# Patient Record
Sex: FEMALE | Race: WHITE | Employment: OTHER | URBAN - METROPOLITAN AREA
[De-identification: names, ages, dates, MRNs, and addresses within clinical notes are randomized per-mention and may not be internally consistent; named-entity substitution may affect disease eponyms.]

---

## 2019-03-20 ENCOUNTER — APPOINTMENT (OUTPATIENT)
Dept: ULTRASOUND IMAGING | Age: 84
DRG: 749 | End: 2019-03-20
Attending: EMERGENCY MEDICINE
Payer: MEDICARE

## 2019-03-20 ENCOUNTER — HOSPITAL ENCOUNTER (INPATIENT)
Age: 84
LOS: 15 days | Discharge: HOME | DRG: 749 | End: 2019-04-04
Attending: EMERGENCY MEDICINE | Admitting: FAMILY MEDICINE
Payer: MEDICARE

## 2019-03-20 ENCOUNTER — APPOINTMENT (OUTPATIENT)
Dept: CT IMAGING | Age: 84
DRG: 749 | End: 2019-03-20
Attending: EMERGENCY MEDICINE
Payer: MEDICARE

## 2019-03-20 DIAGNOSIS — L02.214 CUTANEOUS ABSCESS OF GROIN: Primary | ICD-10-CM

## 2019-03-20 DIAGNOSIS — Z98.890 S/P LAPAROSCOPY: ICD-10-CM

## 2019-03-20 DIAGNOSIS — K65.1 ABDOMINOPELVIC ABSCESS (HCC): ICD-10-CM

## 2019-03-20 PROBLEM — R10.9 ABDOMINAL PAIN: Status: ACTIVE | Noted: 2019-03-20

## 2019-03-20 LAB
ANION GAP SERPL CALC-SCNC: 6 MMOL/L (ref 5–15)
BASOPHILS # BLD: 0.1 K/UL (ref 0–0.1)
BASOPHILS NFR BLD: 1 % (ref 0–1)
BUN SERPL-MCNC: 17 MG/DL (ref 6–20)
BUN/CREAT SERPL: 18 (ref 12–20)
CALCIUM SERPL-MCNC: 9.5 MG/DL (ref 8.5–10.1)
CHLORIDE SERPL-SCNC: 105 MMOL/L (ref 97–108)
CO2 SERPL-SCNC: 29 MMOL/L (ref 21–32)
COMMENT, HOLDF: NORMAL
CREAT SERPL-MCNC: 0.93 MG/DL (ref 0.55–1.02)
D DIMER PPP FEU-MCNC: 2.74 MG/L FEU (ref 0–0.65)
DIFFERENTIAL METHOD BLD: ABNORMAL
EOSINOPHIL # BLD: 0.1 K/UL (ref 0–0.4)
EOSINOPHIL NFR BLD: 1 % (ref 0–7)
ERYTHROCYTE [DISTWIDTH] IN BLOOD BY AUTOMATED COUNT: 16.3 % (ref 11.5–14.5)
GLUCOSE SERPL-MCNC: 107 MG/DL (ref 65–100)
HCT VFR BLD AUTO: 34.2 % (ref 35–47)
HGB BLD-MCNC: 10.3 G/DL (ref 11.5–16)
IMM GRANULOCYTES # BLD AUTO: 0.1 K/UL (ref 0–0.04)
IMM GRANULOCYTES NFR BLD AUTO: 1 % (ref 0–0.5)
INR PPP: 1.1 (ref 0.9–1.1)
LACTATE BLD-SCNC: 1.04 MMOL/L (ref 0.4–2)
LYMPHOCYTES # BLD: 1.8 K/UL (ref 0.8–3.5)
LYMPHOCYTES NFR BLD: 15 % (ref 12–49)
MCH RBC QN AUTO: 29.9 PG (ref 26–34)
MCHC RBC AUTO-ENTMCNC: 30.1 G/DL (ref 30–36.5)
MCV RBC AUTO: 99.4 FL (ref 80–99)
MONOCYTES # BLD: 1.3 K/UL (ref 0–1)
MONOCYTES NFR BLD: 10 % (ref 5–13)
NEUTS SEG # BLD: 8.9 K/UL (ref 1.8–8)
NEUTS SEG NFR BLD: 72 % (ref 32–75)
NRBC # BLD: 0 K/UL (ref 0–0.01)
NRBC BLD-RTO: 0 PER 100 WBC
PLATELET # BLD AUTO: 407 K/UL (ref 150–400)
PMV BLD AUTO: 8.7 FL (ref 8.9–12.9)
POTASSIUM SERPL-SCNC: 3.5 MMOL/L (ref 3.5–5.1)
PROTHROMBIN TIME: 10.9 SEC (ref 9–11.1)
RBC # BLD AUTO: 3.44 M/UL (ref 3.8–5.2)
SAMPLES BEING HELD,HOLD: NORMAL
SODIUM SERPL-SCNC: 140 MMOL/L (ref 136–145)
TROPONIN I SERPL-MCNC: <0.05 NG/ML
WBC # BLD AUTO: 12.3 K/UL (ref 3.6–11)

## 2019-03-20 PROCEDURE — 36415 COLL VENOUS BLD VENIPUNCTURE: CPT

## 2019-03-20 PROCEDURE — 85379 FIBRIN DEGRADATION QUANT: CPT

## 2019-03-20 PROCEDURE — 71275 CT ANGIOGRAPHY CHEST: CPT

## 2019-03-20 PROCEDURE — 84484 ASSAY OF TROPONIN QUANT: CPT

## 2019-03-20 PROCEDURE — 96365 THER/PROPH/DIAG IV INF INIT: CPT

## 2019-03-20 PROCEDURE — 74177 CT ABD & PELVIS W/CONTRAST: CPT

## 2019-03-20 PROCEDURE — 85610 PROTHROMBIN TIME: CPT

## 2019-03-20 PROCEDURE — 83605 ASSAY OF LACTIC ACID: CPT

## 2019-03-20 PROCEDURE — 65270000032 HC RM SEMIPRIVATE

## 2019-03-20 PROCEDURE — 74011636320 HC RX REV CODE- 636/320: Performed by: RADIOLOGY

## 2019-03-20 PROCEDURE — 74011250636 HC RX REV CODE- 250/636: Performed by: FAMILY MEDICINE

## 2019-03-20 PROCEDURE — 80048 BASIC METABOLIC PNL TOTAL CA: CPT

## 2019-03-20 PROCEDURE — 76857 US EXAM PELVIC LIMITED: CPT

## 2019-03-20 PROCEDURE — 74011000258 HC RX REV CODE- 258: Performed by: EMERGENCY MEDICINE

## 2019-03-20 PROCEDURE — 87205 SMEAR GRAM STAIN: CPT

## 2019-03-20 PROCEDURE — 85025 COMPLETE CBC W/AUTO DIFF WBC: CPT

## 2019-03-20 PROCEDURE — 87040 BLOOD CULTURE FOR BACTERIA: CPT

## 2019-03-20 PROCEDURE — 75810000289 HC I&D ABSCESS SIMP/COMP/MULT

## 2019-03-20 PROCEDURE — 74011250636 HC RX REV CODE- 250/636: Performed by: EMERGENCY MEDICINE

## 2019-03-20 PROCEDURE — 74011000250 HC RX REV CODE- 250: Performed by: EMERGENCY MEDICINE

## 2019-03-20 PROCEDURE — 99285 EMERGENCY DEPT VISIT HI MDM: CPT

## 2019-03-20 PROCEDURE — 74011000258 HC RX REV CODE- 258: Performed by: RADIOLOGY

## 2019-03-20 PROCEDURE — 93005 ELECTROCARDIOGRAM TRACING: CPT

## 2019-03-20 RX ORDER — HEPARIN SODIUM 5000 [USP'U]/ML
5000 INJECTION, SOLUTION INTRAVENOUS; SUBCUTANEOUS EVERY 8 HOURS
Status: DISCONTINUED | OUTPATIENT
Start: 2019-03-20 | End: 2019-03-21

## 2019-03-20 RX ORDER — SODIUM CHLORIDE 0.9 % (FLUSH) 0.9 %
5-40 SYRINGE (ML) INJECTION EVERY 8 HOURS
Status: DISCONTINUED | OUTPATIENT
Start: 2019-03-20 | End: 2019-04-04 | Stop reason: HOSPADM

## 2019-03-20 RX ORDER — DOXYCYCLINE 100 MG/1
100 CAPSULE ORAL 2 TIMES DAILY
COMMUNITY
End: 2019-04-04

## 2019-03-20 RX ORDER — SODIUM CHLORIDE 0.9 % (FLUSH) 0.9 %
10 SYRINGE (ML) INJECTION
Status: COMPLETED | OUTPATIENT
Start: 2019-03-20 | End: 2019-03-20

## 2019-03-20 RX ORDER — BACITRACIN 500 UNIT/G
1 PACKET (EA) TOPICAL
Status: COMPLETED | OUTPATIENT
Start: 2019-03-20 | End: 2019-03-20

## 2019-03-20 RX ORDER — SODIUM CHLORIDE, SODIUM LACTATE, POTASSIUM CHLORIDE, CALCIUM CHLORIDE 600; 310; 30; 20 MG/100ML; MG/100ML; MG/100ML; MG/100ML
100 INJECTION, SOLUTION INTRAVENOUS CONTINUOUS
Status: DISCONTINUED | OUTPATIENT
Start: 2019-03-20 | End: 2019-03-20

## 2019-03-20 RX ORDER — METRONIDAZOLE 500 MG/100ML
500 INJECTION, SOLUTION INTRAVENOUS EVERY 12 HOURS
Status: DISCONTINUED | OUTPATIENT
Start: 2019-03-20 | End: 2019-03-23

## 2019-03-20 RX ORDER — SODIUM CHLORIDE 0.9 % (FLUSH) 0.9 %
5-40 SYRINGE (ML) INJECTION AS NEEDED
Status: DISCONTINUED | OUTPATIENT
Start: 2019-03-20 | End: 2019-04-04 | Stop reason: HOSPADM

## 2019-03-20 RX ORDER — ACETAMINOPHEN 325 MG/1
650 TABLET ORAL
Status: DISCONTINUED | OUTPATIENT
Start: 2019-03-20 | End: 2019-04-04 | Stop reason: HOSPADM

## 2019-03-20 RX ORDER — SODIUM CHLORIDE 9 MG/ML
75 INJECTION, SOLUTION INTRAVENOUS CONTINUOUS
Status: DISCONTINUED | OUTPATIENT
Start: 2019-03-20 | End: 2019-03-24

## 2019-03-20 RX ORDER — LIDOCAINE HYDROCHLORIDE AND EPINEPHRINE 10; 10 MG/ML; UG/ML
1.5 INJECTION, SOLUTION INFILTRATION; PERINEURAL ONCE
Status: COMPLETED | OUTPATIENT
Start: 2019-03-20 | End: 2019-03-20

## 2019-03-20 RX ORDER — VANCOMYCIN 1.75 GRAM/500 ML IN 0.9 % SODIUM CHLORIDE INTRAVENOUS
1750 ONCE
Status: COMPLETED | OUTPATIENT
Start: 2019-03-20 | End: 2019-03-21

## 2019-03-20 RX ADMIN — VANCOMYCIN HYDROCHLORIDE 1750 MG: 10 INJECTION, POWDER, LYOPHILIZED, FOR SOLUTION INTRAVENOUS at 22:49

## 2019-03-20 RX ADMIN — SODIUM CHLORIDE, SODIUM LACTATE, POTASSIUM CHLORIDE, AND CALCIUM CHLORIDE 100 ML/HR: 600; 310; 30; 20 INJECTION, SOLUTION INTRAVENOUS at 13:56

## 2019-03-20 RX ADMIN — BACITRACIN 1 PACKET: 500 OINTMENT TOPICAL at 17:25

## 2019-03-20 RX ADMIN — Medication 10 ML: at 16:15

## 2019-03-20 RX ADMIN — SODIUM CHLORIDE 100 ML: 900 INJECTION, SOLUTION INTRAVENOUS at 16:15

## 2019-03-20 RX ADMIN — HEPARIN SODIUM 5000 UNITS: 5000 INJECTION INTRAVENOUS; SUBCUTANEOUS at 21:34

## 2019-03-20 RX ADMIN — Medication 10 ML: at 21:21

## 2019-03-20 RX ADMIN — IOPAMIDOL 100 ML: 755 INJECTION, SOLUTION INTRAVENOUS at 16:15

## 2019-03-20 RX ADMIN — PIPERACILLIN AND TAZOBACTAM 3.38 G: 3; .375 INJECTION, POWDER, FOR SOLUTION INTRAVENOUS at 19:37

## 2019-03-20 RX ADMIN — SODIUM CHLORIDE 75 ML/HR: 900 INJECTION, SOLUTION INTRAVENOUS at 21:21

## 2019-03-20 RX ADMIN — LIDOCAINE HYDROCHLORIDE,EPINEPHRINE BITARTRATE 15 MG: 10; .01 INJECTION, SOLUTION INFILTRATION; PERINEURAL at 17:24

## 2019-03-20 RX ADMIN — METRONIDAZOLE 500 MG: 500 INJECTION, SOLUTION INTRAVENOUS at 21:32

## 2019-03-20 NOTE — ED PROVIDER NOTES
81 y.o. female with no significant past medical history documented, who presents ambulatory to the ED accompanied by friend, with chief complaint of skin problem. Friend reports that patient became \"sick\" with \"uncontrolable\" diarrhea and increased weakness that has progressively worsened. She states that the patient visited [facility] on 2/14/19, and was transported to [facility] in [place] after her WBC was \"59,000\". Friend reports that patient was diagnosed with \"sepsis, borderline tachycardia, renal failure and pelvic abscess\". 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 Friend also reports that patient visited [facility] on 3/13/19 to have abscess drained, and was prescribed doxycycline 100 mg BID. Pt reports that her home health care nurse came in this morning and recommended the patient come to the ED to have abscess drained. Patient also complains of mild shortness of breath while walking in the parking lot to the ED today. Pt notes that she last ate ~0730 this morning, and her last PO fluid intake was ~1000. Notes that she takes ASA every day. Pt specifically denies fever, nausea, vomiting, diarrhea, chest pain, shortness of breath, cough, hemoptysis, rhinorhea, congestion, sore throat, abdominal pain, dysuria, change in appetite or any pain. There are no other acute medical concerns at this time. Review of medical records indicate that the patient was admitted to [facility] for septic shock on 2/14/19. Records also indicate that patient had a CT on 3/7/19 which indicated resolution of previously demonstrated pelvic abscess and an increase in fluid collection in right inguinal region, abscess vs. Hematoma measuring 6 x 4.5 cm with no adenopathy.  Patient was also seen by Dr. Reddy Sin at [facility] 8 days ago and was diagnosed with this r groin abscess. She was placed on 10 days of doxycyline. Fluid culture from that visit shows no growth. Social hx: Negative for Tobacco use; Negative for EtOH use; Negative for Illicit Drug use PCP: No primary care provider on file. Note written by Isauro Cabrales, as dictated by Oral Kris, DO 1:39 PM 
 
The history is provided by the patient, medical records and a friend. No  was used. History reviewed. No pertinent past medical history. Past Surgical History:  
Procedure Laterality Date [address] UNLISTED Abcess drained History reviewed. No pertinent family history. Social History Socioeconomic History  Marital status:  Spouse name: Not on file  Number of children: Not on file  Years of education: Not on file  Highest education level: Not on file Occupational History  Not on file Social Needs  Financial resource strain: Not on file  Food insecurity:  
  Worry: Not on file Inability: Not on file  Transportation needs:  
  Medical: Not on file Non-medical: Not on file Tobacco Use  Smoking status: Never Smoker  Smokeless tobacco: Never Used Substance and Sexual Activity  Alcohol use: Not Currently  Drug use: Never  Sexual activity: Not on file Lifestyle  Physical activity:  
  Days per week: Not on file Minutes per session: Not on file  Stress: Not on file Relationships  Social connections:  
  Talks on phone: Not on file Gets together: Not on file Attends [employer] service: Not on file Active member of club or organization: Not on file Attends meetings of clubs or organizations: Not on file Relationship status: Not on file  Intimate partner violence:  
  Fear of current or ex partner: Not on file Emotionally abused: Not on file Physically abused: Not on file Forced sexual activity: Not on file Other Topics Concern  Not on file Social History Narrative  Not on file ALLERGIES: Patient has no known allergies. Review of Systems Constitutional: Negative for appetite change and fever. HENT: Negative for congestion, rhinorrhea and sore throat. Respiratory: Positive for shortness of breath. Negative for cough. Cardiovascular: Negative for chest pain. Gastrointestinal: Positive for diarrhea. Negative for abdominal pain, nausea and vomiting. Genitourinary: Negative for dysuria. Musculoskeletal: Negative for arthralgias, back pain, myalgias and neck pain. Skin: Positive for wound (left inguinal). Neurological: Positive for weakness (generalized). All other systems reviewed and are negative. Vitals:  
 03/20/19 1253 03/20/19 1317 BP:  125/64 Pulse: (!) 160 94 Resp:  20 Temp:  97.4 °F (36.3 °C) SpO2: 98% 98% Physical Exam  
Abdominal:  
 
 
  
Constitutional: Pt is awake and alert. Frail and elderly. NAD. HENT:  
Head: Normocephalic and atraumatic. Nose: Nose normal.  
Mouth/Throat: Oropharynx is clear and moist. No oropharyngeal exudate. Eyes: Conjunctivae and extraocular motions are normal. Pupils are equal, round, and reactive to light. Right eye exhibits no discharge. Left eye exhibits no discharge. No scleral icterus. Neck: No tracheal deviation present. Supple neck. Cardiovascular: Borderline tachycardic, regular rhythm, normal heart sounds and intact distal pulses. Exam reveals no gallop and no friction rub. No murmur heard. Pulmonary/Chest: Effort normal and breath sounds normal.  Pt  has no wheezes. Pt  has no rales. Abdominal: Soft. Pt  exhibits no distension and no mass. No tenderness. Pt  has no rebound and no guarding. Abscess noted, see above. Musculoskeletal:  Pt  exhibits no edema and no tenderness.   
Ext: Normal ROM in all four extremities; not tender to palpation; distal pulses are normal, no edema. Neurological:  Pt is alert. nonfocal neuro exam. 
Skin: Skin is warm and dry. Pt  is not diaphoretic. Psychiatric:  Pt  has a normal mood and affect. Behavior is normal.  
Note written by Isauro Cabrales, as dictated by Rodney Ponce DO 1:39 PM 
 
MDM I&D Abcess Complex Date/Time: 3/20/2019 5:42 PM 
Performed by: Rodney Ponce DO Authorized by: Rodney Ponce DO Consent:  
  Consent obtained:  Verbal 
  Consent given by:  Patient Risks discussed:  Bleeding Alternatives discussed:  No treatment Location:  
  Type:  Abscess Size:  4 cm diameter Location:  Trunk Trunk location:  Abdomen Pre-procedure details:  
  Skin preparation:  Chloraprep Procedure type:  
  Complexity:  Complex Procedure details:  
  Needle aspiration: no Incision types:  Stab incision and single straight Incision depth:  Subcutaneous Scalpel blade:  11 Wound management:  Probed and deloculated Drainage:  Purulent and bloody Drainage amount: Moderate Wound treatment:  Wound left open Packing materials:  1/2 in gauze Amount 1/2\": 5-6 inches Post-procedure details:  
  Patient tolerance of procedure: Tolerated well, no immediate complications Comments:  
   2 separate abscesses entered. 2 separate swabs for cx. ED EKG interpretation: 
Rhythm: sinus tachycardia; and regular . Rate (approx.): 132 bpm; Axis: normal; Intervals: normal; ST/T wave: no acute ST or T wave changes noted; no ectopy; Note written by Isauro Cabrales, as dictated by Rodney Ponce DO 1:39 PM 
 
PROGRESS NOTE: 
3:09 PM 
Reviewed ultrasound images. 5:43 PM - reviewed CT images earlier. Skin abscesses drained, packed. abx needed. Pelvic abscess is different - zosyn. Needs surgical consult, perhaps gyn onc. Hospitalist Andrews for Admission 5:47 PM 
 
ED Room Number: ER06/06 Patient Name and age:  Sarah Donahue 81 y.o.  female Working Diagnosis: 1. Cutaneous abscess of groin 2. Abdominopelvic abscess ([address] Readmission: yes Isolation Requirements:  no 
Recommended Level of Care:  med/surg Code Status:   Full CONSULT NOTE: 
6:21 PM Jossie Lu DO communicated with Dr. Lazaro Rodriguez MD, Consult for Hospitalist via [facility] Text. Discussed available diagnostic tests and clinical findings. Dr. Rosales Ng will evaluate the patient for admission to the hospital. 
 
6:21 PM 
Patient is being admitted to the hospital.  The results of their tests and reasons for their admission have been discussed with them and/or available family. They convey agreement and understanding for the need to be admitted and for their admission diagnosis. CONSULT NOTE: 
6:32 PM Jossie Lu DO spoke with Dr. Georgina Willis MD, Consult for Surgery. Discussed available diagnostic tests and clinical findings. Dr. Bogdan Mcmillan will evaluate the patient while in the hospital.  He has ordered IR drain to be done. Plans sinogram in 2 days. D/w pt and her friend who is with her. Labs reviewed Doubt nec fasciitis. Likely has mrsa in R groin.

## 2019-03-20 NOTE — PROGRESS NOTES
Clinical Pharmacy Note: Metronidazole Dosing Please note that the metronidazole dose for Ethel Mom has been changed to 500 mg IV q12h per [facility]-approved protocol. Please contact the pharmacy with any questions.  
 
Saúl Aguilar, OSEASD

## 2019-03-20 NOTE — PROGRESS NOTES
Admission Medication Reconciliation: 
Information obtained from: pt/ Rx Query Significant PMH/Disease States:  
History reviewed. No pertinent past medical history. Chief Complaint for this Admission:  abdominal abscess Allergies:  Patient has no known allergies. Prior to Admission Medications:  
Prior to Admission Medications Prescriptions Last Dose Informant Patient Reported? Taking?  
doxycycline (MONODOX) 100 mg capsule 3/20/2019 at am  Yes Yes Sig: Take 100 mg by mouth two (2) times a day. Facility-Administered Medications: None Comments/Recommendations: Reviewed PTA list w/ pt and Rx Query was used. Pt reported that she is only taking doxycycline since 3/13. Denies missing any doses. And the most recent dose was this morning. Pt denies taking any OTC medications or using any topical products. Reviewed medication allergies with pt, pt reported no know allergies. Nina Bruno PharmD candidate

## 2019-03-20 NOTE — ED TRIAGE NOTES
Triage: Patient arrives from home with c/o abscess that has been present since January of this year. Patient states she had it drained last Tuesday with a home health nurse that was following. Per nurse recommendation patient was to come into an ER to have abscess drained again. Patient denies having any fevers.

## 2019-03-21 ENCOUNTER — APPOINTMENT (OUTPATIENT)
Dept: CT IMAGING | Age: 84
DRG: 749 | End: 2019-03-21
Attending: SURGERY
Payer: MEDICARE

## 2019-03-21 LAB
ALBUMIN SERPL-MCNC: 2.2 G/DL (ref 3.5–5)
ALBUMIN/GLOB SERPL: 0.6 {RATIO} (ref 1.1–2.2)
ALP SERPL-CCNC: 58 U/L (ref 45–117)
ALT SERPL-CCNC: 25 U/L (ref 12–78)
ANION GAP SERPL CALC-SCNC: 7 MMOL/L (ref 5–15)
APTT PPP: 28.2 SEC (ref 22.1–32)
AST SERPL-CCNC: 17 U/L (ref 15–37)
ATRIAL RATE: 132 BPM
BASOPHILS # BLD: 0 K/UL (ref 0–0.1)
BASOPHILS NFR BLD: 0 % (ref 0–1)
BILIRUB SERPL-MCNC: 0.3 MG/DL (ref 0.2–1)
BUN SERPL-MCNC: 13 MG/DL (ref 6–20)
BUN/CREAT SERPL: 18 (ref 12–20)
CALCIUM SERPL-MCNC: 8.5 MG/DL (ref 8.5–10.1)
CALCULATED P AXIS, ECG09: 55 DEGREES
CALCULATED R AXIS, ECG10: 8 DEGREES
CALCULATED T AXIS, ECG11: 65 DEGREES
CHLORIDE SERPL-SCNC: 110 MMOL/L (ref 97–108)
CO2 SERPL-SCNC: 26 MMOL/L (ref 21–32)
CREAT SERPL-MCNC: 0.71 MG/DL (ref 0.55–1.02)
DIAGNOSIS, 93000: NORMAL
DIFFERENTIAL METHOD BLD: ABNORMAL
EOSINOPHIL # BLD: 0.1 K/UL (ref 0–0.4)
EOSINOPHIL NFR BLD: 1 % (ref 0–7)
ERYTHROCYTE [DISTWIDTH] IN BLOOD BY AUTOMATED COUNT: 16.4 % (ref 11.5–14.5)
GLOBULIN SER CALC-MCNC: 4 G/DL (ref 2–4)
GLUCOSE SERPL-MCNC: 116 MG/DL (ref 65–100)
HCT VFR BLD AUTO: 29.3 % (ref 35–47)
HGB BLD-MCNC: 9 G/DL (ref 11.5–16)
IMM GRANULOCYTES # BLD AUTO: 0.1 K/UL (ref 0–0.04)
IMM GRANULOCYTES NFR BLD AUTO: 1 % (ref 0–0.5)
LYMPHOCYTES # BLD: 1.6 K/UL (ref 0.8–3.5)
LYMPHOCYTES NFR BLD: 11 % (ref 12–49)
MCH RBC QN AUTO: 29.8 PG (ref 26–34)
MCHC RBC AUTO-ENTMCNC: 30.7 G/DL (ref 30–36.5)
MCV RBC AUTO: 97 FL (ref 80–99)
MONOCYTES # BLD: 1 K/UL (ref 0–1)
MONOCYTES NFR BLD: 7 % (ref 5–13)
NEUTS SEG # BLD: 11.4 K/UL (ref 1.8–8)
NEUTS SEG NFR BLD: 80 % (ref 32–75)
NRBC # BLD: 0 K/UL (ref 0–0.01)
NRBC BLD-RTO: 0 PER 100 WBC
P-R INTERVAL, ECG05: 160 MS
PLATELET # BLD AUTO: 358 K/UL (ref 150–400)
PMV BLD AUTO: 8.8 FL (ref 8.9–12.9)
POTASSIUM SERPL-SCNC: 3.4 MMOL/L (ref 3.5–5.1)
PROT SERPL-MCNC: 6.2 G/DL (ref 6.4–8.2)
Q-T INTERVAL, ECG07: 324 MS
QRS DURATION, ECG06: 68 MS
QTC CALCULATION (BEZET), ECG08: 480 MS
RBC # BLD AUTO: 3.02 M/UL (ref 3.8–5.2)
SODIUM SERPL-SCNC: 143 MMOL/L (ref 136–145)
THERAPEUTIC RANGE,PTTT: NORMAL SECS (ref 58–77)
VENTRICULAR RATE, ECG03: 132 BPM
WBC # BLD AUTO: 14.3 K/UL (ref 3.6–11)

## 2019-03-21 PROCEDURE — 74011250636 HC RX REV CODE- 250/636: Performed by: FAMILY MEDICINE

## 2019-03-21 PROCEDURE — 85730 THROMBOPLASTIN TIME PARTIAL: CPT

## 2019-03-21 PROCEDURE — 65270000032 HC RM SEMIPRIVATE

## 2019-03-21 PROCEDURE — 36415 COLL VENOUS BLD VENIPUNCTURE: CPT

## 2019-03-21 PROCEDURE — 80053 COMPREHEN METABOLIC PANEL: CPT

## 2019-03-21 PROCEDURE — 74011000258 HC RX REV CODE- 258: Performed by: FAMILY MEDICINE

## 2019-03-21 PROCEDURE — 85025 COMPLETE CBC W/AUTO DIFF WBC: CPT

## 2019-03-21 RX ADMIN — METRONIDAZOLE 500 MG: 500 INJECTION, SOLUTION INTRAVENOUS at 19:21

## 2019-03-21 RX ADMIN — HEPARIN SODIUM 5000 UNITS: 5000 INJECTION INTRAVENOUS; SUBCUTANEOUS at 07:01

## 2019-03-21 RX ADMIN — SODIUM CHLORIDE 75 ML/HR: 900 INJECTION, SOLUTION INTRAVENOUS at 16:40

## 2019-03-21 RX ADMIN — PIPERACILLIN SODIUM,TAZOBACTAM SODIUM 3.38 G: 3; .375 INJECTION, POWDER, FOR SOLUTION INTRAVENOUS at 09:12

## 2019-03-21 RX ADMIN — Medication 10 ML: at 15:26

## 2019-03-21 RX ADMIN — PIPERACILLIN SODIUM,TAZOBACTAM SODIUM 3.38 G: 3; .375 INJECTION, POWDER, FOR SOLUTION INTRAVENOUS at 17:45

## 2019-03-21 RX ADMIN — METRONIDAZOLE 500 MG: 500 INJECTION, SOLUTION INTRAVENOUS at 07:01

## 2019-03-21 RX ADMIN — PIPERACILLIN SODIUM,TAZOBACTAM SODIUM 3.38 G: 3; .375 INJECTION, POWDER, FOR SOLUTION INTRAVENOUS at 02:18

## 2019-03-21 RX ADMIN — Medication 10 ML: at 21:48

## 2019-03-21 RX ADMIN — VANCOMYCIN HYDROCHLORIDE 1000 MG: 1 INJECTION, POWDER, LYOPHILIZED, FOR SOLUTION INTRAVENOUS at 16:40

## 2019-03-21 NOTE — PROGRESS NOTES
Care Management Interventions PCP Verified by CM: No(12/2018  dr torres ) Last Visit to PCP: 12/13/18 Palliative Care Criteria Met (RRAT>21 & CHF Dx)?: No 
Mode of Transport at Discharge: (car son Judie Perez ) Transition of Care Consult (CM Consult): Home Health(patient open to [facility] ) [facility] - INPATIENT: No 
Reason Outside [place]: Patient already serviced by other home care/hospice agency Physical Therapy Consult: No 
Occupational Therapy Consult: No 
Speech Therapy Consult: No 
Current Support Network: Lives Alone(son lives in [place] ph [phone]) Confirm Follow Up Transport: Family(family) Plan discussed with Pt/Family/Caregiver: Yes(Patient who has an AMD and Living Will  uses [facility] in [place]) [employer] Information Provided?: No 
Discharge Location Discharge Placement: Home  met with patient and introduced self to patient who is retired from the [employer]. Patient lives in [place] and her son Mitul Lofton ph [phone] lives in [place]. Patient also has three siblings who lives within 5 miles of her home. Patient states she has been at three hospitals in the last month and is now on\"heavy duty antibiotics\". She started out at [facility] as her son lives in the area. She then went to [facility] and went home with [facility]. She would like to use them post discharge. .  Care management will follow for transitions of care needs.

## 2019-03-21 NOTE — H&P
[facility] HISTORY AND PHYSICAL Name:  Nury Morgan 
MR#:  [id number] :  1935 ACCOUNT #:  [de-identified] ADMIT DATE:  2019 CHIEF COMPLAINT:  Abscess. HISTORY OF PRESENT ILLNESS:  The patient is an 80-year-old female with apparently no past medical history, who presents to the hospital with the above-mentioned symptom. The patient has had a complicated course in the last few days, apparently presented to [facility] and then was transferred to [facility] with 10-day history of increasing right lower quadrant, urinary urgency, rigors and passage of fecal material either in her urine or from her vagina, and this was on 2019. The patient also had some constipation associated with diarrhea, but no bloody stools associated with the same. The patient was found to have a pelvic abscess, and they were concerned that there may be a fistula present. The patient underwent a CT scan which showed a pelvic abscess. A CT-guided drainage of the abscess had produced 400 mL of purulent fluid. They placed a drain which continued to produce 925 mL of purulent fluid. Her drain then fell out the next day. A repeat CT scan was then performed that showed a large pelvic abscess. She underwent CT-guided drainage of the abscess that produced another 370 mL of purulent fluid. Her drain continued to produce purulent fluid throughout the hospital course, and the patient was eventually discharged with a drain in place. The patient was started on IV Zosyn, continued to have IV Zosyn throughout her hospital course and then eventually was switched to ciprofloxacin orally.   The patient was also found to have IUD that has been placed about 50 years back, and she was supposed to follow up for outpatient followup and possible removal.  GYN had consulted on her while she was in the hospital and they felt that the patient does not need the IUD removed at that point of time. The patient reports that she was discharged with home healthcare, and today, the healthcare nurse came and noted that the patient had an abscess on her right groin and that started draining. The patient reports that she also had decreased appetite, not eating or drinking well, and had some nausea associated with diarrhea. The patient reports that she got concerned and decided to come to the hospital.  The patient reports that she also had some shortness of breath while she was walking in the parking lot today, reports that she has finished her antibiotics. 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 The patient reports that she had the abscess drained at [facility] again on 03/13/2019 and was prescribed doxycycline. The patient came to the ER today and was found to have a groin abscess as well as a pelvic abscess. Dr. Luis Mcdowell from Surgery was consulted and requested the patient to be admitted to the hospitalist service. Currently, the patient is resting in bed. Denies any headaches, blurry vision, sore throat, trouble swallowing, trouble with speech, any chest pain, shortness of breath, cough, fever, chills, urinary symptoms, focal or generalized neurological weakness, recent travel, sick contacts, any falls, injuries, hematemesis, melena, hemoptysis, hematuria or any other concerns or problems. PAST MEDICAL HISTORY:  See above. HOME MEDICATIONS:  None except for aspirin 81 mg daily. ALLERGIES:  NO KNOWN DRUG ALLERGIES. SOCIAL HISTORY:  Denies tobacco abuse, alcohol use, IV drug abuse. FAMILY HISTORY:  Discussed, was found to be noncontributory to the present admission. PHYSICAL EXAMINATION: 
VITAL SIGNS:  Temperature 98.9, pulse 92, respiratory rate 19, blood pressure 136/44, pulse oximetry 98% on room air. GENERAL:  Alert, oriented x3, awake, mildly distressed, pleasant female, appears to be of stated age. HEENT:  Pupils equal and reactive to light. Dry mucous membranes. Tympanic membranes are clear. NECK:  Supple. CHEST:  Clear to auscultation bilaterally. HEART:  S1, S2 were heard. ABDOMEN:  Soft, nontender, nondistended. Bowel sounds are physiological.  There is an abscess in the right groin with possible cellulitis, draining and purulent. NEURO/PSYCH:  Pleasant mood and affect. Cranial nerves II through XII grossly intact. Sensory grossly within normal limits. DTRs 2+/4. Strength 5/5. SKIN:  Warm. LABORATORY DATA:  White count 12.3, hemoglobin 10.3, hematocrit 34.2, platelets 805. INR 1.1. Sodium 140, potassium 3.5, chloride 105, bicarbonate 29, anion gap 6, glucose 105, BUN 17, creatinine 0.93, calcium 9.5. Troponin less than 0.05. Blood cultures and wound cultures have been sent. CT of the chest, abdomen and pelvis shows no PE.  2.1 x 5.8 x 3.4 cm posterior pelvic collection, positive for abscess possibly secondary to diverticulitis. Soft tissue collection in the deep soft tissue of the right groin measuring 2.3 x 4.6 x 3.5 cm, suspicious for abscess. Additional incidental findings as above. ASSESSMENT AND PLAN: 
1. Posterior pelvic abscess: The patient will be admitted on a telemetry bed. Surgery has been consulted. They are planning to consult Interventional Radiology in the morning for possible interventional radiology drainage of the abscess. Then, they probably will do a sinogram to look for any fistulas. Recommend starting the patient on IV antibiotics that have been initiated. Keep n.p.o. after midnight. IV fluids, supportive care, pain control, blood cultures, wound cultures and continue to monitor. Further intervention per hospital course. 2.  Right groin abscess: We will provide IV antibiotics.   Await Surgery input.  The patient had a bedside incision and drainage done for the same. Pain control, supportive care, blood cultures, wound cultures and continue to closely monitor. Further intervention per hospital course. Reassess as needed. We will titrate the antibiotic based on the culture results. 3.  Mild dehydration: We will start the patient on gentle IV hydration. Repeat labs in the morning. 4.  Leukocytosis:  Most likely secondary to above. The patient on IV antibiotics. See above. 5.  Anemia:  Appears to be chronic. I will continue home medications and continue to closely monitor. 6.  Shortness of breath:  Most likely secondary to above. The CT of the chest does not show any acute pulmonary embolus. Supportive care, continue to monitor. If symptoms persist, may consider further intervention and diagnostics. The patient will be on telemetry monitoring. Further intervention per hospital course. We will cycle troponin. Reassess as needed. 7.  Gastrointestinal/deep venous thrombosis prophylaxis:  The patient will be on heparin. Sammie Atwood MD MM/CHANTE_DOM/BS_EDIT 
D:  03/20/2019 19:34 
T:  03/20/2019 21:21 
JOB #:  [id number]

## 2019-03-21 NOTE — PROGRESS NOTES
Bedside shift change report given to MAYI Corbin (oncoming nurse) by MAYI Hale (offgoing nurse). Report included the following information SBAR, Kardex, Intake/Output and MAR.

## 2019-03-21 NOTE — PROGRESS NOTES
Pharmacist Note - Vancomycin Dosing Consult provided for this 81 y.o. female for indication of pelvic abscess/intra-abdominal infection. Antibiotic regimen(s): Vanc + Zosyn Recent Labs  
  19 
1331 WBC 12.3*  
CREA 0.93 BUN 17 Frequency of BMP: daily Height: 170.2 cm Weight: 78.1 kg Est CrCl: 50 ml/min; UO: -- ml/kg/hr Temp (24hrs), Av.1 °F (36.7 °C), Min:97.4 °F (36.3 °C), Max:98.9 °F (37.2 °C) Cultures: 
3/20 wound - pending 3/20 blood - pending Goal trough = 10 - 15 mcg/mL Therapy will be initiated with a loading dose of 1750 mg IV x 1 to be followed by a maintenance dose of 1000 mg IV every 18 hours. Pharmacy to follow patient daily and order levels / make dose adjustments as appropriate.

## 2019-03-21 NOTE — PROGRESS NOTES
Patient is due to have a CT Guided Drain placed, vitals taken within normal limits, & PTT sent to the lab.

## 2019-03-21 NOTE — CONSULTS
Surgery Consult    Subjective:      Angi Romero is a 81 y.o. female who is being seen for evaluation of pelvic, right groin abscesses. She has a history of sepsis attributed to pelvic abscess in 2/2019, managed in [place] with IV antibiotics, drain placement. She gradually improved and was discharged to home. Last week she was diagnosed with a right groin abscess (aspirated in [place]) and was started on oral antibiotics. On 3/20 her home health nurse recommended she present to [facility] for evaluation. She denies abdominal/groin pain; she notes swelling in the right groin area and dyspnea on exertion. No fever, chills, change in bowel habits, chest pain, wheezing, pneumaturia, or night sweats. Imaging performed in [facility] ED confirmed right groin abscess which was drained by ED staff. No prior colonoscopy. Patient Active Problem List    Diagnosis Date Noted    Abdominal pain 03/20/2019     History reviewed. No pertinent past medical history. Past Surgical History:   Procedure Laterality Date    ABDOMEN SURGERY PROC UNLISTED      Abcess drained       Social History     Tobacco Use    Smoking status: Never Smoker    Smokeless tobacco: Never Used   Substance Use Topics    Alcohol use: Not Currently      History reviewed. No pertinent family history.    Current Facility-Administered Medications   Medication Dose Route Frequency    sodium chloride (NS) flush 5-40 mL  5-40 mL IntraVENous Q8H    sodium chloride (NS) flush 5-40 mL  5-40 mL IntraVENous PRN    0.9% sodium chloride infusion  75 mL/hr IntraVENous CONTINUOUS    acetaminophen (TYLENOL) tablet 650 mg  650 mg Oral Q4H PRN    piperacillin-tazobactam (ZOSYN) 3.375 g in 0.9% sodium chloride (MBP/ADV) 100 mL  3.375 g IntraVENous Q8H    metroNIDAZOLE (FLAGYL) IVPB premix 500 mg  500 mg IntraVENous Q12H    Vancomycin Pharmacy Dosing   Other Rx Dosing/Monitoring    vancomycin (VANCOCIN) 1,000 mg in 0.9% sodium chloride (MBP/ADV) 250 mL  1,000 mg IntraVENous Q18H      No Known Allergies    Review of Systems:    A complete review of systems was negative except as noted in the HPI. Objective:        Visit Vitals  /62 (BP 1 Location: Right arm, BP Patient Position: At rest)   Pulse 85   Temp 98.9 °F (37.2 °C)   Resp 18   Ht 5' 7\" (1.702 m)   Wt 157 lb 1.6 oz (71.3 kg)   SpO2 94%   BMI 24.61 kg/m²       Physical Exam:  GENERAL: alert, cooperative, no distress, appears stated age, EYE: negative, LYMPH NODES: Cervical, supraclavicular nodes normal. THROAT & NECK: normal, LUNG: clear to auscultation bilaterally, HEART: regular rate and rhythm, S1, S2 normal, no murmur. ABDOMEN: Non-distended, NABS, soft. No pain with palpation, mass, or hernia. EXTREMITIES:  extremities normal, atraumatic, no cyanosis or edema, SKIN: right groin wound packed (faint cellulitis at surrounding skin). NEUROLOGIC: negative    Imaging:  reviewed  Ultrasound, CT and CTA     CT Results  (Last 48 hours)               03/20/19 1706  CTA CHEST W OR W WO CONT Final result    Impression:  IMPRESSION:   1. 2.1 x 5.8 x 3.4 cm posterior pelvic collection suspicious for abscess,   possibly secondary to diverticulitis. .   2. Soft tissue collection in the deep soft tissues of the right groin measuring   2.3 x 4.6 x 3.5 cm suspicious for abscess. 3. Additional incidental findings as above. Narrative:  INDICATION: Dyspnea on exertion with positive d-dimer. Status post abscess   drainage. COMPARISON: Ultrasound performed just prior to this exam.        TECHNIQUE:  2.5 mm axial images were obtained from the bases to the lung apices   after the intravenous administration of 100 cc of Isovue-370. Three-dimensional   postprocessing was performed by the technologist with MIP reconstructions. Portal venous imaging was obtained through the abdomen and pelvis with sagittal   and coronal reformats.  Subsequent portal venous imaging of the abdomen and   pelvis was performed with sagittal and coronal reformats. Oral contrast was not   administered CT dose reduction was achieved through use of a standardized   protocol tailored for this examination and automatic exposure control for dose   modulation. FINDINGS:       THYROID: No nodule. MEDIASTINUM: No mass or lymphadenopathy. GELACIO: No mass or lymphadenopathy. THORACIC AORTA: No dissection or aneurysm. MAIN PULMONARY ARTERY: No acute pulmonary embolus   TRACHEA/BRONCHI: Patent. ESOPHAGUS: No wall thickening or dilatation. HEART: Normal in size without pericardial effusion. Coronary artery   calcifications are noted. PLEURA: No effusion or pneumothorax. LUNGS: Bilateral lower lobe dependent atelectasis with areas of linear   atelectasis or scarring. LIVER: Simple appearing right cyst. No enhancing masses or biliary ductal   dilation. Rafa Nakayama GALLBLADDER: Unremarkable. SPLEEN: No mass. PANCREAS: No mass or ductal dilation. ADRENALS: Unremarkable. KIDNEYS: Small bilateral parapelvic cyst. No enhanced mass, calculus, or   hydronephrosis. STOMACH: Unremarkable. SMALL BOWEL: No dilatation or wall thickening. COLON: No dilatation or wall thickening. Noninflamed appearing pancolonic   diverticula. Colon traverses immediately superior to the pelvic collection. APPENDIX: Unremarkable. PERITONEUM: There is a posterior pelvic collection eccentric to the left   measuring 3.1 x 5.8 x 3.4 cm with rim enhancement. No free fluid or   pneumoperitoneum. RETROPERITONEUM: Atherosclerotic calcification without aneurysm or dissection. REPRODUCTIVE ORGANS: IUD within otherwise unremarkable appearing uterus. Bilateral cystic lesions of the ovaries measuring 1.2 x 1.3 cm on the right and   1.2 x 1.5 cm on the left. URINARY BLADDER: No mass or calculus. BONES: Degenerative spine change. No acute fracture or aggressive lesion.    ADDITIONAL COMMENTS: Collection of the right groin with rim enhancement measures   2.3 x 4.6 x 3.5 cm.           03/20/19 1706  CT ABD PELV W CONT Final result    Impression:  IMPRESSION:   1. 2.1 x 5.8 x 3.4 cm posterior pelvic collection suspicious for abscess,   possibly secondary to diverticulitis. .   2. Soft tissue collection in the deep soft tissues of the right groin measuring   2.3 x 4.6 x 3.5 cm suspicious for abscess. 3. Additional incidental findings as above. Narrative:  INDICATION: Dyspnea on exertion with positive d-dimer. Status post abscess   drainage. COMPARISON: Ultrasound performed just prior to this exam.        TECHNIQUE:  2.5 mm axial images were obtained from the bases to the lung apices   after the intravenous administration of 100 cc of Isovue-370. Three-dimensional   postprocessing was performed by the technologist with MIP reconstructions. Portal venous imaging was obtained through the abdomen and pelvis with sagittal   and coronal reformats. Subsequent portal venous imaging of the abdomen and   pelvis was performed with sagittal and coronal reformats. Oral contrast was not   administered CT dose reduction was achieved through use of a standardized   protocol tailored for this examination and automatic exposure control for dose   modulation. FINDINGS:       THYROID: No nodule. MEDIASTINUM: No mass or lymphadenopathy. GELACIO: No mass or lymphadenopathy. THORACIC AORTA: No dissection or aneurysm. MAIN PULMONARY ARTERY: No acute pulmonary embolus   TRACHEA/BRONCHI: Patent. ESOPHAGUS: No wall thickening or dilatation. HEART: Normal in size without pericardial effusion. Coronary artery   calcifications are noted. PLEURA: No effusion or pneumothorax. LUNGS: Bilateral lower lobe dependent atelectasis with areas of linear   atelectasis or scarring. LIVER: Simple appearing right cyst. No enhancing masses or biliary ductal   dilation. Rafa Nakayama GALLBLADDER: Unremarkable. SPLEEN: No mass. PANCREAS: No mass or ductal dilation.    ADRENALS: Unremarkable. KIDNEYS: Small bilateral parapelvic cyst. No enhanced mass, calculus, or   hydronephrosis. STOMACH: Unremarkable. SMALL BOWEL: No dilatation or wall thickening. COLON: No dilatation or wall thickening. Noninflamed appearing pancolonic   diverticula. Colon traverses immediately superior to the pelvic collection. APPENDIX: Unremarkable. PERITONEUM: There is a posterior pelvic collection eccentric to the left   measuring 3.1 x 5.8 x 3.4 cm with rim enhancement. No free fluid or   pneumoperitoneum. RETROPERITONEUM: Atherosclerotic calcification without aneurysm or dissection. REPRODUCTIVE ORGANS: IUD within otherwise unremarkable appearing uterus. Bilateral cystic lesions of the ovaries measuring 1.2 x 1.3 cm on the right and   1.2 x 1.5 cm on the left. URINARY BLADDER: No mass or calculus. BONES: Degenerative spine change. No acute fracture or aggressive lesion. ADDITIONAL COMMENTS: Collection of the right groin with rim enhancement measures   2.3 x 4.6 x 3.5 cm. Lab/Data Review:  Recent Results (from the past 24 hour(s))   EKG, 12 LEAD, INITIAL    Collection Time: 03/20/19 12:57 PM   Result Value Ref Range    Ventricular Rate 132 BPM    Atrial Rate 132 BPM    P-R Interval 160 ms    QRS Duration 68 ms    Q-T Interval 324 ms    QTC Calculation (Bezet) 480 ms    Calculated P Axis 55 degrees    Calculated R Axis 8 degrees    Calculated T Axis 65 degrees    Diagnosis       Multifocal atrial tachycardia  Septal infarct , age undetermined Nonspecific ST and T wave abnormality   Prolonged QT  No previous ECGs available  Confirmed by Melodie rTammell M.D., Jennifer Marti ([id number]) on 3/21/2019 6:47:30 AM     SAMPLES BEING HELD    Collection Time: 03/20/19  1:31 PM   Result Value Ref Range    SAMPLES BEING HELD 1RED 1BLUE 1PST 1LAV     COMMENT        Add-on orders for these samples will be processed based on acceptable specimen integrity and analyte stability, which may vary by analyte. CBC WITH AUTOMATED DIFF    Collection Time: 03/20/19  1:31 PM   Result Value Ref Range    WBC 12.3 (H) 3.6 - 11.0 K/uL    RBC 3.44 (L) 3.80 - 5.20 M/uL    HGB 10.3 (L) 11.5 - 16.0 g/dL    HCT 34.2 (L) 35.0 - 47.0 %    MCV 99.4 (H) 80.0 - 99.0 FL    MCH 29.9 26.0 - 34.0 PG    MCHC 30.1 30.0 - 36.5 g/dL    RDW 16.3 (H) 11.5 - 14.5 %    PLATELET 530 (H) 577 - 400 K/uL    MPV 8.7 (L) 8.9 - 12.9 FL    NRBC 0.0 0  WBC    ABSOLUTE NRBC 0.00 0.00 - 0.01 K/uL    NEUTROPHILS 72 32 - 75 %    LYMPHOCYTES 15 12 - 49 %    MONOCYTES 10 5 - 13 %    EOSINOPHILS 1 0 - 7 %    BASOPHILS 1 0 - 1 %    IMMATURE GRANULOCYTES 1 (H) 0.0 - 0.5 %    ABS. NEUTROPHILS 8.9 (H) 1.8 - 8.0 K/UL    ABS. LYMPHOCYTES 1.8 0.8 - 3.5 K/UL    ABS. MONOCYTES 1.3 (H) 0.0 - 1.0 K/UL    ABS. EOSINOPHILS 0.1 0.0 - 0.4 K/UL    ABS. BASOPHILS 0.1 0.0 - 0.1 K/UL    ABS. IMM.  GRANS. 0.1 (H) 0.00 - 0.04 K/UL    DF AUTOMATED     METABOLIC PANEL, BASIC    Collection Time: 03/20/19  1:31 PM   Result Value Ref Range    Sodium 140 136 - 145 mmol/L    Potassium 3.5 3.5 - 5.1 mmol/L    Chloride 105 97 - 108 mmol/L    CO2 29 21 - 32 mmol/L    Anion gap 6 5 - 15 mmol/L    Glucose 107 (H) 65 - 100 mg/dL    BUN 17 6 - 20 MG/DL    Creatinine 0.93 0.55 - 1.02 MG/DL    BUN/Creatinine ratio 18 12 - 20      GFR est AA >60 >60 ml/min/1.73m2    GFR est non-AA 58 (L) >60 ml/min/1.73m2    Calcium 9.5 8.5 - 10.1 MG/DL   TROPONIN I    Collection Time: 03/20/19  1:31 PM   Result Value Ref Range    Troponin-I, Qt. <0.05 <0.05 ng/mL   PROTHROMBIN TIME + INR    Collection Time: 03/20/19  1:31 PM   Result Value Ref Range    INR 1.1 0.9 - 1.1      Prothrombin time 10.9 9.0 - 11.1 sec   D DIMER    Collection Time: 03/20/19  1:31 PM   Result Value Ref Range    D-dimer 2.74 (H) 0.00 - 0.65 mg/L FEU   CULTURE, WOUND W GRAM STAIN    Collection Time: 03/20/19  5:58 PM   Result Value Ref Range    Special Requests: NO SPECIAL REQUESTS      GRAM STAIN RARE WBCS SEEN      GRAM STAIN NO ORGANISMS SEEN      Culture result: PENDING    CULTURE, WOUND W GRAM STAIN    Collection Time: 03/20/19  5:58 PM   Result Value Ref Range    Special Requests: NO SPECIAL REQUESTS      GRAM STAIN RARE WBCS SEEN      GRAM STAIN NO ORGANISMS SEEN      Culture result: PENDING    POC LACTIC ACID    Collection Time: 03/20/19  7:20 PM   Result Value Ref Range    Lactic Acid (POC) 1.04 0.40 - 1.71 mmol/L   METABOLIC PANEL, COMPREHENSIVE    Collection Time: 03/21/19  2:20 AM   Result Value Ref Range    Sodium 143 136 - 145 mmol/L    Potassium 3.4 (L) 3.5 - 5.1 mmol/L    Chloride 110 (H) 97 - 108 mmol/L    CO2 26 21 - 32 mmol/L    Anion gap 7 5 - 15 mmol/L    Glucose 116 (H) 65 - 100 mg/dL    BUN 13 6 - 20 MG/DL    Creatinine 0.71 0.55 - 1.02 MG/DL    BUN/Creatinine ratio 18 12 - 20      GFR est AA >60 >60 ml/min/1.73m2    GFR est non-AA >60 >60 ml/min/1.73m2    Calcium 8.5 8.5 - 10.1 MG/DL    Bilirubin, total 0.3 0.2 - 1.0 MG/DL    ALT (SGPT) 25 12 - 78 U/L    AST (SGOT) 17 15 - 37 U/L    Alk. phosphatase 58 45 - 117 U/L    Protein, total 6.2 (L) 6.4 - 8.2 g/dL    Albumin 2.2 (L) 3.5 - 5.0 g/dL    Globulin 4.0 2.0 - 4.0 g/dL    A-G Ratio 0.6 (L) 1.1 - 2.2     CBC WITH AUTOMATED DIFF    Collection Time: 03/21/19  2:20 AM   Result Value Ref Range    WBC 14.3 (H) 3.6 - 11.0 K/uL    RBC 3.02 (L) 3.80 - 5.20 M/uL    HGB 9.0 (L) 11.5 - 16.0 g/dL    HCT 29.3 (L) 35.0 - 47.0 %    MCV 97.0 80.0 - 99.0 FL    MCH 29.8 26.0 - 34.0 PG    MCHC 30.7 30.0 - 36.5 g/dL    RDW 16.4 (H) 11.5 - 14.5 %    PLATELET 404 315 - 292 K/uL    MPV 8.8 (L) 8.9 - 12.9 FL    NRBC 0.0 0  WBC    ABSOLUTE NRBC 0.00 0.00 - 0.01 K/uL    NEUTROPHILS 80 (H) 32 - 75 %    LYMPHOCYTES 11 (L) 12 - 49 %    MONOCYTES 7 5 - 13 %    EOSINOPHILS 1 0 - 7 %    BASOPHILS 0 0 - 1 %    IMMATURE GRANULOCYTES 1 (H) 0.0 - 0.5 %    ABS. NEUTROPHILS 11.4 (H) 1.8 - 8.0 K/UL    ABS. LYMPHOCYTES 1.6 0.8 - 3.5 K/UL    ABS. MONOCYTES 1.0 0.0 - 1.0 K/UL    ABS.  EOSINOPHILS 0.1 0.0 - 0.4 K/UL    ABS. BASOPHILS 0.0 0.0 - 0.1 K/UL    ABS. IMM. GRANS. 0.1 (H) 0.00 - 0.04 K/UL    DF AUTOMATED     PTT    Collection Time: 03/21/19  9:30 AM   Result Value Ref Range    aPTT 28.2 22.1 - 32.0 sec    aPTT, therapeutic range     58.0 - 77.0 SECS     All Micro Results     Procedure Component Value Units Date/Time    CULTURE, BLOOD, PAIRED [[id number]]     Order Status:  Sent Specimen:  Blood     CULTURE, Enrike Baumgarten STAIN [[id number]] Collected:  03/20/19 1758    Order Status:  Completed Specimen:  Wound from Groin Updated:  03/20/19 2037     Special Requests: NO SPECIAL REQUESTS        GRAM STAIN RARE WBCS SEEN         NO ORGANISMS SEEN        Culture result: PENDING    CULTURE, Enrike Baumgarten STAIN [[id number]] Collected:  03/20/19 1758    Order Status:  Completed Specimen:  Wound from Groin Updated:  03/20/19 2035     Special Requests: NO SPECIAL REQUESTS        GRAM STAIN RARE WBCS SEEN         NO ORGANISMS SEEN        Culture result: PENDING    CULTURE, BLOOD, PAIRED [[id number]] Collected:  03/20/19 1902    Order Status:  Completed Specimen:  Blood Updated:  03/20/19 1951    CULTURE, BLOOD, PAIRED [[id number]]     Order Status:  Sent Specimen:  Blood             Assessment:     Right groin abscess (drained), recurrent pelvic abscess. Pelvic collection is presumed diverticular in nature; no prior colonoscopy. Plan:     1. I recommend proceeding with image guided drainage of recurrent abscess. If successfully drained, I will order post-drain sinogram to assess for communication to colon. 2. Wound care. 3. Antibiotics. 4. Will follow.       Signed By: Deepthi Hogan MD     March 21, 2019

## 2019-03-21 NOTE — PROGRESS NOTES
Hospitalist Progress Note Brandi Archer MD 
Answering service: [phone] OR 36 from in house phone Cell: [phone] Date of Service:  3/21/2019 NAME:  Johnathan Mendenhall :  1935 MRN:  [id number] Admission Summary:  
The patient is an 67-year-old female with apparently no past medical history, who presents to the hospital with the above-mentioned symptom. The patient has had a complicated course in the last few days, apparently presented to [facility] and then was transferred to [facility] with 10-day history of increasing right lower quadrant, urinary urgency, rigors and passage of fecal material either in her urine or from her vagina, and this was on 2019. The patient also had some constipation associated with diarrhea, but no bloody stools associated with the same. The patient was found to have a pelvic abscess, and they were concerned that there may be a fistula present. The patient underwent a CT scan which showed a pelvic abscess. A CT-guided drainage of the abscess had produced 400 mL of purulent fluid. They placed a drain which continued to produce 925 mL of purulent fluid. Her drain then fell out the next day. A repeat CT scan was then performed that showed a large pelvic abscess. She underwent CT-guided drainage of the abscess that produced another 370 mL of purulent fluid. Her drain continued to produce purulent fluid throughout the hospital course, and the patient was eventually discharged with a drain in place. The patient was started on IV Zosyn, continued to have IV Zosyn throughout her hospital course and then eventually was switched to ciprofloxacin orally.   The patient was also found to have IUD that has been placed about 50 years back, and she was supposed to follow up for outpatient followup and possible removal. GYN had consulted on her while she was in the hospital and they felt that the patient does not need the IUD removed at that point of time. The patient reports that she was discharged with home healthcare, and today, the healthcare nurse came and noted that the patient had an abscess on her right groin and that started draining. The patient reports that she also had decreased appetite, not eating or drinking well, and had some nausea associated with diarrhea. The patient reports that she got concerned and decided to come to the hospital.  The patient reports that she also had some shortness of breath while she was walking in the parking lot today, reports that she has finished her antibiotics. 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 The patient reports that she had the abscess drained at [facility] again on 03/13/2019 and was prescribed doxycycline. 
  
The patient came to the ER today and was found to have a groin abscess as well as a pelvic abscess. Dr. Yung Zaragoza from Surgery was consulted and requested the patient to be admitted to the hospitalist service. Currently, the patient is resting in bed. Denies any headaches, blurry vision, sore throat, trouble swallowing, trouble with speech, any chest pain, shortness of breath, cough, fever, chills, urinary symptoms, focal or generalized neurological weakness, recent travel, sick contacts, any falls, injuries, hematemesis, melena, hemoptysis, hematuria or any other concerns or problems. Interval history / Subjective: F/u pelvic abscess No pain, nausea or vomiting Assessment & Plan:  
 
Recurrent posterior pelvic abscess 
-CT abd 3/20 2.1 x 5.8 x 3.4 cm posterior pelvic collection suspicious for abscess, possibly secondary to diverticulitis 
-Appreciate Surgery -IR for drainage, but is canceled today as the patient had heparin 
-Continue Vanc/zosyn/flagyl 
-Follow culture Right groin abscess 
-CT abd 3/20 Soft tissue collection in the deep soft tissues of the right groin measuring 2.3 x 4.6 x 3.5 cm suspicious for abscess. -S/p I&D 
-Antibiotics as above 
-Appreciate Surgery Mild dehydration 
-Continue IV fluids for now Anemia 
-Follow work up Hypokalemia 
-replace as needed Shortness of breath 
-CT chest unremarkable -now feeling better NPO 
-Will let her eat NPO from midnight Code status: FULL CODE 
DVT prophylaxis: scd PTA: home alone Functional baseline: Mobile independently Plan: IR drainage tomorrow Care Plan discussed with: Patient/Family Disposition: Home w/Family Hospital Problems  Never Reviewed Codes Class Noted POA Abdominal pain ICD-10-CM: R10.9 ICD-9-CM: 789.00  3/20/2019 Unknown Review of Systems: A comprehensive review of systems was negative except for that written in the HPI. Vital Signs:  
 Last 24hrs VS reviewed since prior progress note. Most recent are: 
Visit Vitals /62 (BP 1 Location: Right arm, BP Patient Position: At rest) Pulse 85 Temp 98.9 °F (37.2 °C) Resp 18 Ht 5' 7\" (1.702 m) Wt 71.3 kg (157 lb 1.6 oz) SpO2 94% BMI 24.61 kg/m² Intake/Output Summary (Last 24 hours) at 3/21/2019 1257 Last data filed at 3/21/2019 9008 Gross per 24 hour Intake  Output 400 ml Net -400 ml Physical Examination:  
 
 
     
Constitutional:  No acute distress, cooperative, pleasant   
ENT:  Oral mucous moist, oropharynx benign. Neck supple, Resp:  CTA bilaterally. No wheezing/rhonchi/rales. No accessory muscle use CV:  Regular rhythm, normal rate, no murmurs, gallops, rubs GI:  Soft, non distended, non tender. normoactive bowel sounds, no hepatosplenomegaly Musculoskeletal:  No edema, warm, 2+ pulses throughout Neurologic:  Moves all extremities. AAOx3, CN II-XII reviewed Skin:  Good turgor, no rashes or ulcers Data Review:  
 Review and/or order of clinical lab test 
 
 
Labs:  
 
Recent Labs  
  03/21/19 0220 03/20/19 
1331 WBC 14.3* 12.3* HGB 9.0* 10.3* HCT 29.3* 34.2*  
 407* Recent Labs  
  03/21/19 0220 03/20/19 
1331  140  
K 3.4* 3.5 * 105 CO2 26 29 BUN 13 17 CREA 0.71 0.93 * 107* CA 8.5 9.5 Recent Labs  
  03/21/19 
0220 SGOT 17 ALT 25 AP 58 TBILI 0.3 TP 6.2* ALB 2.2*  
GLOB 4.0 Recent Labs  
  03/21/19 
0930 03/20/19 
1331 INR  --  1.1 PTP  --  10.9 APTT 28.2  -- No results for input(s): FE, TIBC, PSAT, FERR in the last 72 hours. No results found for: FOL, RBCF No results for input(s): PH, PCO2, PO2 in the last 72 hours. Recent Labs  
  03/20/19 
1331 TROIQ <0.05 No results found for: CHOL, CHOLX, CHLST, CHOLV, HDL, LDL, LDLC, DLDLP, TGLX, TRIGL, TRIGP, CHHD, CHHDX No results found for: [facility] No results found for: COLOR, APPRN, SPGRU, REFSG, [place], PROTU, GLUCU, KETU, BILU, UROU, POLA, LEUKU, GLUKE, EPSU, BACTU, WBCU, RBCU, CASTS, UCRY Medications Reviewed:  
 
Current Facility-Administered Medications Medication Dose Route Frequency  sodium chloride (NS) flush 5-40 mL  5-40 mL IntraVENous Q8H  
 sodium chloride (NS) flush 5-40 mL  5-40 mL IntraVENous PRN  
 0.9% sodium chloride infusion  75 mL/hr IntraVENous CONTINUOUS  
 acetaminophen (TYLENOL) tablet 650 mg  650 mg Oral Q4H PRN  piperacillin-tazobactam (ZOSYN) 3.375 g in 0.9% sodium chloride (MBP/ADV) 100 mL  3.375 g IntraVENous Q8H  
 metroNIDAZOLE (FLAGYL) IVPB premix 500 mg  500 mg IntraVENous Q12H  Vancomycin Pharmacy Dosing   Other Rx Dosing/Monitoring  vancomycin (VANCOCIN) 1,000 mg in 0.9% sodium chloride (MBP/ADV) 250 mL  1,000 mg IntraVENous Q18H  
 
______________________________________________________________________ EXPECTED LENGTH OF STAY: 3d 16h ACTUAL LENGTH OF STAY:          1 Jaime Ponce MD

## 2019-03-21 NOTE — PROGRESS NOTES
NUTRITION COMPLETE ASSESSMENT 
 
RECOMMENDATIONS:  
1. Diet per surgery after drainage - cleared for diet advancement today per discussion with MD 
2. Encourage PO intake with meals and supplements 
 - document % consumed in flowsheet 3. Weekly weights on standing scale Interventions/Plan:  
Food/Nutrient Delivery:  (low fiber diet) Commercial supplement(Ensure BID) Assessment:  
Reason for Assessment: [x]BPA/MST Referral  
 
Diet: NPO Supplements: none Nutritionally Significant Medications: [x] Reviewed & Includes: flagyl, zosyn, vanco, NS @ 75ml/hr Pre-Hospitalization: 
Usual Appetite: Good Diet at Home: regular Vitamins/Supplements: Yes(Boost occassionally) Subjective: I haven't eaten since yesterday. I want to eat. Objective: 
Pt admitted for abd pain. PMHx: pelvic abscess. Recurrent pelvic and groin abscess noted. Recent admit s/p high output drainage of abscesses. Seen by surgery with plans for guided drainage of abscess followed bu sinogram to check for possible communication with colon, though to be diverticular in nature. Pt visited. Upset about not having eaten since yesterday. Spoke with pt and MD who note drainage delayed until tomorrow - MD ok with diet advancement for today. Pt reports eating fairly well throughout hospitalization with just smaller more freqnet meals and snacks. Does note wt loss from UBW of 172#. Has been drinking Boost occassionally - will add Ensure BID for 700kcal, 40 g protein. Will continue to follow for PO intake, wt trends, and plan per surgery. Estimated Nutrition Needs:  
Kcals/day: 2244 Kcals/day(1436-1556kcal) Protein: 86 g(86-100g (1.2-1.4g/kg)) Fluid: 9108 ml(25ml/kg) Based On: [place] Jeor(x 1.2-1.3) Weight Used: Actual wt(71.3kg) Pt expected to meet estimated nutrient needs:  []   Yes     []  No [x] Unable to predict at this time Nutrition Diagnosis:  
1.  Unintended weight loss related to inadequate protein/energy intake as evidenced by 9% wt loss x <3 months; recurrent abscess with multiple hospitalizations Goals:   
 Consumption of at least 50% meals and 2 supplements/day in 5-7 days; wt maintenance Monitoring & Evaluation:  
 - Liquid meal replacement, Total energy intake, Protein intake - Weight/weight change Previous Nutrition Goals Met:   N/A Previous Recommendations:    N/A Education & Discharge Needs: 
 [x] None Identified 
 [] Identified and addressed [x] Participated in care plan, discharge planning, and/or interdisciplinary rounds Cultural, Confucianism and ethnic food preferences identified: None Skin Integrity: [x]Intact  []Other Edema: [x]None []Other Last BM: PTA Food Allergies: [x]None []Other Diet Restrictions: Cultural/Restoration Preference(s): None Anthropometrics:   
Weight Loss Metrics 3/21/2019 Today's Wt 157 lb 1.6 oz  
BMI 24.61 kg/m2 Weight Source: Standing scale (comment) Height: 5' 7\" (170.2 cm), Body mass index is 24.61 kg/m². IBW : 61.2 kg (135 lb), % IBW (Calculated): 116.37 % Usual Body Weight: 78 kg (172 lb),   
 
Labs:   
Lab Results Component Value Date/Time Sodium 143 03/21/2019 02:20 AM  
 Potassium 3.4 (L) 03/21/2019 02:20 AM  
 Chloride 110 (H) 03/21/2019 02:20 AM  
 CO2 26 03/21/2019 02:20 AM  
 Glucose 116 (H) 03/21/2019 02:20 AM  
 BUN 13 03/21/2019 02:20 AM  
 Creatinine 0.71 03/21/2019 02:20 AM  
 Calcium 8.5 03/21/2019 02:20 AM  
 Albumin 2.2 (L) 03/21/2019 02:20 AM  
 
Luan Haas RD Pager #[phone] or [phone]

## 2019-03-21 NOTE — ROUTINE PROCESS
TRANSFER - OUT REPORT: 
 
Verbal report given to [facility] (name) on Ethel Lee  being transferred to (unit) for routine progression of care Report consisted of patients Situation, Background, Assessment and  
Recommendations(SBAR). Information from the following report(s) SBAR, Kardex, ED Summary, Intake/Output, MAR and Recent Results was reviewed with the receiving nurse. Lines:  
Peripheral IV 03/20/19 Right Antecubital (Active) Site Assessment Clean, dry, & intact 3/20/2019  1:30 PM  
Phlebitis Assessment 0 3/20/2019  1:30 PM  
Infiltration Assessment 0 3/20/2019  1:30 PM  
Dressing Status Clean, dry, & intact 3/20/2019  1:30 PM  
  
 
Opportunity for questions and clarification was provided. Patient transported with: 
Transport

## 2019-03-21 NOTE — PROGRESS NOTES
Pt asked to speak with me and would be interested in finding a PCP after discharge. Her PCP retired the end of December and she is open to seeing a [facility] physician, however, she points out lives in [place].

## 2019-03-21 NOTE — PROGRESS NOTES
Advance Care Planning Note Name: Merlin Meehan YOB: 1935 MRN: [id number] Admission Date: 3/20/2019  1:04 PM 
 
Date of discussion: 3/21/2019 Active Diagnoses: 
 
Hospital Problems  Never Reviewed Codes Class Noted POA Abdominal pain ICD-10-CM: R10.9 ICD-9-CM: 789.00  3/20/2019 Unknown These active diagnoses are of sufficient risk that focused discussion on advance care planning is indicated in order to allow the patient to thoughtfully consider personal goals of care, and if situations arise that prevent the ability to personally give input, to ensure appropriate representation of their personal desires for different levels and aggressiveness of care. Discussion:  
 
Persons present and participating in discussion: Merlin MeehanArmin MD 
 
Discussion: Discussed about the recent events leading to admission. The patient understands that she does not have any significant medical problems other than the one that brought her here. We discussed about what FULL CODE, Partial code and DNR means and she wants to be PARTIAL CODE Time Spent:  
 
Total time spent face-to-face in education and discussion: 21 minutes.   
 
Armin Narvaez MD 
3/21/2019 
1:31 PM

## 2019-03-21 NOTE — PROGRESS NOTES
Bedside and Verbal shift change report given to Cady Tavera RN (oncoming nurse) by Trupti Hinojosa RN (offgoing nurse). Report included the following information SBAR, Kardex, ED Summary, Intake/Output, MAR and Recent Results.

## 2019-03-22 ENCOUNTER — APPOINTMENT (OUTPATIENT)
Dept: CT IMAGING | Age: 84
DRG: 749 | End: 2019-03-22
Attending: SURGERY
Payer: MEDICARE

## 2019-03-22 LAB
ANION GAP SERPL CALC-SCNC: 7 MMOL/L (ref 5–15)
BACTERIA SPEC CULT: ABNORMAL
BASOPHILS # BLD: 0.1 K/UL (ref 0–0.1)
BASOPHILS NFR BLD: 0 % (ref 0–1)
BUN SERPL-MCNC: 10 MG/DL (ref 6–20)
BUN/CREAT SERPL: 14 (ref 12–20)
CALCIUM SERPL-MCNC: 8.6 MG/DL (ref 8.5–10.1)
CHLORIDE SERPL-SCNC: 112 MMOL/L (ref 97–108)
CO2 SERPL-SCNC: 23 MMOL/L (ref 21–32)
CREAT SERPL-MCNC: 0.73 MG/DL (ref 0.55–1.02)
DIFFERENTIAL METHOD BLD: ABNORMAL
EOSINOPHIL # BLD: 0.3 K/UL (ref 0–0.4)
EOSINOPHIL NFR BLD: 3 % (ref 0–7)
ERYTHROCYTE [DISTWIDTH] IN BLOOD BY AUTOMATED COUNT: 16.6 % (ref 11.5–14.5)
GLUCOSE SERPL-MCNC: 96 MG/DL (ref 65–100)
GRAM STN SPEC: ABNORMAL
HCT VFR BLD AUTO: 30 % (ref 35–47)
HGB BLD-MCNC: 9.4 G/DL (ref 11.5–16)
IMM GRANULOCYTES # BLD AUTO: 0.1 K/UL (ref 0–0.04)
IMM GRANULOCYTES NFR BLD AUTO: 1 % (ref 0–0.5)
LYMPHOCYTES # BLD: 1.9 K/UL (ref 0.8–3.5)
LYMPHOCYTES NFR BLD: 17 % (ref 12–49)
MCH RBC QN AUTO: 30.9 PG (ref 26–34)
MCHC RBC AUTO-ENTMCNC: 31.3 G/DL (ref 30–36.5)
MCV RBC AUTO: 98.7 FL (ref 80–99)
MONOCYTES # BLD: 1.2 K/UL (ref 0–1)
MONOCYTES NFR BLD: 11 % (ref 5–13)
NEUTS SEG # BLD: 7.7 K/UL (ref 1.8–8)
NEUTS SEG NFR BLD: 68 % (ref 32–75)
NRBC # BLD: 0 K/UL (ref 0–0.01)
NRBC BLD-RTO: 0 PER 100 WBC
PLATELET # BLD AUTO: 348 K/UL (ref 150–400)
PMV BLD AUTO: 8.9 FL (ref 8.9–12.9)
POTASSIUM SERPL-SCNC: 3.3 MMOL/L (ref 3.5–5.1)
RBC # BLD AUTO: 3.04 M/UL (ref 3.8–5.2)
SERVICE CMNT-IMP: ABNORMAL
SERVICE CMNT-IMP: ABNORMAL
SODIUM SERPL-SCNC: 142 MMOL/L (ref 136–145)
WBC # BLD AUTO: 11.2 K/UL (ref 3.6–11)

## 2019-03-22 PROCEDURE — 87075 CULTR BACTERIA EXCEPT BLOOD: CPT

## 2019-03-22 PROCEDURE — 99152 MOD SED SAME PHYS/QHP 5/>YRS: CPT

## 2019-03-22 PROCEDURE — 80048 BASIC METABOLIC PNL TOTAL CA: CPT

## 2019-03-22 PROCEDURE — 74011250637 HC RX REV CODE- 250/637: Performed by: HOSPITALIST

## 2019-03-22 PROCEDURE — 85025 COMPLETE CBC W/AUTO DIFF WBC: CPT

## 2019-03-22 PROCEDURE — 87077 CULTURE AEROBIC IDENTIFY: CPT

## 2019-03-22 PROCEDURE — 74011250636 HC RX REV CODE- 250/636: Performed by: FAMILY MEDICINE

## 2019-03-22 PROCEDURE — 36415 COLL VENOUS BLD VENIPUNCTURE: CPT

## 2019-03-22 PROCEDURE — 94760 N-INVAS EAR/PLS OXIMETRY 1: CPT

## 2019-03-22 PROCEDURE — 74011000258 HC RX REV CODE- 258: Performed by: FAMILY MEDICINE

## 2019-03-22 PROCEDURE — 87102 FUNGUS ISOLATION CULTURE: CPT

## 2019-03-22 PROCEDURE — 65270000032 HC RM SEMIPRIVATE

## 2019-03-22 PROCEDURE — 87205 SMEAR GRAM STAIN: CPT

## 2019-03-22 PROCEDURE — 77030020268 HC MISC GENERAL SUPPLY

## 2019-03-22 PROCEDURE — C1729 CATH, DRAINAGE: HCPCS

## 2019-03-22 PROCEDURE — 0W9J4ZZ DRAINAGE OF PELVIC CAVITY, PERCUTANEOUS ENDOSCOPIC APPROACH: ICD-10-PCS | Performed by: STUDENT IN AN ORGANIZED HEALTH CARE EDUCATION/TRAINING PROGRAM

## 2019-03-22 PROCEDURE — 74011250636 HC RX REV CODE- 250/636: Performed by: STUDENT IN AN ORGANIZED HEALTH CARE EDUCATION/TRAINING PROGRAM

## 2019-03-22 RX ORDER — FLUMAZENIL 0.1 MG/ML
0.5 INJECTION INTRAVENOUS ONCE
Status: DISCONTINUED | OUTPATIENT
Start: 2019-03-22 | End: 2019-03-22 | Stop reason: HOSPADM

## 2019-03-22 RX ORDER — POTASSIUM CHLORIDE 750 MG/1
40 TABLET, FILM COATED, EXTENDED RELEASE ORAL
Status: COMPLETED | OUTPATIENT
Start: 2019-03-22 | End: 2019-03-22

## 2019-03-22 RX ORDER — LIDOCAINE HYDROCHLORIDE 10 MG/ML
20 INJECTION INFILTRATION; PERINEURAL ONCE
Status: COMPLETED | OUTPATIENT
Start: 2019-03-22 | End: 2019-03-22

## 2019-03-22 RX ORDER — FENTANYL CITRATE 50 UG/ML
200 INJECTION, SOLUTION INTRAMUSCULAR; INTRAVENOUS
Status: DISCONTINUED | OUTPATIENT
Start: 2019-03-22 | End: 2019-03-22 | Stop reason: HOSPADM

## 2019-03-22 RX ORDER — NALOXONE HYDROCHLORIDE 0.4 MG/ML
0.4 INJECTION, SOLUTION INTRAMUSCULAR; INTRAVENOUS; SUBCUTANEOUS AS NEEDED
Status: DISCONTINUED | OUTPATIENT
Start: 2019-03-22 | End: 2019-03-22 | Stop reason: HOSPADM

## 2019-03-22 RX ORDER — SODIUM BICARBONATE 42 MG/ML
2 INJECTION, SOLUTION INTRAVENOUS ONCE
Status: DISCONTINUED | OUTPATIENT
Start: 2019-03-22 | End: 2019-03-22 | Stop reason: HOSPADM

## 2019-03-22 RX ORDER — MIDAZOLAM HYDROCHLORIDE 1 MG/ML
5 INJECTION, SOLUTION INTRAMUSCULAR; INTRAVENOUS
Status: DISCONTINUED | OUTPATIENT
Start: 2019-03-22 | End: 2019-03-22 | Stop reason: HOSPADM

## 2019-03-22 RX ORDER — SODIUM CHLORIDE 9 MG/ML
25 INJECTION, SOLUTION INTRAVENOUS CONTINUOUS
Status: DISCONTINUED | OUTPATIENT
Start: 2019-03-22 | End: 2019-03-22 | Stop reason: HOSPADM

## 2019-03-22 RX ADMIN — FENTANYL CITRATE 50 MCG: 50 INJECTION, SOLUTION INTRAMUSCULAR; INTRAVENOUS at 09:53

## 2019-03-22 RX ADMIN — METRONIDAZOLE 500 MG: 500 INJECTION, SOLUTION INTRAVENOUS at 06:56

## 2019-03-22 RX ADMIN — PIPERACILLIN SODIUM,TAZOBACTAM SODIUM 3.38 G: 3; .375 INJECTION, POWDER, FOR SOLUTION INTRAVENOUS at 01:45

## 2019-03-22 RX ADMIN — SODIUM CHLORIDE 75 ML/HR: 900 INJECTION, SOLUTION INTRAVENOUS at 08:47

## 2019-03-22 RX ADMIN — LIDOCAINE HYDROCHLORIDE 6 ML: 10 INJECTION, SOLUTION INFILTRATION; PERINEURAL at 10:02

## 2019-03-22 RX ADMIN — PIPERACILLIN SODIUM,TAZOBACTAM SODIUM 3.38 G: 3; .375 INJECTION, POWDER, FOR SOLUTION INTRAVENOUS at 17:00

## 2019-03-22 RX ADMIN — MIDAZOLAM HYDROCHLORIDE 1 MG: 1 INJECTION, SOLUTION INTRAMUSCULAR; INTRAVENOUS at 09:53

## 2019-03-22 RX ADMIN — METRONIDAZOLE 500 MG: 500 INJECTION, SOLUTION INTRAVENOUS at 18:43

## 2019-03-22 RX ADMIN — VANCOMYCIN HYDROCHLORIDE 1000 MG: 1 INJECTION, POWDER, LYOPHILIZED, FOR SOLUTION INTRAVENOUS at 11:29

## 2019-03-22 RX ADMIN — Medication 10 ML: at 14:00

## 2019-03-22 RX ADMIN — FENTANYL CITRATE 25 MCG: 50 INJECTION, SOLUTION INTRAMUSCULAR; INTRAVENOUS at 10:03

## 2019-03-22 RX ADMIN — FENTANYL CITRATE 25 MCG: 50 INJECTION, SOLUTION INTRAMUSCULAR; INTRAVENOUS at 10:08

## 2019-03-22 RX ADMIN — MIDAZOLAM HYDROCHLORIDE 1 MG: 1 INJECTION, SOLUTION INTRAMUSCULAR; INTRAVENOUS at 09:58

## 2019-03-22 RX ADMIN — Medication 10 ML: at 06:59

## 2019-03-22 RX ADMIN — PIPERACILLIN SODIUM,TAZOBACTAM SODIUM 3.38 G: 3; .375 INJECTION, POWDER, FOR SOLUTION INTRAVENOUS at 09:00

## 2019-03-22 RX ADMIN — POTASSIUM CHLORIDE 40 MEQ: 750 TABLET, EXTENDED RELEASE ORAL at 11:29

## 2019-03-22 NOTE — PROGRESS NOTES
Bedside and Verbal shift change report given to MAYI Wallace (oncoming nurse) by Adonay Gibson RN (offgoing nurse). Report included the following information SBAR, Kardex, Intake/Output, MAR and Recent Results.

## 2019-03-22 NOTE — PROGRESS NOTES
Hospitalist Progress Note Roopa Cristina MD 
Answering service: [phone] from in house phone Cell: [phone] Date of Service:  3/22/2019 NAME:  Silvino Ramires :  1935 MRN:  [id number] Admission Summary:  
The patient is an 58-year-old female with apparently no past medical history, who presents to the hospital with the above-mentioned symptom. The patient has had a complicated course in the last few days, apparently presented to [facility] and then was transferred to [facility] with 10-day history of increasing right lower quadrant, urinary urgency, rigors and passage of fecal material either in her urine or from her vagina, and this was on 2019. The patient also had some constipation associated with diarrhea, but no bloody stools associated with the same. The patient was found to have a pelvic abscess, and they were concerned that there may be a fistula present. The patient underwent a CT scan which showed a pelvic abscess. A CT-guided drainage of the abscess had produced 400 mL of purulent fluid. They placed a drain which continued to produce 925 mL of purulent fluid. Her drain then fell out the next day. A repeat CT scan was then performed that showed a large pelvic abscess. She underwent CT-guided drainage of the abscess that produced another 370 mL of purulent fluid. Her drain continued to produce purulent fluid throughout the hospital course, and the patient was eventually discharged with a drain in place. The patient was started on IV Zosyn, continued to have IV Zosyn throughout her hospital course and then eventually was switched to ciprofloxacin orally.   The patient was also found to have IUD that has been placed about 50 years back, and she was supposed to follow up for outpatient followup and possible removal. GYN had consulted on her while she was in the hospital and they felt that the patient does not need the IUD removed at that point of time. The patient reports that she was discharged with home healthcare, and today, the healthcare nurse came and noted that the patient had an abscess on her right groin and that started draining. The patient reports that she also had decreased appetite, not eating or drinking well, and had some nausea associated with diarrhea. The patient reports that she got concerned and decided to come to the hospital.  The patient reports that she also had some shortness of breath while she was walking in the parking lot today, reports that she has finished her antibiotics. 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 The patient reports that she had the abscess drained at [facility] again on 03/13/2019 and was prescribed doxycycline. 
  
The patient came to the ER today and was found to have a groin abscess as well as a pelvic abscess. Dr. Pita Calhoun from Surgery was consulted and requested the patient to be admitted to the hospitalist service. Currently, the patient is resting in bed. Denies any headaches, blurry vision, sore throat, trouble swallowing, trouble with speech, any chest pain, shortness of breath, cough, fever, chills, urinary symptoms, focal or generalized neurological weakness, recent travel, sick contacts, any falls, injuries, hematemesis, melena, hemoptysis, hematuria or any other concerns or problems. Interval history / Subjective: F/u pelvic abscess No pain, nausea or vomiting Assessment & Plan:  
 
Recurrent posterior pelvic abscess 
-CT abd 3/20 2.1 x 5.8 x 3.4 cm posterior pelvic collection suspicious for abscess, possibly secondary to diverticulitis 
-Appreciate Surgery -IR for CT guided drainage today 3/22, follow cultures 
-Continue Vanc/zosyn/flagyl Right groin abscess 
-CT abd 3/20 Soft tissue collection in the deep soft tissues of the right groin measuring 2.3 x 4.6 x 3.5 cm suspicious for abscess. -S/p I&D 
-Antibiotics as above 
-Appreciate Surgery Mild dehydration -now resolved Anemia 
-Follow work up Hypokalemia 
-replace as needed Shortness of breath 
-CT chest unremarkable -now feeling better NPO 
-regular diet after procedure Code status: PARTIAL CODE 
DVT prophylaxis: scd PTA: home alone Functional baseline: Mobile independently Plan: IR drainage today Care Plan discussed with: Patient/Family Disposition: Home w/Family Hospital Problems  Never Reviewed Codes Class Noted POA Abdominal pain ICD-10-CM: R10.9 ICD-9-CM: 789.00  3/20/2019 Unknown Review of Systems: A comprehensive review of systems was negative except for that written in the HPI. Vital Signs:  
 Last 24hrs VS reviewed since prior progress note. Most recent are: 
Visit Vitals /42 Pulse 74 Temp 97.6 °F (36.4 °C) Resp 16 Ht 5' 7\" (1.702 m) Wt 71.3 kg (157 lb 1.6 oz) SpO2 99% BMI 24.61 kg/m² Intake/Output Summary (Last 24 hours) at 3/22/2019 1051 Last data filed at 3/22/2019 7777 Gross per 24 hour Intake  Output 1400 ml Net -1400 ml Physical Examination:  
 
 
     
Constitutional:  No acute distress, cooperative, pleasant   
ENT:  Oral mucous moist, oropharynx benign. Neck supple, Resp:  CTA bilaterally. No wheezing/rhonchi/rales. No accessory muscle use CV:  Regular rhythm, normal rate, no murmurs, gallops, rubs GI:  Soft, non distended, non tender. normoactive bowel sounds, no hepatosplenomegaly Musculoskeletal:  No edema, warm, 2+ pulses throughout Neurologic:  Moves all extremities. AAOx3, CN II-XII reviewed Skin:  Good turgor, no rashes or ulcers Data Review: Review and/or order of clinical lab test 
 
 
Labs:  
 
Recent Labs  
  03/22/19 
0305 03/21/19 
0220 WBC 11.2* 14.3* HGB 9.4* 9.0*  
HCT 30.0* 29.3*  
 358 Recent Labs  
  03/22/19 
0305 03/21/19 
0220 03/20/19 
1331  143 140  
K 3.3* 3.4* 3.5 * 110* 105 CO2 23 26 29 BUN 10 13 17 CREA 0.73 0.71 0.93 GLU 96 116* 107* CA 8.6 8.5 9.5 Recent Labs  
  03/21/19 
0220 SGOT 17 ALT 25 AP 58 TBILI 0.3 TP 6.2* ALB 2.2*  
GLOB 4.0 Recent Labs  
  03/21/19 
0930 03/20/19 
1331 INR  --  1.1 PTP  --  10.9 APTT 28.2  -- No results for input(s): FE, TIBC, PSAT, FERR in the last 72 hours. No results found for: FOL, RBCF No results for input(s): PH, PCO2, PO2 in the last 72 hours. Recent Labs  
  03/20/19 
1331 TROIQ <0.05 No results found for: CHOL, CHOLX, CHLST, CHOLV, HDL, LDL, LDLC, DLDLP, TGLX, TRIGL, TRIGP, CHHD, CHHDX No results found for: Ivonne Goldberg No results found for: COLOR, APPRN, SPGRU, REFSG, LAWRENCE, PROTU, GLUCU, KETU, BILU, UROU, POLA, LEUKU, GLUKE, EPSU, BACTU, WBCU, RBCU, CASTS, UCRY Medications Reviewed:  
 
Current Facility-Administered Medications Medication Dose Route Frequency  sodium chloride (NS) flush 5-40 mL  5-40 mL IntraVENous Q8H  
 sodium chloride (NS) flush 5-40 mL  5-40 mL IntraVENous PRN  
 0.9% sodium chloride infusion  75 mL/hr IntraVENous CONTINUOUS  
 acetaminophen (TYLENOL) tablet 650 mg  650 mg Oral Q4H PRN  piperacillin-tazobactam (ZOSYN) 3.375 g in 0.9% sodium chloride (MBP/ADV) 100 mL  3.375 g IntraVENous Q8H  
 metroNIDAZOLE (FLAGYL) IVPB premix 500 mg  500 mg IntraVENous Q12H  Vancomycin Pharmacy Dosing   Other Rx Dosing/Monitoring  vancomycin (VANCOCIN) 1,000 mg in 0.9% sodium chloride (MBP/ADV) 250 mL  1,000 mg IntraVENous Q18H  
 
______________________________________________________________________ EXPECTED LENGTH OF STAY: 3d 16h ACTUAL LENGTH OF STAY:          2 
 
            
 Bisi Ahmadi MD

## 2019-03-22 NOTE — ROUTINE PROCESS
TRANSFER - OUT REPORT: 
 
Verbal report given to ramy Wallace RN on Ronald Martell  being transferred to [facility] for routine progression of care Report consisted of patients Situation, Background, Assessment and  
Recommendations(SBAR). Information from the following report(s) SBAR was reviewed with the receiving nurse. Lines:  
Peripheral IV 03/20/19 Anterior; Left Hand (Active) Site Assessment Clean, dry, & intact 3/22/2019  4:44 AM  
Phlebitis Assessment 0 3/22/2019  4:44 AM  
Infiltration Assessment 0 3/22/2019  4:44 AM  
Dressing Status Clean, dry, & intact 3/22/2019  4:44 AM  
Dressing Type Tape;Transparent 3/22/2019  4:44 AM  
Hub Color/Line Status Infusing 3/22/2019  4:44 AM  
Action Taken Open ports on tubing capped 3/22/2019  4:44 AM  
Alcohol Cap Used Yes 3/22/2019  4:44 AM  
   
Peripheral IV 03/21/19 Left Antecubital (Active) Site Assessment Clean, dry, & intact 3/22/2019  4:44 AM  
Phlebitis Assessment 0 3/22/2019  4:44 AM  
Infiltration Assessment 0 3/22/2019  4:44 AM  
Dressing Status Clean, dry, & intact 3/22/2019  4:44 AM  
Dressing Type Tape;Transparent 3/22/2019  4:44 AM  
Hub Color/Line Status Infusing 3/22/2019  4:44 AM  
Action Taken Open ports on tubing capped 3/22/2019  4:44 AM  
Alcohol Cap Used Yes 3/22/2019  4:44 AM  
  
 
Opportunity for questions and clarification was provided. Patient transported with: 
 transport on stretcher back to unit; left pelvic drain intact; connected to accordion drainage bag. Samples taken to lab for aerobic, anaerobic, and fungal cultures.

## 2019-03-22 NOTE — WOUND CARE
Wound Consult:  New Patient Visit. Chart reviewed. Consulted for right groin abscess. Patient is resting on a advance bed with pressure prevention mattress; she was up in chair, to bed for care and then back up to chair. Had discomfort with packing/care. Assessment: 
Right groin - two open areas that appear to connect under skin/subcutaneous tissue; 1 x 3 x 2.6 cm and then beneath this area a 1 x 0.4 x 4 cm opening; the smaller opening erupted with creamy light tan drainage when packing strip removed and I continued to express drainage with palpation. The larger area has creamy serosanguinous drainage in lesser amount and non is expressed. She has maceration of intact skin in adjacent skin; induration and pink surrounding as well. No crepitus or fluctuance felt. No extension noted into groin/inner thigh. Left buttocks - drain in place from IR. No discoloration to sacrum/gluteal cleft or right buttock. Treatment: 
Irrigated right groin wounds with saline, packed with MeSalt packing strips, covered with 4x4s, composite dressing. Protected periwound skin with skin prep. Wound Recommendations: 
Irrigate right groin wounds with saline, packed with MeSalt packing strips leaving at least a 2 cm tail, cover with 4x4s, composite dressing daily and prn. Continue to monitor nutrition, pain, and skin risk scale, and skin assessment. Plan: 
Spoke with Dr. Patience Chambers regarding findings and obtained orders for treatment. We will continue to reassess routinely and as needed. Shana Keith RN,[facility] Office [phone] Pager [phone]

## 2019-03-22 NOTE — PROGRESS NOTES
Provided patient with a list of [employer] PCP's for review. Patient is undecided. At this time.

## 2019-03-22 NOTE — PROGRESS NOTES
Problem: Falls - Risk of 
Goal: *Absence of Falls Description Document Melissa Lemus Fall Risk and appropriate interventions in the flowsheet. Outcome: Progressing Towards Goal 
  
Problem: Patient Education: Go to Patient Education Activity Goal: Patient/Family Education Outcome: Progressing Towards Goal 
  
Problem: General Infection Care Plan (Adult and Pediatric) Goal: Improvement in signs and symptoms of infection Outcome: Progressing Towards Goal 
  
Problem: Pain Goal: *Control of Pain Outcome: Progressing Towards Goal 
  
Problem: Pressure Injury - Risk of 
Goal: *Prevention of pressure injury Description Document Chavez Scale and appropriate interventions in the flowsheet. Outcome: Progressing Towards Goal 
  
Problem: Patient Education: Go to Patient Education Activity Goal: Patient/Family Education Outcome: Progressing Towards Goal 
  
Problem: Nutrition Deficit Goal: *Optimize nutritional status Outcome: Progressing Towards Goal

## 2019-03-23 ENCOUNTER — APPOINTMENT (OUTPATIENT)
Dept: GENERAL RADIOLOGY | Age: 84
DRG: 749 | End: 2019-03-23
Attending: SURGERY
Payer: MEDICARE

## 2019-03-23 LAB
ANION GAP SERPL CALC-SCNC: 6 MMOL/L (ref 5–15)
BASOPHILS # BLD: 0.1 K/UL (ref 0–0.1)
BASOPHILS NFR BLD: 1 % (ref 0–1)
BUN SERPL-MCNC: 13 MG/DL (ref 6–20)
BUN/CREAT SERPL: 19 (ref 12–20)
CALCIUM SERPL-MCNC: 8.4 MG/DL (ref 8.5–10.1)
CHLORIDE SERPL-SCNC: 113 MMOL/L (ref 97–108)
CO2 SERPL-SCNC: 23 MMOL/L (ref 21–32)
CREAT SERPL-MCNC: 0.68 MG/DL (ref 0.55–1.02)
DIFFERENTIAL METHOD BLD: ABNORMAL
EOSINOPHIL # BLD: 0.4 K/UL (ref 0–0.4)
EOSINOPHIL NFR BLD: 3 % (ref 0–7)
ERYTHROCYTE [DISTWIDTH] IN BLOOD BY AUTOMATED COUNT: 16.5 % (ref 11.5–14.5)
FOLATE SERPL-MCNC: 14.1 NG/ML (ref 5–21)
GLUCOSE SERPL-MCNC: 101 MG/DL (ref 65–100)
HCT VFR BLD AUTO: 29 % (ref 35–47)
HGB BLD-MCNC: 8.7 G/DL (ref 11.5–16)
IMM GRANULOCYTES # BLD AUTO: 0.1 K/UL (ref 0–0.04)
IMM GRANULOCYTES NFR BLD AUTO: 1 % (ref 0–0.5)
IRON SATN MFR SERPL: 23 % (ref 20–50)
IRON SERPL-MCNC: 39 UG/DL (ref 35–150)
LYMPHOCYTES # BLD: 1.9 K/UL (ref 0.8–3.5)
LYMPHOCYTES NFR BLD: 17 % (ref 12–49)
MCH RBC QN AUTO: 29.8 PG (ref 26–34)
MCHC RBC AUTO-ENTMCNC: 30 G/DL (ref 30–36.5)
MCV RBC AUTO: 99.3 FL (ref 80–99)
MONOCYTES # BLD: 1.3 K/UL (ref 0–1)
MONOCYTES NFR BLD: 11 % (ref 5–13)
NEUTS SEG # BLD: 7.6 K/UL (ref 1.8–8)
NEUTS SEG NFR BLD: 67 % (ref 32–75)
NRBC # BLD: 0 K/UL (ref 0–0.01)
NRBC BLD-RTO: 0 PER 100 WBC
PLATELET # BLD AUTO: 348 K/UL (ref 150–400)
PMV BLD AUTO: 8.9 FL (ref 8.9–12.9)
POTASSIUM SERPL-SCNC: 3.4 MMOL/L (ref 3.5–5.1)
RBC # BLD AUTO: 2.92 M/UL (ref 3.8–5.2)
SODIUM SERPL-SCNC: 142 MMOL/L (ref 136–145)
TIBC SERPL-MCNC: 171 UG/DL (ref 250–450)
VIT B12 SERPL-MCNC: 252 PG/ML (ref 193–986)
WBC # BLD AUTO: 11.3 K/UL (ref 3.6–11)

## 2019-03-23 PROCEDURE — 74011000258 HC RX REV CODE- 258: Performed by: FAMILY MEDICINE

## 2019-03-23 PROCEDURE — 82746 ASSAY OF FOLIC ACID SERUM: CPT

## 2019-03-23 PROCEDURE — 83540 ASSAY OF IRON: CPT

## 2019-03-23 PROCEDURE — 74011250637 HC RX REV CODE- 250/637: Performed by: HOSPITALIST

## 2019-03-23 PROCEDURE — 65270000032 HC RM SEMIPRIVATE

## 2019-03-23 PROCEDURE — 85025 COMPLETE CBC W/AUTO DIFF WBC: CPT

## 2019-03-23 PROCEDURE — 36415 COLL VENOUS BLD VENIPUNCTURE: CPT

## 2019-03-23 PROCEDURE — 82607 VITAMIN B-12: CPT

## 2019-03-23 PROCEDURE — 76080 X-RAY EXAM OF FISTULA: CPT

## 2019-03-23 PROCEDURE — 74011250636 HC RX REV CODE- 250/636: Performed by: FAMILY MEDICINE

## 2019-03-23 PROCEDURE — 74011636320 HC RX REV CODE- 636/320: Performed by: HOSPITALIST

## 2019-03-23 PROCEDURE — 80048 BASIC METABOLIC PNL TOTAL CA: CPT

## 2019-03-23 RX ORDER — POTASSIUM CHLORIDE 750 MG/1
40 TABLET, FILM COATED, EXTENDED RELEASE ORAL
Status: COMPLETED | OUTPATIENT
Start: 2019-03-23 | End: 2019-03-23

## 2019-03-23 RX ORDER — LANOLIN ALCOHOL/MO/W.PET/CERES
1000 CREAM (GRAM) TOPICAL DAILY
Status: DISCONTINUED | OUTPATIENT
Start: 2019-03-23 | End: 2019-04-04 | Stop reason: HOSPADM

## 2019-03-23 RX ORDER — LANOLIN ALCOHOL/MO/W.PET/CERES
1 CREAM (GRAM) TOPICAL
Status: DISCONTINUED | OUTPATIENT
Start: 2019-03-24 | End: 2019-04-04 | Stop reason: HOSPADM

## 2019-03-23 RX ADMIN — PIPERACILLIN SODIUM,TAZOBACTAM SODIUM 3.38 G: 3; .375 INJECTION, POWDER, FOR SOLUTION INTRAVENOUS at 09:43

## 2019-03-23 RX ADMIN — SODIUM CHLORIDE 75 ML/HR: 900 INJECTION, SOLUTION INTRAVENOUS at 17:11

## 2019-03-23 RX ADMIN — PIPERACILLIN SODIUM,TAZOBACTAM SODIUM 3.38 G: 3; .375 INJECTION, POWDER, FOR SOLUTION INTRAVENOUS at 02:07

## 2019-03-23 RX ADMIN — POTASSIUM CHLORIDE 40 MEQ: 750 TABLET, EXTENDED RELEASE ORAL at 11:54

## 2019-03-23 RX ADMIN — PIPERACILLIN SODIUM,TAZOBACTAM SODIUM 3.38 G: 3; .375 INJECTION, POWDER, FOR SOLUTION INTRAVENOUS at 18:07

## 2019-03-23 RX ADMIN — Medication 1 CAPSULE: at 11:54

## 2019-03-23 RX ADMIN — CYANOCOBALAMIN TAB 500 MCG 1000 MCG: 500 TAB at 11:54

## 2019-03-23 RX ADMIN — METRONIDAZOLE 500 MG: 500 INJECTION, SOLUTION INTRAVENOUS at 07:25

## 2019-03-23 RX ADMIN — IOHEXOL 50 ML: 350 INJECTION, SOLUTION INTRAVENOUS at 11:22

## 2019-03-23 RX ADMIN — VANCOMYCIN HYDROCHLORIDE 1000 MG: 1 INJECTION, POWDER, LYOPHILIZED, FOR SOLUTION INTRAVENOUS at 05:22

## 2019-03-23 NOTE — PROGRESS NOTES
Bedside shift change report given to Yarelis Mobley (oncoming nurse) by [facility] RN (offgoing nurse). Report included the following information SBAR, Kardex, Intake/Output and MAR.

## 2019-03-23 NOTE — PROGRESS NOTES
Hospitalist Progress Note Nica Wagner MD 
Answering service: [phone] from in house phone Cell: [phone] Date of Service:  3/23/2019 NAME:  Sarah Donahue :  1935 MRN:  [id number] Admission Summary:  
The patient is an 60-year-old female with apparently no past medical history, who presents to the hospital with the above-mentioned symptom. The patient has had a complicated course in the last few days, apparently presented to [facility] and then was transferred to [facility] with 10-day history of increasing right lower quadrant, urinary urgency, rigors and passage of fecal material either in her urine or from her vagina, and this was on 2019. The patient also had some constipation associated with diarrhea, but no bloody stools associated with the same. The patient was found to have a pelvic abscess, and they were concerned that there may be a fistula present. The patient underwent a CT scan which showed a pelvic abscess. A CT-guided drainage of the abscess had produced 400 mL of purulent fluid. They placed a drain which continued to produce 925 mL of purulent fluid. Her drain then fell out the next day. A repeat CT scan was then performed that showed a large pelvic abscess. She underwent CT-guided drainage of the abscess that produced another 370 mL of purulent fluid. Her drain continued to produce purulent fluid throughout the hospital course, and the patient was eventually discharged with a drain in place. The patient was started on IV Zosyn, continued to have IV Zosyn throughout her hospital course and then eventually was switched to ciprofloxacin orally.   The patient was also found to have IUD that has been placed about 50 years back, and she was supposed to follow up for outpatient followup and possible removal. GYN had consulted on her while she was in the hospital and they felt that the patient does not need the IUD removed at that point of time. The patient reports that she was discharged with home healthcare, and today, the healthcare nurse came and noted that the patient had an abscess on her right groin and that started draining. The patient reports that she also had decreased appetite, not eating or drinking well, and had some nausea associated with diarrhea. The patient reports that she got concerned and decided to come to the hospital.  The patient reports that she also had some shortness of breath while she was walking in the parking lot today, reports that she has finished her antibiotics. 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 The patient reports that she had the abscess drained at [facility] again on 03/13/2019 and was prescribed doxycycline. 
  
The patient came to the ER today and was found to have a groin abscess as well as a pelvic abscess. Dr. Jennie Oleary from Surgery was consulted and requested the patient to be admitted to the hospitalist service. Currently, the patient is resting in bed. Denies any headaches, blurry vision, sore throat, trouble swallowing, trouble with speech, any chest pain, shortness of breath, cough, fever, chills, urinary symptoms, focal or generalized neurological weakness, recent travel, sick contacts, any falls, injuries, hematemesis, melena, hemoptysis, hematuria or any other concerns or problems. Interval history / Subjective: F/u pelvic abscess No pain, nausea or vomiting Assessment & Plan:  
 
Recurrent posterior pelvic abscess 
-CT abd 3/20 2.1 x 5.8 x 3.4 cm posterior pelvic collection suspicious for abscess, possibly secondary to diverticulitis 
-Appreciate Surgery -s/p CT guided drainage 3/22, follow cultures -Continue Vanc/zosyn/flagyl Right groin abscess 
-CT abd 3/20 Soft tissue collection in the deep soft tissues of the right groin measuring 2.3 x 4.6 x 3.5 cm suspicious for abscess. -S/p I&D 
-Antibiotics as above 
-Culture shows coagulase negative staph 
-Appreciate Surgery/wound care Mild dehydration -now resolved Anemia 
-Vit b12 deficiency, will put the patient on Vit b12 and oral iron 
-outpatient follow up Hypokalemia 
-replace as needed Shortness of breath 
-CT chest unremarkable -now feeling better Regular diet Code status: PARTIAL CODE 
DVT prophylaxis: scd PTA: home alone Functional baseline: Mobile independently Plan: IR drainage today Care Plan discussed with: Patient/Family Disposition: Home w/Family Hospital Problems  Date Reviewed: 3/23/2019 Codes Class Noted POA * (Principal) Abdominal pain ICD-10-CM: R10.9 ICD-9-CM: 789.00  3/20/2019 Yes Review of Systems: A comprehensive review of systems was negative except for that written in the HPI. Vital Signs:  
 Last 24hrs VS reviewed since prior progress note. Most recent are: 
Visit Vitals /63 (BP 1 Location: Right arm, BP Patient Position: At rest) Pulse 86 Temp 97.4 °F (36.3 °C) Resp 16 Ht 5' 7\" (1.702 m) Wt 73.6 kg (162 lb 3.2 oz) SpO2 96% BMI 25.40 kg/m² Intake/Output Summary (Last 24 hours) at 3/23/2019 1037 Last data filed at 3/23/2019 6393 Gross per 24 hour Intake 480 ml Output 1435 ml Net -955 ml Physical Examination:  
 
 
     
Constitutional:  No acute distress, cooperative, pleasant   
ENT:  Oral mucous moist, oropharynx benign. Neck supple, Resp:  CTA bilaterally. No wheezing/rhonchi/rales. No accessory muscle use CV:  Regular rhythm, normal rate, no murmurs, gallops, rubs GI:  drain in place, Soft, non distended, non tender. normoactive bowel sounds, no hepatosplenomegaly Musculoskeletal:  No edema, warm, 2+ pulses throughout Neurologic:  Moves all extremities. AAOx3, CN II-XII reviewed Skin: Right groin wound Data Review:  
 Review and/or order of clinical lab test 
 
 
Labs:  
 
Recent Labs  
  03/23/19 0217 03/22/19 
0305 WBC 11.3* 11.2* HGB 8.7* 9.4* HCT 29.0* 30.0*  
 348 Recent Labs  
  03/23/19 0217 03/22/19 
0305 03/21/19 
0220  142 143  
K 3.4* 3.3* 3.4*  
* 112* 110* CO2 23 23 26 BUN 13 10 13 CREA 0.68 0.73 0.71 * 96 116* CA 8.4* 8.6 8.5 Recent Labs  
  03/21/19 
0220 SGOT 17 ALT 25 AP 58 TBILI 0.3 TP 6.2* ALB 2.2*  
GLOB 4.0 Recent Labs  
  03/21/19 
0930 03/20/19 
1331 INR  --  1.1 PTP  --  10.9 APTT 28.2  --   
  
Recent Labs  
  03/23/19 0217 TIBC 171* PSAT 23 Lab Results Component Value Date/Time Folate 14.1 03/23/2019 02:17 AM  
  
No results for input(s): PH, PCO2, PO2 in the last 72 hours. Recent Labs  
  03/20/19 
1331 TROIQ <0.05 No results found for: CHOL, CHOLX, CHLST, CHOLV, HDL, LDL, LDLC, DLDLP, TGLX, TRIGL, TRIGP, CHHD, CHHDX No results found for: [facility] No results found for: COLOR, APPRN, SPGRU, REFSG, LAWRENCE, PROTU, GLUCU, KETU, BILU, UROU, POLA, LEUKU, GLUKE, EPSU, BACTU, WBCU, RBCU, CASTS, UCRY Medications Reviewed:  
 
Current Facility-Administered Medications Medication Dose Route Frequency  vancomycin trough on 3/23 @ 23:30 - draw PRIOR to dose due @ 00:00   Other ONCE  
 iohexol (OMNIPAQUE) 350 mg iodine/mL contrast injection 100 mL  100 mL Oral RAD ONCE  
 sodium chloride (NS) flush 5-40 mL  5-40 mL IntraVENous Q8H  
 sodium chloride (NS) flush 5-40 mL  5-40 mL IntraVENous PRN  
 0.9% sodium chloride infusion  75 mL/hr IntraVENous CONTINUOUS  
 acetaminophen (TYLENOL) tablet 650 mg  650 mg Oral Q4H PRN  piperacillin-tazobactam (ZOSYN) 3.375 g in 0.9% sodium chloride (MBP/ADV) 100 mL  3.375 g IntraVENous Q8H  
  metroNIDAZOLE (FLAGYL) IVPB premix 500 mg  500 mg IntraVENous Q12H  Vancomycin Pharmacy Dosing   Other Rx Dosing/Monitoring  vancomycin (VANCOCIN) 1,000 mg in 0.9% sodium chloride (MBP/ADV) 250 mL  1,000 mg IntraVENous Q18H  
 
______________________________________________________________________ EXPECTED LENGTH OF STAY: 3d 16h ACTUAL LENGTH OF STAY:          3 Nica Wagner MD

## 2019-03-23 NOTE — PROGRESS NOTES
Image-guided abscess drain performed; will continue IV antibiotics and order sinogram through drain to assess for communication with colon, bladder.

## 2019-03-23 NOTE — ROUTINE PROCESS
Bedside and Verbal shift change report given to [address] [place] [address],[address] (oncoming nurse) by Emanuel Petty RN (offgoing nurse). Report included the following information SBAR, Kardex, Procedure Summary, Intake/Output, MAR, Accordion and Recent Results.

## 2019-03-24 LAB
ANION GAP SERPL CALC-SCNC: 6 MMOL/L (ref 5–15)
BACTERIA SPEC CULT: NORMAL
BUN SERPL-MCNC: 11 MG/DL (ref 6–20)
BUN/CREAT SERPL: 16 (ref 12–20)
CALCIUM SERPL-MCNC: 8.6 MG/DL (ref 8.5–10.1)
CHLORIDE SERPL-SCNC: 113 MMOL/L (ref 97–108)
CO2 SERPL-SCNC: 24 MMOL/L (ref 21–32)
CREAT SERPL-MCNC: 0.67 MG/DL (ref 0.55–1.02)
DATE LAST DOSE: NORMAL
GLUCOSE SERPL-MCNC: 107 MG/DL (ref 65–100)
POTASSIUM SERPL-SCNC: 3.6 MMOL/L (ref 3.5–5.1)
REPORTED DOSE,DOSE: NORMAL UNITS
REPORTED DOSE/TIME,TMG: NORMAL
SERVICE CMNT-IMP: NORMAL
SODIUM SERPL-SCNC: 143 MMOL/L (ref 136–145)
VANCOMYCIN TROUGH SERPL-MCNC: 9 UG/ML (ref 5–10)

## 2019-03-24 PROCEDURE — 74011250637 HC RX REV CODE- 250/637: Performed by: FAMILY MEDICINE

## 2019-03-24 PROCEDURE — 74011250637 HC RX REV CODE- 250/637: Performed by: HOSPITALIST

## 2019-03-24 PROCEDURE — 74011000258 HC RX REV CODE- 258: Performed by: FAMILY MEDICINE

## 2019-03-24 PROCEDURE — 74011250636 HC RX REV CODE- 250/636: Performed by: FAMILY MEDICINE

## 2019-03-24 PROCEDURE — 80048 BASIC METABOLIC PNL TOTAL CA: CPT

## 2019-03-24 PROCEDURE — 36415 COLL VENOUS BLD VENIPUNCTURE: CPT

## 2019-03-24 PROCEDURE — 80202 ASSAY OF VANCOMYCIN: CPT

## 2019-03-24 PROCEDURE — 65270000032 HC RM SEMIPRIVATE

## 2019-03-24 PROCEDURE — 74011250636 HC RX REV CODE- 250/636: Performed by: HOSPITALIST

## 2019-03-24 RX ADMIN — Medication 10 ML: at 22:05

## 2019-03-24 RX ADMIN — ACETAMINOPHEN 650 MG: 325 TABLET ORAL at 16:32

## 2019-03-24 RX ADMIN — VANCOMYCIN HYDROCHLORIDE 1000 MG: 1 INJECTION, POWDER, LYOPHILIZED, FOR SOLUTION INTRAVENOUS at 00:03

## 2019-03-24 RX ADMIN — PIPERACILLIN SODIUM,TAZOBACTAM SODIUM 3.38 G: 3; .375 INJECTION, POWDER, FOR SOLUTION INTRAVENOUS at 18:19

## 2019-03-24 RX ADMIN — VANCOMYCIN HYDROCHLORIDE 750 MG: 750 INJECTION, POWDER, LYOPHILIZED, FOR SOLUTION INTRAVENOUS at 23:04

## 2019-03-24 RX ADMIN — VANCOMYCIN HYDROCHLORIDE 750 MG: 750 INJECTION, POWDER, LYOPHILIZED, FOR SOLUTION INTRAVENOUS at 11:37

## 2019-03-24 RX ADMIN — Medication 1 CAPSULE: at 09:01

## 2019-03-24 RX ADMIN — PIPERACILLIN SODIUM,TAZOBACTAM SODIUM 3.38 G: 3; .375 INJECTION, POWDER, FOR SOLUTION INTRAVENOUS at 09:00

## 2019-03-24 RX ADMIN — FERROUS SULFATE TAB 325 MG (65 MG ELEMENTAL FE) 325 MG: 325 (65 FE) TAB at 09:01

## 2019-03-24 RX ADMIN — CYANOCOBALAMIN TAB 500 MCG 1000 MCG: 500 TAB at 09:01

## 2019-03-24 RX ADMIN — PIPERACILLIN SODIUM,TAZOBACTAM SODIUM 3.38 G: 3; .375 INJECTION, POWDER, FOR SOLUTION INTRAVENOUS at 02:24

## 2019-03-24 NOTE — PROGRESS NOTES
Hospitalist Progress Note Sanna Pruitt MD 
Answering service: [phone] from in house phone Cell: [phone] Date of Service:  3/24/2019 NAME:  Ronadl Martell :  1935 MRN:  [id number] Admission Summary:  
The patient is an 73-year-old female with apparently no past medical history, who presents to the hospital with the above-mentioned symptom. The patient has had a complicated course in the last few days, apparently presented to [facility] and then was transferred to [facility] with 10-day history of increasing right lower quadrant, urinary urgency, rigors and passage of fecal material either in her urine or from her vagina, and this was on 2019. The patient also had some constipation associated with diarrhea, but no bloody stools associated with the same. The patient was found to have a pelvic abscess, and they were concerned that there may be a fistula present. The patient underwent a CT scan which showed a pelvic abscess. A CT-guided drainage of the abscess had produced 400 mL of purulent fluid. They placed a drain which continued to produce 925 mL of purulent fluid. Her drain then fell out the next day. A repeat CT scan was then performed that showed a large pelvic abscess. She underwent CT-guided drainage of the abscess that produced another 370 mL of purulent fluid. Her drain continued to produce purulent fluid throughout the hospital course, and the patient was eventually discharged with a drain in place. The patient was started on IV Zosyn, continued to have IV Zosyn throughout her hospital course and then eventually was switched to ciprofloxacin orally.   The patient was also found to have IUD that has been placed about 50 years back, and she was supposed to follow up for outpatient followup and possible removal. GYN had consulted on her while she was in the hospital and they felt that the patient does not need the IUD removed at that point of time. The patient reports that she was discharged with home healthcare, and today, the healthcare nurse came and noted that the patient had an abscess on her right groin and that started draining. The patient reports that she also had decreased appetite, not eating or drinking well, and had some nausea associated with diarrhea. The patient reports that she got concerned and decided to come to the hospital.  The patient reports that she also had some shortness of breath while she was walking in the parking lot today, reports that she has finished her antibiotics. 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 The patient reports that she had the abscess drained at [facility] again on 03/13/2019 and was prescribed doxycycline. 
  
The patient came to the ER today and was found to have a groin abscess as well as a pelvic abscess. Dr. Randle Castleman from Surgery was consulted and requested the patient to be admitted to the hospitalist service. Currently, the patient is resting in bed. Denies any headaches, blurry vision, sore throat, trouble swallowing, trouble with speech, any chest pain, shortness of breath, cough, fever, chills, urinary symptoms, focal or generalized neurological weakness, recent travel, sick contacts, any falls, injuries, hematemesis, melena, hemoptysis, hematuria or any other concerns or problems. Interval history / Subjective: F/u pelvic abscess No pain, nausea or vomiting Continues to feel stable Assessment & Plan:  
 
Recurrent posterior pelvic abscess 
-CT abd 3/20 2.1 x 5.8 x 3.4 cm posterior pelvic collection suspicious for abscess, possibly secondary to diverticulitis 
-Appreciate Surgery -s/p CT guided drainage 3/22, follow cultures 
-On Vanc/zosyn. May need to stop Vanc now Right groin abscess 
-CT abd 3/20 Soft tissue collection in the deep soft tissues of the right groin measuring 2.3 x 4.6 x 3.5 cm suspicious for abscess. -S/p I&D 
-Antibiotics as above 
-Culture shows coagulase negative staph 
-Appreciate Surgery/wound care Mild dehydration -now resolved Anemia 
-Vit b12 deficiency, will put the patient on Vit b12 and oral iron 
-outpatient follow up Hypokalemia 
-replace as needed Shortness of breath 
-CT chest unremarkable -now feeling better Regular diet Code status: PARTIAL CODE 
DVT prophylaxis: scd PTA: home alone Functional baseline: Mobile independently Plan: Follow Surgery Care Plan discussed with: Patient/Family Disposition: Home w/Family Hospital Problems  Date Reviewed: 3/23/2019 Codes Class Noted POA * (Principal) Abdominal pain ICD-10-CM: R10.9 ICD-9-CM: 789.00  3/20/2019 Yes Review of Systems: A comprehensive review of systems was negative except for that written in the HPI. Vital Signs:  
 Last 24hrs VS reviewed since prior progress note. Most recent are: 
Visit Vitals /73 (BP 1 Location: Right arm, BP Patient Position: Sitting) Pulse 92 Temp 97.7 °F (36.5 °C) Resp 16 Ht 5' 7\" (1.702 m) Wt 73.6 kg (162 lb 3.2 oz) SpO2 94% BMI 25.40 kg/m² Intake/Output Summary (Last 24 hours) at 3/24/2019 0805 Last data filed at 3/24/2019 0230 Gross per 24 hour Intake 5 ml Output 1015 ml Net -1010 ml Physical Examination:  
 
 
     
Constitutional:  No acute distress, cooperative, pleasant   
ENT:  Oral mucous moist, oropharynx benign. Neck supple, Resp:  CTA bilaterally. No wheezing/rhonchi/rales. No accessory muscle use CV:  Regular rhythm, normal rate, no murmurs, gallops, rubs GI:  drain in place, Soft, non distended, non tender.  normoactive bowel sounds, no hepatosplenomegaly Musculoskeletal:  No edema, warm, 2+ pulses throughout Neurologic:  Moves all extremities. AAOx3, CN II-XII reviewed Skin: Right groin wound Data Review:  
 Review and/or order of clinical lab test 
 
 
Labs:  
 
Recent Labs  
  03/23/19 0217 03/22/19 
0305 WBC 11.3* 11.2* HGB 8.7* 9.4* HCT 29.0* 30.0*  
 348 Recent Labs  
  03/24/19 
0002 03/23/19 0217 03/22/19 
0305  142 142  
K 3.6 3.4* 3.3*  
* 113* 112* CO2 24 23 23 BUN 11 13 10 CREA 0.67 0.68 0.73 * 101* 96  
CA 8.6 8.4* 8.6 No results for input(s): SGOT, GPT, ALT, AP, TBIL, TBILI, TP, ALB, GLOB, GGT, AML, LPSE in the last 72 hours. No lab exists for component: AMYP, HLPSE Recent Labs  
  03/21/19 
0930 APTT 28.2 Recent Labs  
  03/23/19 0217 TIBC 171* PSAT 23 Lab Results Component Value Date/Time Folate 14.1 03/23/2019 02:17 AM  
  
No results for input(s): PH, PCO2, PO2 in the last 72 hours. No results for input(s): CPK, CKNDX, TROIQ in the last 72 hours. No lab exists for component: CPKMB No results found for: CHOL, CHOLX, CHLST, CHOLV, HDL, LDL, LDLC, DLDLP, TGLX, TRIGL, TRIGP, CHHD, CHHDX No results found for: Ad[facility] Feeling No results found for: COLOR, APPRN, SPGRU, REFSG, LAWRENCE, PROTU, GLUCU, KETU, BILU, UROU, POLA, LEUKU, GLUKE, EPSU, BACTU, WBCU, RBCU, CASTS, UCRY Medications Reviewed:  
 
Current Facility-Administered Medications Medication Dose Route Frequency  vancomycin (VANCOCIN) 750 mg in 0.9% sodium chloride (MBP/ADV) 250 mL  750 mg IntraVENous Q12H  
 lactobac ac& pc-s.therm-b.anim (JOSE Q/RISAQUAD)  1 Cap Oral DAILY  cyanocobalamin (VITAMIN B12) tablet 1,000 mcg  1,000 mcg Oral DAILY  ferrous sulfate tablet 325 mg  1 Tab Oral DAILY WITH BREAKFAST  sodium chloride (NS) flush 5-40 mL  5-40 mL IntraVENous Q8H  
 sodium chloride (NS) flush 5-40 mL  5-40 mL IntraVENous PRN  
  0.9% sodium chloride infusion  75 mL/hr IntraVENous CONTINUOUS  
 acetaminophen (TYLENOL) tablet 650 mg  650 mg Oral Q4H PRN  piperacillin-tazobactam (ZOSYN) 3.375 g in 0.9% sodium chloride (MBP/ADV) 100 mL  3.375 g IntraVENous Q8H  Vancomycin Pharmacy Dosing   Other Rx Dosing/Monitoring  
 
______________________________________________________________________ EXPECTED LENGTH OF STAY: 3d 16h ACTUAL LENGTH OF STAY:          4 Radha Trent MD

## 2019-03-24 NOTE — PROGRESS NOTES
Bedside shift change report given to Kimber  (oncoming nurse) by Shady Bloom (offgoing nurse). Report included the following information SBAR, Kardex, Intake/Output and MAR.

## 2019-03-24 NOTE — PROGRESS NOTES
Vancomycin trough = 9.0 @ 18.5 H (ext ~ 9.3) Goal 10-15 mcg/ml Change to Vancomycin 750 MG Q 12 H starting @ noon today.

## 2019-03-24 NOTE — PROGRESS NOTES
Bedside shift change report given to Adrian Mendoza (oncoming nurse) by Kimber RN (offgoing nurse). Report included the following information SBAR, Kardex, Intake/Output and MAR.

## 2019-03-25 LAB
ANION GAP SERPL CALC-SCNC: 6 MMOL/L (ref 5–15)
APPEARANCE UR: CLEAR
BACTERIA SPEC CULT: ABNORMAL
BACTERIA SPEC CULT: NORMAL
BACTERIA URNS QL MICRO: NEGATIVE /HPF
BASOPHILS # BLD: 0.1 K/UL (ref 0–0.1)
BASOPHILS NFR BLD: 1 % (ref 0–1)
BILIRUB UR QL: NEGATIVE
BUN SERPL-MCNC: 6 MG/DL (ref 6–20)
BUN/CREAT SERPL: 10 (ref 12–20)
CALCIUM SERPL-MCNC: 8.5 MG/DL (ref 8.5–10.1)
CHLORIDE SERPL-SCNC: 112 MMOL/L (ref 97–108)
CO2 SERPL-SCNC: 25 MMOL/L (ref 21–32)
COLOR UR: ABNORMAL
CREAT SERPL-MCNC: 0.61 MG/DL (ref 0.55–1.02)
DIFFERENTIAL METHOD BLD: ABNORMAL
EOSINOPHIL # BLD: 0.6 K/UL (ref 0–0.4)
EOSINOPHIL NFR BLD: 6 % (ref 0–7)
EPITH CASTS URNS QL MICRO: ABNORMAL /LPF
ERYTHROCYTE [DISTWIDTH] IN BLOOD BY AUTOMATED COUNT: 16.6 % (ref 11.5–14.5)
GLUCOSE SERPL-MCNC: 98 MG/DL (ref 65–100)
GLUCOSE UR STRIP.AUTO-MCNC: NEGATIVE MG/DL
GRAM STN SPEC: ABNORMAL
GRAM STN SPEC: ABNORMAL
HCT VFR BLD AUTO: 31.2 % (ref 35–47)
HGB BLD-MCNC: 9.4 G/DL (ref 11.5–16)
HGB UR QL STRIP: ABNORMAL
IMM GRANULOCYTES # BLD AUTO: 0.1 K/UL (ref 0–0.04)
IMM GRANULOCYTES NFR BLD AUTO: 1 % (ref 0–0.5)
KETONES UR QL STRIP.AUTO: NEGATIVE MG/DL
LEUKOCYTE ESTERASE UR QL STRIP.AUTO: ABNORMAL
LYMPHOCYTES # BLD: 1.7 K/UL (ref 0.8–3.5)
LYMPHOCYTES NFR BLD: 17 % (ref 12–49)
MCH RBC QN AUTO: 29.6 PG (ref 26–34)
MCHC RBC AUTO-ENTMCNC: 30.1 G/DL (ref 30–36.5)
MCV RBC AUTO: 98.1 FL (ref 80–99)
MONOCYTES # BLD: 1.2 K/UL (ref 0–1)
MONOCYTES NFR BLD: 12 % (ref 5–13)
NEUTS SEG # BLD: 6.5 K/UL (ref 1.8–8)
NEUTS SEG NFR BLD: 63 % (ref 32–75)
NITRITE UR QL STRIP.AUTO: NEGATIVE
NRBC # BLD: 0 K/UL (ref 0–0.01)
NRBC BLD-RTO: 0 PER 100 WBC
PH UR STRIP: 5.5 [PH] (ref 5–8)
PLATELET # BLD AUTO: 337 K/UL (ref 150–400)
PMV BLD AUTO: 8.6 FL (ref 8.9–12.9)
POTASSIUM SERPL-SCNC: 3.4 MMOL/L (ref 3.5–5.1)
PROT UR STRIP-MCNC: NEGATIVE MG/DL
RBC # BLD AUTO: 3.18 M/UL (ref 3.8–5.2)
RBC #/AREA URNS HPF: ABNORMAL /HPF (ref 0–5)
SERVICE CMNT-IMP: ABNORMAL
SERVICE CMNT-IMP: ABNORMAL
SERVICE CMNT-IMP: NORMAL
SODIUM SERPL-SCNC: 143 MMOL/L (ref 136–145)
SP GR UR REFRACTOMETRY: 1.02 (ref 1–1.03)
UA: UC IF INDICATED,UAUC: ABNORMAL
UROBILINOGEN UR QL STRIP.AUTO: 0.2 EU/DL (ref 0.2–1)
WBC # BLD AUTO: 10.1 K/UL (ref 3.6–11)
WBC URNS QL MICRO: ABNORMAL /HPF (ref 0–4)

## 2019-03-25 PROCEDURE — 81001 URINALYSIS AUTO W/SCOPE: CPT

## 2019-03-25 PROCEDURE — 74011250637 HC RX REV CODE- 250/637: Performed by: HOSPITALIST

## 2019-03-25 PROCEDURE — 36415 COLL VENOUS BLD VENIPUNCTURE: CPT

## 2019-03-25 PROCEDURE — 74011000250 HC RX REV CODE- 250: Performed by: NURSE PRACTITIONER

## 2019-03-25 PROCEDURE — 85025 COMPLETE CBC W/AUTO DIFF WBC: CPT

## 2019-03-25 PROCEDURE — 65270000032 HC RM SEMIPRIVATE

## 2019-03-25 PROCEDURE — 74011000258 HC RX REV CODE- 258: Performed by: FAMILY MEDICINE

## 2019-03-25 PROCEDURE — 74011250636 HC RX REV CODE- 250/636: Performed by: FAMILY MEDICINE

## 2019-03-25 PROCEDURE — 87086 URINE CULTURE/COLONY COUNT: CPT

## 2019-03-25 PROCEDURE — 80048 BASIC METABOLIC PNL TOTAL CA: CPT

## 2019-03-25 RX ORDER — POTASSIUM CHLORIDE 750 MG/1
40 TABLET, FILM COATED, EXTENDED RELEASE ORAL
Status: COMPLETED | OUTPATIENT
Start: 2019-03-25 | End: 2019-03-25

## 2019-03-25 RX ADMIN — POLYETHYLENE GLYCOL-3350 AND ELECTROLYTES 4000 ML: 236; 6.74; 5.86; 2.97; 22.74 POWDER, FOR SOLUTION ORAL at 17:25

## 2019-03-25 RX ADMIN — Medication 10 ML: at 21:35

## 2019-03-25 RX ADMIN — Medication 1 CAPSULE: at 09:45

## 2019-03-25 RX ADMIN — PIPERACILLIN SODIUM,TAZOBACTAM SODIUM 3.38 G: 3; .375 INJECTION, POWDER, FOR SOLUTION INTRAVENOUS at 09:45

## 2019-03-25 RX ADMIN — FERROUS SULFATE TAB 325 MG (65 MG ELEMENTAL FE) 325 MG: 325 (65 FE) TAB at 07:10

## 2019-03-25 RX ADMIN — Medication 10 ML: at 05:40

## 2019-03-25 RX ADMIN — POTASSIUM CHLORIDE 40 MEQ: 750 TABLET, EXTENDED RELEASE ORAL at 12:36

## 2019-03-25 RX ADMIN — Medication 10 ML: at 14:00

## 2019-03-25 RX ADMIN — PIPERACILLIN SODIUM,TAZOBACTAM SODIUM 3.38 G: 3; .375 INJECTION, POWDER, FOR SOLUTION INTRAVENOUS at 17:26

## 2019-03-25 RX ADMIN — CYANOCOBALAMIN TAB 500 MCG 1000 MCG: 500 TAB at 09:45

## 2019-03-25 RX ADMIN — PIPERACILLIN SODIUM,TAZOBACTAM SODIUM 3.38 G: 3; .375 INJECTION, POWDER, FOR SOLUTION INTRAVENOUS at 01:30

## 2019-03-25 NOTE — PROGRESS NOTES
Hospitalist Progress Note Rad Huber MD 
Answering service: [phone] from in house phone Cell: [phone] Date of Service:  3/25/2019 NAME:  Gigi Garcia :  1935 MRN:  [id number] Admission Summary:  
The patient is an 79-year-old female with apparently no past medical history, who presents to the hospital with the above-mentioned symptom. The patient has had a complicated course in the last few days, apparently presented to [facility] and then was transferred to [facility] with 10-day history of increasing right lower quadrant, urinary urgency, rigors and passage of fecal material either in her urine or from her vagina, and this was on 2019. The patient also had some constipation associated with diarrhea, but no bloody stools associated with the same. The patient was found to have a pelvic abscess, and they were concerned that there may be a fistula present. The patient underwent a CT scan which showed a pelvic abscess. A CT-guided drainage of the abscess had produced 400 mL of purulent fluid. They placed a drain which continued to produce 925 mL of purulent fluid. Her drain then fell out the next day. A repeat CT scan was then performed that showed a large pelvic abscess. She underwent CT-guided drainage of the abscess that produced another 370 mL of purulent fluid. Her drain continued to produce purulent fluid throughout the hospital course, and the patient was eventually discharged with a drain in place. The patient was started on IV Zosyn, continued to have IV Zosyn throughout her hospital course and then eventually was switched to ciprofloxacin orally.   The patient was also found to have IUD that has been placed about 50 years back, and she was supposed to follow up for outpatient followup and possible removal. GYN had consulted on her while she was in the hospital and they felt that the patient does not need the IUD removed at that point of time. The patient reports that she was discharged with home healthcare, and today, the healthcare nurse came and noted that the patient had an abscess on her right groin and that started draining. The patient reports that she also had decreased appetite, not eating or drinking well, and had some nausea associated with diarrhea. The patient reports that she got concerned and decided to come to the hospital.  The patient reports that she also had some shortness of breath while she was walking in the parking lot today, reports that she has finished her antibiotics. 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 The patient reports that she had the abscess drained at [facility] again on 03/13/2019 and was prescribed doxycycline. 
  
The patient came to the ER today and was found to have a groin abscess as well as a pelvic abscess. Dr. Tova Belle from Surgery was consulted and requested the patient to be admitted to the hospitalist service. Currently, the patient is resting in bed. Denies any headaches, blurry vision, sore throat, trouble swallowing, trouble with speech, any chest pain, shortness of breath, cough, fever, chills, urinary symptoms, focal or generalized neurological weakness, recent travel, sick contacts, any falls, injuries, hematemesis, melena, hemoptysis, hematuria or any other concerns or problems. Interval history / Subjective: F/u pelvic abscess No pain, nausea or vomiting No new issues Assessment & Plan:  
 
Recurrent posterior pelvic abscess 
-CT abd 3/20 2.1 x 5.8 x 3.4 cm posterior pelvic collection suspicious for abscess, possibly secondary to diverticulitis 
-Appreciate Surgery -s/p CT guided drainage 3/22, culture shows S mitis, alpha strep, rare candida 
-On Vanc/zosyn. 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 
-Plan for Colonoscopy 3/26 and then lap small bowel resection 3/27 
-Appreciate GI/Surgery Right groin abscess 
-CT abd 3/20 Soft tissue collection in the deep soft tissues of the right groin measuring 2.3 x 4.6 x 3.5 cm suspicious for abscess. -S/p I&D 
-Antibiotics as above 
-Culture shows coagulase negative staph 
-Appreciate Surgery/wound care Mild dehydration -now resolved Anemia 
-Vit b12 deficiency, will put the patient on Vit b12 and oral iron 
-outpatient follow up Hypokalemia 
-replace as needed Shortness of breath 
-CT chest unremarkable -now feeling better Regular diet Code status: PARTIAL CODE 
DVT prophylaxis: scd PTA: home alone Functional baseline: Mobile independently Plan: Follow Surgery Care Plan discussed with: Patient/Family Disposition: Home w/Family Hospital Problems  Date Reviewed: 3/23/2019 Codes Class Noted POA * (Principal) Abdominal pain ICD-10-CM: R10.9 ICD-9-CM: 789.00  3/20/2019 Yes Review of Systems: A comprehensive review of systems was negative except for that written in the HPI. Vital Signs:  
 Last 24hrs VS reviewed since prior progress note. Most recent are: 
Visit Vitals /65 (BP 1 Location: Right arm, BP Patient Position: At rest) Pulse 82 Temp 98 °F (36.7 °C) Resp 16 Ht 5' 7\" (1.702 m) Wt 73.6 kg (162 lb 3.2 oz) SpO2 95% BMI 25.40 kg/m² Intake/Output Summary (Last 24 hours) at 3/25/2019 1055 Last data filed at 3/25/2019 8538 Gross per 24 hour Intake  Output 1200 ml Net -1200 ml Physical Examination:  
 
 
     
Constitutional:  No acute distress, cooperative, pleasant   
ENT:  Oral mucous moist, oropharynx benign.  Neck supple,   
 Resp: CTA bilaterally. No wheezing/rhonchi/rales. No accessory muscle use CV:  Regular rhythm, normal rate, no murmurs, gallops, rubs GI:  drain in place, Soft, non distended, non tender. normoactive bowel sounds, no hepatosplenomegaly Musculoskeletal:  No edema, warm, 2+ pulses throughout Neurologic:  Moves all extremities. AAOx3, CN II-XII reviewed Skin: Right groin wound Data Review:  
 Review and/or order of clinical lab test 
 
 
Labs:  
 
Recent Labs  
  03/25/19 0541 03/23/19 0217 WBC 10.1 11.3* HGB 9.4* 8.7* HCT 31.2* 29.0*  
 348 Recent Labs  
  03/25/19 0541 03/24/19 
0002 03/23/19 0217  143 142  
K 3.4* 3.6 3.4*  
* 113* 113* CO2 25 24 23 BUN 6 11 13 CREA 0.61 0.67 0.68 GLU 98 107* 101* CA 8.5 8.6 8.4* No results for input(s): SGOT, GPT, ALT, AP, TBIL, TBILI, TP, ALB, GLOB, GGT, AML, LPSE in the last 72 hours. No lab exists for component: AMYP, HLPSE No results for input(s): INR, PTP, APTT in the last 72 hours. No lab exists for component: INREXT, INREXT Recent Labs  
  03/23/19 0217 TIBC 171* PSAT 23 Lab Results Component Value Date/Time Folate 14.1 03/23/2019 02:17 AM  
  
No results for input(s): PH, PCO2, PO2 in the last 72 hours. No results for input(s): CPK, CKNDX, TROIQ in the last 72 hours. No lab exists for component: CPKMB No results found for: CHOL, CHOLX, CHLST, CHOLV, HDL, LDL, LDLC, DLDLP, TGLX, TRIGL, TRIGP, CHHD, CHHDX No results found for: Sabina Pride No results found for: COLOR, APPRN, SPGRU, REFSG, LAWRENCE, PROTU, GLUCU, KETU, BILU, UROU, POLA, LEUKU, GLUKE, EPSU, BACTU, WBCU, RBCU, CASTS, UCRY Medications Reviewed:  
 
Current Facility-Administered Medications Medication Dose Route Frequency  vancomycin (VANCOCIN) 750 mg in 0.9% sodium chloride (MBP/ADV) 250 mL  750 mg IntraVENous Q12H  
 lactobac ac& pc-s.therm-b.anim (JOSE Q/RISAQUAD)  1 Cap Oral DAILY  cyanocobalamin (VITAMIN B12) tablet 1,000 mcg  1,000 mcg Oral DAILY  ferrous sulfate tablet 325 mg  1 Tab Oral DAILY WITH BREAKFAST  sodium chloride (NS) flush 5-40 mL  5-40 mL IntraVENous Q8H  
 sodium chloride (NS) flush 5-40 mL  5-40 mL IntraVENous PRN  
 acetaminophen (TYLENOL) tablet 650 mg  650 mg Oral Q4H PRN  piperacillin-tazobactam (ZOSYN) 3.375 g in 0.9% sodium chloride (MBP/ADV) 100 mL  3.375 g IntraVENous Q8H  Vancomycin Pharmacy Dosing   Other Rx Dosing/Monitoring  
 
______________________________________________________________________ EXPECTED LENGTH OF STAY: 3d 16h ACTUAL LENGTH OF STAY:          5 Hubert Head MD

## 2019-03-25 NOTE — PROGRESS NOTES
Charted 3/25/19: 
 
Spiritual Care Partner Volunteer visited patient in Rm 505 on 3/24/19. Documented by: Chaplain Rowan MDiv, MACE 
[phone] PRAY ([id number])

## 2019-03-25 NOTE — CONSULTS
[facility].  [address] [address] [address], [address]  [phone]                     GI CONSULTATION NOTE  Madison Pelaez, AGACNP-BC    NAME:  Raven Wei   :   1935   MRN:   [id number]       Referring Provider: Dr. Charline Madison Date: 3/25/2019     Chief Complaint: Ileal fistual    History of Present Illness:  81year old female with history of pelvic and groin abscess seen in consultation for ileal fistula. She was admitted in 2019 to [facility] for right quadrant pain, found to have a pelvic abscess that was drained x 2. She was eventually discharged home on antibiotics. At some some point developed right groin abscess and had it drained at [facility] 2 weeks ago and prescribed doxycycline. She returned to [facility] referred by her home health nurse on 3/20 for evaluation. She denies fevers or chills. No pain. No bloody stools. No weight loss. CT Abd/pelvis w/:  IMPRESSION:  1. 2.1 x 5.8 x 3.4 cm posterior pelvic collection suspicious for abscess,  possibly secondary to diverticulitis. .  2. Soft tissue collection in the deep soft tissues of the right groin measuring  2.3 x 4.6 x 3.5 cm suspicious for abscess. 3. Additional incidental findings as above. Sinogram fistulogram:  IMPRESSION: Fistula to the distal small bowel. She has not had any previous colonoscopy. History reviewed. No pertinent past medical history. PSH:  Past Surgical History:   Procedure Laterality Date    ABDOMEN SURGERY PROC UNLISTED      Abcess drained        Allergies:  No Known Allergies    Home Medications:  Prior to Admission Medications   Prescriptions Last Dose Informant Patient Reported? Taking?   doxycycline (MONODOX) 100 mg capsule 3/20/2019 at am  Yes Yes   Sig: Take 100 mg by mouth two (2) times a day.       Facility-Administered Medications: None       Hospital Medications:  Current Facility-Administered Medications   Medication Dose Route Frequency    vancomycin (VANCOCIN) 750 mg in 0.9% sodium chloride (MBP/ADV) 250 mL  750 mg IntraVENous Q12H    lactobac ac& pc-s.therm-b.anim (JOSE Q/RISAQUAD)  1 Cap Oral DAILY    cyanocobalamin (VITAMIN B12) tablet 1,000 mcg  1,000 mcg Oral DAILY    ferrous sulfate tablet 325 mg  1 Tab Oral DAILY WITH BREAKFAST    sodium chloride (NS) flush 5-40 mL  5-40 mL IntraVENous Q8H    sodium chloride (NS) flush 5-40 mL  5-40 mL IntraVENous PRN    acetaminophen (TYLENOL) tablet 650 mg  650 mg Oral Q4H PRN    piperacillin-tazobactam (ZOSYN) 3.375 g in 0.9% sodium chloride (MBP/ADV) 100 mL  3.375 g IntraVENous Q8H    Vancomycin Pharmacy Dosing   Other Rx Dosing/Monitoring       Social History:  Social History     Tobacco Use    Smoking status: Never Smoker    Smokeless tobacco: Never Used   Substance Use Topics    Alcohol use: Not Currently       Family History:  History reviewed. No pertinent family history. Review of Systems:    Constitutional: negative fever, negative chills, negative weight loss  Eyes:   negative visual changes  ENT:   negative sore throat, tongue or lip swelling  Respiratory:  negative cough, negative dyspnea  Cards:  negative for chest pain, palpitations, lower extremity edema  GI:   See HPI  :  negative for frequency, dysuria  Integument:  negative for rash and pruritus  Heme:  negative for easy bruising and gum/nose bleeding  Musculoskel: negative for myalgias, back pain and muscle weakness  Neuro: negative for headaches, dizziness, vertigo  Psych:  negative for feelings of anxiety, depression      Objective:     Patient Vitals for the past 8 hrs:   BP Temp Pulse Resp SpO2   03/25/19 0900 123/65 98 °F (36.7 °C) 82 16 95 %     03/25 0701 - 03/25 1900  In: -   Out: 800 [Urine:800]  03/23 1901 - 03/25 0700  In: -   Out: 9266 [Urine:1400; Drains:15]      PHYSICAL EXAM:  General: Well developed, Well nourished.  Elderly female alert, cooperative, no acute distress.    HEENT: Normocephalic, Atraumatic. PERRLA, EOMI. Anicteric sclerae. Lungs:  CTA Bilaterally. No Wheezing/Rhonchi/Rales. Heart:  Regular rate and rhythm, No murmur, No Rubs, No Gallops  Abdomen: Soft, non-distended, non-tender.  +Bowel sounds, no hepatomegaly. Right groin wound  Extremities: No cyanosis,clubing or edema  Neurologic:  Alert and oriented X 3. No acute neurological distress. Psych:   Good insight. Not anxious nor agitated. Data Review     Recent Labs     03/25/19  0541 03/23/19  0217   WBC 10.1 11.3*   HGB 9.4* 8.7*   HCT 31.2* 29.0*    348     Recent Labs     03/25/19  0541 03/24/19  0002    143   K 3.4* 3.6   * 113*   CO2 25 24   BUN 6 11   CREA 0.61 0.67   GLU 98 107*   CA 8.5 8.6     No results for input(s): SGOT, GPT, AP, TBIL, TP, ALB, GLOB, GGT, AML, LPSE in the last 72 hours. No lab exists for component: AMYP, HLPSE  No results for input(s): INR, PTP, APTT in the last 72 hours. 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        Patient Active Problem List   Diagnosis Code    Abdominal pain R10.9                  Plan:   -Clear liquid now, start prep for colonoscopy  -Colonoscopy tomorrow   -Continue antibiotics  -Discussed with Dr. Radu Hale  -Will follow along with you  -Thank you kindly for allowing us to participate in the care of this patient

## 2019-03-25 NOTE — PROGRESS NOTES
Problem: Falls - Risk of 
Goal: *Absence of Falls Description Document Loni Gann Fall Risk and appropriate interventions in the flowsheet. 3/25/2019 1808 by Madelaine Villarreal, RN Outcome: Progressing Towards Goal 
3/25/2019 1807 by Madelaine Villarreal, RN Outcome: Progressing Towards Goal 
3/25/2019 1806 by Madelaine Villarreal, RN Outcome: Progressing Towards Goal 
  
Problem: Patient Education: Go to Patient Education Activity Goal: Patient/Family Education 3/25/2019 1808 by Madelaine Villarreal, RN Outcome: Progressing Towards Goal 
3/25/2019 1807 by Madelaine Villarreal, RN Outcome: Progressing Towards Goal 
3/25/2019 1806 by Madelaine Villarreal, RN Outcome: Progressing Towards Goal 
  
Problem: General Infection Care Plan (Adult and Pediatric) Goal: Improvement in signs and symptoms of infection 3/25/2019 1808 by Madelaine Villarreal, RN Outcome: Progressing Towards Goal 
3/25/2019 1807 by Madelaine Villarreal, RN Outcome: Progressing Towards Goal 
3/25/2019 1806 by Madelaine Villarreal, RN Outcome: Progressing Towards Goal 
  
Problem: Pain Goal: *Control of Pain 3/25/2019 1808 by Madelaine Villarreal, RN Outcome: Progressing Towards Goal 
3/25/2019 1807 by Madelaine Villarreal, RN Outcome: Progressing Towards Goal 
3/25/2019 1806 by Madelaine Villarreal, RN Outcome: Progressing Towards Goal 
  
Problem: Pressure Injury - Risk of 
Goal: *Prevention of pressure injury Description Document Chavez Scale and appropriate interventions in the flowsheet. 3/25/2019 1808 by Madelaine Villarreal, RN Outcome: Progressing Towards Goal 
3/25/2019 1807 by Madelaine Villarreal, RN Outcome: Progressing Towards Goal 
3/25/2019 1806 by Madelaine Villarreal, RN Outcome: Progressing Towards Goal 
  
Problem: Patient Education: Go to Patient Education Activity Goal: Patient/Family Education 3/25/2019 1808 by Madelaine Villarreal, RN Outcome: Progressing Towards Goal 
3/25/2019 1807 by Madelaine Villarreal, RN Outcome: Progressing Towards Goal 
 3/25/2019 1806 by Archer Meigs, RN Outcome: Progressing Towards Goal 
  
Problem: Nutrition Deficit Goal: *Optimize nutritional status 3/25/2019 1808 by Archer Meigs, RN Outcome: Progressing Towards Goal 
3/25/2019 1807 by Archer Meigs, RN Outcome: Progressing Towards Goal 
3/25/2019 1806 by Archer Meigs, RN Outcome: Progressing Towards Goal

## 2019-03-25 NOTE — PROGRESS NOTES
3/25/19; 10:30 -  
CM participated in IDRs on patient. Patient has a hx of abscesses. Patient's pigtail drain came out Saturday, and current recommendation is to monitor patient's development without the drain. Patient is noted as having been open to [facility]. CM submitted MYRA referral to [facility] via [employer]. CRM: Henrry Sharma, MPH, [facility]; Z: [phone]

## 2019-03-25 NOTE — PROGRESS NOTES
Progress Note Patient: Sarah Donahue MRN: [id number]  SSN: [id number] YOB: 1935  Age: 81 y.o. Sex: female Admit Date: 3/20/2019 Subjective:  
 
Patient is tolerating diet; no fever/chills or abdominal pain. Drain fell out over weekend. Objective:  
 
Visit Vitals /65 (BP 1 Location: Right arm, BP Patient Position: At rest) Pulse 82 Temp 98 °F (36.7 °C) Resp 16 Ht 5' 7\" (1.702 m) Wt 162 lb 3.2 oz (73.6 kg) SpO2 95% BMI 25.40 kg/m² Temp (24hrs), Av.2 °F (36.8 °C), Min:98 °F (36.7 °C), Max:98.5 °F (36.9 °C) Physical Exam: ABDOMEN: Non-distended, soft. Non-tender. Right groin wound packed. Data Review: VSD, I/O's Lab Review:  
Recent Results (from the past 12 hour(s)) CBC WITH AUTOMATED DIFF Collection Time: 19  5:41 AM  
Result Value Ref Range WBC 10.1 3.6 - 11.0 K/uL  
 RBC 3.18 (L) 3.80 - 5.20 M/uL HGB 9.4 (L) 11.5 - 16.0 g/dL HCT 31.2 (L) 35.0 - 47.0 % MCV 98.1 80.0 - 99.0 FL  
 MCH 29.6 26.0 - 34.0 PG  
 MCHC 30.1 30.0 - 36.5 g/dL  
 RDW 16.6 (H) 11.5 - 14.5 % PLATELET 903 860 - 103 K/uL MPV 8.6 (L) 8.9 - 12.9 FL  
 NRBC 0.0 0  WBC ABSOLUTE NRBC 0.00 0.00 - 0.01 K/uL NEUTROPHILS 63 32 - 75 % LYMPHOCYTES 17 12 - 49 % MONOCYTES 12 5 - 13 % EOSINOPHILS 6 0 - 7 % BASOPHILS 1 0 - 1 % IMMATURE GRANULOCYTES 1 (H) 0.0 - 0.5 % ABS. NEUTROPHILS 6.5 1.8 - 8.0 K/UL  
 ABS. LYMPHOCYTES 1.7 0.8 - 3.5 K/UL  
 ABS. MONOCYTES 1.2 (H) 0.0 - 1.0 K/UL  
 ABS. EOSINOPHILS 0.6 (H) 0.0 - 0.4 K/UL  
 ABS. BASOPHILS 0.1 0.0 - 0.1 K/UL  
 ABS. IMM. GRANS. 0.1 (H) 0.00 - 0.04 K/UL  
 DF AUTOMATED METABOLIC PANEL, BASIC Collection Time: 19  5:41 AM  
Result Value Ref Range Sodium 143 136 - 145 mmol/L Potassium 3.4 (L) 3.5 - 5.1 mmol/L Chloride 112 (H) 97 - 108 mmol/L  
 CO2 25 21 - 32 mmol/L Anion gap 6 5 - 15 mmol/L Glucose 98 65 - 100 mg/dL  BUN 6 6 - 20 MG/DL  
 Creatinine 0.61 0.55 - 1.02 MG/DL  
 BUN/Creatinine ratio 10 (L) 12 - 20 GFR est AA >60 >60 ml/min/1.73m2 GFR est non-AA >60 >60 ml/min/1.73m2 Calcium 8.5 8.5 - 10.1 MG/DL Assessment:  
 
Hospital Problems  Date Reviewed: 3/23/2019 Codes Class Noted POA * (Principal) Abdominal pain ICD-10-CM: R10.9 ICD-9-CM: 789.00  3/20/2019 Yes Sinogram 3/23 reveals abscess communicates with terminal ileum; no prior colonoscopy. Discussed resection as abscess will otherwise recur. Also recommended pre-op colonoscopy as she has no prior colonoscopy. She agrees with this plan. Plan/Recommendations/Medical Decision Makin. Appreciate Gastroenterology recommendations, plan for colonoscopy Tuesday. 2. To OR Wednesday for laparoscopic small bowel resection barring additional findings on colonoscopy. 3. Continue antibiotics. Signed By: Kaelyn Apple MD   
 2019

## 2019-03-25 NOTE — WOUND CARE
Wound Consult: Follow Up Visit. Chart reviewed. Consulted for right groin abscess / I&D site. Patient is up in chair, assisted her back to bed and then back to chair; she had some fecal incontinence and this was treated with care. Patient's son at bedside; he and his mom are discussing that she is to have colonoscopy tomorrow but she does not know of any diet changes for today that her son is indicating should occur. Looked up diet orders at bedside for patient and reported to her that she was as of 11:30 this a.m on clear liquid diet - she is looking at menu to call dietary - advised her to tell them no red/orange colored jellos. Assessment: 
Right groin - larger area 2 x 0.5 x 1.8 cm and smaller area beneath 0.5 x 0.2 x 1.8 cm. Moist red tissue at opening of larger area, no purulence from this wound; mostly serosanguinous and the smaller area continues to have some drainage of serous to creamy serous drainage without odor; expressed until no longer could express. Surrounding redness / induration is improving. Buttock drain fell out per patient. Perineal area pink, cleansed from fecal incontinence. Treatment: 
Irrigated with saline; packed with strips of MeSalt packing into each wound with over 2 cm tail. Dry gauze and composite dressing applied. Wound Recommendations: 
Continue MeSalt packing / dry gauze dressing to right groin. Continue to monitor nutrition, pain, and skin risk scale, and skin assessment. Plan: We will continue to reassess routinely and as needed. Lisa Ferrell RN,[facility] [phone] Pager [phone]

## 2019-03-25 NOTE — PROGRESS NOTES
Bedside shift change report given to Clarisse (oncoming nurse) by Kwasi Tanner (offgoing nurse). Report included the following information SBAR, Kardex, Intake/Output and MAR.

## 2019-03-25 NOTE — PROGRESS NOTES
Bedside and Verbal shift change report given to Niels N Sami Hutchinson (oncoming nurse) by Liam Irene (offgoing nurse). Report included the following information SBAR, Kardex, Intake/Output, MAR, Accordion and Recent Results.

## 2019-03-26 ENCOUNTER — ANESTHESIA (OUTPATIENT)
Dept: ENDOSCOPY | Age: 84
DRG: 749 | End: 2019-03-26
Payer: MEDICARE

## 2019-03-26 ENCOUNTER — ANESTHESIA EVENT (OUTPATIENT)
Dept: ENDOSCOPY | Age: 84
DRG: 749 | End: 2019-03-26
Payer: MEDICARE

## 2019-03-26 LAB
ANION GAP SERPL CALC-SCNC: 4 MMOL/L (ref 5–15)
BASOPHILS # BLD: 0.1 K/UL (ref 0–0.1)
BASOPHILS NFR BLD: 1 % (ref 0–1)
BUN SERPL-MCNC: 5 MG/DL (ref 6–20)
BUN/CREAT SERPL: 8 (ref 12–20)
CALCIUM SERPL-MCNC: 8.9 MG/DL (ref 8.5–10.1)
CHLORIDE SERPL-SCNC: 112 MMOL/L (ref 97–108)
CO2 SERPL-SCNC: 26 MMOL/L (ref 21–32)
CREAT SERPL-MCNC: 0.59 MG/DL (ref 0.55–1.02)
DIFFERENTIAL METHOD BLD: ABNORMAL
EOSINOPHIL # BLD: 0.6 K/UL (ref 0–0.4)
EOSINOPHIL NFR BLD: 6 % (ref 0–7)
ERYTHROCYTE [DISTWIDTH] IN BLOOD BY AUTOMATED COUNT: 16.6 % (ref 11.5–14.5)
GLUCOSE SERPL-MCNC: 93 MG/DL (ref 65–100)
HCT VFR BLD AUTO: 31.2 % (ref 35–47)
HGB BLD-MCNC: 9.4 G/DL (ref 11.5–16)
IMM GRANULOCYTES # BLD AUTO: 0.1 K/UL (ref 0–0.04)
IMM GRANULOCYTES NFR BLD AUTO: 1 % (ref 0–0.5)
LYMPHOCYTES # BLD: 2.1 K/UL (ref 0.8–3.5)
LYMPHOCYTES NFR BLD: 20 % (ref 12–49)
MCH RBC QN AUTO: 29.2 PG (ref 26–34)
MCHC RBC AUTO-ENTMCNC: 30.1 G/DL (ref 30–36.5)
MCV RBC AUTO: 96.9 FL (ref 80–99)
MONOCYTES # BLD: 1.2 K/UL (ref 0–1)
MONOCYTES NFR BLD: 12 % (ref 5–13)
NEUTS SEG # BLD: 6.3 K/UL (ref 1.8–8)
NEUTS SEG NFR BLD: 60 % (ref 32–75)
NRBC # BLD: 0 K/UL (ref 0–0.01)
NRBC BLD-RTO: 0 PER 100 WBC
PLATELET # BLD AUTO: 344 K/UL (ref 150–400)
PMV BLD AUTO: 8.7 FL (ref 8.9–12.9)
POTASSIUM SERPL-SCNC: 3.3 MMOL/L (ref 3.5–5.1)
RBC # BLD AUTO: 3.22 M/UL (ref 3.8–5.2)
SODIUM SERPL-SCNC: 142 MMOL/L (ref 136–145)
WBC # BLD AUTO: 10.4 K/UL (ref 3.6–11)

## 2019-03-26 PROCEDURE — 76060000032 HC ANESTHESIA 0.5 TO 1 HR: Performed by: SPECIALIST

## 2019-03-26 PROCEDURE — 74011250637 HC RX REV CODE- 250/637: Performed by: SPECIALIST

## 2019-03-26 PROCEDURE — 85025 COMPLETE CBC W/AUTO DIFF WBC: CPT

## 2019-03-26 PROCEDURE — 77030029384 HC SNR POLYP CAPTVR II BSC -B: Performed by: SPECIALIST

## 2019-03-26 PROCEDURE — 0DBK8ZZ EXCISION OF ASCENDING COLON, VIA NATURAL OR ARTIFICIAL OPENING ENDOSCOPIC: ICD-10-PCS | Performed by: SPECIALIST

## 2019-03-26 PROCEDURE — 74011250636 HC RX REV CODE- 250/636: Performed by: SPECIALIST

## 2019-03-26 PROCEDURE — 77030027957 HC TBNG IRR ENDOGTR BUSS -B: Performed by: SPECIALIST

## 2019-03-26 PROCEDURE — 80048 BASIC METABOLIC PNL TOTAL CA: CPT

## 2019-03-26 PROCEDURE — 74011250637 HC RX REV CODE- 250/637: Performed by: FAMILY MEDICINE

## 2019-03-26 PROCEDURE — 74011250636 HC RX REV CODE- 250/636

## 2019-03-26 PROCEDURE — 65270000032 HC RM SEMIPRIVATE

## 2019-03-26 PROCEDURE — 36415 COLL VENOUS BLD VENIPUNCTURE: CPT

## 2019-03-26 PROCEDURE — 74011000258 HC RX REV CODE- 258: Performed by: FAMILY MEDICINE

## 2019-03-26 PROCEDURE — 76040000007: Performed by: SPECIALIST

## 2019-03-26 PROCEDURE — 88305 TISSUE EXAM BY PATHOLOGIST: CPT

## 2019-03-26 PROCEDURE — 74011250636 HC RX REV CODE- 250/636: Performed by: FAMILY MEDICINE

## 2019-03-26 RX ORDER — SODIUM CHLORIDE 0.9 % (FLUSH) 0.9 %
5-40 SYRINGE (ML) INJECTION EVERY 8 HOURS
Status: DISCONTINUED | OUTPATIENT
Start: 2019-03-26 | End: 2019-04-04 | Stop reason: HOSPADM

## 2019-03-26 RX ORDER — MIDAZOLAM HYDROCHLORIDE 1 MG/ML
.25-1 INJECTION, SOLUTION INTRAMUSCULAR; INTRAVENOUS
Status: DISCONTINUED | OUTPATIENT
Start: 2019-03-26 | End: 2019-03-26 | Stop reason: HOSPADM

## 2019-03-26 RX ORDER — FLUMAZENIL 0.1 MG/ML
0.2 INJECTION INTRAVENOUS
Status: DISCONTINUED | OUTPATIENT
Start: 2019-03-26 | End: 2019-03-26 | Stop reason: HOSPADM

## 2019-03-26 RX ORDER — ATROPINE SULFATE 0.1 MG/ML
0.5 INJECTION INTRAVENOUS
Status: DISCONTINUED | OUTPATIENT
Start: 2019-03-26 | End: 2019-03-26 | Stop reason: HOSPADM

## 2019-03-26 RX ORDER — LIDOCAINE HYDROCHLORIDE 20 MG/ML
INJECTION, SOLUTION EPIDURAL; INFILTRATION; INTRACAUDAL; PERINEURAL AS NEEDED
Status: DISCONTINUED | OUTPATIENT
Start: 2019-03-26 | End: 2019-03-26 | Stop reason: HOSPADM

## 2019-03-26 RX ORDER — FENTANYL CITRATE 50 UG/ML
200 INJECTION, SOLUTION INTRAMUSCULAR; INTRAVENOUS
Status: DISCONTINUED | OUTPATIENT
Start: 2019-03-26 | End: 2019-03-26 | Stop reason: HOSPADM

## 2019-03-26 RX ORDER — SODIUM CHLORIDE 9 MG/ML
50 INJECTION, SOLUTION INTRAVENOUS CONTINUOUS
Status: DISPENSED | OUTPATIENT
Start: 2019-03-26 | End: 2019-03-26

## 2019-03-26 RX ORDER — EPINEPHRINE 0.1 MG/ML
1 INJECTION INTRACARDIAC; INTRAVENOUS
Status: DISCONTINUED | OUTPATIENT
Start: 2019-03-26 | End: 2019-03-26 | Stop reason: HOSPADM

## 2019-03-26 RX ORDER — LORAZEPAM 2 MG/ML
2 INJECTION INTRAMUSCULAR AS NEEDED
Status: DISCONTINUED | OUTPATIENT
Start: 2019-03-26 | End: 2019-03-26 | Stop reason: HOSPADM

## 2019-03-26 RX ORDER — DEXTROMETHORPHAN/PSEUDOEPHED 2.5-7.5/.8
1.2 DROPS ORAL
Status: DISCONTINUED | OUTPATIENT
Start: 2019-03-26 | End: 2019-03-26 | Stop reason: HOSPADM

## 2019-03-26 RX ORDER — GUAIFENESIN 100 MG/5ML
81 LIQUID (ML) ORAL DAILY
COMMUNITY

## 2019-03-26 RX ORDER — SODIUM CHLORIDE 0.9 % (FLUSH) 0.9 %
5-40 SYRINGE (ML) INJECTION AS NEEDED
Status: DISCONTINUED | OUTPATIENT
Start: 2019-03-26 | End: 2019-04-04 | Stop reason: HOSPADM

## 2019-03-26 RX ORDER — NALOXONE HYDROCHLORIDE 0.4 MG/ML
0.4 INJECTION, SOLUTION INTRAMUSCULAR; INTRAVENOUS; SUBCUTANEOUS
Status: DISCONTINUED | OUTPATIENT
Start: 2019-03-26 | End: 2019-03-26 | Stop reason: HOSPADM

## 2019-03-26 RX ORDER — PROPOFOL 10 MG/ML
INJECTION, EMULSION INTRAVENOUS AS NEEDED
Status: DISCONTINUED | OUTPATIENT
Start: 2019-03-26 | End: 2019-03-26 | Stop reason: HOSPADM

## 2019-03-26 RX ADMIN — Medication 10 ML: at 22:32

## 2019-03-26 RX ADMIN — ACETAMINOPHEN 650 MG: 325 TABLET ORAL at 10:27

## 2019-03-26 RX ADMIN — PIPERACILLIN SODIUM,TAZOBACTAM SODIUM 3.38 G: 3; .375 INJECTION, POWDER, FOR SOLUTION INTRAVENOUS at 10:27

## 2019-03-26 RX ADMIN — PROPOFOL 50 MG: 10 INJECTION, EMULSION INTRAVENOUS at 16:23

## 2019-03-26 RX ADMIN — LIDOCAINE HYDROCHLORIDE 40 MG: 20 INJECTION, SOLUTION EPIDURAL; INFILTRATION; INTRACAUDAL; PERINEURAL at 16:20

## 2019-03-26 RX ADMIN — PIPERACILLIN SODIUM,TAZOBACTAM SODIUM 3.38 G: 3; .375 INJECTION, POWDER, FOR SOLUTION INTRAVENOUS at 02:40

## 2019-03-26 RX ADMIN — PIPERACILLIN SODIUM,TAZOBACTAM SODIUM 3.38 G: 3; .375 INJECTION, POWDER, FOR SOLUTION INTRAVENOUS at 17:39

## 2019-03-26 RX ADMIN — Medication 10 ML: at 05:23

## 2019-03-26 RX ADMIN — Medication 10 ML: at 17:40

## 2019-03-26 RX ADMIN — PROPOFOL 50 MG: 10 INJECTION, EMULSION INTRAVENOUS at 16:27

## 2019-03-26 RX ADMIN — SODIUM CHLORIDE 50 ML/HR: 900 INJECTION, SOLUTION INTRAVENOUS at 17:40

## 2019-03-26 RX ADMIN — PROPOFOL 50 MG: 10 INJECTION, EMULSION INTRAVENOUS at 16:20

## 2019-03-26 RX ADMIN — ACETAMINOPHEN 650 MG: 325 TABLET ORAL at 17:56

## 2019-03-26 NOTE — PROGRESS NOTES
Progress Note Patient: Hood Florez MRN: [id number]  SSN: [id number] YOB: 1935  Age: 81 y.o. Sex: female Admit Date: 3/20/2019 Subjective:  
 
Patient is tolerating bowel prep; no fever/chills or abdominal pain. Objective:  
 
Visit Vitals /77 (BP 1 Location: Right arm, BP Patient Position: At rest) Pulse 95 Temp 98.3 °F (36.8 °C) Resp 16 Ht 5' 7\" (1.702 m) Wt 162 lb 3.2 oz (73.6 kg) SpO2 95% BMI 25.40 kg/m² Temp (24hrs), Av.3 °F (36.8 °C), Min:98 °F (36.7 °C), Max:98.5 °F (36.9 °C) Physical Exam: ABDOMEN: Non-distended, soft. Non-tender. Right groin wound packed. Data Review: VSD, I/O's Lab Review:  
Recent Results (from the past 12 hour(s)) CBC WITH AUTOMATED DIFF Collection Time: 19  3:00 AM  
Result Value Ref Range WBC 10.4 3.6 - 11.0 K/uL  
 RBC 3.22 (L) 3.80 - 5.20 M/uL HGB 9.4 (L) 11.5 - 16.0 g/dL HCT 31.2 (L) 35.0 - 47.0 % MCV 96.9 80.0 - 99.0 FL  
 MCH 29.2 26.0 - 34.0 PG  
 MCHC 30.1 30.0 - 36.5 g/dL  
 RDW 16.6 (H) 11.5 - 14.5 % PLATELET 471 989 - 502 K/uL MPV 8.7 (L) 8.9 - 12.9 FL  
 NRBC 0.0 0  WBC ABSOLUTE NRBC 0.00 0.00 - 0.01 K/uL NEUTROPHILS 60 32 - 75 % LYMPHOCYTES 20 12 - 49 % MONOCYTES 12 5 - 13 % EOSINOPHILS 6 0 - 7 % BASOPHILS 1 0 - 1 % IMMATURE GRANULOCYTES 1 (H) 0.0 - 0.5 % ABS. NEUTROPHILS 6.3 1.8 - 8.0 K/UL  
 ABS. LYMPHOCYTES 2.1 0.8 - 3.5 K/UL  
 ABS. MONOCYTES 1.2 (H) 0.0 - 1.0 K/UL  
 ABS. EOSINOPHILS 0.6 (H) 0.0 - 0.4 K/UL  
 ABS. BASOPHILS 0.1 0.0 - 0.1 K/UL  
 ABS. IMM. GRANS. 0.1 (H) 0.00 - 0.04 K/UL  
 DF AUTOMATED METABOLIC PANEL, BASIC Collection Time: 19  3:00 AM  
Result Value Ref Range Sodium 142 136 - 145 mmol/L Potassium 3.3 (L) 3.5 - 5.1 mmol/L Chloride 112 (H) 97 - 108 mmol/L  
 CO2 26 21 - 32 mmol/L Anion gap 4 (L) 5 - 15 mmol/L Glucose 93 65 - 100 mg/dL  BUN 5 (L) 6 - 20 MG/DL  
 Creatinine 0.59 0.55 - 1.02 MG/DL  
 BUN/Creatinine ratio 8 (L) 12 - 20 GFR est AA >60 >60 ml/min/1.73m2 GFR est non-AA >60 >60 ml/min/1.73m2 Calcium 8.9 8.5 - 10.1 MG/DL Assessment:  
 
Hospital Problems  Date Reviewed: 3/23/2019 Codes Class Noted POA * (Principal) Abdominal pain ICD-10-CM: R10.9 ICD-9-CM: 789.00  3/20/2019 Yes Sinogram 3/23 reveals abscess communicates with terminal ileum; no prior colonoscopy. Discussed resection as abscess will otherwise recur. Will proceed with colonoscopy to rule out concomitant colonic pathology, might also confirm diverticulitis as antecedent event leading to fistula/abscess. She is on OR schedule for Wednesday PM for laparoscopic small bowel resection. Plan/Recommendations/Medical Decision Makin. Colonoscopy later today. 2. To OR Wednesday for laparoscopic small bowel resection barring additional findings on colonoscopy. 3. Continue antibiotics. Signed By: Dimitrios Jerez MD   
 2019

## 2019-03-26 NOTE — PROGRESS NOTES
Bedside shift change report given to Brii (oncoming nurse) by Aziza Urbano  (offgoing nurse). Report included the following information SBAR, Kardex, Procedure Summary, MAR, Recent Results, Med Rec Status and Quality Measures.

## 2019-03-26 NOTE — ANESTHESIA PREPROCEDURE EVALUATION
Relevant Problems No relevant active problems Anesthetic History No history of anesthetic complications Review of Systems / Medical History Patient summary reviewed, nursing notes reviewed and pertinent labs reviewed Pulmonary Within defined limits Neuro/Psych Within defined limits Cardiovascular Within defined limits GI/Hepatic/Renal 
Within defined limits Endo/Other Within defined limits Other Findings Physical Exam 
 
Airway Mallampati: II 
TM Distance: > 6 cm Neck ROM: normal range of motion Mouth opening: Normal 
 
 Cardiovascular Regular rate and rhythm,  S1 and S2 normal,  no murmur, click, rub, or gallop Dental 
No notable dental hx Pulmonary Breath sounds clear to auscultation Abdominal 
GI exam deferred Other Findings Anesthetic Plan ASA: 2 Anesthesia type: MAC Induction: Intravenous Anesthetic plan and risks discussed with: Patient

## 2019-03-26 NOTE — PROGRESS NOTES
Hospitalist Progress Note Bright Lopez MD 
Answering service: [phone] from in house phone Cell: [phone] Date of Service:  3/26/2019 NAME:  Irma Severance :  1935 MRN:  [id number] Admission Summary:  
The patient is an 61-year-old female with apparently no past medical history, who presents to the hospital with the above-mentioned symptom. The patient has had a complicated course in the last few days, apparently presented to [facility] and then was transferred to [facility] with 10-day history of increasing right lower quadrant, urinary urgency, rigors and passage of fecal material either in her urine or from her vagina, and this was on 2019. The patient also had some constipation associated with diarrhea, but no bloody stools associated with the same. The patient was found to have a pelvic abscess, and they were concerned that there may be a fistula present. The patient underwent a CT scan which showed a pelvic abscess. A CT-guided drainage of the abscess had produced 400 mL of purulent fluid. They placed a drain which continued to produce 925 mL of purulent fluid. Her drain then fell out the next day. A repeat CT scan was then performed that showed a large pelvic abscess. She underwent CT-guided drainage of the abscess that produced another 370 mL of purulent fluid. Her drain continued to produce purulent fluid throughout the hospital course, and the patient was eventually discharged with a drain in place. The patient was started on IV Zosyn, continued to have IV Zosyn throughout her hospital course and then eventually was switched to ciprofloxacin orally.   The patient was also found to have IUD that has been placed about 50 years back, and she was supposed to follow up for outpatient followup and possible removal. GYN had consulted on her while she was in the hospital and they felt that the patient does not need the IUD removed at that point of time. The patient reports that she was discharged with home healthcare, and today, the healthcare nurse came and noted that the patient had an abscess on her right groin and that started draining. The patient reports that she also had decreased appetite, not eating or drinking well, and had some nausea associated with diarrhea. The patient reports that she got concerned and decided to come to the hospital.  The patient reports that she also had some shortness of breath while she was walking in the parking lot today, reports that she has finished her antibiotics. 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 The patient reports that she had the abscess drained at [facility] again on 03/13/2019 and was prescribed doxycycline. 
  
The patient came to the ER today and was found to have a groin abscess as well as a pelvic abscess. Dr. Bogdan Mcmillan from Surgery was consulted and requested the patient to be admitted to the hospitalist service. Currently, the patient is resting in bed. Denies any headaches, blurry vision, sore throat, trouble swallowing, trouble with speech, any chest pain, shortness of breath, cough, fever, chills, urinary symptoms, focal or generalized neurological weakness, recent travel, sick contacts, any falls, injuries, hematemesis, melena, hemoptysis, hematuria or any other concerns or problems. Interval history / Subjective: F/u pelvic abscess No pain, nausea or vomiting No new issues Waiting for colonoscopy today Assessment & Plan:  
 
Recurrent posterior pelvic abscess 
-CT abd 3/20 2.1 x 5.8 x 3.4 cm posterior pelvic collection suspicious for abscess, possibly secondary to diverticulitis 
-Appreciate Surgery -s/p CT guided drainage 3/22, culture shows S mitis, alpha strep, rare candida 
-On Vanc/zosyn. 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 
-Plan for Colonoscopy 3/26 and then lap small bowel resection 3/27 
-Appreciate GI/Surgery Right groin abscess 
-CT abd 3/20 Soft tissue collection in the deep soft tissues of the right groin measuring 2.3 x 4.6 x 3.5 cm suspicious for abscess. -S/p I&D 
-Antibiotics as above 
-Culture shows coagulase negative staph 
-Appreciate Surgery/wound care Mild dehydration -now resolved Anemia 
-Vit b12 deficiency, will put the patient on Vit b12 and oral iron 
-outpatient follow up Hypokalemia 
-replace as needed Shortness of breath 
-CT chest unremarkable -now feeling better Regular diet Code status: PARTIAL CODE 
DVT prophylaxis: scd PTA: home alone Functional baseline: Mobile independently Plan: Follow Surgery/GI Care Plan discussed with: Patient/Family Disposition: Home w/Family Hospital Problems  Date Reviewed: 3/23/2019 Codes Class Noted POA * (Principal) Abdominal pain ICD-10-CM: R10.9 ICD-9-CM: 789.00  3/20/2019 Yes Review of Systems: A comprehensive review of systems was negative except for that written in the HPI. Vital Signs:  
 Last 24hrs VS reviewed since prior progress note. Most recent are: 
Visit Vitals /77 (BP 1 Location: Right arm, BP Patient Position: At rest) Pulse 95 Temp 98.3 °F (36.8 °C) Resp 16 Ht 5' 7\" (1.702 m) Wt 73.6 kg (162 lb 3.2 oz) SpO2 95% BMI 25.40 kg/m² Intake/Output Summary (Last 24 hours) at 3/26/2019 1038 Last data filed at 3/25/2019 8701 Gross per 24 hour Intake 300 ml Output 450 ml Net -150 ml Physical Examination:  
 
 
     
Constitutional:  No acute distress, cooperative, pleasant   
ENT:  Oral mucous moist, oropharynx benign.  Neck supple,   
 Resp: CTA bilaterally. No wheezing/rhonchi/rales. No accessory muscle use CV:  Regular rhythm, normal rate, no murmurs, gallops, rubs GI:  drain in place, Soft, non distended, non tender. normoactive bowel sounds, no hepatosplenomegaly Musculoskeletal:  No edema, warm, 2+ pulses throughout Neurologic:  Moves all extremities. AAOx3, CN II-XII reviewed Skin: Right groin wound Data Review:  
 Review and/or order of clinical lab test 
 
 
Labs:  
 
Recent Labs  
  03/26/19 0300 03/25/19 0541 WBC 10.4 10.1 HGB 9.4* 9.4* HCT 31.2* 31.2*  
 337 Recent Labs  
  03/26/19 0300 03/25/19 0541 03/24/19 
0002  143 143  
K 3.3* 3.4* 3.6 * 112* 113* CO2 26 25 24 BUN 5* 6 11 CREA 0.59 0.61 0.67 GLU 93 98 107* CA 8.9 8.5 8.6 No results for input(s): SGOT, GPT, ALT, AP, TBIL, TBILI, TP, ALB, GLOB, GGT, AML, LPSE in the last 72 hours. No lab exists for component: AMYP, HLPSE No results for input(s): INR, PTP, APTT in the last 72 hours. No lab exists for component: INREXT, INREXT No results for input(s): FE, TIBC, PSAT, FERR in the last 72 hours. Lab Results Component Value Date/Time Folate 14.1 03/23/2019 02:17 AM  
  
No results for input(s): PH, PCO2, PO2 in the last 72 hours. No results for input(s): CPK, CKNDX, TROIQ in the last 72 hours. No lab exists for component: CPKMB No results found for: CHOL, CHOLX, CHLST, CHOLV, HDL, LDL, LDLC, DLDLP, TGLX, TRIGL, TRIGP, CHHD, CHHDX No results found for: Slava Pedroza Lab Results Component Value Date/Time  Color YELLOW/STRAW 03/25/2019 05:30 PM  
 Appearance CLEAR 03/25/2019 05:30 PM  
 Specific gravity 1.019 03/25/2019 05:30 PM  
 pH (UA) 5.5 03/25/2019 05:30 PM  
 Protein NEGATIVE  03/25/2019 05:30 PM  
 Glucose NEGATIVE  03/25/2019 05:30 PM  
 Ketone NEGATIVE  03/25/2019 05:30 PM  
 Bilirubin NEGATIVE  03/25/2019 05:30 PM  
 Urobilinogen 0.2 03/25/2019 05:30 PM  
 Nitrites NEGATIVE  03/25/2019 05:30 PM  
 Leukocyte Esterase SMALL (A) 03/25/2019 05:30 PM  
 Epithelial cells MODERATE (A) 03/25/2019 05:30 PM  
 Bacteria NEGATIVE  03/25/2019 05:30 PM  
 WBC 5-10 03/25/2019 05:30 PM  
 RBC 0-5 03/25/2019 05:30 PM  
 
 
 
Medications Reviewed:  
 
Current Facility-Administered Medications Medication Dose Route Frequency  lactobac ac& pc-s.therm-b.anim (JOSE Q/RISAQUAD)  1 Cap Oral DAILY  cyanocobalamin (VITAMIN B12) tablet 1,000 mcg  1,000 mcg Oral DAILY  ferrous sulfate tablet 325 mg  1 Tab Oral DAILY WITH BREAKFAST  sodium chloride (NS) flush 5-40 mL  5-40 mL IntraVENous Q8H  
 sodium chloride (NS) flush 5-40 mL  5-40 mL IntraVENous PRN  
 acetaminophen (TYLENOL) tablet 650 mg  650 mg Oral Q4H PRN  piperacillin-tazobactam (ZOSYN) 3.375 g in 0.9% sodium chloride (MBP/ADV) 100 mL  3.375 g IntraVENous Q8H  
 
______________________________________________________________________ EXPECTED LENGTH OF STAY: 4d 21h ACTUAL LENGTH OF STAY:          6 Inocente Dietz MD

## 2019-03-26 NOTE — ANESTHESIA POSTPROCEDURE EVALUATION
Procedure(s): 
COLONOSCOPY 
ENDOSCOPIC POLYPECTOMY. MAC Anesthesia Post Evaluation Patient location during evaluation: PACU Patient participation: complete - patient participated Level of consciousness: awake Pain management: adequate Airway patency: patent Anesthetic complications: no 
Cardiovascular status: hemodynamically stable Respiratory status: acceptable Hydration status: acceptable Comments: I have seen and evaluated the patient. The patient is ready for PACU discharge. [address], DO Vitals Value Taken Time BP Temp Pulse Resp 0 3/26/2019  5:04 PM  
SpO2 Vitals shown include unvalidated device data.

## 2019-03-26 NOTE — PROGRESS NOTES
Problem: Falls - Risk of 
Goal: *Absence of Falls Description Document Vonnie Trujillo Fall Risk and appropriate interventions in the flowsheet. Outcome: Progressing Towards Goal 
  
Problem: Patient Education: Go to Patient Education Activity Goal: Patient/Family Education Outcome: Progressing Towards Goal 
  
Problem: General Infection Care Plan (Adult and Pediatric) Goal: Improvement in signs and symptoms of infection Outcome: Progressing Towards Goal 
  
Problem: Pain Goal: *Control of Pain Outcome: Progressing Towards Goal 
  
Problem: Pressure Injury - Risk of 
Goal: *Prevention of pressure injury Description Document Chavez Scale and appropriate interventions in the flowsheet. Outcome: Progressing Towards Goal 
  
Problem: Patient Education: Go to Patient Education Activity Goal: Patient/Family Education Outcome: Progressing Towards Goal 
  
Problem: Nutrition Deficit Goal: *Optimize nutritional status Outcome: Progressing Towards Goal

## 2019-03-26 NOTE — PROGRESS NOTES
3/26/19; 10:30 -  
CM participated in IDRs on patient. Patient is to have a colonoscopy today, 3/26, and possibly a small bowel resection tomorrow. Patient continues on zosyn and wound care is following.  was notified that patient is currently open to [facility] and will require MYRA orders. CRM: Juliana Raza, MPH, [address] [place] [address]; Z: [phone]

## 2019-03-26 NOTE — ROUTINE PROCESS
Bedside and Verbal shift change report given to Elaine Gardiner (oncoming nurse) by Destiny Amin (offgoing nurse). Report included the following information SBAR, Kardex, Procedure Summary, Intake/Output, MAR, Accordion and Recent Results.

## 2019-03-27 ENCOUNTER — ANESTHESIA EVENT (OUTPATIENT)
Dept: SURGERY | Age: 84
DRG: 749 | End: 2019-03-27
Payer: MEDICARE

## 2019-03-27 ENCOUNTER — ANESTHESIA (OUTPATIENT)
Dept: SURGERY | Age: 84
DRG: 749 | End: 2019-03-27
Payer: MEDICARE

## 2019-03-27 LAB
ANION GAP SERPL CALC-SCNC: 6 MMOL/L (ref 5–15)
BACTERIA SPEC CULT: ABNORMAL
BUN SERPL-MCNC: 4 MG/DL (ref 6–20)
BUN/CREAT SERPL: 7 (ref 12–20)
CALCIUM SERPL-MCNC: 8.6 MG/DL (ref 8.5–10.1)
CC UR VC: ABNORMAL
CHLORIDE SERPL-SCNC: 111 MMOL/L (ref 97–108)
CO2 SERPL-SCNC: 25 MMOL/L (ref 21–32)
CREAT SERPL-MCNC: 0.59 MG/DL (ref 0.55–1.02)
GLUCOSE SERPL-MCNC: 77 MG/DL (ref 65–100)
POTASSIUM SERPL-SCNC: 3 MMOL/L (ref 3.5–5.1)
SERVICE CMNT-IMP: ABNORMAL
SODIUM SERPL-SCNC: 142 MMOL/L (ref 136–145)

## 2019-03-27 PROCEDURE — 74011250636 HC RX REV CODE- 250/636: Performed by: FAMILY MEDICINE

## 2019-03-27 PROCEDURE — 74011000258 HC RX REV CODE- 258: Performed by: FAMILY MEDICINE

## 2019-03-27 PROCEDURE — 86900 BLOOD TYPING SEROLOGIC ABO: CPT

## 2019-03-27 PROCEDURE — 86923 COMPATIBILITY TEST ELECTRIC: CPT

## 2019-03-27 PROCEDURE — 74011000258 HC RX REV CODE- 258

## 2019-03-27 PROCEDURE — 74011250636 HC RX REV CODE- 250/636

## 2019-03-27 PROCEDURE — 77030035051: Performed by: SURGERY

## 2019-03-27 PROCEDURE — 77030008756 HC TU IRR SUC STRY -B: Performed by: SURGERY

## 2019-03-27 PROCEDURE — 77030016151 HC PROTCTR LNS DFOG COVD -B: Performed by: SURGERY

## 2019-03-27 PROCEDURE — 77030013567 HC DRN WND RESERV BARD -A: Performed by: SURGERY

## 2019-03-27 PROCEDURE — 77030034850: Performed by: SURGERY

## 2019-03-27 PROCEDURE — 77030002933 HC SUT MCRYL J&J -A: Performed by: SURGERY

## 2019-03-27 PROCEDURE — 77030018836 HC SOL IRR NACL ICUM -A: Performed by: SURGERY

## 2019-03-27 PROCEDURE — P9045 ALBUMIN (HUMAN), 5%, 250 ML: HCPCS

## 2019-03-27 PROCEDURE — 77030020747 HC TU INSUF ENDOSC TELE -A: Performed by: SURGERY

## 2019-03-27 PROCEDURE — 77030037032 HC INSRT SCIS CLICKLLINE DISP STOR -B: Performed by: SURGERY

## 2019-03-27 PROCEDURE — 80048 BASIC METABOLIC PNL TOTAL CA: CPT

## 2019-03-27 PROCEDURE — 76210000006 HC OR PH I REC 0.5 TO 1 HR: Performed by: SURGERY

## 2019-03-27 PROCEDURE — 77030012407 HC DRN WND BARD -B: Performed by: SURGERY

## 2019-03-27 PROCEDURE — 76010000171 HC OR TIME 2 TO 2.5 HR INTENSV-TIER 1: Performed by: SURGERY

## 2019-03-27 PROCEDURE — 77030008684 HC TU ET CUF COVD -B: Performed by: ANESTHESIOLOGY

## 2019-03-27 PROCEDURE — 36415 COLL VENOUS BLD VENIPUNCTURE: CPT

## 2019-03-27 PROCEDURE — 74011250636 HC RX REV CODE- 250/636: Performed by: SURGERY

## 2019-03-27 PROCEDURE — 77030002916 HC SUT ETHLN J&J -A: Performed by: SURGERY

## 2019-03-27 PROCEDURE — 74011250637 HC RX REV CODE- 250/637: Performed by: SURGERY

## 2019-03-27 PROCEDURE — 77030026438 HC STYL ET INTUB CARD -A: Performed by: ANESTHESIOLOGY

## 2019-03-27 PROCEDURE — 0W9J40Z DRAINAGE OF PELVIC CAVITY WITH DRAINAGE DEVICE, PERCUTANEOUS ENDOSCOPIC APPROACH: ICD-10-PCS | Performed by: SURGERY

## 2019-03-27 PROCEDURE — 77030035048 HC TRCR ENDOSC OPTCL COVD -B: Performed by: SURGERY

## 2019-03-27 PROCEDURE — 77030020782 HC GWN BAIR PAWS FLX 3M -B

## 2019-03-27 PROCEDURE — 77030032490 HC SLV COMPR SCD KNE COVD -B: Performed by: SURGERY

## 2019-03-27 PROCEDURE — 77030035045 HC TRCR ENDOSC VRSPRT BLDLSS COVD -B: Performed by: SURGERY

## 2019-03-27 PROCEDURE — 0DN84ZZ RELEASE SMALL INTESTINE, PERCUTANEOUS ENDOSCOPIC APPROACH: ICD-10-PCS | Performed by: SURGERY

## 2019-03-27 PROCEDURE — 76060000035 HC ANESTHESIA 2 TO 2.5 HR: Performed by: SURGERY

## 2019-03-27 PROCEDURE — 74011000258 HC RX REV CODE- 258: Performed by: SURGERY

## 2019-03-27 PROCEDURE — 77030018352 HC SHR COAG HARM2 J&J -E: Performed by: SURGERY

## 2019-03-27 PROCEDURE — 76010000131 HC OR TIME 2 TO 2.5 HR: Performed by: SURGERY

## 2019-03-27 PROCEDURE — 74011000250 HC RX REV CODE- 250: Performed by: SURGERY

## 2019-03-27 PROCEDURE — 77030013079 HC BLNKT BAIR HGGR 3M -A: Performed by: ANESTHESIOLOGY

## 2019-03-27 PROCEDURE — 65270000032 HC RM SEMIPRIVATE

## 2019-03-27 PROCEDURE — 77030019702 HC WRP THER MENM -C: Performed by: SURGERY

## 2019-03-27 PROCEDURE — 74011000250 HC RX REV CODE- 250

## 2019-03-27 PROCEDURE — 77030011640 HC PAD GRND REM COVD -A: Performed by: SURGERY

## 2019-03-27 RX ORDER — ACETAMINOPHEN 500 MG
1000 TABLET ORAL ONCE
Status: COMPLETED | OUTPATIENT
Start: 2019-03-27 | End: 2019-03-27

## 2019-03-27 RX ORDER — SODIUM CHLORIDE 9 MG/ML
1000 INJECTION, SOLUTION INTRAVENOUS CONTINUOUS
Status: DISCONTINUED | OUTPATIENT
Start: 2019-03-27 | End: 2019-03-27 | Stop reason: HOSPADM

## 2019-03-27 RX ORDER — SODIUM CHLORIDE 0.9 % (FLUSH) 0.9 %
5-40 SYRINGE (ML) INJECTION AS NEEDED
Status: CANCELLED | OUTPATIENT
Start: 2019-03-27

## 2019-03-27 RX ORDER — LIDOCAINE HYDROCHLORIDE 10 MG/ML
0.1 INJECTION, SOLUTION EPIDURAL; INFILTRATION; INTRACAUDAL; PERINEURAL AS NEEDED
Status: DISCONTINUED | OUTPATIENT
Start: 2019-03-27 | End: 2019-03-27 | Stop reason: HOSPADM

## 2019-03-27 RX ORDER — SUCCINYLCHOLINE CHLORIDE 20 MG/ML
INJECTION INTRAMUSCULAR; INTRAVENOUS AS NEEDED
Status: DISCONTINUED | OUTPATIENT
Start: 2019-03-27 | End: 2019-03-27 | Stop reason: HOSPADM

## 2019-03-27 RX ORDER — MORPHINE SULFATE 2 MG/ML
2 INJECTION, SOLUTION INTRAMUSCULAR; INTRAVENOUS
Status: DISCONTINUED | OUTPATIENT
Start: 2019-03-27 | End: 2019-04-04 | Stop reason: HOSPADM

## 2019-03-27 RX ORDER — GABAPENTIN 300 MG/1
600 CAPSULE ORAL ONCE
Status: COMPLETED | OUTPATIENT
Start: 2019-03-27 | End: 2019-03-27

## 2019-03-27 RX ORDER — FENTANYL CITRATE 50 UG/ML
25 INJECTION, SOLUTION INTRAMUSCULAR; INTRAVENOUS
Status: CANCELLED | OUTPATIENT
Start: 2019-03-27

## 2019-03-27 RX ORDER — ONDANSETRON 2 MG/ML
4 INJECTION INTRAMUSCULAR; INTRAVENOUS AS NEEDED
Status: CANCELLED | OUTPATIENT
Start: 2019-03-27

## 2019-03-27 RX ORDER — OXYCODONE AND ACETAMINOPHEN 5; 325 MG/1; MG/1
1 TABLET ORAL AS NEEDED
Status: CANCELLED | OUTPATIENT
Start: 2019-03-27

## 2019-03-27 RX ORDER — ALVIMOPAN 12 MG/1
12 CAPSULE ORAL ONCE
Status: COMPLETED | OUTPATIENT
Start: 2019-03-27 | End: 2019-03-27

## 2019-03-27 RX ORDER — SODIUM CHLORIDE 0.9 % (FLUSH) 0.9 %
5-40 SYRINGE (ML) INJECTION EVERY 8 HOURS
Status: DISCONTINUED | OUTPATIENT
Start: 2019-03-27 | End: 2019-04-04 | Stop reason: HOSPADM

## 2019-03-27 RX ORDER — ROCURONIUM BROMIDE 10 MG/ML
INJECTION, SOLUTION INTRAVENOUS AS NEEDED
Status: DISCONTINUED | OUTPATIENT
Start: 2019-03-27 | End: 2019-03-27 | Stop reason: HOSPADM

## 2019-03-27 RX ORDER — OXYCODONE HYDROCHLORIDE 5 MG/1
5 TABLET ORAL
Status: DISCONTINUED | OUTPATIENT
Start: 2019-03-27 | End: 2019-04-04 | Stop reason: HOSPADM

## 2019-03-27 RX ORDER — PHENYLEPHRINE HCL IN 0.9% NACL 0.4MG/10ML
SYRINGE (ML) INTRAVENOUS AS NEEDED
Status: DISCONTINUED | OUTPATIENT
Start: 2019-03-27 | End: 2019-03-27 | Stop reason: HOSPADM

## 2019-03-27 RX ORDER — SODIUM CHLORIDE, SODIUM LACTATE, POTASSIUM CHLORIDE, CALCIUM CHLORIDE 600; 310; 30; 20 MG/100ML; MG/100ML; MG/100ML; MG/100ML
75 INJECTION, SOLUTION INTRAVENOUS CONTINUOUS
Status: DISCONTINUED | OUTPATIENT
Start: 2019-03-27 | End: 2019-03-27 | Stop reason: HOSPADM

## 2019-03-27 RX ORDER — SODIUM CHLORIDE 0.9 % (FLUSH) 0.9 %
5-40 SYRINGE (ML) INJECTION AS NEEDED
Status: DISCONTINUED | OUTPATIENT
Start: 2019-03-27 | End: 2019-03-27 | Stop reason: HOSPADM

## 2019-03-27 RX ORDER — PROPOFOL 10 MG/ML
INJECTION, EMULSION INTRAVENOUS AS NEEDED
Status: DISCONTINUED | OUTPATIENT
Start: 2019-03-27 | End: 2019-03-27 | Stop reason: HOSPADM

## 2019-03-27 RX ORDER — SODIUM CHLORIDE 9 MG/ML
INJECTION, SOLUTION INTRAVENOUS
Status: DISCONTINUED | OUTPATIENT
Start: 2019-03-27 | End: 2019-03-27 | Stop reason: HOSPADM

## 2019-03-27 RX ORDER — FENTANYL CITRATE 50 UG/ML
50 INJECTION, SOLUTION INTRAMUSCULAR; INTRAVENOUS AS NEEDED
Status: DISCONTINUED | OUTPATIENT
Start: 2019-03-27 | End: 2019-03-27 | Stop reason: HOSPADM

## 2019-03-27 RX ORDER — SODIUM CHLORIDE 0.9 % (FLUSH) 0.9 %
5-40 SYRINGE (ML) INJECTION EVERY 8 HOURS
Status: CANCELLED | OUTPATIENT
Start: 2019-03-27

## 2019-03-27 RX ORDER — MIDAZOLAM HYDROCHLORIDE 1 MG/ML
1 INJECTION, SOLUTION INTRAMUSCULAR; INTRAVENOUS AS NEEDED
Status: DISCONTINUED | OUTPATIENT
Start: 2019-03-27 | End: 2019-03-27 | Stop reason: HOSPADM

## 2019-03-27 RX ORDER — FENTANYL CITRATE 50 UG/ML
25 INJECTION, SOLUTION INTRAMUSCULAR; INTRAVENOUS
Status: DISCONTINUED | OUTPATIENT
Start: 2019-03-27 | End: 2019-03-27 | Stop reason: HOSPADM

## 2019-03-27 RX ORDER — DIPHENHYDRAMINE HYDROCHLORIDE 50 MG/ML
12.5 INJECTION, SOLUTION INTRAMUSCULAR; INTRAVENOUS AS NEEDED
Status: CANCELLED | OUTPATIENT
Start: 2019-03-27 | End: 2019-03-27

## 2019-03-27 RX ORDER — CELECOXIB 200 MG/1
200 CAPSULE ORAL ONCE
Status: COMPLETED | OUTPATIENT
Start: 2019-03-27 | End: 2019-03-27

## 2019-03-27 RX ORDER — GLYCOPYRROLATE 0.2 MG/ML
INJECTION INTRAMUSCULAR; INTRAVENOUS AS NEEDED
Status: DISCONTINUED | OUTPATIENT
Start: 2019-03-27 | End: 2019-03-27 | Stop reason: HOSPADM

## 2019-03-27 RX ORDER — FENTANYL CITRATE 50 UG/ML
INJECTION, SOLUTION INTRAMUSCULAR; INTRAVENOUS AS NEEDED
Status: DISCONTINUED | OUTPATIENT
Start: 2019-03-27 | End: 2019-03-27 | Stop reason: HOSPADM

## 2019-03-27 RX ORDER — MIDAZOLAM HYDROCHLORIDE 1 MG/ML
0.5 INJECTION, SOLUTION INTRAMUSCULAR; INTRAVENOUS
Status: CANCELLED | OUTPATIENT
Start: 2019-03-27

## 2019-03-27 RX ORDER — BUPIVACAINE HYDROCHLORIDE 5 MG/ML
30 INJECTION, SOLUTION EPIDURAL; INTRACAUDAL ONCE
Status: COMPLETED | OUTPATIENT
Start: 2019-03-27 | End: 2019-03-27

## 2019-03-27 RX ORDER — NEOSTIGMINE METHYLSULFATE 1 MG/ML
INJECTION INTRAVENOUS AS NEEDED
Status: DISCONTINUED | OUTPATIENT
Start: 2019-03-27 | End: 2019-03-27 | Stop reason: HOSPADM

## 2019-03-27 RX ORDER — SODIUM CHLORIDE 0.9 % (FLUSH) 0.9 %
5-40 SYRINGE (ML) INJECTION EVERY 8 HOURS
Status: DISCONTINUED | OUTPATIENT
Start: 2019-03-27 | End: 2019-03-27 | Stop reason: HOSPADM

## 2019-03-27 RX ORDER — POTASSIUM CHLORIDE 7.45 MG/ML
10 INJECTION INTRAVENOUS
Status: DISPENSED | OUTPATIENT
Start: 2019-03-27 | End: 2019-03-27

## 2019-03-27 RX ORDER — ONDANSETRON 2 MG/ML
INJECTION INTRAMUSCULAR; INTRAVENOUS AS NEEDED
Status: DISCONTINUED | OUTPATIENT
Start: 2019-03-27 | End: 2019-03-27 | Stop reason: HOSPADM

## 2019-03-27 RX ORDER — SODIUM CHLORIDE 0.9 % (FLUSH) 0.9 %
5-40 SYRINGE (ML) INJECTION AS NEEDED
Status: DISCONTINUED | OUTPATIENT
Start: 2019-03-27 | End: 2019-04-04 | Stop reason: HOSPADM

## 2019-03-27 RX ORDER — LIDOCAINE HYDROCHLORIDE 20 MG/ML
INJECTION, SOLUTION EPIDURAL; INFILTRATION; INTRACAUDAL; PERINEURAL AS NEEDED
Status: DISCONTINUED | OUTPATIENT
Start: 2019-03-27 | End: 2019-03-27 | Stop reason: HOSPADM

## 2019-03-27 RX ORDER — MORPHINE SULFATE 10 MG/ML
2 INJECTION, SOLUTION INTRAMUSCULAR; INTRAVENOUS
Status: CANCELLED | OUTPATIENT
Start: 2019-03-27

## 2019-03-27 RX ORDER — SODIUM CHLORIDE 9 MG/ML
50 INJECTION, SOLUTION INTRAVENOUS CONTINUOUS
Status: CANCELLED | OUTPATIENT
Start: 2019-03-27

## 2019-03-27 RX ORDER — KETAMINE HYDROCHLORIDE 10 MG/ML
INJECTION, SOLUTION INTRAMUSCULAR; INTRAVENOUS AS NEEDED
Status: DISCONTINUED | OUTPATIENT
Start: 2019-03-27 | End: 2019-03-27 | Stop reason: HOSPADM

## 2019-03-27 RX ORDER — SODIUM CHLORIDE 9 MG/ML
50 INJECTION, SOLUTION INTRAVENOUS CONTINUOUS
Status: DISCONTINUED | OUTPATIENT
Start: 2019-03-27 | End: 2019-03-27 | Stop reason: HOSPADM

## 2019-03-27 RX ORDER — SODIUM CHLORIDE, SODIUM LACTATE, POTASSIUM CHLORIDE, CALCIUM CHLORIDE 600; 310; 30; 20 MG/100ML; MG/100ML; MG/100ML; MG/100ML
75 INJECTION, SOLUTION INTRAVENOUS CONTINUOUS
Status: CANCELLED | OUTPATIENT
Start: 2019-03-27

## 2019-03-27 RX ORDER — SODIUM CHLORIDE, SODIUM LACTATE, POTASSIUM CHLORIDE, CALCIUM CHLORIDE 600; 310; 30; 20 MG/100ML; MG/100ML; MG/100ML; MG/100ML
INJECTION, SOLUTION INTRAVENOUS
Status: DISCONTINUED | OUTPATIENT
Start: 2019-03-27 | End: 2019-03-27 | Stop reason: HOSPADM

## 2019-03-27 RX ORDER — DEXAMETHASONE SODIUM PHOSPHATE 4 MG/ML
INJECTION, SOLUTION INTRA-ARTICULAR; INTRALESIONAL; INTRAMUSCULAR; INTRAVENOUS; SOFT TISSUE AS NEEDED
Status: DISCONTINUED | OUTPATIENT
Start: 2019-03-27 | End: 2019-03-27 | Stop reason: HOSPADM

## 2019-03-27 RX ORDER — SODIUM CHLORIDE 9 MG/ML
50 INJECTION, SOLUTION INTRAVENOUS CONTINUOUS
Status: DISPENSED | OUTPATIENT
Start: 2019-03-27 | End: 2019-03-27

## 2019-03-27 RX ORDER — ONDANSETRON 2 MG/ML
4 INJECTION INTRAMUSCULAR; INTRAVENOUS AS NEEDED
Status: DISCONTINUED | OUTPATIENT
Start: 2019-03-27 | End: 2019-03-27 | Stop reason: HOSPADM

## 2019-03-27 RX ORDER — SODIUM CHLORIDE 9 MG/ML
250 INJECTION, SOLUTION INTRAVENOUS AS NEEDED
Status: DISCONTINUED | OUTPATIENT
Start: 2019-03-27 | End: 2019-03-27

## 2019-03-27 RX ORDER — HYDROMORPHONE HYDROCHLORIDE 1 MG/ML
0.2 INJECTION, SOLUTION INTRAMUSCULAR; INTRAVENOUS; SUBCUTANEOUS
Status: CANCELLED | OUTPATIENT
Start: 2019-03-27

## 2019-03-27 RX ORDER — ALBUMIN HUMAN 50 G/1000ML
SOLUTION INTRAVENOUS AS NEEDED
Status: DISCONTINUED | OUTPATIENT
Start: 2019-03-27 | End: 2019-03-27 | Stop reason: HOSPADM

## 2019-03-27 RX ORDER — MAGNESIUM SULFATE HEPTAHYDRATE 40 MG/ML
INJECTION, SOLUTION INTRAVENOUS AS NEEDED
Status: DISCONTINUED | OUTPATIENT
Start: 2019-03-27 | End: 2019-03-27 | Stop reason: HOSPADM

## 2019-03-27 RX ADMIN — GLYCOPYRROLATE 0.2 MG: 0.2 INJECTION INTRAMUSCULAR; INTRAVENOUS at 13:58

## 2019-03-27 RX ADMIN — FENTANYL CITRATE 50 MCG: 50 INJECTION, SOLUTION INTRAMUSCULAR; INTRAVENOUS at 13:13

## 2019-03-27 RX ADMIN — NEOSTIGMINE METHYLSULFATE 1 MG: 1 INJECTION INTRAVENOUS at 15:03

## 2019-03-27 RX ADMIN — DEXAMETHASONE SODIUM PHOSPHATE 4 MG: 4 INJECTION, SOLUTION INTRA-ARTICULAR; INTRALESIONAL; INTRAMUSCULAR; INTRAVENOUS; SOFT TISSUE at 14:11

## 2019-03-27 RX ADMIN — SODIUM CHLORIDE: 9 INJECTION, SOLUTION INTRAVENOUS at 13:24

## 2019-03-27 RX ADMIN — Medication 80 MCG: at 13:46

## 2019-03-27 RX ADMIN — SODIUM CHLORIDE 50 ML/HR: 900 INJECTION, SOLUTION INTRAVENOUS at 17:30

## 2019-03-27 RX ADMIN — CELECOXIB 200 MG: 200 CAPSULE ORAL at 12:51

## 2019-03-27 RX ADMIN — FENTANYL CITRATE 50 MCG: 50 INJECTION, SOLUTION INTRAMUSCULAR; INTRAVENOUS at 13:19

## 2019-03-27 RX ADMIN — ACETAMINOPHEN 1000 MG: 500 TABLET ORAL at 12:50

## 2019-03-27 RX ADMIN — GLYCOPYRROLATE 0.2 MG: 0.2 INJECTION INTRAMUSCULAR; INTRAVENOUS at 15:00

## 2019-03-27 RX ADMIN — GLYCOPYRROLATE 0.1 MG: 0.2 INJECTION INTRAMUSCULAR; INTRAVENOUS at 15:03

## 2019-03-27 RX ADMIN — NEOSTIGMINE METHYLSULFATE 1 MG: 1 INJECTION INTRAVENOUS at 15:00

## 2019-03-27 RX ADMIN — ALVIMOPAN 12 MG: 12 CAPSULE ORAL at 12:50

## 2019-03-27 RX ADMIN — MAGNESIUM SULFATE HEPTAHYDRATE 2 G: 40 INJECTION, SOLUTION INTRAVENOUS at 13:26

## 2019-03-27 RX ADMIN — LIDOCAINE HYDROCHLORIDE 60 MG: 20 INJECTION, SOLUTION EPIDURAL; INFILTRATION; INTRACAUDAL; PERINEURAL at 13:21

## 2019-03-27 RX ADMIN — SODIUM CHLORIDE, SODIUM LACTATE, POTASSIUM CHLORIDE, CALCIUM CHLORIDE: 600; 310; 30; 20 INJECTION, SOLUTION INTRAVENOUS at 13:12

## 2019-03-27 RX ADMIN — PIPERACILLIN AND TAZOBACTAM 3.38 G: 3; .375 INJECTION, POWDER, FOR SOLUTION INTRAVENOUS at 18:00

## 2019-03-27 RX ADMIN — PIPERACILLIN SODIUM,TAZOBACTAM SODIUM 3.38 G: 3; .375 INJECTION, POWDER, FOR SOLUTION INTRAVENOUS at 02:00

## 2019-03-27 RX ADMIN — NEOSTIGMINE METHYLSULFATE 1 MG: 1 INJECTION INTRAVENOUS at 15:02

## 2019-03-27 RX ADMIN — GABAPENTIN 600 MG: 300 CAPSULE ORAL at 12:50

## 2019-03-27 RX ADMIN — CYANOCOBALAMIN TAB 500 MCG 1000 MCG: 500 TAB at 17:48

## 2019-03-27 RX ADMIN — KETAMINE HYDROCHLORIDE 30 MG: 10 INJECTION, SOLUTION INTRAMUSCULAR; INTRAVENOUS at 13:22

## 2019-03-27 RX ADMIN — Medication 1 CAPSULE: at 17:48

## 2019-03-27 RX ADMIN — ALBUMIN HUMAN 250 ML: 50 SOLUTION INTRAVENOUS at 14:15

## 2019-03-27 RX ADMIN — ROCURONIUM BROMIDE 5 MG: 10 INJECTION, SOLUTION INTRAVENOUS at 13:22

## 2019-03-27 RX ADMIN — PROPOFOL 120 MG: 10 INJECTION, EMULSION INTRAVENOUS at 13:22

## 2019-03-27 RX ADMIN — SUCCINYLCHOLINE CHLORIDE 120 MG: 20 INJECTION INTRAMUSCULAR; INTRAVENOUS at 13:22

## 2019-03-27 RX ADMIN — ROCURONIUM BROMIDE 45 MG: 10 INJECTION, SOLUTION INTRAVENOUS at 13:51

## 2019-03-27 RX ADMIN — SODIUM CHLORIDE, SODIUM LACTATE, POTASSIUM CHLORIDE, AND CALCIUM CHLORIDE 75 ML/HR: 600; 310; 30; 20 INJECTION, SOLUTION INTRAVENOUS at 13:10

## 2019-03-27 RX ADMIN — NEOSTIGMINE METHYLSULFATE 1 MG: 1 INJECTION INTRAVENOUS at 15:01

## 2019-03-27 RX ADMIN — GLYCOPYRROLATE 0.2 MG: 0.2 INJECTION INTRAMUSCULAR; INTRAVENOUS at 15:02

## 2019-03-27 RX ADMIN — ONDANSETRON 4 MG: 2 INJECTION INTRAMUSCULAR; INTRAVENOUS at 15:04

## 2019-03-27 NOTE — BRIEF OP NOTE
BRIEF OPERATIVE NOTE Date of Procedure: 3/27/2019 Preoperative Diagnosis: SMALL BOWEL FISTULA Postoperative Diagnosis: SMALL BOWEL FISTULA Procedure(s): DIAGNOSTIC LAPAROSCOPY WITH LYSIS OF ADHESIONS AND DRAINAGE OF PELVIC ABSCESS Surgeon(s) and Role: Glenda Anne MD - Primary Surgical Assistant: Miranda Prater PA-C Surgical Staff: 
Circ-1: Andree Paul Circ-Relief: Luke Zacarias RN Physician Assistant: YUKI Aragon Scrub Tech-1: Judi Moulton Event Time In Time Out Incision Start 03/27/2019 1355 Incision Close 03/27/2019 1501 Anesthesia: General  
Estimated Blood Loss: 100 cc Specimens: * No specimens in log * Findings: dense pelvic adhesions, lysed; pelvic abscess x 2, drained; no fistula identified; sigmoid diverticulosis Complications: none Implants: * No implants in log *

## 2019-03-27 NOTE — ROUTINE PROCESS
Bedside and Verbal shift change report given to [address] [place] [address],[address] (oncoming nurse) by Chester Alfaro RN (offgoing nurse). Report included the following information SBAR, Kardex, Procedure Summary, Intake/Output, MAR, Accordion and Recent Results.

## 2019-03-27 NOTE — PROGRESS NOTES
TRANSFER - OUT REPORT: 
 
Verbal report given to Shwetha SEE(name) on Ronald Martell  being transferred to [facility](unit) for routine post - op Report consisted of patients Situation, Background, Assessment and  
Recommendations(SBAR). Time Pre op antibiotic given:1350 Anesthesia Stop time: [phone] Davalos Present on Transfer to floor:y Order for Davalos on Chart:y 
Discharge Prescriptions with Chart:n Information from the following report(s) SBAR, Kardex, OR Summary, Procedure Summary, Intake/Output and MAR was reviewed with the receiving nurse. Opportunity for questions and clarification was provided. Is the patient on 02? YES 
     L/Min 2 Is the patient on a monitor? NO Is the nurse transporting with the patient? NO Surgical Waiting Area notified of patient's transfer from PACU? YES The following personal items collected during your admission accompanied patient upon transfer:  
Dental Appliance: Dental Appliances: None Vision: Visual Aid: Glasses, With patient Hearing Aid:   
Jewelry: Jewelry: None Clothing: Clothing: At bedside Other Valuables: Other Valuables: Cell Phone Valuables sent to safe:

## 2019-03-27 NOTE — ROUTINE PROCESS
Patient: Nirav Ramon MRN: [id number]  SSN: [id number] YOB: 1935  Age: 81 y.o. Sex: female Patient is status post Procedure(s): DIAGNOSTIC LAPAROSCOPY WITH LYSIS OF ADHESIONS AND DRAINAGE OF PELVIC ABSCESS. Surgeon(s) and Role: Cathy Rojas MD - Primary Local/Dose/Irrigation: 0.5% Marcaine 30ml Peripheral IV 03/20/19 Anterior; Left Hand (Active) Site Assessment Clean, dry, & intact 3/27/2019  4:30 AM  
Phlebitis Assessment 0 3/27/2019  4:30 AM  
Infiltration Assessment 0 3/27/2019  4:30 AM  
Dressing Status Clean, dry, & intact 3/27/2019  4:30 AM  
Dressing Type Transparent 3/27/2019  4:30 AM  
Hub Color/Line Status Infusing 3/27/2019  4:30 AM  
Action Taken Open ports on tubing capped 3/26/2019  8:35 PM  
Alcohol Cap Used Yes 3/27/2019  4:30 AM  
   
Peripheral IV 03/27/19 Posterior;Right Arm (Active) Peripheral IV 03/27/19 Right Hand (Active) Airway - Endotracheal Tube 03/27/19 Oral (Active) Dressing/Packing:   Exofin to trocar sites Other: Davalos in place

## 2019-03-27 NOTE — WOUND CARE
Stoma Marking Note: 
 
Per Dr. Luis Mcdowell: 
                                 Colonoscopy results reviewed and discussed with Dr. Patrick Pierson. We both agree likely source of abscess, fistula is previous complicated diverticulitis. Plan to proceed with laparoscopic small bowel resection. Discussed with patient high likelihood to need sigmoidectomy with current abscess, fistula, previous colovesical fistula, and she is likely to require conversion to open procedure. Risks reviewed to include bleeding, infection, visceral injury, leak, stoma, and open procedure. She understands and desires to proceed. All questions answered. Cardiology to see patient this AM. Remain NPO. Will have stoma team mali patient. Type of ostomy scheduled: probable sigmoid colostomy, per Dr. Luis Mcdowell. Introduced self and services to patient. Stoma site marked with surgical marker in LUQ: Dr. Luis Mcdowell requested high on left side, marked within rectus muscle. Stoma site chosen is in the patients visual field and within the rectus muscle while avoiding the following: umbilicus, wrinkles, dips, skin folds, rib cage, iliac crest and belt/pants/underwear line. Site was assessed in the sitting and standing positions. Abdominal topography: with 2 creases: 1 at waist line and 1  above. Patient understands that the final location will be determined by the surgeon and the site is only a guideline. Plan to follow after surgery for teaching. Will likely need home health care for further teaching after discharge. Barb Slaughter RN, BSN Wound Care Team 
office: [phone] Pager: [phone]

## 2019-03-27 NOTE — PROGRESS NOTES
Bedside shift change report given to MAYI Jason (oncoming nurse) by Troy Gregorio (offgoing nurse). Report included the following information SBAR, Kardex, Intake/Output, MAR and Recent Results.

## 2019-03-27 NOTE — PROGRESS NOTES
Bedside and Verbal shift change report given to steve (oncoming nurse) by Debi Black (offgoing nurse). Report included the following information SBAR, Kardex, Procedure Summary, Intake/Output, MAR and Recent Results.

## 2019-03-27 NOTE — ANESTHESIA POSTPROCEDURE EVALUATION
Procedure(s): DIAGNOSTIC LAPAROSCOPY WITH LYSIS OF ADHESIONS AND DRAINAGE OF PELVIC ABSCESS. general 
 
Anesthesia Post Evaluation Patient location during evaluation: PACU Patient participation: complete - patient participated Level of consciousness: awake Pain management: adequate Airway patency: patent Anesthetic complications: no 
Cardiovascular status: acceptable Respiratory status: acceptable Hydration status: acceptable Comments: Seen, recovering well Post anesthesia nausea and vomiting:  none Vitals Value Taken Time /55 3/27/2019  3:45 PM  
Temp 36.5 °C (97.7 °F) 3/27/2019  3:20 PM  
Pulse 86 3/27/2019  3:50 PM  
Resp 9 3/27/2019  3:50 PM  
SpO2 90 % 3/27/2019  3:50 PM  
Vitals shown include unvalidated device data.

## 2019-03-27 NOTE — PROGRESS NOTES
Colonoscopy results reviewed and discussed with Dr. Raul Diaz. We both agree likely source of abscess, fistula is previous complicated diverticulitis. Plan to proceed with laparoscopic small bowel resection. Discussed with patient high likelihood to need sigmoidectomy with current abscess, fistula, previous colovesical fistula, and she is likely to require conversion to open procedure. Risks reviewed to include bleeding, infection, visceral injury, leak, stoma, and open procedure. She understands and desires to proceed. All questions answered. Cardiology to see patient this AM. Remain NPO. Will have stoma team mali patient.

## 2019-03-27 NOTE — PROGRESS NOTES
3/27/19; 10:30 -  
CM participated in IDRs on patient. Patient has abscess on buttock and left groin. Patient has had her colonoscopy, and patient will have a laparoscopic bowel resection today, 3/27. CM resubmitted [facility] MYRA referral to [place] via [employer]. CRM: Shabana Luo, MPH, [address] [place] [address]; Z: [phone]

## 2019-03-27 NOTE — PROGRESS NOTES
Problem: Falls - Risk of 
Goal: *Absence of Falls Description Document Soledad Reagan Fall Risk and appropriate interventions in the flowsheet. Outcome: Progressing Towards Goal 
  
Problem: Patient Education: Go to Patient Education Activity Goal: Patient/Family Education Outcome: Progressing Towards Goal 
  
Problem: General Infection Care Plan (Adult and Pediatric) Goal: Improvement in signs and symptoms of infection Outcome: Progressing Towards Goal 
  
Problem: Pain Goal: *Control of Pain Outcome: Progressing Towards Goal 
  
Problem: Pressure Injury - Risk of 
Goal: *Prevention of pressure injury Description Document Chavez Scale and appropriate interventions in the flowsheet. Outcome: Progressing Towards Goal 
  
Problem: Patient Education: Go to Patient Education Activity Goal: Patient/Family Education Outcome: Progressing Towards Goal 
  
Problem: Nutrition Deficit Goal: *Optimize nutritional status Outcome: Progressing Towards Goal

## 2019-03-27 NOTE — CONSULTS
Date of  Admission: 3/20/2019  1:04 PM     Sigrid Borges is a 81 y.o. female admitted for Abdominal pain [R10.9]  Subjective: patient admitted with recurrent pelvic abscess, needs surgical exploration and repair. Asked to see for preop evaluation abnormal ekg. Pt has been remarkably healthy until around valentines day when developed lower abdominal swelling. Ended up in [facility] where had drainage and discharged on abx. Symptoms recurred and advised by home health to come to [facility]. Initial ekg showed mat with septal q waves, no old tracing. She is very active for age. Lives in upstairs apartment, does all adl's and some gardening without symptoms suggestive of angina or chf.            denies chest pain, dyspnea, palpitations, syncope, orthopnea, paroxysmal nocturnal dyspnea, exertional chest pressure/discomfort, claudication, lower extremity edema. Cardiac risk factors: post-menopausal.    Assessment/Plan: her ekg abnormalities are minimal and not concerning considering clinical presentation, lack of symptoms and risk factors and implications for planned surgery. Vitals have been stable since shortly after admission. At this point surgery can proceed without special precautions or further cardiac testing. Patient Active Problem List    Diagnosis Date Noted    Abdominal pain 03/20/2019      Chilango, Nkechiverónica Or Chandu General Surgeon  History reviewed. No pertinent past medical history. Past Surgical History:   Procedure Laterality Date    ABDOMEN SURGERY PROC UNLISTED      Abcess drained      No Known Allergies   History reviewed. No pertinent family history.    Current Facility-Administered Medications   Medication Dose Route Frequency    potassium chloride 10 mEq in 100 ml IVPB  10 mEq IntraVENous Q1H    sodium chloride (NS) flush 5-40 mL  5-40 mL IntraVENous Q8H    sodium chloride (NS) flush 5-40 mL  5-40 mL IntraVENous PRN    lactobac ac& pc-s.therm-b.anim (JOSE Q/RISAQUAD) 1 Cap Oral DAILY    cyanocobalamin (VITAMIN B12) tablet 1,000 mcg  1,000 mcg Oral DAILY    ferrous sulfate tablet 325 mg  1 Tab Oral DAILY WITH BREAKFAST    sodium chloride (NS) flush 5-40 mL  5-40 mL IntraVENous Q8H    sodium chloride (NS) flush 5-40 mL  5-40 mL IntraVENous PRN    acetaminophen (TYLENOL) tablet 650 mg  650 mg Oral Q4H PRN    piperacillin-tazobactam (ZOSYN) 3.375 g in 0.9% sodium chloride (MBP/ADV) 100 mL  3.375 g IntraVENous Q8H         Review of Symptoms:  A comprehensive review of systems was negative except for that written in the HPI. Physical Exam    Visit Vitals  /71 (BP 1 Location: Right arm, BP Patient Position: At rest)   Pulse 83   Temp 98 °F (36.7 °C)   Resp 16   Ht 5' 7\" (1.702 m)   Wt 153 lb 14.4 oz (69.8 kg)   SpO2 93%   BMI 24.10 kg/m²     Neck: supple, symmetrical, trachea midline, no adenopathy, no carotid bruit and no JVD  Heart: regular rate and rhythm, S1, S2 normal, no murmur, click, rub or gallop  Lungs: clear to auscultation bilaterally  Abdomen: soft, non-tender.  Bowel sounds normal. No masses,  no organomegaly, dressing rlq  Extremities: extremities normal, atraumatic, no cyanosis or edema  Pulses: 2+ and symmetric  Neurologic: Grossly normal    Cardiographics      ECG: see above  Echocardiogram: Not done    Recent radiology, intake/output and wt reviewed    Labs:   Recent Results (from the past 24 hour(s))   METABOLIC PANEL, BASIC    Collection Time: 03/27/19  3:03 AM   Result Value Ref Range    Sodium 142 136 - 145 mmol/L    Potassium 3.0 (L) 3.5 - 5.1 mmol/L    Chloride 111 (H) 97 - 108 mmol/L    CO2 25 21 - 32 mmol/L    Anion gap 6 5 - 15 mmol/L    Glucose 77 65 - 100 mg/dL    BUN 4 (L) 6 - 20 MG/DL    Creatinine 0.59 0.55 - 1.02 MG/DL    BUN/Creatinine ratio 7 (L) 12 - 20      GFR est AA >60 >60 ml/min/1.73m2    GFR est non-AA >60 >60 ml/min/1.73m2    Calcium 8.6 8.5 - 10.1 MG/DL

## 2019-03-27 NOTE — PROGRESS NOTES
Hospitalist Progress Note Eddi Gardiner MD 
Answering service: [phone] from in house phone Cell: [phone] Date of Service:  3/27/2019 NAME:  Hood Florez :  1935 MRN:  [id number] Admission Summary:  
The patient is an 81-year-old female with apparently no past medical history, who presents to the hospital with the above-mentioned symptom. The patient has had a complicated course in the last few days, apparently presented to [facility] and then was transferred to [facility] with 10-day history of increasing right lower quadrant, urinary urgency, rigors and passage of fecal material either in her urine or from her vagina, and this was on 2019. The patient also had some constipation associated with diarrhea, but no bloody stools associated with the same. The patient was found to have a pelvic abscess, and they were concerned that there may be a fistula present. The patient underwent a CT scan which showed a pelvic abscess. A CT-guided drainage of the abscess had produced 400 mL of purulent fluid. They placed a drain which continued to produce 925 mL of purulent fluid. Her drain then fell out the next day. A repeat CT scan was then performed that showed a large pelvic abscess. She underwent CT-guided drainage of the abscess that produced another 370 mL of purulent fluid. Her drain continued to produce purulent fluid throughout the hospital course, and the patient was eventually discharged with a drain in place. The patient was started on IV Zosyn, continued to have IV Zosyn throughout her hospital course and then eventually was switched to ciprofloxacin orally.   The patient was also found to have IUD that has been placed about 50 years back, and she was supposed to follow up for outpatient followup and possible removal. GYN had consulted on her while she was in the hospital and they felt that the patient does not need the IUD removed at that point of time. The patient reports that she was discharged with home healthcare, and today, the healthcare nurse came and noted that the patient had an abscess on her right groin and that started draining. The patient reports that she also had decreased appetite, not eating or drinking well, and had some nausea associated with diarrhea. The patient reports that she got concerned and decided to come to the hospital.  The patient reports that she also had some shortness of breath while she was walking in the parking lot today, reports that she has finished her antibiotics. 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 The patient reports that she had the abscess drained at [facility] again on 03/13/2019 and was prescribed doxycycline. 
  
The patient came to the ER today and was found to have a groin abscess as well as a pelvic abscess. Dr. Karina So from Surgery was consulted and requested the patient to be admitted to the hospitalist service. Currently, the patient is resting in bed. Denies any headaches, blurry vision, sore throat, trouble swallowing, trouble with speech, any chest pain, shortness of breath, cough, fever, chills, urinary symptoms, focal or generalized neurological weakness, recent travel, sick contacts, any falls, injuries, hematemesis, melena, hemoptysis, hematuria or any other concerns or problems. Interval history / Subjective: F/u pelvic abscess No pain, nausea or vomiting No new issues Waiting for colonoscopy today Assessment & Plan:  
 
Recurrent posterior pelvic abscess 
-CT abd 3/20 2.1 x 5.8 x 3.4 cm posterior pelvic collection suspicious for abscess, possibly secondary to diverticulitis 
-Appreciate Surgery -s/p CT guided drainage 3/22, culture shows S mitis, alpha strep, rare candida 
-On Vanc/zosyn. 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 
-s/p Colonoscopy 3/26. Please see the results. Outpatient follow up with GI 
-Awaiting lap small bowel resection today 
-Appreciate GI/Surgery Right groin abscess 
-CT abd 3/20 Soft tissue collection in the deep soft tissues of the right groin measuring 2.3 x 4.6 x 3.5 cm suspicious for abscess. -S/p I&D 
-Antibiotics as above 
-Culture shows coagulase negative staph 
-Appreciate Surgery/wound care Mild dehydration -now resolved Anemia 
-Vit b12 deficiency, will put the patient on Vit b12 and oral iron 
-outpatient follow up Hypokalemia 
-replace as needed Shortness of breath 
-CT chest unremarkable -now feeling better Regular diet Code status: PARTIAL CODE 
DVT prophylaxis: scd PTA: home alone Functional baseline: Mobile independently Plan: Follow Surgery/GI Care Plan discussed with: Patient/Family Disposition: Home w/Family Hospital Problems  Date Reviewed: 3/27/2019 Codes Class Noted POA * (Principal) Abdominal pain ICD-10-CM: R10.9 ICD-9-CM: 789.00  3/20/2019 Yes Review of Systems: A comprehensive review of systems was negative except for that written in the HPI. Vital Signs:  
 Last 24hrs VS reviewed since prior progress note. Most recent are: 
Visit Vitals /71 (BP 1 Location: Right arm, BP Patient Position: At rest) Pulse 83 Temp 98 °F (36.7 °C) Resp 16 Ht 5' 7\" (1.702 m) Wt 69.8 kg (153 lb 14.4 oz) SpO2 93% BMI 24.10 kg/m² No intake or output data in the 24 hours ending 03/27/19 1200 Physical Examination:  
 
 
     
Constitutional:  No acute distress, cooperative, pleasant   
ENT:  Oral mucous moist, oropharynx benign. Neck supple, Resp:  CTA bilaterally. No wheezing/rhonchi/rales. No accessory muscle use CV:  Regular rhythm, normal rate, no murmurs, gallops, rubs GI:  drain in place, Soft, non distended, non tender. normoactive bowel sounds, no hepatosplenomegaly Musculoskeletal:  No edema, warm, 2+ pulses throughout Neurologic:  Moves all extremities. AAOx3, CN II-XII reviewed Skin: Right groin wound Data Review:  
 Review and/or order of clinical lab test 
 
 
Labs:  
 
Recent Labs  
  03/26/19 0300 03/25/19 0541 WBC 10.4 10.1 HGB 9.4* 9.4* HCT 31.2* 31.2*  
 337 Recent Labs  
  03/27/19 
0303 03/26/19 
0300 03/25/19 
0541  142 143  
K 3.0* 3.3* 3.4*  
* 112* 112* CO2 25 26 25 BUN 4* 5* 6  
CREA 0.59 0.59 0.61 GLU 77 93 98 CA 8.6 8.9 8.5 No results for input(s): SGOT, GPT, ALT, AP, TBIL, TBILI, TP, ALB, GLOB, GGT, AML, LPSE in the last 72 hours. No lab exists for component: AMYP, HLPSE No results for input(s): INR, PTP, APTT in the last 72 hours. No lab exists for component: INREXT, INREXT No results for input(s): FE, TIBC, PSAT, FERR in the last 72 hours. Lab Results Component Value Date/Time Folate 14.1 03/23/2019 02:17 AM  
  
No results for input(s): PH, PCO2, PO2 in the last 72 hours. No results for input(s): CPK, CKNDX, TROIQ in the last 72 hours. No lab exists for component: CPKMB No results found for: CHOL, CHOLX, CHLST, CHOLV, HDL, LDL, LDLC, DLDLP, TGLX, TRIGL, TRIGP, CHHD, CHHDX No results found for: Consuelo Sage Lab Results Component Value Date/Time  Color YELLOW/STRAW 03/25/2019 05:30 PM  
 Appearance CLEAR 03/25/2019 05:30 PM  
 Specific gravity 1.019 03/25/2019 05:30 PM  
 pH (UA) 5.5 03/25/2019 05:30 PM  
 Protein NEGATIVE  03/25/2019 05:30 PM  
 Glucose NEGATIVE  03/25/2019 05:30 PM  
 Ketone NEGATIVE  03/25/2019 05:30 PM  
 Bilirubin NEGATIVE  03/25/2019 05:30 PM  
 Urobilinogen 0.2 03/25/2019 05:30 PM  
 Nitrites NEGATIVE  03/25/2019 05:30 PM  
 Leukocyte Esterase SMALL (A) 03/25/2019 05:30 PM  
 Epithelial cells MODERATE (A) 03/25/2019 05:30 PM  
 Bacteria NEGATIVE  03/25/2019 05:30 PM  
 WBC 5-10 03/25/2019 05:30 PM  
 RBC 0-5 03/25/2019 05:30 PM  
 
 
 
Medications Reviewed:  
 
Current Facility-Administered Medications Medication Dose Route Frequency  potassium chloride 10 mEq in 100 ml IVPB  10 mEq IntraVENous Q1H  
 0.9% sodium chloride infusion 250 mL  250 mL IntraVENous PRN  
 sodium chloride (NS) flush 5-40 mL  5-40 mL IntraVENous Q8H  
 sodium chloride (NS) flush 5-40 mL  5-40 mL IntraVENous PRN  
 lactobac ac& pc-s.therm-b.anim (JOSE Q/RISAQUAD)  1 Cap Oral DAILY  cyanocobalamin (VITAMIN B12) tablet 1,000 mcg  1,000 mcg Oral DAILY  ferrous sulfate tablet 325 mg  1 Tab Oral DAILY WITH BREAKFAST  sodium chloride (NS) flush 5-40 mL  5-40 mL IntraVENous Q8H  
 sodium chloride (NS) flush 5-40 mL  5-40 mL IntraVENous PRN  
 acetaminophen (TYLENOL) tablet 650 mg  650 mg Oral Q4H PRN  piperacillin-tazobactam (ZOSYN) 3.375 g in 0.9% sodium chloride (MBP/ADV) 100 mL  3.375 g IntraVENous Q8H  
 
______________________________________________________________________ EXPECTED LENGTH OF STAY: 4d 21h ACTUAL LENGTH OF STAY:          7 Paramjit Rosario MD

## 2019-03-27 NOTE — PROGRESS NOTES
[facility]. 
[address] [place], [address] 
[phone] GI PROGRESS Chantal Enoch, AGACNP-BC 
 
 
NAME:   Bryson Martinez :    1935 MRN:    [id number] Assessment/Plan 1. 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 Plans are for laparoscopic exploration small bowel resection and probable sigmoid resection. 
-await pathology Patient Active Problem List  
Diagnosis Code  Abdominal pain R10.9 Subjective:  
 
Pleasant. No complaints. Denies abd pain. Objective: VITALS:  
Last 24hrs VS reviewed since prior hospitalist progress note. Most recent are: 
Visit Vitals /71 (BP 1 Location: Right arm, BP Patient Position: At rest) Pulse 83 Temp 98 °F (36.7 °C) Resp 16 Ht 5' 7\" (1.702 m) Wt 69.8 kg (153 lb 14.4 oz) SpO2 93% BMI 24.10 kg/m² No intake or output data in the 24 hours ending 19 1000 PHYSICAL EXAM: 
General:          Well developed, Well nourished. Elderly female alert, cooperative, no acute distress.   
HEENT:           Normocephalic, Atraumatic. PERRLA, EOMI. Anicteric sclerae. Lungs:             CTA Bilaterally. No Wheezing/Rhonchi/Rales. Heart:              Regular rate and rhythm, No murmur, No Rubs, No Gallops Abdomen:        Soft, non-distended, non-tender.  +Bowel sounds, no hepatomegaly. Right groin wound Extremities:     No cyanosis,clubing or edema Neurologic:      Alert and oriented X 3. No acute neurological distress. Psych:             Good insight. Not anxious nor agitated. 
  
 
 
  
Lab Data Recent Results (from the past 12 hour(s)) METABOLIC PANEL, BASIC Collection Time: 19  3:03 AM  
Result Value Ref Range Sodium 142 136 - 145 mmol/L Potassium 3.0 (L) 3.5 - 5.1 mmol/L Chloride 111 (H) 97 - 108 mmol/L  
 CO2 25 21 - 32 mmol/L Anion gap 6 5 - 15 mmol/L Glucose 77 65 - 100 mg/dL BUN 4 (L) 6 - 20 MG/DL Creatinine 0.59 0.55 - 1.02 MG/DL  
 BUN/Creatinine ratio 7 (L) 12 - 20 GFR est AA >60 >60 ml/min/1.73m2 GFR est non-AA >60 >60 ml/min/1.73m2 Calcium 8.6 8.5 - 10.1 MG/DL Medications: Reviewed PMH/SH reviewed - no change compared to H&P Mid-Level Provider: Alexandru Fuentes NP Date/Time:  3/27/2019

## 2019-03-27 NOTE — PROGRESS NOTES
TRANSFER - IN REPORT: 
 
Verbal report received from EMILRN(name) on Lorrene Oas  being received from 5E(unit) for ordered procedure Report consisted of patients Situation, Background, Assessment and  
Recommendations(SBAR). Information from the following report(s) SBAR, ED Summary, MAR, Recent Results and Pre Procedure Checklist was reviewed with the receiving nurse. Opportunity for questions and clarification was provided. Assessment completed upon patients arrival to unit and care assumed.

## 2019-03-27 NOTE — PROGRESS NOTES
Bedside shift change report given to [facility], RN (oncoming nurse) by Loli Parra nurse). Report included the following information SBAR, Kardex, Intake/Output, MAR and Recent Results.

## 2019-03-27 NOTE — PROCEDURES
Colonoscopy Procedure Note    Indications: abnormal CT with pelvic abscess (recurrent), history of diverticulosis, recent history of pneumaturia  Referring Physician: Mariana Rivera General Surgeon   Anesthesia/Sedation:MAC  Endoscopist:  Dr. Angus Humphrey  Assistant:  Endoscopy Technician-1: Romelia Kowalski  Endoscopy RN-1: Radha Arana  Surgical Assistant: None  Implants: None    Preoperative diagnosis: Ileal Fistula    Postoperative diagnosis: 1. Mild universal Diverticulosis-moderate to severe sigmoid diverticulosis  2.ascending colon polyp      Procedure in Detail:  Informed consent was obtained for the procedure, including sedation. Risks of perforation, hemorrhage, adverse drug reaction, and aspiration were discussed. The patient was placed in the left lateral decubitus position. Based on the pre-procedure assessment, including review of the patient's medical history, medications, allergies, and review of systems, she had been deemed to be an appropriate candidate for moderate sedation; she was therefore sedated with the medications listed above. The patient was monitored continuously with ECG tracing, pulse oximetry, blood pressure monitoring, and direct observations. A rectal examination was performed. The [id number] was inserted into the rectum and advanced under direct vision to the terminal ileum. The quality of the colonic preparation was excellent. A careful inspection was made as the colonoscope was withdrawn, including a retroflexed view of the rectum; findings and interventions are described below.       Findings:   Rectum: normal  Sigmoid: moderate to severe diverticulosis without diverticulitis  Descending Colon: mild scattered diverticulosis  Transverse Colon: mild scattered diverticulosis  Ascending Colon:mild scattered diverticulosis, 3 mm polyp removed with cold snare  Cecum: normal  Terminal Ileum: normal ×5 cm proximally sharp angulation beyond this unable to traverse    Specimens:   See above    EBL: None    Complications: None; patient tolerated the procedure well. Recommendations:     - Await pathology. - discussed case with Dr. Nancee Canavan. Plans are for laparoscopic exploration small bowel resection and probable sigmoid resection. Most likely first developed diverticular abscess arising from the sigmoid then involving the bladder and small bowel. no evidence of any neoplasia other than small and is a distal polyp of the ascending colon    Signed By: Cyrus Dias MD                        March 26, 2019

## 2019-03-27 NOTE — ANESTHESIA PREPROCEDURE EVALUATION
Relevant Problems No relevant active problems Anesthetic History No history of anesthetic complications Review of Systems / Medical History Patient summary reviewed, nursing notes reviewed and pertinent labs reviewed Pulmonary Within defined limits Neuro/Psych Within defined limits Cardiovascular CHF Exercise tolerance: <4 METS 
  
GI/Hepatic/Renal 
  
 
 
 
 
 
Comments: GI abscess 
diverticulitis  Endo/Other Within defined limits Other Findings Physical Exam 
 
Airway Mallampati: III 
TM Distance: 4 - 6 cm Neck ROM: decreased range of motion Mouth opening: Normal 
 
 Cardiovascular Regular rate and rhythm,  S1 and S2 normal,  no murmur, click, rub, or gallop Rhythm: regular Rate: normal 
 
 
 
 Dental 
 
Dentition: Poor dentition Pulmonary Breath sounds clear to auscultation Abdominal 
GI exam deferred Other Findings Anesthetic Plan ASA: 3 Anesthesia type: general 
 
 
 
 
Induction: Intravenous Anesthetic plan and risks discussed with: Patient ERAS

## 2019-03-28 PROBLEM — K65.1 ABSCESS OF ABDOMINAL CAVITY (HCC): Status: ACTIVE | Noted: 2019-03-28

## 2019-03-28 LAB
ABO + RH BLD: NORMAL
ANION GAP SERPL CALC-SCNC: 6 MMOL/L (ref 5–15)
BASOPHILS # BLD: 0 K/UL (ref 0–0.1)
BASOPHILS NFR BLD: 0 % (ref 0–1)
BLD PROD TYP BPU: NORMAL
BLD PROD TYP BPU: NORMAL
BLOOD GROUP ANTIBODIES SERPL: NORMAL
BPU ID: NORMAL
BPU ID: NORMAL
BUN SERPL-MCNC: 7 MG/DL (ref 6–20)
BUN/CREAT SERPL: 7 (ref 12–20)
CALCIUM SERPL-MCNC: 8.4 MG/DL (ref 8.5–10.1)
CHLORIDE SERPL-SCNC: 109 MMOL/L (ref 97–108)
CO2 SERPL-SCNC: 27 MMOL/L (ref 21–32)
CREAT SERPL-MCNC: 0.98 MG/DL (ref 0.55–1.02)
CROSSMATCH RESULT,%XM: NORMAL
CROSSMATCH RESULT,%XM: NORMAL
DIFFERENTIAL METHOD BLD: ABNORMAL
EOSINOPHIL # BLD: 0 K/UL (ref 0–0.4)
EOSINOPHIL NFR BLD: 0 % (ref 0–7)
ERYTHROCYTE [DISTWIDTH] IN BLOOD BY AUTOMATED COUNT: 16.7 % (ref 11.5–14.5)
GLUCOSE SERPL-MCNC: 117 MG/DL (ref 65–100)
HCT VFR BLD AUTO: 30.5 % (ref 35–47)
HGB BLD-MCNC: 9.3 G/DL (ref 11.5–16)
IMM GRANULOCYTES # BLD AUTO: 0.1 K/UL (ref 0–0.04)
IMM GRANULOCYTES NFR BLD AUTO: 1 % (ref 0–0.5)
LYMPHOCYTES # BLD: 1.4 K/UL (ref 0.8–3.5)
LYMPHOCYTES NFR BLD: 12 % (ref 12–49)
MCH RBC QN AUTO: 30 PG (ref 26–34)
MCHC RBC AUTO-ENTMCNC: 30.5 G/DL (ref 30–36.5)
MCV RBC AUTO: 98.4 FL (ref 80–99)
MONOCYTES # BLD: 1.3 K/UL (ref 0–1)
MONOCYTES NFR BLD: 10 % (ref 5–13)
NEUTS SEG # BLD: 9.2 K/UL (ref 1.8–8)
NEUTS SEG NFR BLD: 77 % (ref 32–75)
NRBC # BLD: 0 K/UL (ref 0–0.01)
NRBC BLD-RTO: 0 PER 100 WBC
PLATELET # BLD AUTO: 348 K/UL (ref 150–400)
PMV BLD AUTO: 8.7 FL (ref 8.9–12.9)
POTASSIUM SERPL-SCNC: 3.4 MMOL/L (ref 3.5–5.1)
RBC # BLD AUTO: 3.1 M/UL (ref 3.8–5.2)
SODIUM SERPL-SCNC: 142 MMOL/L (ref 136–145)
SPECIMEN EXP DATE BLD: NORMAL
STATUS OF UNIT,%ST: NORMAL
STATUS OF UNIT,%ST: NORMAL
UNIT DIVISION, %UDIV: 0
UNIT DIVISION, %UDIV: 0
WBC # BLD AUTO: 12 K/UL (ref 3.6–11)

## 2019-03-28 PROCEDURE — 65270000032 HC RM SEMIPRIVATE

## 2019-03-28 PROCEDURE — 94760 N-INVAS EAR/PLS OXIMETRY 1: CPT

## 2019-03-28 PROCEDURE — 85025 COMPLETE CBC W/AUTO DIFF WBC: CPT

## 2019-03-28 PROCEDURE — 74011250637 HC RX REV CODE- 250/637: Performed by: SURGERY

## 2019-03-28 PROCEDURE — 36415 COLL VENOUS BLD VENIPUNCTURE: CPT

## 2019-03-28 PROCEDURE — 80048 BASIC METABOLIC PNL TOTAL CA: CPT

## 2019-03-28 PROCEDURE — 74011250636 HC RX REV CODE- 250/636: Performed by: SURGERY

## 2019-03-28 PROCEDURE — 74011000258 HC RX REV CODE- 258: Performed by: SURGERY

## 2019-03-28 PROCEDURE — 74011250637 HC RX REV CODE- 250/637: Performed by: INTERNAL MEDICINE

## 2019-03-28 RX ORDER — POTASSIUM CHLORIDE 1.5 G/1.77G
20 POWDER, FOR SOLUTION ORAL
Status: COMPLETED | OUTPATIENT
Start: 2019-03-28 | End: 2019-03-28

## 2019-03-28 RX ADMIN — POTASSIUM CHLORIDE 20 MEQ: 1.5 POWDER, FOR SOLUTION ORAL at 15:41

## 2019-03-28 RX ADMIN — PIPERACILLIN SODIUM,TAZOBACTAM SODIUM 3.38 G: 3; .375 INJECTION, POWDER, FOR SOLUTION INTRAVENOUS at 20:16

## 2019-03-28 RX ADMIN — Medication 1 CAPSULE: at 09:32

## 2019-03-28 RX ADMIN — OXYCODONE HYDROCHLORIDE 5 MG: 5 TABLET ORAL at 12:20

## 2019-03-28 RX ADMIN — Medication 10 ML: at 14:20

## 2019-03-28 RX ADMIN — OXYCODONE HYDROCHLORIDE 5 MG: 5 TABLET ORAL at 17:17

## 2019-03-28 RX ADMIN — Medication 10 ML: at 07:03

## 2019-03-28 RX ADMIN — FERROUS SULFATE TAB 325 MG (65 MG ELEMENTAL FE) 325 MG: 325 (65 FE) TAB at 07:01

## 2019-03-28 RX ADMIN — PIPERACILLIN SODIUM,TAZOBACTAM SODIUM 3.38 G: 3; .375 INJECTION, POWDER, FOR SOLUTION INTRAVENOUS at 12:15

## 2019-03-28 RX ADMIN — CYANOCOBALAMIN TAB 500 MCG 1000 MCG: 500 TAB at 09:32

## 2019-03-28 NOTE — WOUND CARE
Wound Consult: Follow Up Visit. Chart reviewed. Consulted for abscess in right groin and in to follow up; patient marked for colostomy but did not have to have diversion, drain placed in RLQ for fluid collection/abscess. Spoke with patients nurse,  Adriana Becerra regarding performing wound care. Patient is resting on a advance bed but back up to chair after seeing. Assessment: 
Right groin - two linear openings - larger one 2 x 0.3 x 2 cm, then distal to this a small slit 0.5 x 0.1 x 1.4 cm, this small area continues to express drainage seropurulent without odor, induration continues in this area surrounding these openings; no redness or warmth. DEEPAK drain above this area with serosanguinous drainage. Treatment: 
Cleansed areas with saline, packed with tyler of MeSalt, folded 4x4 and Tegaderm placed to secure. Wound Recommendations: 
Continue current local wound care. Continue to monitor nutrition, pain, and skin risk scale, and skin assessment. Plan: We will continue to reassess routinely and as needed. Elizabeth Godfrey RN,[facility] Office [phone] Pager [phone]

## 2019-03-28 NOTE — PROGRESS NOTES
3/28/19; 10:30 -  
CM participated in IDRs on patient. Patient is POD#1 from abscess drainage. Patient remains on ABX and with a  DEEPAK drain. Patient's plan includes: cameron coming out today, increased ambulation, and a CT tomorrow, 3/29. CM spoke with Matt Shipley: [phone]) who confirmed that they are accepting patient for [facility]. Information is on patient's AVS. CRM: Dillon Sheets, MPH, [address] [place] [address]; Z: [phone]

## 2019-03-28 NOTE — PROGRESS NOTES
Hospitalist Progress Note Micha Zamudio MD 
Answering service: [phone] OR [phone] from in house phone Cell: [phone] Date of Service:  3/28/2019 NAME:  Izabel Chicas :  1935 MRN:  [id number] Admission Summary:  
69-year-old female with  a complicated medical course, apparently presented to [facility] and then was transferred to [facility] with 10-day history of increasing right lower quadrant pain, urinary urgency, rigors and passage of fecal material either in her urine or from her vagina, on 2019. The patient also had some constipation associated with diarrhea, but no bloody stools associated with the same. The patient was found to have a pelvic abscess, and they were concerned that there may be a fistula present. The patient underwent a CT scan which showed a pelvic abscess. A CT-guided drainage of the abscess had produced 400 mL of purulent fluid. They placed a drain which continued to produce 925 mL of purulent fluid. Her drain then fell out the next day. A repeat CT scan was then performed that showed a large pelvic abscess. She underwent CT-guided drainage of the abscess that produced another 370 mL of purulent fluid. Her drain continued to produce purulent fluid throughout the hospital course, and the patient was eventually discharged with a drain in place. The patient was started on IV Zosyn, continued to have IV Zosyn throughout her hospital course and then eventually was switched to ciprofloxacin orally. The patient was also found to have IUD that has been placed about 50 years back, and she was supposed to follow up for outpatient followup and possible removal.  GYN had consulted on her while she was in the hospital and they felt that the patient does not need the IUD removed at that point of time.   The patient reports that she was discharged with home healthcare, and today, the healthcare nurse came and noted that the patient had an abscess on her right groin and that started draining. The patient reports that she also had decreased appetite, not eating or drinking well, and had some nausea associated with diarrhea. The patient reports that she got concerned and decided to come to the hospital.  The patient reports that she also had some shortness of breath while she was walking in the parking lot today, reports that she has finished her antibiotics. 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 The patient reports that she had the abscess drained at [facility] again on 03/13/2019 and was prescribed doxycycline. The patient presented to the ER  and was found to have a groin abscess as well as a pelvic abscess. Dr. Greg Sprague from Surgery was consulted and requested the patient to be admitted to the hospitalist service. Interval history / Subjective:  
  CC: Pelvic Abscess. Patient in no distress on am rounds. C/O of abdomen feeling a bit \"funny\" after meals. Denied any any nausea or vomiting. Assessment & Plan:  
 
Recurrent posterior pelvic abscess 
-CT abd 3/20 2.1 x 5.8 x 3.4 cm posterior pelvic collection suspicious for abscess, possibly secondary to diverticulitis -s/p CT guided drainage 3/22, culture shows S mitis, alpha strep, rare candida 
-On Vanc/zosyn. 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 
-s/p Colonoscopy 3/26/19. Please see the results. Outpatient follow up with GI 
-GI and surgical F/Us noted and appreciated. Right groin abscess 
-CT abd 3/20 Soft tissue collection in the deep soft tissues of the right groin measuring 2.3 x 4.6 x 3.5 cm suspicious for abscess. -S/p I&D 
-Culture shows coagulase negative staph -Continue Zosyn. Mild dehydration 
-resolved Anemia 
-Vit b12 deficiency, will put the patient on Vit b12 and oral iron 
-outpatient follow up Hypokalemia 
-repletion with F/U Shortness of breath 
-resolved. Encourage incentive spirometry Regular diet Prophylaxis: DVT. Directives: Partial Code with a guarded prognosis. D/W patient. Plan: Anticipate D/C to home with probable HomeHealth arrangements. D/W patient. Hospital Problems  Date Reviewed: 3/28/2019 Codes Class Noted POA Abscess of abdominal cavity ([facility] [address] ICD-10-CM: K65.1 ICD-9-CM: 472.96  3/28/2019 Yes * (Principal) Abdominal pain ICD-10-CM: R10.9 ICD-9-CM: 789.00  3/20/2019 Yes Review of Systems: A comprehensive review of systems was negative except for that written in the HPI. Vital Signs:  
 Last 24hrs VS reviewed since prior progress note. Most recent are: 
Visit Vitals /57 (BP 1 Location: Left arm, BP Patient Position: At rest) Pulse 93 Temp 98.4 °F (36.9 °C) Resp 18 Ht 5' 7\" (1.702 m) Wt 72.1 kg (158 lb 14.4 oz) SpO2 93% BMI 24.89 kg/m² Intake/Output Summary (Last 24 hours) at 3/28/2019 1426 Last data filed at 3/28/2019 1220 Gross per 24 hour Intake 1240 ml Output 1755 ml Net -515 ml Physical Examination:  
 
 
     
Constitutional:  No acute distress, cooperative, pleasant   
ENT:  Oral mucous moist, oropharynx benign. Neck supple, Resp:  CTA bilaterally. No wheezing/rhonchi/rales. No accessory muscle use CV:  Regular rhythm, normal rate, no murmurs, gallops, rubs GI:  drain in place, Soft, non distended, non tender. normoactive bowel sounds, no hepatosplenomegaly Musculoskeletal:  No edema, warm, 2+ pulses throughout Neurologic: Awake, alert and oriented. Skin: Right groin wound Data Review:  
 Review and/or order of clinical lab test 
 
 
Labs:  
 
Recent Labs  
  03/28/19 
0247 03/26/19 
0300 WBC 12.0* 10.4 HGB 9.3* 9.4* HCT 30.5* 31.2*  
 344 Recent Labs  
  03/28/19 
0247 03/27/19 
0303 03/26/19 
0300  142 142  
K 3.4* 3.0* 3.3*  
* 111* 112* CO2 27 25 26 BUN 7 4* 5*  
CREA 0.98 0.59 0.59 * 77 93 CA 8.4* 8.6 8.9 No results for input(s): SGOT, GPT, ALT, AP, TBIL, TBILI, TP, ALB, GLOB, GGT, AML, LPSE in the last 72 hours. No lab exists for component: AMYP, HLPSE No results for input(s): INR, PTP, APTT in the last 72 hours. No lab exists for component: INREXT, INREXT No results for input(s): FE, TIBC, PSAT, FERR in the last 72 hours. Lab Results Component Value Date/Time Folate 14.1 03/23/2019 02:17 AM  
  
No results for input(s): PH, PCO2, PO2 in the last 72 hours. No results for input(s): CPK, CKNDX, TROIQ in the last 72 hours. No lab exists for component: CPKMB No results found for: CHOL, CHOLX, CHLST, CHOLV, HDL, LDL, LDLC, DLDLP, TGLX, TRIGL, TRIGP, CHHD, CHHDX No results found for: Aileen Pulido Lab Results Component Value Date/Time Color YELLOW/STRAW 03/25/2019 05:30 PM  
 Appearance CLEAR 03/25/2019 05:30 PM  
 Specific gravity 1.019 03/25/2019 05:30 PM  
 pH (UA) 5.5 03/25/2019 05:30 PM  
 Protein NEGATIVE  03/25/2019 05:30 PM  
 Glucose NEGATIVE  03/25/2019 05:30 PM  
 Ketone NEGATIVE  03/25/2019 05:30 PM  
 Bilirubin NEGATIVE  03/25/2019 05:30 PM  
 Urobilinogen 0.2 03/25/2019 05:30 PM  
 Nitrites NEGATIVE  03/25/2019 05:30 PM  
 Leukocyte Esterase SMALL (A) 03/25/2019 05:30 PM  
 Epithelial cells MODERATE (A) 03/25/2019 05:30 PM  
 Bacteria NEGATIVE  03/25/2019 05:30 PM  
 WBC 5-10 03/25/2019 05:30 PM  
 RBC 0-5 03/25/2019 05:30 PM  
 
 
 
Medications Reviewed:  
 
Current Facility-Administered Medications Medication Dose Route Frequency  piperacillin-tazobactam (ZOSYN) 3.375 g in 0.9% sodium chloride (MBP/ADV) 100 mL  3.375 g IntraVENous Q8H  
 sodium chloride (NS) flush 5-40 mL  5-40 mL IntraVENous Q8H  
  sodium chloride (NS) flush 5-40 mL  5-40 mL IntraVENous PRN  
 oxyCODONE IR (ROXICODONE) tablet 5 mg  5 mg Oral Q4H PRN  
 morphine injection 2 mg  2 mg IntraVENous Q4H PRN  
 sodium chloride (NS) flush 5-40 mL  5-40 mL IntraVENous Q8H  
 sodium chloride (NS) flush 5-40 mL  5-40 mL IntraVENous PRN  
 lactobac ac& pc-s.therm-b.anim (JOSE Q/RISAQUAD)  1 Cap Oral DAILY  cyanocobalamin (VITAMIN B12) tablet 1,000 mcg  1,000 mcg Oral DAILY  ferrous sulfate tablet 325 mg  1 Tab Oral DAILY WITH BREAKFAST  sodium chloride (NS) flush 5-40 mL  5-40 mL IntraVENous Q8H  
 sodium chloride (NS) flush 5-40 mL  5-40 mL IntraVENous PRN  
 acetaminophen (TYLENOL) tablet 650 mg  650 mg Oral Q4H PRN  
 
______________________________________________________________________ EXPECTED LENGTH OF STAY: 4d 21h ACTUAL LENGTH OF STAY:          8 Elan Schroeder MD

## 2019-03-28 NOTE — PROGRESS NOTES
Bedside shift change report given to Kimber (oncoming nurse) by Michelle Munoz (offgoing nurse). Report included the following information SBAR, Intake/Output, MAR and Accordion.

## 2019-03-28 NOTE — PROGRESS NOTES
Progress Note Patient: Trina Melgar MRN: [id number]  SSN: [id number] YOB: 1935  Age: 81 y.o. Sex: female Admit Date: 3/20/2019 
 
1 Day Post-Op Procedure:  Procedure(s): DIAGNOSTIC LAPAROSCOPY WITH LYSIS OF ADHESIONS AND DRAINAGE OF PELVIC ABSCESS Subjective:  
 
Patient has good pain control; out of bed in chair last night; tolerating diet. Objective:  
 
Visit Vitals /63 (BP 1 Location: Left arm, BP Patient Position: At rest) Pulse 85 Temp 98.3 °F (36.8 °C) Resp 18 Ht 5' 7\" (1.702 m) Wt 158 lb 14.4 oz (72.1 kg) SpO2 93% BMI 24.89 kg/m² Temp (24hrs), Av.8 °F (36.6 °C), Min:97 °F (36.1 °C), Max:98.3 °F (36.8 °C) Physical Exam: ABDOMEN: Non-distended, soft. Wounds intact; sero-sanguinous drain fluid; appropriate incisional pain with palpation. Data Review: VS, I/O's, Labs Lab Review:  
Recent Results (from the past 12 hour(s)) CBC WITH AUTOMATED DIFF Collection Time: 19  2:47 AM  
Result Value Ref Range WBC 12.0 (H) 3.6 - 11.0 K/uL  
 RBC 3.10 (L) 3.80 - 5.20 M/uL HGB 9.3 (L) 11.5 - 16.0 g/dL HCT 30.5 (L) 35.0 - 47.0 % MCV 98.4 80.0 - 99.0 FL  
 MCH 30.0 26.0 - 34.0 PG  
 MCHC 30.5 30.0 - 36.5 g/dL  
 RDW 16.7 (H) 11.5 - 14.5 % PLATELET 560 010 - 474 K/uL MPV 8.7 (L) 8.9 - 12.9 FL  
 NRBC 0.0 0  WBC ABSOLUTE NRBC 0.00 0.00 - 0.01 K/uL NEUTROPHILS 77 (H) 32 - 75 % LYMPHOCYTES 12 12 - 49 % MONOCYTES 10 5 - 13 % EOSINOPHILS 0 0 - 7 % BASOPHILS 0 0 - 1 % IMMATURE GRANULOCYTES 1 (H) 0.0 - 0.5 % ABS. NEUTROPHILS 9.2 (H) 1.8 - 8.0 K/UL  
 ABS. LYMPHOCYTES 1.4 0.8 - 3.5 K/UL  
 ABS. MONOCYTES 1.3 (H) 0.0 - 1.0 K/UL  
 ABS. EOSINOPHILS 0.0 0.0 - 0.4 K/UL  
 ABS. BASOPHILS 0.0 0.0 - 0.1 K/UL  
 ABS. IMM. GRANS. 0.1 (H) 0.00 - 0.04 K/UL  
 DF AUTOMATED METABOLIC PANEL, BASIC Collection Time: 19  2:47 AM  
Result Value Ref Range  Sodium 142 136 - 145 mmol/L  
 Potassium 3.4 (L) 3.5 - 5.1 mmol/L Chloride 109 (H) 97 - 108 mmol/L  
 CO2 27 21 - 32 mmol/L Anion gap 6 5 - 15 mmol/L Glucose 117 (H) 65 - 100 mg/dL BUN 7 6 - 20 MG/DL Creatinine 0.98 0.55 - 1.02 MG/DL  
 BUN/Creatinine ratio 7 (L) 12 - 20 GFR est AA >60 >60 ml/min/1.73m2 GFR est non-AA 54 (L) >60 ml/min/1.73m2 Calcium 8.4 (L) 8.5 - 10.1 MG/DL Assessment:  
 
Hospital Problems  Date Reviewed: 3/28/2019 Codes Class Noted POA [facility] ICD-10-CM: K65.1 ICD-9-CM: 450.77  3/28/2019 Yes * (Principal) Abdominal pain ICD-10-CM: R10.9 ICD-9-CM: 789.00  3/20/2019 Yes Plan/Recommendations/Medical Decision Makin. Remove Davalos. 2. Continue antibiotics, drain. 3. Out of bed, ambulation. 4. Interval CT scan Friday. Signed By: Nolvia Corley MD   
 2019

## 2019-03-28 NOTE — PROGRESS NOTES
Bedside verbal shift change report given to oncoming nurse Yasmin Mendoza R.N. Opportunity for questions and clarifications provided.

## 2019-03-28 NOTE — PROGRESS NOTES
NUTRITION COMPLETE ASSESSMENT 
 
RECOMMENDATIONS:  
1. Encourage PO intake with meals and supplements 
 - document % consumed in flowsheet 2. Weekly weights on standing scale Interventions/Plan:  
Food/Nutrient Delivery:  (low fiber diet) (continue Ensure) Assessment:  
Reason for Assessment: [x]reassessment Diet: (GI lite) Supplements: Ensure BID Nutritionally Significant Medications: [x] Reviewed & Includes: vit B12 (1000mcg), iron, jonas-Q, zosyn, vanco 
 
Subjective: I am drinking the Ensure, I do Boost at home. I really wanted a Frosty the other day. Objective: 
Pt admitted for abd pain. PMHx: pelvic abscess. Recurrent pelvic and groin abscess noted. S/p diagnostic lap with SAHRA and drainage of abscess. No resection needed. DEEPAK drains remain in place. Pt visited. Eating lunch during visit with no issues. Ate about 75% breakfast today per RN. Will continue Ensure BID for 700kcal, 40 g protein since pt drinking some of each. Asking about \"regular\" diet, will liberalize some to just low fiber with further liberalization per surgery (low fat, low lactose not indicated at this time). Pt getting tired of foods so encouraged family to bring foods as able. No questions/concerns at this time. Will continue to follow for PO intake, wt trends. Estimated Nutrition Needs:  
Kcals/day: 1364 Kcals/day(1436-1556kcal) Protein: 86 g(86-100g (1.2-1.4g/kg)) Fluid: 7033 ml(25ml/kg) Based On: [place] [address] Jeor(x 1.2-1.3) Weight Used: Actual wt(71.3kg) Pt expected to meet estimated nutrient needs:  []   Yes     []  No [x] Unable to predict at this time Nutrition Diagnosis:  
1. Unintended weight loss related to inadequate protein/energy intake as evidenced by 9% wt loss x <3 months; recurrent abscess with multiple hospitalizations Goals:   
 Continued consumption of at least 75% meals and 1 supplement/day in 5-7 days Monitoring & Evaluation:  
 - Liquid meal replacement, Protein intake - Weight/weight change Previous Nutrition Goals Met:   Yes Previous Recommendations:    Yes 
 
Education & Discharge Needs: 
 [x] None Identified 
 [] Identified and addressed [x] Participated in care plan, discharge planning, and/or interdisciplinary rounds Cultural, Jewish and ethnic food preferences identified: None Skin Integrity: [x]Intact  []Other Edema: [x]None []Other Last BM: PTA Food Allergies: [x]None []Other Diet Restrictions: Cultural/Jewish Preference(s): None Anthropometrics:   
Weight Loss Metrics 3/28/2019 Today's Wt 158 lb 14.4 oz BMI 24.89 kg/m2 Weight Source: Standing scale (comment) Height: 5' 7\" (170.2 cm), Body mass index is 24.89 kg/m². IBW : 61.2 kg (135 lb), % IBW (Calculated): 116.37 % Usual Body Weight: 78 kg (172 lb),   
 
Labs:   
Lab Results Component Value Date/Time Sodium 142 03/28/2019 02:47 AM  
 Potassium 3.4 (L) 03/28/2019 02:47 AM  
 Chloride 109 (H) 03/28/2019 02:47 AM  
 CO2 27 03/28/2019 02:47 AM  
 Glucose 117 (H) 03/28/2019 02:47 AM  
 BUN 7 03/28/2019 02:47 AM  
 Creatinine 0.98 03/28/2019 02:47 AM  
 Calcium 8.4 (L) 03/28/2019 02:47 AM  
 Albumin 2.2 (L) 03/21/2019 02:20 AM  
 
Rosalva Prakash RD Pager #[phone] tl [phone]

## 2019-03-28 NOTE — PROGRESS NOTES
Bedside shift change report given to [facility] (oncoming nurse) by Kimber RN (offgoing nurse). Report included the following information SBAR, Kardex, Intake/Output and MAR.

## 2019-03-29 ENCOUNTER — APPOINTMENT (OUTPATIENT)
Dept: CT IMAGING | Age: 84
DRG: 749 | End: 2019-03-29
Attending: SURGERY
Payer: MEDICARE

## 2019-03-29 LAB
ANION GAP SERPL CALC-SCNC: 6 MMOL/L (ref 5–15)
BASOPHILS # BLD: 0 K/UL (ref 0–0.1)
BASOPHILS NFR BLD: 0 % (ref 0–1)
BLASTS NFR BLD MANUAL: 0 %
BUN SERPL-MCNC: 5 MG/DL (ref 6–20)
BUN/CREAT SERPL: 7 (ref 12–20)
CALCIUM SERPL-MCNC: 8.2 MG/DL (ref 8.5–10.1)
CHLORIDE SERPL-SCNC: 111 MMOL/L (ref 97–108)
CO2 SERPL-SCNC: 24 MMOL/L (ref 21–32)
CREAT SERPL-MCNC: 0.69 MG/DL (ref 0.55–1.02)
DIFFERENTIAL METHOD BLD: ABNORMAL
EOSINOPHIL # BLD: 0 K/UL (ref 0–0.4)
EOSINOPHIL NFR BLD: 0 % (ref 0–7)
ERYTHROCYTE [DISTWIDTH] IN BLOOD BY AUTOMATED COUNT: 16.7 % (ref 11.5–14.5)
GLUCOSE SERPL-MCNC: 109 MG/DL (ref 65–100)
HCT VFR BLD AUTO: 30.2 % (ref 35–47)
HGB BLD-MCNC: 9.5 G/DL (ref 11.5–16)
IMM GRANULOCYTES # BLD AUTO: 0 K/UL
IMM GRANULOCYTES NFR BLD AUTO: 0 %
LYMPHOCYTES # BLD: 2.3 K/UL (ref 0.8–3.5)
LYMPHOCYTES NFR BLD: 13 % (ref 12–49)
MCH RBC QN AUTO: 31.4 PG (ref 26–34)
MCHC RBC AUTO-ENTMCNC: 31.5 G/DL (ref 30–36.5)
MCV RBC AUTO: 99.7 FL (ref 80–99)
METAMYELOCYTES NFR BLD MANUAL: 0 %
MONOCYTES # BLD: 1.6 K/UL (ref 0–1)
MONOCYTES NFR BLD: 9 % (ref 5–13)
MYELOCYTES NFR BLD MANUAL: 0 %
NEUTS BAND NFR BLD MANUAL: 0 % (ref 0–6)
NEUTS SEG # BLD: 13.5 K/UL (ref 1.8–8)
NEUTS SEG NFR BLD: 78 % (ref 32–75)
NRBC # BLD: 0 K/UL (ref 0–0.01)
NRBC BLD-RTO: 0 PER 100 WBC
OTHER CELLS NFR BLD MANUAL: 0 %
PLATELET # BLD AUTO: 309 K/UL (ref 150–400)
PMV BLD AUTO: 8.8 FL (ref 8.9–12.9)
POTASSIUM SERPL-SCNC: 3.2 MMOL/L (ref 3.5–5.1)
PROMYELOCYTES NFR BLD MANUAL: 0 %
RBC # BLD AUTO: 3.03 M/UL (ref 3.8–5.2)
RBC MORPH BLD: ABNORMAL
SODIUM SERPL-SCNC: 141 MMOL/L (ref 136–145)
WBC # BLD AUTO: 17.4 K/UL (ref 3.6–11)

## 2019-03-29 PROCEDURE — 74011250637 HC RX REV CODE- 250/637: Performed by: SURGERY

## 2019-03-29 PROCEDURE — 74011636320 HC RX REV CODE- 636/320: Performed by: RADIOLOGY

## 2019-03-29 PROCEDURE — 74011000258 HC RX REV CODE- 258: Performed by: RADIOLOGY

## 2019-03-29 PROCEDURE — 80048 BASIC METABOLIC PNL TOTAL CA: CPT

## 2019-03-29 PROCEDURE — 74177 CT ABD & PELVIS W/CONTRAST: CPT

## 2019-03-29 PROCEDURE — 85027 COMPLETE CBC AUTOMATED: CPT

## 2019-03-29 PROCEDURE — 36415 COLL VENOUS BLD VENIPUNCTURE: CPT

## 2019-03-29 PROCEDURE — 74011250636 HC RX REV CODE- 250/636: Performed by: SURGERY

## 2019-03-29 PROCEDURE — 65270000032 HC RM SEMIPRIVATE

## 2019-03-29 PROCEDURE — 74011000258 HC RX REV CODE- 258: Performed by: SURGERY

## 2019-03-29 RX ORDER — AMOXICILLIN AND CLAVULANATE POTASSIUM 875; 125 MG/1; MG/1
1 TABLET, FILM COATED ORAL 2 TIMES DAILY
Qty: 14 TAB | Refills: 0 | Status: SHIPPED | OUTPATIENT
Start: 2019-03-29 | End: 2019-04-05

## 2019-03-29 RX ORDER — POTASSIUM CHLORIDE 14.9 MG/ML
10 INJECTION INTRAVENOUS ONCE
Status: ACTIVE | OUTPATIENT
Start: 2019-03-29 | End: 2019-03-30

## 2019-03-29 RX ORDER — OXYCODONE HYDROCHLORIDE 5 MG/1
5 TABLET ORAL
Qty: 20 TAB | Refills: 0 | Status: SHIPPED | OUTPATIENT
Start: 2019-03-29 | End: 2019-04-01

## 2019-03-29 RX ORDER — SODIUM CHLORIDE 0.9 % (FLUSH) 0.9 %
10 SYRINGE (ML) INJECTION
Status: COMPLETED | OUTPATIENT
Start: 2019-03-29 | End: 2019-03-29

## 2019-03-29 RX ADMIN — IOHEXOL 50 ML: 240 INJECTION, SOLUTION INTRATHECAL; INTRAVASCULAR; INTRAVENOUS; ORAL at 11:17

## 2019-03-29 RX ADMIN — PIPERACILLIN SODIUM,TAZOBACTAM SODIUM 3.38 G: 3; .375 INJECTION, POWDER, FOR SOLUTION INTRAVENOUS at 20:53

## 2019-03-29 RX ADMIN — IOPAMIDOL 100 ML: 755 INJECTION, SOLUTION INTRAVENOUS at 11:17

## 2019-03-29 RX ADMIN — Medication 10 ML: at 20:54

## 2019-03-29 RX ADMIN — PIPERACILLIN SODIUM,TAZOBACTAM SODIUM 3.38 G: 3; .375 INJECTION, POWDER, FOR SOLUTION INTRAVENOUS at 13:44

## 2019-03-29 RX ADMIN — OXYCODONE HYDROCHLORIDE 5 MG: 5 TABLET ORAL at 13:55

## 2019-03-29 RX ADMIN — SODIUM CHLORIDE 100 ML: 900 INJECTION, SOLUTION INTRAVENOUS at 11:17

## 2019-03-29 RX ADMIN — Medication 10 ML: at 11:17

## 2019-03-29 RX ADMIN — PIPERACILLIN SODIUM,TAZOBACTAM SODIUM 3.38 G: 3; .375 INJECTION, POWDER, FOR SOLUTION INTRAVENOUS at 04:01

## 2019-03-29 NOTE — PROGRESS NOTES
3/29/19; 10:30 -  
ILANA participated in IDRs on patient. Patient had significant DEEPAK drain output overnight. Patient is complaining of pain at drain site. Patient's plan for today includes ABD CT with contrast.  Ollie will be doing MYRA for [facility] upon discharge, and information is on patient's AVS. CRM: Yanely Huizar, MPH, [address] [place] [address]; Z: [phone]

## 2019-03-29 NOTE — PROGRESS NOTES
Hospitalist Progress Note Lilian Clay MD 
Answering service: [phone] or [phone] from in house phone Cell: [phone] Date of Service:  3/29/2019 NAME:  Kendrick Neal :  1935 MRN:  [id number] Admission Summary:  
81-year-old female with  a complicated medical course, apparently presented to [facility] and then was transferred to [facility] with 10-day history of increasing right lower quadrant pain, urinary urgency, rigors and passage of fecal material either in her urine or from her vagina, on 2019. The patient also had some constipation associated with diarrhea, but no bloody stools associated with the same. The patient was found to have a pelvic abscess, and they were concerned that there may be a fistula present. The patient underwent a CT scan which showed a pelvic abscess. A CT-guided drainage of the abscess had produced 400 mL of purulent fluid. They placed a drain which continued to produce 925 mL of purulent fluid. Her drain then fell out the next day. A repeat CT scan was then performed that showed a large pelvic abscess. She underwent CT-guided drainage of the abscess that produced another 370 mL of purulent fluid. Her drain continued to produce purulent fluid throughout the hospital course, and the patient was eventually discharged with a drain in place. The patient was started on IV Zosyn, continued to have IV Zosyn throughout her hospital course and then eventually was switched to ciprofloxacin orally. The patient was also found to have IUD that has been placed about 50 years back, and she was supposed to follow up for outpatient followup and possible removal.  GYN had consulted on her while she was in the hospital and they felt that the patient does not need the IUD removed at that point of time.   The patient reports that she was discharged with home healthcare, and today, the healthcare nurse came and noted that the patient had an abscess on her right groin and that started draining. The patient reports that she also had decreased appetite, not eating or drinking well, and had some nausea associated with diarrhea. The patient reports that she got concerned and decided to come to the hospital.  The patient reports that she also had some shortness of breath while she was walking in the parking lot today, reports that she has finished her antibiotics. 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 The patient reports that she had the abscess drained at [facility] again on 03/13/2019 and was prescribed doxycycline. The patient presented to the ER  and was found to have a groin abscess as well as a pelvic abscess. Dr. Jevon Connors from Surgery was consulted and requested the patient to be admitted to the hospitalist service. Interval history / Subjective:  
  CC: Pelvic Abscess. Patient in no distress on am rounds. C/O of abdomen feeling a bit \"funny\" after meals. Denied any any nausea or vomiting. Assessment & Plan:  
 
Recurrent posterior pelvic abscess 
-CT abd 3/20 2.1 x 5.8 x 3.4 cm posterior pelvic collection suspicious for abscess, possibly secondary to diverticulitis -s/p CT guided drainage 3/22, culture shows S mitis, alpha strep, rare candida 
-On Vanc/zosyn. 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 
-s/p Colonoscopy 3/26/19. Please see the results. Outpatient follow up with GI 
-GI and surgical F/Us noted and appreciated. Right groin abscess 
-CT abd 3/20 Soft tissue collection in the deep soft tissues of the right groin measuring 2.3 x 4.6 x 3.5 cm suspicious for abscess. -S/p I&D 
-Culture shows coagulase negative staph. -Sub-optimal growth of Candida albicans from urine. 
-Continue Zosyn. Will hold off on Antifungals for now. Mild dehydration 
-resolved Anemia 
-Vit b12 deficiency. Supplementation. 
-Outpatient follow up Hypokalemia 
-repletion with F/U Shortness of breath 
-resolved. Encourage incentive spirometry Regular diet Prophylaxis: DVT. Directives: Partial Code with a guarded prognosis. D/W patient. Plan: Prolonged hospital stay due to the decompensation in the medical problems. Anticipate D/C to home with probable HomeHealth arrangements. D/W patient. Hospital Problems  Date Reviewed: 3/28/2019 Codes Class Noted POA Abscess of abdominal cavity ([facility] [address] [facility] ICD-10-CM: K65.1 ICD-9-CM: 147.55  3/28/2019 Yes * (Principal) Abdominal pain ICD-10-CM: R10.9 ICD-9-CM: 789.00  3/20/2019 Yes Review of Systems: A comprehensive review of systems was negative except for that written in the HPI. Vital Signs:  
 Last 24hrs VS reviewed since prior progress note. Most recent are: 
Visit Vitals /53 (BP 1 Location: Left arm, BP Patient Position: At rest) Pulse 93 Temp 98.5 °F (36.9 °C) Resp 16 Ht 5' 7\" (1.702 m) Wt 77.1 kg (170 lb) SpO2 92% BMI 26.63 kg/m² Intake/Output Summary (Last 24 hours) at 3/29/2019 1200 Last data filed at 3/28/2019 2006 Gross per 24 hour Intake  Output 600 ml Net -600 ml Physical Examination:  
 
 
     
Constitutional:  No acute distress, cooperative, pleasant   
ENT:  Oral mucous moist, oropharynx benign. Neck supple, Resp:  CTA bilaterally. No wheezing/rhonchi/rales. No accessory muscle use CV:  Regular rhythm, normal rate, no murmurs, gallops, rubs GI:  drain in place, Soft, non distended, non tender. normoactive bowel sounds, no hepatosplenomegaly Musculoskeletal:  No edema, warm, 2+ pulses throughout Neurologic: Awake, alert and oriented. Skin: Right groin wound Data Review: Review and/or order of clinical lab test 
 
 
Labs:  
 
Recent Labs  
  03/29/19 
0420 03/28/19 
0247 WBC 17.4* 12.0* HGB 9.5* 9.3* HCT 30.2* 30.5*  348 Recent Labs  
  03/29/19 
0420 03/28/19 
0247 03/27/19 
0303  142 142  
K 3.2* 3.4* 3.0*  
* 109* 111* CO2 24 27 25 BUN 5* 7 4* CREA 0.69 0.98 0.59 * 117* 77  
CA 8.2* 8.4* 8.6 No results for input(s): SGOT, GPT, ALT, AP, TBIL, TBILI, TP, ALB, GLOB, GGT, AML, LPSE in the last 72 hours. No lab exists for component: AMYP, HLPSE No results for input(s): INR, PTP, APTT in the last 72 hours. No lab exists for component: INREXT, INREXT No results for input(s): FE, TIBC, PSAT, FERR in the last 72 hours. Lab Results Component Value Date/Time Folate 14.1 03/23/2019 02:17 AM  
  
No results for input(s): PH, PCO2, PO2 in the last 72 hours. No results for input(s): CPK, CKNDX, TROIQ in the last 72 hours. No lab exists for component: CPKMB No results found for: CHOL, CHOLX, CHLST, CHOLV, HDL, LDL, LDLC, DLDLP, TGLX, TRIGL, TRIGP, CHHD, CHHDX No results found for: Sabina Pride Lab Results Component Value Date/Time Color YELLOW/STRAW 03/25/2019 05:30 PM  
 Appearance CLEAR 03/25/2019 05:30 PM  
 Specific gravity 1.019 03/25/2019 05:30 PM  
 pH (UA) 5.5 03/25/2019 05:30 PM  
 Protein NEGATIVE  03/25/2019 05:30 PM  
 Glucose NEGATIVE  03/25/2019 05:30 PM  
 Ketone NEGATIVE  03/25/2019 05:30 PM  
 Bilirubin NEGATIVE  03/25/2019 05:30 PM  
 Urobilinogen 0.2 03/25/2019 05:30 PM  
 Nitrites NEGATIVE  03/25/2019 05:30 PM  
 Leukocyte Esterase SMALL (A) 03/25/2019 05:30 PM  
 Epithelial cells MODERATE (A) 03/25/2019 05:30 PM  
 Bacteria NEGATIVE  03/25/2019 05:30 PM  
 WBC 5-10 03/25/2019 05:30 PM  
 RBC 0-5 03/25/2019 05:30 PM  
 
 
 
Medications Reviewed:  
 
Current Facility-Administered Medications Medication Dose Route Frequency  piperacillin-tazobactam (ZOSYN) 3.375 g in 0.9% sodium chloride (MBP/ADV) 100 mL  3.375 g IntraVENous Q8H  
 sodium chloride (NS) flush 5-40 mL  5-40 mL IntraVENous Q8H  
 sodium chloride (NS) flush 5-40 mL  5-40 mL IntraVENous PRN  
 oxyCODONE IR (ROXICODONE) tablet 5 mg  5 mg Oral Q4H PRN  
 morphine injection 2 mg  2 mg IntraVENous Q4H PRN  
 sodium chloride (NS) flush 5-40 mL  5-40 mL IntraVENous Q8H  
 sodium chloride (NS) flush 5-40 mL  5-40 mL IntraVENous PRN  
 lactobac ac& pc-s.therm-b.anim (JOSE Q/RISAQUAD)  1 Cap Oral DAILY  cyanocobalamin (VITAMIN B12) tablet 1,000 mcg  1,000 mcg Oral DAILY  ferrous sulfate tablet 325 mg  1 Tab Oral DAILY WITH BREAKFAST  sodium chloride (NS) flush 5-40 mL  5-40 mL IntraVENous Q8H  
 sodium chloride (NS) flush 5-40 mL  5-40 mL IntraVENous PRN  
 acetaminophen (TYLENOL) tablet 650 mg  650 mg Oral Q4H PRN  
 
______________________________________________________________________ EXPECTED LENGTH OF STAY: 4d 21h ACTUAL LENGTH OF STAY:          9 Melisa Valentino MD

## 2019-03-29 NOTE — PROGRESS NOTES
Patient c/o post op abdominal pain, anorexic VSS stable Abdomen- soft mildly tender (+)BS. REP-[phone]. Stable post op Will sign off. Please call for new problems.  Mehreen Peterson MD

## 2019-03-29 NOTE — PROGRESS NOTES
Surgery Progress Note Admit Date: 3/20/2019 Subjective:  
 
 Pt complains of pain and poor appetite Pts pain location, mid to lower abdomen and level of pain (1-10, 10 severe), 5/10 this morning. No SOB. No CP. Pt is ambulating. Patient 's current diet is Regular, yesterday she took some ginger ale and ice cream but did not eat much, sipped a supplement \" I just have not been very interested in food lately\"  . Katerine Robles Pt reports  no nausea and no vomiting. Pt reports no fever or chills Bowel Movements: Flatus and last BM 3 days ago with bowel prep Objective:  
 
Patient Vitals for the past 8 hrs: 
 Weight  
03/29/19 0646 170 lb (77.1 kg) No intake/output data recorded. 03/27 1901 - 03/29 0700 In: -  
Out: 0818 [Urine:1550; Drains:140]ip Physical Exam: 
 
General: alert, cooperative, no distress, sitting up in the chair Cardiac: normal S1 and S2 Lungs: clear anterior Abdomen: soft, protuberant, nondistended, hypoactive bowel sounds, tenderness mild - in the lower abdomen, without guarding, without rebound Wounds:DEEPAK is ss output, clean dressings in place to right groin and DEEPAK site Neuro: alert, oriented x 3, no defects noted in general exam. 
Extremities: no edema CBC:  
Lab Results Component Value Date/Time WBC 17.4 (H) 03/29/2019 04:20 AM  
 RBC 3.03 (L) 03/29/2019 04:20 AM  
 HGB 9.5 (L) 03/29/2019 04:20 AM  
 HCT 30.2 (L) 03/29/2019 04:20 AM  
 PLATELET 191 95/51/3253 04:20 AM  
 
BMP:  
Lab Results Component Value Date/Time Glucose 109 (H) 03/29/2019 04:20 AM  
 Sodium 141 03/29/2019 04:20 AM  
 Potassium 3.2 (L) 03/29/2019 04:20 AM  
 Chloride 111 (H) 03/29/2019 04:20 AM  
 CO2 24 03/29/2019 04:20 AM  
 BUN 5 (L) 03/29/2019 04:20 AM  
 Creatinine 0.69 03/29/2019 04:20 AM  
 Calcium 8.2 (L) 03/29/2019 04:20 AM  
 
CMP: 
Lab Results Component Value Date/Time  Glucose 109 (H) 03/29/2019 04:20 AM  
 Sodium 141 03/29/2019 04:20 AM  
 Potassium 3.2 (L) 03/29/2019 04:20 AM  
 Chloride 111 (H) 03/29/2019 04:20 AM  
 CO2 24 03/29/2019 04:20 AM  
 BUN 5 (L) 03/29/2019 04:20 AM  
 Creatinine 0.69 03/29/2019 04:20 AM  
 Calcium 8.2 (L) 03/29/2019 04:20 AM  
 Anion gap 6 03/29/2019 04:20 AM  
 BUN/Creatinine ratio 7 (L) 03/29/2019 04:20 AM  
 Alk. phosphatase 58 03/21/2019 02:20 AM  
 Protein, total 6.2 (L) 03/21/2019 02:20 AM  
 Albumin 2.2 (L) 03/21/2019 02:20 AM  
 Globulin 4.0 03/21/2019 02:20 AM  
 A-G Ratio 0.6 (L) 03/21/2019 02:20 AM  
 
 
Radiology review: CT pending this AM  
 
   
Assessment:  
Pt is POD #2 s/p Procedure(s): DIAGNOSTIC LAPAROSCOPY WITH LYSIS OF ADHESIONS AND DRAINAGE OF PELVIC ABSCESS Plan:  
Diet: encourage po as tolerated, Activity: walk in [place] Pain management GI and DVT prophylaxis Drains DEEPAK in place with ss output Labs: labs reviewed, her WBC has bumped up to 17 today, she is afebrile and abdomen is soft and appropriately tender Antibiotics: Zosyn--cultures from last week show candida in urine and drainage, consider adding diflucan For CT today--r/o undrained collection Further plan per Dr. Serenity Cazares PA-C

## 2019-03-29 NOTE — OP NOTES
[address]   OPERATIVE REPORT    Name:  Starr Rossi  MR#:  [id number]  :  1935  ACCOUNT #:  [de-identified]  DATE OF SERVICE:  2019    PREOPERATIVE DIAGNOSIS:  Recurrent pelvic abscess. POSTOPERATIVE DIAGNOSES:  1. Recurrent pelvic abscess. 2.  Dense intra-abdominal adhesions. PROCEDURE PERFORMED:  Laparoscopic drainage of pelvic abscess x 2 with lysis of adhesions. SURGEON:  Deepthi Hogan MD    ASSISTANT:  YUKI Hamilton    ANESTHESIA:  General endotracheal.    COMPLICATIONS:  None. SPECIMENS REMOVED:  None. IMPLANTS:  None. ESTIMATED BLOOD LOSS:  100 mL. DRAINS:  19 mm Mega drain. COUNTS:  Sponge count correct. Needle count correct. INDICATIONS:  The patient is an 60-year-old white female with a history of sepsis with a pelvic abscess, managed through image-guided drainage in [place] in 2019. Following drain removal, the abscess was noted to recur, followed by repeat drainage. She was recently admitted to the Medicine Service with a right groin abscess which was drained by emergency room personnel. CT scan revealed a recurrent pelvic abscess. Image-guided drainage was performed, and sinogram through this drain revealed communication with the distal small bowel. Colonoscopy revealed severe diverticulosis in the sigmoid colon. She appears to have a diverticulitis-related abscess with communication to the small bowel. She was taken to the operating room today for diagnostic laparoscopy, possible small bowel resection, and possible sigmoidectomy. FINDINGS:  1. Dense pelvic adhesions, lysed. 2.  Pelvic abscess x 2, drained. 3.  No fistula to small bowel identified. 4.  Sigmoid diverticulosis. PROCEDURE:  The patient was identified as the correct patient in the preoperative holding area and informed consent was confirmed.   After answering the patient's remaining questions, she was taken to the operating room and placed on the operating room table in the supine position. Sequential compression devices were placed on both lower extremities. Following the uneventful initiation of general anesthesia, she was carefully secured to the operating room table with safety strap in place. A Davalos catheter was in place. All potential pressure points were padded with egg crate. The right arm was tucked to her side. She was then shifted into the low lithotomy position. Her abdomen was then prepped and draped in usual sterile fashion. Final time-out was performed, and it was confirmed she had received intravenous antibiotics. A 5 mm trocar was inserted through a small right subcostal skin incision using an Optiview technique. After confirming intraperitoneal location of the trocar tip, insufflation with carbon dioxide gas was initiated. Once adequate working sites had been developed, the 5 mm, 30-degree laparoscope was inserted. No signs of trocar injury were present. Loops of bowel were identified, adherent to the pelvic sidewall, and the intraabdominal wall. Three additional 5 mm trocars were inserted through small incisions using visual guidance with the laparoscope. Lysis of adhesions was initiated using careful sharp dissection. The patient was then placed in the steep Trendelenburg position, and multiple interloop small bowel adhesions were freed, with mobile loops of bowel mobilized into the upper abdomen as they were freed. Additional loops of bowel were freed from the right pelvic sidewall, with identification of the appendix and the terminal ileum. The terminal ileum was run in retrograde fashion, freeing adhesions along its antimesenteric surface from the pelvic sidewall and the right adnexa. This allowed drainage of an abscess. The terminal ileum was densely adherent within the pelvis, just adjacent to the rectosigmoid junction and the blood supply of the sigmoid colon, which was adherent to the left sidewall.   Once the terminal ileum had been freed, no further small bowel was present within the pelvis. The small bowel was then run in retrograde fashion, inspecting the bowel as it was run proximally. No signs of fistula or injury were present. An additional pelvic abscess adjacent to the rectosigmoid junction was drained using careful blunt dissection. The pelvis was then irrigated with sterile saline. The sigmoid colon was noted to have severe diverticulosis, with no signs of communication with other structures. The colon was relatively soft on palpation, and given these findings, the decision was made to abstain from performing small bowel resection or sigmoidectomy. A 19 mm Mega drain was inserted into the abdominal space. This was allowed to lie in the pelvis at the area of abscess drainage and secured to the skin with a 2-0 nylon suture. After confirming adequate hemostasis, the small bowel was returned to the lower abdomen, and pneumoperitoneum was released. All wounds were infiltrated with 0.5% Marcaine without epinephrine. All skin edges were reapproximated with a combination of subcuticular 4-0 Monocryl suture and Dermabond. The patient tolerated the procedure well. She was extubated in the operating room and transported to the recovery area in stable condition. The attending surgeon, Dr. Nancee Canavan, was scrubbed and present for the entire procedure.       Catalino Monsalve MD      [id number]  D:  03/28/2019 10:07  T:  03/28/2019 10:15  JOB #:  [id number]

## 2019-03-30 LAB
ANION GAP SERPL CALC-SCNC: 7 MMOL/L (ref 5–15)
BASOPHILS # BLD: 0 K/UL (ref 0–0.1)
BASOPHILS NFR BLD: 0 % (ref 0–1)
BLASTS NFR BLD MANUAL: 0 %
BUN SERPL-MCNC: 5 MG/DL (ref 6–20)
BUN/CREAT SERPL: 9 (ref 12–20)
CALCIUM SERPL-MCNC: 8.5 MG/DL (ref 8.5–10.1)
CHLORIDE SERPL-SCNC: 106 MMOL/L (ref 97–108)
CO2 SERPL-SCNC: 26 MMOL/L (ref 21–32)
CREAT SERPL-MCNC: 0.58 MG/DL (ref 0.55–1.02)
DIFFERENTIAL METHOD BLD: ABNORMAL
EOSINOPHIL # BLD: 0.4 K/UL (ref 0–0.4)
EOSINOPHIL NFR BLD: 3 % (ref 0–7)
ERYTHROCYTE [DISTWIDTH] IN BLOOD BY AUTOMATED COUNT: 16.4 % (ref 11.5–14.5)
GLUCOSE SERPL-MCNC: 94 MG/DL (ref 65–100)
HCT VFR BLD AUTO: 29.9 % (ref 35–47)
HGB BLD-MCNC: 9.3 G/DL (ref 11.5–16)
IMM GRANULOCYTES # BLD AUTO: 0 K/UL
IMM GRANULOCYTES NFR BLD AUTO: 0 %
LYMPHOCYTES # BLD: 1.7 K/UL (ref 0.8–3.5)
LYMPHOCYTES NFR BLD: 12 % (ref 12–49)
MCH RBC QN AUTO: 30.3 PG (ref 26–34)
MCHC RBC AUTO-ENTMCNC: 31.1 G/DL (ref 30–36.5)
MCV RBC AUTO: 97.4 FL (ref 80–99)
METAMYELOCYTES NFR BLD MANUAL: 0 %
MONOCYTES # BLD: 0.8 K/UL (ref 0–1)
MONOCYTES NFR BLD: 6 % (ref 5–13)
MYELOCYTES NFR BLD MANUAL: 0 %
NEUTS BAND NFR BLD MANUAL: 1 % (ref 0–6)
NEUTS SEG # BLD: 11.1 K/UL (ref 1.8–8)
NEUTS SEG NFR BLD: 78 % (ref 32–75)
NRBC # BLD: 0 K/UL (ref 0–0.01)
NRBC BLD-RTO: 0 PER 100 WBC
OTHER CELLS NFR BLD MANUAL: 0 %
PLATELET # BLD AUTO: 317 K/UL (ref 150–400)
PLATELET COMMENTS,PCOM: ABNORMAL
PMV BLD AUTO: 9.2 FL (ref 8.9–12.9)
POTASSIUM SERPL-SCNC: 3.1 MMOL/L (ref 3.5–5.1)
PROMYELOCYTES NFR BLD MANUAL: 0 %
RBC # BLD AUTO: 3.07 M/UL (ref 3.8–5.2)
RBC MORPH BLD: ABNORMAL
SODIUM SERPL-SCNC: 139 MMOL/L (ref 136–145)
WBC # BLD AUTO: 14 K/UL (ref 3.6–11)

## 2019-03-30 PROCEDURE — 74011250636 HC RX REV CODE- 250/636: Performed by: SURGERY

## 2019-03-30 PROCEDURE — 80048 BASIC METABOLIC PNL TOTAL CA: CPT

## 2019-03-30 PROCEDURE — 74011250637 HC RX REV CODE- 250/637: Performed by: SURGERY

## 2019-03-30 PROCEDURE — 65270000032 HC RM SEMIPRIVATE

## 2019-03-30 PROCEDURE — 85027 COMPLETE CBC AUTOMATED: CPT

## 2019-03-30 PROCEDURE — 36415 COLL VENOUS BLD VENIPUNCTURE: CPT

## 2019-03-30 PROCEDURE — 74011250637 HC RX REV CODE- 250/637: Performed by: INTERNAL MEDICINE

## 2019-03-30 PROCEDURE — 74011000258 HC RX REV CODE- 258: Performed by: SURGERY

## 2019-03-30 RX ORDER — POTASSIUM CHLORIDE 750 MG/1
40 TABLET, FILM COATED, EXTENDED RELEASE ORAL
Status: COMPLETED | OUTPATIENT
Start: 2019-03-30 | End: 2019-03-30

## 2019-03-30 RX ADMIN — Medication 10 ML: at 06:12

## 2019-03-30 RX ADMIN — POTASSIUM CHLORIDE 40 MEQ: 750 TABLET, EXTENDED RELEASE ORAL at 11:34

## 2019-03-30 RX ADMIN — Medication 10 ML: at 22:04

## 2019-03-30 RX ADMIN — Medication 10 ML: at 22:03

## 2019-03-30 RX ADMIN — PIPERACILLIN SODIUM,TAZOBACTAM SODIUM 3.38 G: 3; .375 INJECTION, POWDER, FOR SOLUTION INTRAVENOUS at 11:35

## 2019-03-30 RX ADMIN — Medication 1 CAPSULE: at 08:58

## 2019-03-30 RX ADMIN — OXYCODONE HYDROCHLORIDE 5 MG: 5 TABLET ORAL at 04:09

## 2019-03-30 RX ADMIN — OXYCODONE HYDROCHLORIDE 5 MG: 5 TABLET ORAL at 12:44

## 2019-03-30 RX ADMIN — CYANOCOBALAMIN TAB 500 MCG 1000 MCG: 500 TAB at 08:59

## 2019-03-30 RX ADMIN — PIPERACILLIN SODIUM,TAZOBACTAM SODIUM 3.38 G: 3; .375 INJECTION, POWDER, FOR SOLUTION INTRAVENOUS at 03:59

## 2019-03-30 RX ADMIN — PIPERACILLIN SODIUM,TAZOBACTAM SODIUM 3.38 G: 3; .375 INJECTION, POWDER, FOR SOLUTION INTRAVENOUS at 20:25

## 2019-03-30 RX ADMIN — FERROUS SULFATE TAB 325 MG (65 MG ELEMENTAL FE) 325 MG: 325 (65 FE) TAB at 08:57

## 2019-03-30 NOTE — PROGRESS NOTES
Bedside and Verbal shift change report given to [facility] [place] (oncoming nurse) by DALE Barragan RN (offgoing nurse). Report given with SBAR, Kardex, Intake/Output, MAR and Recent Results.

## 2019-03-30 NOTE — PROGRESS NOTES
Progress Note Patient: Merlin Meehan MRN: [id number]  SSN: [id number] YOB: 1935  Age: 81 y.o. Sex: female Admit Date: 3/20/2019 
 
3 Days Post-Op Procedure:  Procedure(s): DIAGNOSTIC LAPAROSCOPY WITH LYSIS OF ADHESIONS AND DRAINAGE OF PELVIC ABSCESS Subjective:  
 
Patient feeling better though still needing some pain medication. Eating some but not very much Objective:  
 
Visit Vitals /65 (BP 1 Location: Right arm, BP Patient Position: At rest;Head of bed elevated (Comment degrees)) Pulse 87 Temp 97.5 °F (36.4 °C) Resp 16 Ht 5' 7\" (1.702 m) Wt 170 lb (77.1 kg) SpO2 96% BMI 26.63 kg/m² Temp (24hrs), Av.9 °F (36.6 °C), Min:97.1 °F (36.2 °C), Max:98.9 °F (37.2 °C) Physical Exam:   
Gen- Alert in NAD Lungs- CTA H-RRR Abd- soft with mild tenderness in RLQ at drain insertion site. Data Review: images and reports reviewed Lab Review: All lab results for the last 24 hours reviewed. Recent Results (from the past 24 hour(s)) METABOLIC PANEL, BASIC Collection Time: 19  4:07 AM  
Result Value Ref Range Sodium 139 136 - 145 mmol/L Potassium 3.1 (L) 3.5 - 5.1 mmol/L Chloride 106 97 - 108 mmol/L  
 CO2 26 21 - 32 mmol/L Anion gap 7 5 - 15 mmol/L Glucose 94 65 - 100 mg/dL BUN 5 (L) 6 - 20 MG/DL Creatinine 0.58 0.55 - 1.02 MG/DL  
 BUN/Creatinine ratio 9 (L) 12 - 20 GFR est AA >60 >60 ml/min/1.73m2 GFR est non-AA >60 >60 ml/min/1.73m2 Calcium 8.5 8.5 - 10.1 MG/DL  
CBC WITH MANUAL DIFF Collection Time: 19  6:28 AM  
Result Value Ref Range WBC 14.0 (H) 3.6 - 11.0 K/uL  
 RBC 3.07 (L) 3.80 - 5.20 M/uL HGB 9.3 (L) 11.5 - 16.0 g/dL HCT 29.9 (L) 35.0 - 47.0 % MCV 97.4 80.0 - 99.0 FL  
 MCH 30.3 26.0 - 34.0 PG  
 MCHC 31.1 30.0 - 36.5 g/dL  
 RDW 16.4 (H) 11.5 - 14.5 % PLATELET 982 196 - 331 K/uL MPV 9.2 8.9 - 12.9 FL  
 NRBC 0.0 0  WBC ABSOLUTE NRBC 0.00 0.00 - 0.01 K/uL NEUTROPHILS 78 (H) 32 - 75 % BAND NEUTROPHILS 1 0 - 6 % LYMPHOCYTES 12 12 - 49 % MONOCYTES 6 5 - 13 % EOSINOPHILS 3 0 - 7 % BASOPHILS 0 0 - 1 % METAMYELOCYTES 0 0 % MYELOCYTES 0 0 % PROMYELOCYTES 0 0 % BLASTS 0 0 % OTHER CELL 0 0 IMMATURE GRANULOCYTES 0 %  
 ABS. NEUTROPHILS 11.1 (H) 1.8 - 8.0 K/UL  
 ABS. LYMPHOCYTES 1.7 0.8 - 3.5 K/UL  
 ABS. MONOCYTES 0.8 0.0 - 1.0 K/UL  
 ABS. EOSINOPHILS 0.4 0.0 - 0.4 K/UL  
 ABS. BASOPHILS 0.0 0.0 - 0.1 K/UL  
 ABS. IMM. GRANS. 0.0 K/UL  
 DF MANUAL PLATELET COMMENTS Large Platelets RBC COMMENTS ANISOCYTOSIS 
1+ Assessment:  
 
Hospital Problems  Date Reviewed: 3/28/2019 Codes Class Noted POA [facility] ICD-10-CM: K65.1 ICD-9-CM: 537.64  3/28/2019 Yes * (Principal) Abdominal pain ICD-10-CM: R10.9 ICD-9-CM: 789.00  3/20/2019 Yes Plan/Recommendations/Medical Decision Making:  
Seems to be improving. Continue IV abx Diet as tolerated Repeat CBC in am . Signed By: Betty Arshad MD   
 March 30, 2019

## 2019-03-30 NOTE — PROGRESS NOTES
Hospitalist Progress Note Mathew Salazar MD 
Answering service: [phone] OR [phone] from in house phone Cell: [phone] Date of Service:  3/30/2019 NAME:  Leticia Grier :  1935 MRN:  [id number] Admission Summary:  
52-year-old female with  a complicated medical course, apparently presented to [facility] and then was transferred to [facility] with 10-day history of increasing right lower quadrant pain, urinary urgency, rigors and passage of fecal material either in her urine or from her vagina, on 2019. The patient also had some constipation associated with diarrhea, but no bloody stools associated with the same. The patient was found to have a pelvic abscess, and they were concerned that there may be a fistula present. The patient underwent a CT scan which showed a pelvic abscess. A CT-guided drainage of the abscess had produced 400 mL of purulent fluid. They placed a drain which continued to produce 925 mL of purulent fluid. Her drain then fell out the next day. A repeat CT scan was then performed that showed a large pelvic abscess. She underwent CT-guided drainage of the abscess that produced another 370 mL of purulent fluid. Her drain continued to produce purulent fluid throughout the hospital course, and the patient was eventually discharged with a drain in place. The patient was started on IV Zosyn, continued to have IV Zosyn throughout her hospital course and then eventually was switched to ciprofloxacin orally. The patient was also found to have IUD that has been placed about 50 years back, and she was supposed to follow up for outpatient followup and possible removal.  GYN had consulted on her while she was in the hospital and they felt that the patient does not need the IUD removed at that point of time.   The patient reports that she was discharged with home healthcare, and today, the healthcare nurse came and noted that the patient had an abscess on her right groin and that started draining. The patient reports that she also had decreased appetite, not eating or drinking well, and had some nausea associated with diarrhea. The patient reports that she got concerned and decided to come to the hospital.  The patient reports that she also had some shortness of breath while she was walking in the parking lot today, reports that she has finished her antibiotics. 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 The patient reports that she had the abscess drained at [facility] again on 03/13/2019 and was prescribed doxycycline. The patient presented to the ER  and was found to have a groin abscess as well as a pelvic abscess. Dr. Dileep Cullen from Surgery was consulted and requested the patient to be admitted to the hospitalist service. Interval history / Subjective:  
  CC: Pelvic Abscess. Patient sitting up comfortably on am rounds. Denied any fevers or chills. Assessment & Plan:  
 
Recurrent posterior pelvic abscess 
-CT abd 3/20 2.1 x 5.8 x 3.4 cm posterior pelvic collection suspicious for abscess, possibly secondary to diverticulitis -s/p CT guided drainage 3/22, culture shows S mitis, alpha strep, rare candida 
-On Vanc/zosyn. 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 
-s/p Colonoscopy 3/26/19. Outpatient  GI follow up. -GI and surgical F/Us noted and appreciated. Right groin abscess 
-CT abd 3/20 Soft tissue collection in the deep soft tissues of the right groin measuring 2.3 x 4.6 x 3.5 cm suspicious for abscess. -S/p I&D 
-F/U CT ABD/Pelvis 3/29/19 with almost complete resolution of right groin fluid and no new collections. -Culture shows coagulase negative staph. 
-Sub-optimal growth of Candida albicans from urine. 
-Continue Zosyn. Will hold off on Antifungals for now. Mild dehydration 
-resolved Anemia 
-Vit b12 deficiency. Supplementation. 
-Outpatient follow up Hypokalemia 
-repletion with F/U Shortness of breath 
-resolved. Encourage incentive spirometry Regular diet Prophylaxis: DVT. Directives: Partial Code with a guarded prognosis. D/W patient. Plan: Prolonged hospital stay due to the decompensation in the medical problems. Anticipate D/C to home with probable HomeHealth arrangements when stable. D/W patient. Hospital Problems  Date Reviewed: 3/28/2019 Codes Class Noted POA Abscess of abdominal cavity ([facility] ICD-10-CM: K65.1 ICD-9-CM: 792.45  3/28/2019 Yes * (Principal) Abdominal pain ICD-10-CM: R10.9 ICD-9-CM: 789.00  3/20/2019 Yes Review of Systems: A comprehensive review of systems was negative except for that written in the HPI. Vital Signs:  
 Last 24hrs VS reviewed since prior progress note. Most recent are: 
Visit Vitals /65 (BP 1 Location: Left arm, BP Patient Position: Sitting) Pulse (!) 107 Temp 97.1 °F (36.2 °C) Resp 16 Ht 5' 7\" (1.702 m) Wt 77.1 kg (170 lb) SpO2 93% BMI 26.63 kg/m² Intake/Output Summary (Last 24 hours) at 3/30/2019 1112 Last data filed at 3/30/2019 2006 Gross per 24 hour Intake 600 ml Output 20 ml Net 580 ml Physical Examination:  
 
 
     
Constitutional:  No acute distress, cooperative, pleasant   
ENT:  Oral mucous moist, oropharynx benign. Neck supple, Resp:  CTA bilaterally. No wheezing/rhonchi/rales. No accessory muscle use CV:  Regular rhythm, normal rate, no murmurs, gallops, rubs GI:  drain in place with output, Soft, non distended, non tender. normoactive bowel sounds, no hepatosplenomegaly Musculoskeletal:  No edema, warm, 2+ pulses throughout Neurologic: Awake, alert and oriented. Skin: Right groin wound Data Review:  
 Review and/or order of clinical lab test 
 
 
Labs:  
 
Recent Labs  
  03/30/19 
0628 03/29/19 
0420 WBC 14.0* 17.4* HGB 9.3* 9.5* HCT 29.9* 30.2*  309 Recent Labs  
  03/30/19 
0407 03/29/19 
0420 03/28/19 
0247  141 142  
K 3.1* 3.2* 3.4*  
 111* 109* CO2 26 24 27 BUN 5* 5* 7 CREA 0.58 0.69 0.98  
GLU 94 109* 117* CA 8.5 8.2* 8.4* No results for input(s): SGOT, GPT, ALT, AP, TBIL, TBILI, TP, ALB, GLOB, GGT, AML, LPSE in the last 72 hours. No lab exists for component: AMYP, HLPSE No results for input(s): INR, PTP, APTT in the last 72 hours. No lab exists for component: INREXT, INREXT No results for input(s): FE, TIBC, PSAT, FERR in the last 72 hours. Lab Results Component Value Date/Time Folate 14.1 03/23/2019 02:17 AM  
  
No results for input(s): PH, PCO2, PO2 in the last 72 hours. No results for input(s): CPK, CKNDX, TROIQ in the last 72 hours. No lab exists for component: CPKMB No results found for: CHOL, CHOLX, CHLST, CHOLV, HDL, LDL, LDLC, DLDLP, TGLX, TRIGL, TRIGP, CHHD, CHHDX No results found for: Jamarcus Uribe Lab Results Component Value Date/Time Color YELLOW/STRAW 03/25/2019 05:30 PM  
 Appearance CLEAR 03/25/2019 05:30 PM  
 Specific gravity 1.019 03/25/2019 05:30 PM  
 pH (UA) 5.5 03/25/2019 05:30 PM  
 Protein NEGATIVE  03/25/2019 05:30 PM  
 Glucose NEGATIVE  03/25/2019 05:30 PM  
 Ketone NEGATIVE  03/25/2019 05:30 PM  
 Bilirubin NEGATIVE  03/25/2019 05:30 PM  
 Urobilinogen 0.2 03/25/2019 05:30 PM  
 Nitrites NEGATIVE  03/25/2019 05:30 PM  
 Leukocyte Esterase SMALL (A) 03/25/2019 05:30 PM  
 Epithelial cells MODERATE (A) 03/25/2019 05:30 PM  
 Bacteria NEGATIVE  03/25/2019 05:30 PM  
 WBC 5-10 03/25/2019 05:30 PM  
 RBC 0-5 03/25/2019 05:30 PM  
 
 
 
Medications Reviewed:  
 
Current Facility-Administered Medications Medication Dose Route Frequency  potassium chloride SR (KLOR-CON 10) tablet 40 mEq  40 mEq Oral NOW  piperacillin-tazobactam (ZOSYN) 3.375 g in 0.9% sodium chloride (MBP/ADV) 100 mL  3.375 g IntraVENous Q8H  
 sodium chloride (NS) flush 5-40 mL  5-40 mL IntraVENous Q8H  
 sodium chloride (NS) flush 5-40 mL  5-40 mL IntraVENous PRN  
 oxyCODONE IR (ROXICODONE) tablet 5 mg  5 mg Oral Q4H PRN  
 morphine injection 2 mg  2 mg IntraVENous Q4H PRN  
 sodium chloride (NS) flush 5-40 mL  5-40 mL IntraVENous Q8H  
 sodium chloride (NS) flush 5-40 mL  5-40 mL IntraVENous PRN  
 lactobac ac& pc-s.therm-b.anim (JOSE Q/RISAQUAD)  1 Cap Oral DAILY  cyanocobalamin (VITAMIN B12) tablet 1,000 mcg  1,000 mcg Oral DAILY  ferrous sulfate tablet 325 mg  1 Tab Oral DAILY WITH BREAKFAST  sodium chloride (NS) flush 5-40 mL  5-40 mL IntraVENous Q8H  
 sodium chloride (NS) flush 5-40 mL  5-40 mL IntraVENous PRN  
 acetaminophen (TYLENOL) tablet 650 mg  650 mg Oral Q4H PRN  
 
______________________________________________________________________ EXPECTED LENGTH OF STAY: 4d 21h ACTUAL LENGTH OF STAY:          10 Zuhair Holland MD

## 2019-03-31 LAB
ANION GAP SERPL CALC-SCNC: 4 MMOL/L (ref 5–15)
BASOPHILS # BLD: 0.1 K/UL (ref 0–0.1)
BASOPHILS NFR BLD: 1 % (ref 0–1)
BUN SERPL-MCNC: 9 MG/DL (ref 6–20)
BUN/CREAT SERPL: 13 (ref 12–20)
CALCIUM SERPL-MCNC: 8.7 MG/DL (ref 8.5–10.1)
CHLORIDE SERPL-SCNC: 107 MMOL/L (ref 97–108)
CO2 SERPL-SCNC: 29 MMOL/L (ref 21–32)
CREAT SERPL-MCNC: 0.67 MG/DL (ref 0.55–1.02)
DIFFERENTIAL METHOD BLD: ABNORMAL
EOSINOPHIL # BLD: 0.4 K/UL (ref 0–0.4)
EOSINOPHIL NFR BLD: 3 % (ref 0–7)
ERYTHROCYTE [DISTWIDTH] IN BLOOD BY AUTOMATED COUNT: 16.4 % (ref 11.5–14.5)
GLUCOSE SERPL-MCNC: 103 MG/DL (ref 65–100)
HCT VFR BLD AUTO: 31.2 % (ref 35–47)
HGB BLD-MCNC: 9.6 G/DL (ref 11.5–16)
IMM GRANULOCYTES # BLD AUTO: 0.1 K/UL (ref 0–0.04)
IMM GRANULOCYTES NFR BLD AUTO: 1 % (ref 0–0.5)
LYMPHOCYTES # BLD: 1.8 K/UL (ref 0.8–3.5)
LYMPHOCYTES NFR BLD: 13 % (ref 12–49)
MCH RBC QN AUTO: 30 PG (ref 26–34)
MCHC RBC AUTO-ENTMCNC: 30.8 G/DL (ref 30–36.5)
MCV RBC AUTO: 97.5 FL (ref 80–99)
MONOCYTES # BLD: 1.4 K/UL (ref 0–1)
MONOCYTES NFR BLD: 11 % (ref 5–13)
NEUTS SEG # BLD: 9.7 K/UL (ref 1.8–8)
NEUTS SEG NFR BLD: 71 % (ref 32–75)
NRBC # BLD: 0 K/UL (ref 0–0.01)
NRBC BLD-RTO: 0 PER 100 WBC
PLATELET # BLD AUTO: 354 K/UL (ref 150–400)
PMV BLD AUTO: 9 FL (ref 8.9–12.9)
POTASSIUM SERPL-SCNC: 3 MMOL/L (ref 3.5–5.1)
RBC # BLD AUTO: 3.2 M/UL (ref 3.8–5.2)
SODIUM SERPL-SCNC: 140 MMOL/L (ref 136–145)
WBC # BLD AUTO: 13.5 K/UL (ref 3.6–11)

## 2019-03-31 PROCEDURE — 74011250636 HC RX REV CODE- 250/636: Performed by: SURGERY

## 2019-03-31 PROCEDURE — 65270000032 HC RM SEMIPRIVATE

## 2019-03-31 PROCEDURE — 36415 COLL VENOUS BLD VENIPUNCTURE: CPT

## 2019-03-31 PROCEDURE — 74011250637 HC RX REV CODE- 250/637: Performed by: SURGERY

## 2019-03-31 PROCEDURE — 80048 BASIC METABOLIC PNL TOTAL CA: CPT

## 2019-03-31 PROCEDURE — 74011000258 HC RX REV CODE- 258: Performed by: SURGERY

## 2019-03-31 PROCEDURE — 85025 COMPLETE CBC W/AUTO DIFF WBC: CPT

## 2019-03-31 RX ADMIN — PIPERACILLIN SODIUM,TAZOBACTAM SODIUM 3.38 G: 3; .375 INJECTION, POWDER, FOR SOLUTION INTRAVENOUS at 19:56

## 2019-03-31 RX ADMIN — Medication 10 ML: at 21:06

## 2019-03-31 RX ADMIN — FERROUS SULFATE TAB 325 MG (65 MG ELEMENTAL FE) 325 MG: 325 (65 FE) TAB at 07:05

## 2019-03-31 RX ADMIN — PIPERACILLIN SODIUM,TAZOBACTAM SODIUM 3.38 G: 3; .375 INJECTION, POWDER, FOR SOLUTION INTRAVENOUS at 13:37

## 2019-03-31 RX ADMIN — Medication 10 ML: at 07:05

## 2019-03-31 RX ADMIN — PIPERACILLIN SODIUM,TAZOBACTAM SODIUM 3.38 G: 3; .375 INJECTION, POWDER, FOR SOLUTION INTRAVENOUS at 03:28

## 2019-03-31 RX ADMIN — ACETAMINOPHEN 650 MG: 325 TABLET ORAL at 03:41

## 2019-03-31 RX ADMIN — CYANOCOBALAMIN TAB 500 MCG 1000 MCG: 500 TAB at 10:10

## 2019-03-31 RX ADMIN — Medication 1 CAPSULE: at 10:10

## 2019-03-31 NOTE — PROGRESS NOTES
I have read and agree with the documentation of KARO Jacobs RN. Bedside shift change report given to José Miguel Esteves RN (oncoming nurse) by Ria Luis RN (offgoing nurse). Report included the following information SBAR.

## 2019-03-31 NOTE — PROGRESS NOTES
Hospitalist Progress Note Yaron Barrientos MD 
Answering service: [phone] OR [phone] from in house phone Cell: [phone] Date of Service:  3/31/2019 NAME:  Hood Florez :  1935 MRN:  [id number] Admission Summary:  
81-year-old female with  a complicated medical course, apparently presented to [facility] and then was transferred to [facility] with 10-day history of increasing right lower quadrant pain, urinary urgency, rigors and passage of fecal material either in her urine or from her vagina, on 2019. The patient also had some constipation associated with diarrhea, but no bloody stools associated with the same. The patient was found to have a pelvic abscess, and they were concerned that there may be a fistula present. The patient underwent a CT scan which showed a pelvic abscess. A CT-guided drainage of the abscess had produced 400 mL of purulent fluid. They placed a drain which continued to produce 925 mL of purulent fluid. Her drain then fell out the next day. A repeat CT scan was then performed that showed a large pelvic abscess. She underwent CT-guided drainage of the abscess that produced another 370 mL of purulent fluid. Her drain continued to produce purulent fluid throughout the hospital course, and the patient was eventually discharged with a drain in place. The patient was started on IV Zosyn, continued to have IV Zosyn throughout her hospital course and then eventually was switched to ciprofloxacin orally. The patient was also found to have IUD that has been placed about 50 years back, and she was supposed to follow up for outpatient followup and possible removal.  GYN had consulted on her while she was in the hospital and they felt that the patient does not need the IUD removed at that point of time.   The patient reports that she was discharged with home healthcare, and today, the healthcare nurse came and noted that the patient had an abscess on her right groin and that started draining. The patient reports that she also had decreased appetite, not eating or drinking well, and had some nausea associated with diarrhea. The patient reports that she got concerned and decided to come to the hospital.  The patient reports that she also had some shortness of breath while she was walking in the parking lot today, reports that she has finished her antibiotics. 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 The patient reports that she had the abscess drained at [facility] again on 03/13/2019 and was prescribed doxycycline. The patient presented to the ER  and was found to have a groin abscess as well as a pelvic abscess. Dr. Libra Corley from Surgery was consulted and requested the patient to be admitted to the hospitalist service. Interval history / Subjective:  
  CC: Pelvic Abscess. Patient sitting up comfortably on am rounds. Denied any fevers or chills. Assessment & Plan:  
 
Recurrent posterior pelvic abscess 
-CT abd 3/20 2.1 x 5.8 x 3.4 cm posterior pelvic collection suspicious for abscess, possibly secondary to diverticulitis -s/p CT guided drainage 3/22, culture shows S mitis, alpha strep, rare candida 
-On Vanc/zosyn. 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 
-s/p Colonoscopy 3/26/19. Outpatient  GI follow up. -GI and surgical F/Us noted and appreciated. Right groin abscess 
-CT abd 3/20 Soft tissue collection in the deep soft tissues of the right groin measuring 2.3 x 4.6 x 3.5 cm suspicious for abscess. -S/p I&D 
-F/U CT ABD/Pelvis 3/29/19 with almost complete resolution of right groin fluid and no new collections. -Culture shows coagulase negative staph. -Resolving leukocytosis. -Sub-optimal growth of Candida albicans from urine. Avoid Davalos catheters. 
-Continue Zosyn. Will hold off on Antifungals for now. Mild dehydration 
-resolved Anemia 
-Vit b12 deficiency. Supplementation. 
-Outpatient follow up Hypokalemia 
-repletion with F/U Shortness of breath 
-resolved. Encourage incentive spirometry Regular diet Prophylaxis: DVT. Directives: Partial Code with a guarded prognosis. D/W patient. Plan: Prolonged hospital stay due to the decompensation in the medical problems. Anticipate D/C to home with probable HomeHealth arrangements in the next few days. D/W patient. Hospital Problems  Date Reviewed: 3/28/2019 Codes Class Noted POA Abscess of abdominal cavity ([facility] [address] ICD-10-CM: K65.1 ICD-9-CM: 483.23  3/28/2019 Yes * (Principal) Abdominal pain ICD-10-CM: R10.9 ICD-9-CM: 789.00  3/20/2019 Yes Review of Systems: A comprehensive review of systems was negative except for that written in the HPI. Vital Signs:  
 Last 24hrs VS reviewed since prior progress note. Most recent are: 
Visit Vitals /62 Pulse 91 Temp 98.1 °F (36.7 °C) Resp 18 Ht 5' 7\" (1.702 m) Wt 77.1 kg (170 lb) SpO2 93% BMI 26.63 kg/m² Intake/Output Summary (Last 24 hours) at 3/31/2019 0750 Last data filed at 3/31/2019 4545 Gross per 24 hour Intake  Output 425 ml Net -425 ml Physical Examination:  
 
 
     
Constitutional:  No acute distress, cooperative, pleasant   
ENT:  Oral mucous moist, oropharynx benign. Neck supple, Resp:  CTA bilaterally. No wheezing/rhonchi/rales. No accessory muscle use CV:  Regular rhythm, normal rate, no murmurs, gallops, rubs GI:  drain in place with output, Soft, non distended, non tender. normoactive bowel sounds, no hepatosplenomegaly Musculoskeletal:  No edema, warm, 2+ pulses throughout Neurologic: Awake, alert and oriented. Skin: Right groin wound Data Review:  
 Review and/or order of clinical lab test 
 
 
Labs:  
 
Recent Labs  
  03/31/19 
0327 03/30/19 
1520 WBC 13.5* 14.0* HGB 9.6* 9.3* HCT 31.2* 29.9*  
 317 Recent Labs  
  03/31/19 
0327 03/30/19 
0407 03/29/19 
5188  139 141  
K 3.0* 3.1* 3.2*  
 106 111* CO2 29 26 24 BUN 9 5* 5*  
CREA 0.67 0.58 0.69 * 94 109* CA 8.7 8.5 8.2* No results for input(s): SGOT, GPT, ALT, AP, TBIL, TBILI, TP, ALB, GLOB, GGT, AML, LPSE in the last 72 hours. No lab exists for component: AMYP, HLPSE No results for input(s): INR, PTP, APTT in the last 72 hours. No lab exists for component: INREXT, INREXT No results for input(s): FE, TIBC, PSAT, FERR in the last 72 hours. Lab Results Component Value Date/Time Folate 14.1 03/23/2019 02:17 AM  
  
No results for input(s): PH, PCO2, PO2 in the last 72 hours. No results for input(s): CPK, CKNDX, TROIQ in the last 72 hours. No lab exists for component: CPKMB No results found for: CHOL, CHOLX, CHLST, CHOLV, HDL, LDL, LDLC, DLDLP, TGLX, TRIGL, TRIGP, CHHD, CHHDX No results found for: Chinmay Kaminski Lab Results Component Value Date/Time Color YELLOW/STRAW 03/25/2019 05:30 PM  
 Appearance CLEAR 03/25/2019 05:30 PM  
 Specific gravity 1.019 03/25/2019 05:30 PM  
 pH (UA) 5.5 03/25/2019 05:30 PM  
 Protein NEGATIVE  03/25/2019 05:30 PM  
 Glucose NEGATIVE  03/25/2019 05:30 PM  
 Ketone NEGATIVE  03/25/2019 05:30 PM  
 Bilirubin NEGATIVE  03/25/2019 05:30 PM  
 Urobilinogen 0.2 03/25/2019 05:30 PM  
 Nitrites NEGATIVE  03/25/2019 05:30 PM  
 Leukocyte Esterase SMALL (A) 03/25/2019 05:30 PM  
 Epithelial cells MODERATE (A) 03/25/2019 05:30 PM  
 Bacteria NEGATIVE  03/25/2019 05:30 PM  
 WBC 5-10 03/25/2019 05:30 PM  
 RBC 0-5 03/25/2019 05:30 PM  
 
 
 
Medications Reviewed:  
 
Current Facility-Administered Medications Medication Dose Route Frequency  piperacillin-tazobactam (ZOSYN) 3.375 g in 0.9% sodium chloride (MBP/ADV) 100 mL  3.375 g IntraVENous Q8H  
 sodium chloride (NS) flush 5-40 mL  5-40 mL IntraVENous Q8H  
 sodium chloride (NS) flush 5-40 mL  5-40 mL IntraVENous PRN  
 oxyCODONE IR (ROXICODONE) tablet 5 mg  5 mg Oral Q4H PRN  
 morphine injection 2 mg  2 mg IntraVENous Q4H PRN  
 sodium chloride (NS) flush 5-40 mL  5-40 mL IntraVENous Q8H  
 sodium chloride (NS) flush 5-40 mL  5-40 mL IntraVENous PRN  
 lactobac ac& pc-s.therm-b.anim (JOSE Q/RISAQUAD)  1 Cap Oral DAILY  cyanocobalamin (VITAMIN B12) tablet 1,000 mcg  1,000 mcg Oral DAILY  ferrous sulfate tablet 325 mg  1 Tab Oral DAILY WITH BREAKFAST  sodium chloride (NS) flush 5-40 mL  5-40 mL IntraVENous Q8H  
 sodium chloride (NS) flush 5-40 mL  5-40 mL IntraVENous PRN  
 acetaminophen (TYLENOL) tablet 650 mg  650 mg Oral Q4H PRN  
 
______________________________________________________________________ EXPECTED LENGTH OF STAY: 4d 21h ACTUAL LENGTH OF STAY:          11 Mathew Salazar MD

## 2019-03-31 NOTE — PROGRESS NOTES
Progress Note Patient: Raven Wei MRN: [id number]  SSN: [id number] YOB: 1935  Age: 81 y.o. Sex: female Admit Date: 3/20/2019 
 
4 Days Post-Op Procedure:  Procedure(s): DIAGNOSTIC LAPAROSCOPY WITH LYSIS OF ADHESIONS AND DRAINAGE OF PELVIC ABSCESS Subjective:  
 
Patient still not feeling great. She is able to eat about 1/2 of her food. Can only tolerate 1 ensure. Objective:  
 
Visit Vitals /72 (BP 1 Location: Left arm, BP Patient Position: At rest;Sitting) Pulse 88 Temp 98.6 °F (37 °C) Resp 18 Ht 5' 7\" (1.702 m) Wt 170 lb (77.1 kg) SpO2 94% BMI 26.63 kg/m² Temp (24hrs), Av.2 °F (36.8 °C), Min:97.8 °F (36.6 °C), Max:98.6 °F (37 °C) Physical Exam:   
gen- alert in NAD Lungs- CTA H-RRR Abd-S/ mild tenderness/ 
Juaquin serosanginous Right groin- granulating. Data Review: images and reports reviewed Lab Review: All lab results for the last 24 hours reviewed. Recent Results (from the past 24 hour(s)) CBC WITH AUTOMATED DIFF Collection Time: 19  3:27 AM  
Result Value Ref Range WBC 13.5 (H) 3.6 - 11.0 K/uL  
 RBC 3.20 (L) 3.80 - 5.20 M/uL HGB 9.6 (L) 11.5 - 16.0 g/dL HCT 31.2 (L) 35.0 - 47.0 % MCV 97.5 80.0 - 99.0 FL  
 MCH 30.0 26.0 - 34.0 PG  
 MCHC 30.8 30.0 - 36.5 g/dL  
 RDW 16.4 (H) 11.5 - 14.5 % PLATELET 611 627 - 880 K/uL MPV 9.0 8.9 - 12.9 FL  
 NRBC 0.0 0  WBC ABSOLUTE NRBC 0.00 0.00 - 0.01 K/uL NEUTROPHILS 71 32 - 75 % LYMPHOCYTES 13 12 - 49 % MONOCYTES 11 5 - 13 % EOSINOPHILS 3 0 - 7 % BASOPHILS 1 0 - 1 % IMMATURE GRANULOCYTES 1 (H) 0.0 - 0.5 % ABS. NEUTROPHILS 9.7 (H) 1.8 - 8.0 K/UL  
 ABS. LYMPHOCYTES 1.8 0.8 - 3.5 K/UL  
 ABS. MONOCYTES 1.4 (H) 0.0 - 1.0 K/UL  
 ABS. EOSINOPHILS 0.4 0.0 - 0.4 K/UL  
 ABS. BASOPHILS 0.1 0.0 - 0.1 K/UL  
 ABS. IMM. GRANS. 0.1 (H) 0.00 - 0.04 K/UL  
 DF AUTOMATED METABOLIC PANEL, BASIC  Collection Time: 19  3:27 AM  
 Result Value Ref Range Sodium 140 136 - 145 mmol/L Potassium 3.0 (L) 3.5 - 5.1 mmol/L Chloride 107 97 - 108 mmol/L  
 CO2 29 21 - 32 mmol/L Anion gap 4 (L) 5 - 15 mmol/L Glucose 103 (H) 65 - 100 mg/dL BUN 9 6 - 20 MG/DL Creatinine 0.67 0.55 - 1.02 MG/DL  
 BUN/Creatinine ratio 13 12 - 20 GFR est AA >60 >60 ml/min/1.73m2 GFR est non-AA >60 >60 ml/min/1.73m2 Calcium 8.7 8.5 - 10.1 MG/DL Assessment:  
 
Hospital Problems  Date Reviewed: 3/28/2019 Codes Class Noted POA [facility] ICD-10-CM: K65.1 ICD-9-CM: 654.21  3/28/2019 Yes * (Principal) Abdominal pain ICD-10-CM: R10.9 ICD-9-CM: 789.00  3/20/2019 Yes Plan/Recommendations/Medical Decision Making:  
Continue IV abx Encourage PO intake Continue groin dressing changes. Follow WBC Signed By: Javy Zhu MD   
 March 31, 2019

## 2019-04-01 LAB
ANION GAP SERPL CALC-SCNC: 8 MMOL/L (ref 5–15)
BASOPHILS # BLD: 0.1 K/UL (ref 0–0.1)
BASOPHILS NFR BLD: 1 % (ref 0–1)
BUN SERPL-MCNC: 8 MG/DL (ref 6–20)
BUN/CREAT SERPL: 12 (ref 12–20)
CALCIUM SERPL-MCNC: 8.7 MG/DL (ref 8.5–10.1)
CHLORIDE SERPL-SCNC: 106 MMOL/L (ref 97–108)
CO2 SERPL-SCNC: 25 MMOL/L (ref 21–32)
CREAT SERPL-MCNC: 0.66 MG/DL (ref 0.55–1.02)
DIFFERENTIAL METHOD BLD: ABNORMAL
EOSINOPHIL # BLD: 0.3 K/UL (ref 0–0.4)
EOSINOPHIL NFR BLD: 2 % (ref 0–7)
ERYTHROCYTE [DISTWIDTH] IN BLOOD BY AUTOMATED COUNT: 16.2 % (ref 11.5–14.5)
GLUCOSE SERPL-MCNC: 97 MG/DL (ref 65–100)
HCT VFR BLD AUTO: 29.6 % (ref 35–47)
HGB BLD-MCNC: 9.4 G/DL (ref 11.5–16)
IMM GRANULOCYTES # BLD AUTO: 0.1 K/UL (ref 0–0.04)
IMM GRANULOCYTES NFR BLD AUTO: 1 % (ref 0–0.5)
LYMPHOCYTES # BLD: 1.8 K/UL (ref 0.8–3.5)
LYMPHOCYTES NFR BLD: 16 % (ref 12–49)
MCH RBC QN AUTO: 30.5 PG (ref 26–34)
MCHC RBC AUTO-ENTMCNC: 31.8 G/DL (ref 30–36.5)
MCV RBC AUTO: 96.1 FL (ref 80–99)
MONOCYTES # BLD: 1.4 K/UL (ref 0–1)
MONOCYTES NFR BLD: 13 % (ref 5–13)
NEUTS SEG # BLD: 7.7 K/UL (ref 1.8–8)
NEUTS SEG NFR BLD: 67 % (ref 32–75)
NRBC # BLD: 0 K/UL (ref 0–0.01)
NRBC BLD-RTO: 0 PER 100 WBC
PLATELET # BLD AUTO: 376 K/UL (ref 150–400)
PMV BLD AUTO: 8.8 FL (ref 8.9–12.9)
POTASSIUM SERPL-SCNC: 3.1 MMOL/L (ref 3.5–5.1)
RBC # BLD AUTO: 3.08 M/UL (ref 3.8–5.2)
SODIUM SERPL-SCNC: 139 MMOL/L (ref 136–145)
WBC # BLD AUTO: 11.4 K/UL (ref 3.6–11)

## 2019-04-01 PROCEDURE — 74011250637 HC RX REV CODE- 250/637: Performed by: INTERNAL MEDICINE

## 2019-04-01 PROCEDURE — 85025 COMPLETE CBC W/AUTO DIFF WBC: CPT

## 2019-04-01 PROCEDURE — 94760 N-INVAS EAR/PLS OXIMETRY 1: CPT

## 2019-04-01 PROCEDURE — 74011250636 HC RX REV CODE- 250/636: Performed by: SURGERY

## 2019-04-01 PROCEDURE — 74011250637 HC RX REV CODE- 250/637: Performed by: SURGERY

## 2019-04-01 PROCEDURE — 80048 BASIC METABOLIC PNL TOTAL CA: CPT

## 2019-04-01 PROCEDURE — 74011250636 HC RX REV CODE- 250/636: Performed by: NURSE PRACTITIONER

## 2019-04-01 PROCEDURE — 65270000032 HC RM SEMIPRIVATE

## 2019-04-01 PROCEDURE — 74011000258 HC RX REV CODE- 258: Performed by: SURGERY

## 2019-04-01 PROCEDURE — 36415 COLL VENOUS BLD VENIPUNCTURE: CPT

## 2019-04-01 RX ORDER — DIPHENOXYLATE HYDROCHLORIDE AND ATROPINE SULFATE 2.5; .025 MG/1; MG/1
1 TABLET ORAL
Status: DISCONTINUED | OUTPATIENT
Start: 2019-04-01 | End: 2019-04-04 | Stop reason: HOSPADM

## 2019-04-01 RX ORDER — ENOXAPARIN SODIUM 100 MG/ML
40 INJECTION SUBCUTANEOUS EVERY 24 HOURS
Status: DISCONTINUED | OUTPATIENT
Start: 2019-04-01 | End: 2019-04-04 | Stop reason: HOSPADM

## 2019-04-01 RX ADMIN — ACETAMINOPHEN 650 MG: 325 TABLET ORAL at 17:31

## 2019-04-01 RX ADMIN — PIPERACILLIN SODIUM,TAZOBACTAM SODIUM 3.38 G: 3; .375 INJECTION, POWDER, FOR SOLUTION INTRAVENOUS at 19:15

## 2019-04-01 RX ADMIN — Medication 10 ML: at 07:10

## 2019-04-01 RX ADMIN — ACETAMINOPHEN 650 MG: 325 TABLET ORAL at 02:20

## 2019-04-01 RX ADMIN — Medication 1 CAPSULE: at 09:41

## 2019-04-01 RX ADMIN — CYANOCOBALAMIN TAB 500 MCG 1000 MCG: 500 TAB at 09:41

## 2019-04-01 RX ADMIN — PIPERACILLIN SODIUM,TAZOBACTAM SODIUM 3.38 G: 3; .375 INJECTION, POWDER, FOR SOLUTION INTRAVENOUS at 03:01

## 2019-04-01 RX ADMIN — DIPHENOXYLATE HYDROCHLORIDE AND ATROPINE SULFATE 1 TABLET: 2.5; .025 TABLET ORAL at 14:48

## 2019-04-01 RX ADMIN — ENOXAPARIN SODIUM 40 MG: 100 INJECTION SUBCUTANEOUS at 14:48

## 2019-04-01 RX ADMIN — PIPERACILLIN SODIUM,TAZOBACTAM SODIUM 3.38 G: 3; .375 INJECTION, POWDER, FOR SOLUTION INTRAVENOUS at 12:16

## 2019-04-01 RX ADMIN — FERROUS SULFATE TAB 325 MG (65 MG ELEMENTAL FE) 325 MG: 325 (65 FE) TAB at 07:09

## 2019-04-01 NOTE — PROGRESS NOTES
Daily Progress Note [id number] [facility] at [facility] Admit Date: 3/20/2019 Post-Operative Day: 5 from Procedure(s): DIAGNOSTIC LAPAROSCOPY WITH LYSIS OF ADHESIONS AND DRAINAGE OF PELVIC ABSCESS Subjective:  
 
Last 24 hrs: Feeling better today. Pain significantly improved. Has little energy and minimal appetite. WBC 11.4, T max 99, on zosyn. + flatus and had a \"blow out\" this morning. Ambulating hallway with assistance. Objective:  
 
Blood pressure 128/54, pulse 92, temperature 99 °F (37.2 °C), resp. rate 16, height 5' 7\" (1.702 m), weight 77.1 kg (170 lb), SpO2 100 %. Temp (24hrs), Av.8 °F (37.1 °C), Min:98.6 °F (37 °C), Max:99 °F (37.2 °C) 
 
 
_____________________ Physical Exam:    
Alert and Oriented, lying in bed, no acute distress. Cardiovascular: RRR, no peripheral edema Lungs:CTAB Abdomen: + BS, soft, NT.  DEEPAK with ss fluid, 10 mL out yesterday. Assessment:  
Principal Problem: 
  Abdominal pain (3/20/2019) Active Problems: Abscess of abdominal cavity ([address] (3/28/2019) S/p laparoscopic drainage of pelvic abscess Plan:  
 
Continue zosyn OOB 
GI lite as tolerated Labs in am 
Hopefully home in the next 2-3 days Conchis Corrigan [facility] [id number] [facility] Surgery at [facility] [address] [address] [place], [place] 
[phone] Data Review: 
 
Recent Labs 19 
4273 19 
0327 19 
1595 WBC 11.4* 13.5* 14.0* HGB 9.4* 9.6* 9.3* HCT 29.6* 31.2* 29.9*  
 354 317 Recent Labs 19 
1545 19 
0327 19 
0407  140 139  
K 3.1* 3.0* 3.1*  
 107 106 CO2 25 29 26 GLU 97 103* 94 BUN 8 9 5* CREA 0.66 0.67 0.58  
CA 8.7 8.7 8.5 No results for input(s): AML, LPSE in the last 72 hours. ______________________ Medications: 
 
Current Facility-Administered Medications Medication Dose Route Frequency  diphenoxylate-atropine (LOMOTIL) tablet 1 Tab  1 Tab Oral QID PRN  piperacillin-tazobactam (ZOSYN) 3.375 g in 0.9% sodium chloride (MBP/ADV) 100 mL  3.375 g IntraVENous Q8H  
 sodium chloride (NS) flush 5-40 mL  5-40 mL IntraVENous Q8H  
 sodium chloride (NS) flush 5-40 mL  5-40 mL IntraVENous PRN  
 oxyCODONE IR (ROXICODONE) tablet 5 mg  5 mg Oral Q4H PRN  
 morphine injection 2 mg  2 mg IntraVENous Q4H PRN  
 sodium chloride (NS) flush 5-40 mL  5-40 mL IntraVENous Q8H  
 sodium chloride (NS) flush 5-40 mL  5-40 mL IntraVENous PRN  
 lactobac ac& pc-s.therm-b.anim (JOSE Q/RISAQUAD)  1 Cap Oral DAILY  cyanocobalamin (VITAMIN B12) tablet 1,000 mcg  1,000 mcg Oral DAILY  ferrous sulfate tablet 325 mg  1 Tab Oral DAILY WITH BREAKFAST  sodium chloride (NS) flush 5-40 mL  5-40 mL IntraVENous Q8H  
 sodium chloride (NS) flush 5-40 mL  5-40 mL IntraVENous PRN  
 acetaminophen (TYLENOL) tablet 650 mg  650 mg Oral Q4H PRN I have independently examined the patient and have reviewed the chart. I agree with the above plan. Doing well, needs to eat a little more, WBC decreasing, DEEPAK ss, maybe home in the next 1-2 days.  
 
Ivonne Santiago MD

## 2019-04-01 NOTE — PROGRESS NOTES
Bedside and Verbal shift change report given to Yesy Elizondo (oncoming nurse) by Rl Ocampo RN (offgoing nurse). Report included the following information SBAR, Kardex, ED Summary, Intake/Output, MAR and Recent Results.

## 2019-04-01 NOTE — PROGRESS NOTES
Hospitalist Progress Note Marixa Mccall MD 
Answering service: [phone] OR [phone] from in house phone Cell: [phone] Date of Service:  2019 NAME:  Raven Wei :  1935 MRN:  [id number] Admission Summary:  
67-year-old female with  a complicated medical course, apparently presented to [facility] and then was transferred to [facility] with 10-day history of increasing right lower quadrant pain, urinary urgency, rigors and passage of fecal material either in her urine or from her vagina, on 2019. The patient also had some constipation associated with diarrhea, but no bloody stools associated with the same. The patient was found to have a pelvic abscess, and they were concerned that there may be a fistula present. The patient underwent a CT scan which showed a pelvic abscess. A CT-guided drainage of the abscess had produced 400 mL of purulent fluid. They placed a drain which continued to produce 925 mL of purulent fluid. Her drain then fell out the next day. A repeat CT scan was then performed that showed a large pelvic abscess. She underwent CT-guided drainage of the abscess that produced another 370 mL of purulent fluid. Her drain continued to produce purulent fluid throughout the hospital course, and the patient was eventually discharged with a drain in place. The patient was started on IV Zosyn, continued to have IV Zosyn throughout her hospital course and then eventually was switched to ciprofloxacin orally. The patient was also found to have IUD that has been placed about 50 years back, and she was supposed to follow up for outpatient followup and possible removal.  GYN had consulted on her while she was in the hospital and they felt that the patient does not need the IUD removed at that point of time.   The patient reports that she was discharged with home healthcare, and today, the healthcare nurse came and noted that the patient had an abscess on her right groin and that started draining. The patient reports that she also had decreased appetite, not eating or drinking well, and had some nausea associated with diarrhea. The patient reports that she got concerned and decided to come to the hospital.  The patient reports that she also had some shortness of breath while she was walking in the parking lot today, reports that she has finished her antibiotics. 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 The patient reports that she had the abscess drained at [facility] again on 03/13/2019 and was prescribed doxycycline. The patient presented to the ER  and was found to have a groin abscess as well as a pelvic abscess. Dr. Bogdan Mcmillan from Surgery was consulted and requested the patient to be admitted to the hospitalist service. Interval history / Subjective:  
  CC: Pelvic Abscess. Patient sitting up comfortably on am rounds. Denied any fevers or chills. Assessment & Plan:  
 
Recurrent posterior pelvic abscess 
-CT abd 3/20 2.1 x 5.8 x 3.4 cm posterior pelvic collection suspicious for abscess, possibly secondary to diverticulitis -s/p CT guided drainage 3/22, culture shows S mitis, alpha strep, rare candida 
-On Vanc/zosyn. 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 
-s/p Colonoscopy 3/26/19. Outpatient  GI follow up. -GI and surgical F/Us noted and appreciated. -Continue zosyn 
-GI lite as tolerated Labs in am 
-Hopefully home in the next 2-3 days 
  
 
Right groin abscess 
-CT abd 3/20 Soft tissue collection in the deep soft tissues of the right groin measuring 2.3 x 4.6 x 3.5 cm suspicious for abscess. -S/p I&D -F/U CT ABD/Pelvis 3/29/19 with almost complete resolution of right groin fluid and no new collections. 
-Culture shows coagulase negative staph. -Resolving leukocytosis. -Sub-optimal growth of Candida albicans from urine. Avoid Davalos catheters. 
-Continue Zosyn. Will hold off on Antifungals for now. Mild dehydration 
-resolved Anemia 
-Vit b12 deficiency. Supplementation. 
-Outpatient follow up Hypokalemia 
-repletion with F/U Shortness of breath 
-resolved. Encourage incentive spirometry Regular diet Prophylaxis: DVT. Directives: Partial Code with a guarded prognosis. D/W patient. Plan: Prolonged hospital stay due to the decompensation in the medical problems. Anticipate D/C to home with probable HomeHealth arrangements in the next few days. D/W patient. Hospital Problems  Date Reviewed: 3/28/2019 Codes Class Noted POA Abscess of abdominal cavity ([facility] [address] ICD-10-CM: K65.1 ICD-9-CM: 212.60  3/28/2019 Yes * (Principal) Abdominal pain ICD-10-CM: R10.9 ICD-9-CM: 789.00  3/20/2019 Yes Review of Systems: A comprehensive review of systems was negative except for that written in the HPI. Vital Signs:  
 Last 24hrs VS reviewed since prior progress note. Most recent are: 
Visit Vitals /74 (BP 1 Location: Left arm, BP Patient Position: At rest) Pulse 83 Temp 97.7 °F (36.5 °C) Resp 17 Ht 5' 7\" (1.702 m) Wt 77.1 kg (170 lb) SpO2 95% BMI 26.63 kg/m² Intake/Output Summary (Last 24 hours) at 4/1/2019 1919 Last data filed at 4/1/2019 5500 Gross per 24 hour Intake 900 ml Output 510 ml Net 390 ml Physical Examination:  
 
 
     
Constitutional:  No acute distress, cooperative, pleasant   
ENT:  Oral mucous moist, oropharynx benign. Neck supple, Resp:  CTA bilaterally. No wheezing/rhonchi/rales. No accessory muscle use CV:  Regular rhythm, normal rate, no murmurs, gallops, rubs GI: drain in place with output, Soft, non distended, non tender. normoactive bowel sounds, no hepatosplenomegaly Musculoskeletal:  No edema, warm, 2+ pulses throughout Neurologic: Awake, alert and oriented. Skin: Right groin wound Data Review:  
 Review and/or order of clinical lab test 
 
 
Labs:  
 
Recent Labs 04/01/19 
6931 03/31/19 
0327 WBC 11.4* 13.5* HGB 9.4* 9.6* HCT 29.6* 31.2*  
 354 Recent Labs 04/01/19 
0196 03/31/19 
0327 03/30/19 
0407  140 139  
K 3.1* 3.0* 3.1*  
 107 106 CO2 25 29 26 BUN 8 9 5* CREA 0.66 0.67 0.58 GLU 97 103* 94  
CA 8.7 8.7 8.5 No results for input(s): SGOT, GPT, ALT, AP, TBIL, TBILI, TP, ALB, GLOB, GGT, AML, LPSE in the last 72 hours. No lab exists for component: AMYP, HLPSE No results for input(s): INR, PTP, APTT in the last 72 hours. No lab exists for component: INREXT, INREXT No results for input(s): FE, TIBC, PSAT, FERR in the last 72 hours. Lab Results Component Value Date/Time Folate 14.1 03/23/2019 02:17 AM  
  
No results for input(s): PH, PCO2, PO2 in the last 72 hours. No results for input(s): CPK, CKNDX, TROIQ in the last 72 hours. No lab exists for component: CPKMB No results found for: CHOL, CHOLX, CHLST, CHOLV, HDL, LDL, LDLC, DLDLP, TGLX, TRIGL, TRIGP, CHHD, CHHDX No results found for: Lyly Jacobs Lab Results Component Value Date/Time  Color YELLOW/STRAW 03/25/2019 05:30 PM  
 Appearance CLEAR 03/25/2019 05:30 PM  
 Specific gravity 1.019 03/25/2019 05:30 PM  
 pH (UA) 5.5 03/25/2019 05:30 PM  
 Protein NEGATIVE  03/25/2019 05:30 PM  
 Glucose NEGATIVE  03/25/2019 05:30 PM  
 Ketone NEGATIVE  03/25/2019 05:30 PM  
 Bilirubin NEGATIVE  03/25/2019 05:30 PM  
 Urobilinogen 0.2 03/25/2019 05:30 PM  
 Nitrites NEGATIVE  03/25/2019 05:30 PM  
 Leukocyte Esterase SMALL (A) 03/25/2019 05:30 PM  
 Epithelial cells MODERATE (A) 03/25/2019 05:30 PM  
 Bacteria NEGATIVE  03/25/2019 05:30 PM  
 WBC 5-10 03/25/2019 05:30 PM  
 RBC 0-5 03/25/2019 05:30 PM  
 
 
 
Medications Reviewed:  
 
Current Facility-Administered Medications Medication Dose Route Frequency  diphenoxylate-atropine (LOMOTIL) tablet 1 Tab  1 Tab Oral QID PRN  
 enoxaparin (LOVENOX) injection 40 mg  40 mg SubCUTAneous Q24H  piperacillin-tazobactam (ZOSYN) 3.375 g in 0.9% sodium chloride (MBP/ADV) 100 mL  3.375 g IntraVENous Q8H  
 sodium chloride (NS) flush 5-40 mL  5-40 mL IntraVENous Q8H  
 sodium chloride (NS) flush 5-40 mL  5-40 mL IntraVENous PRN  
 oxyCODONE IR (ROXICODONE) tablet 5 mg  5 mg Oral Q4H PRN  
 morphine injection 2 mg  2 mg IntraVENous Q4H PRN  
 sodium chloride (NS) flush 5-40 mL  5-40 mL IntraVENous Q8H  
 sodium chloride (NS) flush 5-40 mL  5-40 mL IntraVENous PRN  
 lactobac ac& pc-s.therm-b.anim (JOSE Q/RISAQUAD)  1 Cap Oral DAILY  cyanocobalamin (VITAMIN B12) tablet 1,000 mcg  1,000 mcg Oral DAILY  ferrous sulfate tablet 325 mg  1 Tab Oral DAILY WITH BREAKFAST  sodium chloride (NS) flush 5-40 mL  5-40 mL IntraVENous Q8H  
 sodium chloride (NS) flush 5-40 mL  5-40 mL IntraVENous PRN  
 acetaminophen (TYLENOL) tablet 650 mg  650 mg Oral Q4H PRN  
 
______________________________________________________________________ EXPECTED LENGTH OF STAY: 4d 21h ACTUAL LENGTH OF STAY:          12 Sebastien Darby MD

## 2019-04-01 NOTE — PROGRESS NOTES
4/1/19; 10:30 -  
CM participated in IDRs on patient. Patient is being followed by GI and surgery. Patient is POD #5 for a diagnostic laparoscopy with lysis of adhesions and drainage of pelvic abscess. Patient is ambulating in the halls with assistance and continues on IV ABX. Plan includes: diet advancement to GI Lite and watching WBC with labs in the morning. Patient is anticipated to discharge in 2-3 days. Ollie is on board for [facility] upon discharge. CRM: Chi Martin, MPH, [address]; Z: [phone]

## 2019-04-02 LAB
BASOPHILS # BLD: 0.1 K/UL (ref 0–0.1)
BASOPHILS NFR BLD: 1 % (ref 0–1)
DIFFERENTIAL METHOD BLD: ABNORMAL
EOSINOPHIL # BLD: 0.5 K/UL (ref 0–0.4)
EOSINOPHIL NFR BLD: 6 % (ref 0–7)
ERYTHROCYTE [DISTWIDTH] IN BLOOD BY AUTOMATED COUNT: 16.2 % (ref 11.5–14.5)
HCT VFR BLD AUTO: 28.2 % (ref 35–47)
HGB BLD-MCNC: 8.8 G/DL (ref 11.5–16)
IMM GRANULOCYTES # BLD AUTO: 0.1 K/UL (ref 0–0.04)
IMM GRANULOCYTES NFR BLD AUTO: 1 % (ref 0–0.5)
LYMPHOCYTES # BLD: 1.8 K/UL (ref 0.8–3.5)
LYMPHOCYTES NFR BLD: 24 % (ref 12–49)
MCH RBC QN AUTO: 30 PG (ref 26–34)
MCHC RBC AUTO-ENTMCNC: 31.2 G/DL (ref 30–36.5)
MCV RBC AUTO: 96.2 FL (ref 80–99)
MONOCYTES # BLD: 1.1 K/UL (ref 0–1)
MONOCYTES NFR BLD: 14 % (ref 5–13)
NEUTS SEG # BLD: 3.9 K/UL (ref 1.8–8)
NEUTS SEG NFR BLD: 54 % (ref 32–75)
NRBC # BLD: 0 K/UL (ref 0–0.01)
NRBC BLD-RTO: 0 PER 100 WBC
PLATELET # BLD AUTO: 406 K/UL (ref 150–400)
RBC # BLD AUTO: 2.93 M/UL (ref 3.8–5.2)
RBC MORPH BLD: ABNORMAL
WBC # BLD AUTO: 7.5 K/UL (ref 3.6–11)

## 2019-04-02 PROCEDURE — 74011000258 HC RX REV CODE- 258: Performed by: SURGERY

## 2019-04-02 PROCEDURE — 74011250636 HC RX REV CODE- 250/636: Performed by: NURSE PRACTITIONER

## 2019-04-02 PROCEDURE — 36415 COLL VENOUS BLD VENIPUNCTURE: CPT

## 2019-04-02 PROCEDURE — 74011250637 HC RX REV CODE- 250/637: Performed by: SURGERY

## 2019-04-02 PROCEDURE — 85025 COMPLETE CBC W/AUTO DIFF WBC: CPT

## 2019-04-02 PROCEDURE — 65270000032 HC RM SEMIPRIVATE

## 2019-04-02 PROCEDURE — 74011250636 HC RX REV CODE- 250/636: Performed by: SURGERY

## 2019-04-02 RX ADMIN — ACETAMINOPHEN 650 MG: 325 TABLET ORAL at 18:36

## 2019-04-02 RX ADMIN — CYANOCOBALAMIN TAB 500 MCG 1000 MCG: 500 TAB at 09:10

## 2019-04-02 RX ADMIN — Medication 10 ML: at 06:00

## 2019-04-02 RX ADMIN — PIPERACILLIN SODIUM,TAZOBACTAM SODIUM 3.38 G: 3; .375 INJECTION, POWDER, FOR SOLUTION INTRAVENOUS at 19:45

## 2019-04-02 RX ADMIN — FERROUS SULFATE TAB 325 MG (65 MG ELEMENTAL FE) 325 MG: 325 (65 FE) TAB at 09:01

## 2019-04-02 RX ADMIN — ENOXAPARIN SODIUM 40 MG: 100 INJECTION SUBCUTANEOUS at 13:50

## 2019-04-02 RX ADMIN — Medication 1 CAPSULE: at 09:10

## 2019-04-02 RX ADMIN — PIPERACILLIN SODIUM,TAZOBACTAM SODIUM 3.38 G: 3; .375 INJECTION, POWDER, FOR SOLUTION INTRAVENOUS at 03:42

## 2019-04-02 RX ADMIN — PIPERACILLIN SODIUM,TAZOBACTAM SODIUM 3.38 G: 3; .375 INJECTION, POWDER, FOR SOLUTION INTRAVENOUS at 12:31

## 2019-04-02 NOTE — PROGRESS NOTES
Bedside shift change report given to Gilson Collier (oncoming nurse) by Humble Lopez (offgoing nurse). Report included the following information SBAR.

## 2019-04-02 NOTE — PROGRESS NOTES
Bedside shift change report given to Elie Alaniz (oncoming nurse) by America Vieira (offgoing nurse). Report included the following information SBAR, Kardex, Intake/Output and MAR.

## 2019-04-02 NOTE — PROGRESS NOTES
Bedside shift change report given to [address] [place] [address] [address] (oncoming nurse) by Marielena Mcconnell (offgoing nurse). Report included the following information SBAR, Kardex and MAR.

## 2019-04-02 NOTE — PROGRESS NOTES
[facility] POD #5 Subjective Doing well, no eating much but eating about 1/2 her plate, no fever Objective Patient Vitals for the past 24 hrs: 
 Temp Pulse Resp BP SpO2  
04/02/19 1154 98.2 °F (36.8 °C) 89 14 113/72 93 % 04/02/19 0811 97.5 °F (36.4 °C) 88 14 143/79 94 % 04/02/19 0055 97.9 °F (36.6 °C) 78 14 121/65 94 % Date 04/01/19 0700 - 04/02/19 6427 04/02/19 0700 - 04/03/19 5778 Shift 6159-0392 7078-0645 24 Hour Total 5465-1985 5965-7865 24 Hour Total  
INTAKE  
P.O. 400  400 240  240  
  P. O. 400  400 240  240  
I. V.(mL/kg/hr)  300(0.3) 300(0.2) Volume (piperacillin-tazobactam (ZOSYN) 3.375 g in 0.9% sodium chloride (MBP/ADV) 100 mL)  300 300 Shift Total(mL/kg) 400(5.2) 300(3.9) 700(9.1) 240(3.1)  240(3.1) OUTPUT Urine(mL/kg/hr) 300(0.3) 675(0.7) 975(0.5) Urine Voided 300 675 975 Drains 10  10 Output (ml) (Javy-Jimenes Drain 03/27/19 Right; Lower Abdomen) 10  10 Stool Stool Occurrence(s) 1 x 2 x 3 x Shift Total(mL/kg) 310(4) 675(8.8) [id number]) NET 90 375 -285 240  240 Weight (kg) 77.1 77.1 77.1 77.1 77.1 77.1 PE 
Pulm - CTAB 
CV - RRR Abd - soft, ND, BS present, incisions c/d/i, DEEPAK ss Labs No results found for this or any previous visit (from the past 12 hour(s)). Assessment Trinidad Almanzar is a 81 y. o.yr old female s/p laparoscopic drainage of abscess Plan Doing well WBC now normal, DEEPAK is ss and not enteric, no signs of fistula Repeat a CT scan tomorrow to make sure the collection is drained Continue diet as tolerated I may have the drain stay in place on DC home depending what the CT shows.  
 
Sally Palacios MD

## 2019-04-02 NOTE — PROGRESS NOTES
Hospitalist Progress Note Lindsey Ames MD 
Answering service: [phone] OR [phone] from in house phone Cell: [phone] Date of Service:  2019 NAME:  Deandra Young :  1935 MRN:  [id number] Admission Summary:  
78-year-old female with  a complicated medical course, apparently presented to [facility] and then was transferred to [facility] with 10-day history of increasing right lower quadrant pain, urinary urgency, rigors and passage of fecal material either in her urine or from her vagina, on 2019. The patient also had some constipation associated with diarrhea, but no bloody stools associated with the same. The patient was found to have a pelvic abscess, and they were concerned that there may be a fistula present. The patient underwent a CT scan which showed a pelvic abscess. A CT-guided drainage of the abscess had produced 400 mL of purulent fluid. They placed a drain which continued to produce 925 mL of purulent fluid. Her drain then fell out the next day. A repeat CT scan was then performed that showed a large pelvic abscess. She underwent CT-guided drainage of the abscess that produced another 370 mL of purulent fluid. Her drain continued to produce purulent fluid throughout the hospital course, and the patient was eventually discharged with a drain in place. The patient was started on IV Zosyn, continued to have IV Zosyn throughout her hospital course and then eventually was switched to ciprofloxacin orally. The patient was also found to have IUD that has been placed about 50 years back, and she was supposed to follow up for outpatient followup and possible removal.  GYN had consulted on her while she was in the hospital and they felt that the patient does not need the IUD removed at that point of time.   The patient reports that she was discharged with home healthcare, and today, the healthcare nurse came and noted that the patient had an abscess on her right groin and that started draining. The patient reports that she also had decreased appetite, not eating or drinking well, and had some nausea associated with diarrhea. The patient reports that she got concerned and decided to come to the hospital.  The patient reports that she also had some shortness of breath while she was walking in the parking lot today, reports that she has finished her antibiotics. 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 The patient reports that she had the abscess drained at [facility] again on 03/13/2019 and was prescribed doxycycline. The patient presented to the ER  and was found to have a groin abscess as well as a pelvic abscess. Dr. Reinier Harris from Surgery was consulted and requested the patient to be admitted to the hospitalist service. Interval history / Subjective:  
  CC: Pelvic Abscess. Patient sitting up comfortably on am rounds. Denied any fevers or chills. Assessment & Plan:  
 
Recurrent posterior pelvic abscess 
-CT abd 3/20 2.1 x 5.8 x 3.4 cm posterior pelvic collection suspicious for abscess, possibly secondary to diverticulitis -s/p CT guided drainage 3/22, culture shows S mitis, alpha strep, rare candida 
-On Vanc/zosyn. 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 
-s/p Colonoscopy 3/26/19. Outpatient  GI follow up. -GI and surgical F/Us noted and appreciated. -Continue zosyn 
-GI lite as tolerated Labs in am 
-Hopefully home in the next 2-3 days 
-follow up CT  Tomorrow 4/3 if it is unremarkable the patient can be discharged 
  
 
Right groin abscess 
-CT abd 3/20 Soft tissue collection in the deep soft tissues of the right groin measuring 2.3 x 4.6 x 3.5 cm suspicious for abscess. -S/p I&D 
-F/U CT ABD/Pelvis 3/29/19 with almost complete resolution of right groin fluid and no new collections. 
-Culture shows coagulase negative staph. -Resolving leukocytosis. -Sub-optimal growth of Candida albicans from urine. Avoid Davalos catheters. 
-Continue Zosyn. Will hold off on Antifungals for now Mild dehydration 
-resolved Anemia 
-Vit b12 deficiency. Supplementation. 
-Outpatient follow up Hypokalemia 
-repletion with F/U Shortness of breath 
-resolved. Encourage incentive spirometry Regular diet Prophylaxis: DVT. Directives: Partial Code with a guarded prognosis. D/W patient. Plan: Prolonged hospital stay due to the decompensation in the medical problems. Anticipate D/C to home with probable HomeHealth arrangements in the next few days. D/W patient. Hospital Problems  Date Reviewed: 3/28/2019 Codes Class Noted POA [facility] ICD-10-CM: K65.1 ICD-9-CM: 030.88  3/28/2019 Yes * (Principal) Abdominal pain ICD-10-CM: R10.9 ICD-9-CM: 789.00  3/20/2019 Yes Review of Systems: A comprehensive review of systems was negative except for that written in the HPI. Vital Signs:  
 Last 24hrs VS reviewed since prior progress note. Most recent are: 
Visit Vitals /60 (BP 1 Location: Left arm, BP Patient Position: Sitting) Pulse 89 Temp 98.1 °F (36.7 °C) Resp 16 Ht 5' 7\" (1.702 m) Wt 72.1 kg (159 lb) SpO2 94% BMI 24.90 kg/m² Intake/Output Summary (Last 24 hours) at 4/2/2019 1951 Last data filed at 4/2/2019 7003 Gross per 24 hour Intake 540 ml Output 475 ml Net 65 ml Physical Examination:  
 
 
     
Constitutional:  No acute distress, cooperative, pleasant   
ENT:  Oral mucous moist, oropharynx benign. Neck supple, Resp:  CTA bilaterally. No wheezing/rhonchi/rales. No accessory muscle use CV:  Regular rhythm, normal rate, no murmurs, gallops, rubs GI:  drain in place with output, Soft, non distended, non tender. normoactive bowel sounds, no hepatosplenomegaly Musculoskeletal:  No edema, warm, 2+ pulses throughout Neurologic: Awake, alert and oriented. Skin: Right groin wound Data Review:  
 Review and/or order of clinical lab test 
 
 
Labs:  
 
Recent Labs 04/02/19 
0990 04/01/19 
7041 WBC 7.5 11.4* HGB 8.8* 9.4* HCT 28.2* 29.6* * 376 Recent Labs 04/01/19 
2493 03/31/19 
0327  140  
K 3.1* 3.0*  
 107 CO2 25 29 BUN 8 9 CREA 0.66 0.67 GLU 97 103* CA 8.7 8.7 No results for input(s): SGOT, GPT, ALT, AP, TBIL, TBILI, TP, ALB, GLOB, GGT, AML, LPSE in the last 72 hours. No lab exists for component: AMYP, HLPSE No results for input(s): INR, PTP, APTT in the last 72 hours. No lab exists for component: INREXT, INREXT No results for input(s): FE, TIBC, PSAT, FERR in the last 72 hours. Lab Results Component Value Date/Time Folate 14.1 03/23/2019 02:17 AM  
  
No results for input(s): PH, PCO2, PO2 in the last 72 hours. No results for input(s): CPK, CKNDX, TROIQ in the last 72 hours. No lab exists for component: CPKMB No results found for: CHOL, CHOLX, CHLST, CHOLV, HDL, LDL, LDLC, DLDLP, TGLX, TRIGL, TRIGP, CHHD, CHHDX No results found for: Ruth Congo Lab Results Component Value Date/Time  Color YELLOW/STRAW 03/25/2019 05:30 PM  
 Appearance CLEAR 03/25/2019 05:30 PM  
 Specific gravity 1.019 03/25/2019 05:30 PM  
 pH (UA) 5.5 03/25/2019 05:30 PM  
 Protein NEGATIVE  03/25/2019 05:30 PM  
 Glucose NEGATIVE  03/25/2019 05:30 PM  
 Ketone NEGATIVE  03/25/2019 05:30 PM  
 Bilirubin NEGATIVE  03/25/2019 05:30 PM  
 Urobilinogen 0.2 03/25/2019 05:30 PM  
 Nitrites NEGATIVE  03/25/2019 05:30 PM  
 Leukocyte Esterase SMALL (A) 03/25/2019 05:30 PM  
 Epithelial cells MODERATE (A) 03/25/2019 05:30 PM  
 Bacteria NEGATIVE  03/25/2019 05:30 PM  
 WBC 5-10 03/25/2019 05:30 PM  
 RBC 0-5 03/25/2019 05:30 PM  
 
 
 
Medications Reviewed:  
 
Current Facility-Administered Medications Medication Dose Route Frequency  diphenoxylate-atropine (LOMOTIL) tablet 1 Tab  1 Tab Oral QID PRN  
 enoxaparin (LOVENOX) injection 40 mg  40 mg SubCUTAneous Q24H  piperacillin-tazobactam (ZOSYN) 3.375 g in 0.9% sodium chloride (MBP/ADV) 100 mL  3.375 g IntraVENous Q8H  
 sodium chloride (NS) flush 5-40 mL  5-40 mL IntraVENous Q8H  
 sodium chloride (NS) flush 5-40 mL  5-40 mL IntraVENous PRN  
 oxyCODONE IR (ROXICODONE) tablet 5 mg  5 mg Oral Q4H PRN  
 morphine injection 2 mg  2 mg IntraVENous Q4H PRN  
 sodium chloride (NS) flush 5-40 mL  5-40 mL IntraVENous Q8H  
 sodium chloride (NS) flush 5-40 mL  5-40 mL IntraVENous PRN  
 lactobac ac& pc-s.therm-b.anim (JOSE Q/RISAQUAD)  1 Cap Oral DAILY  cyanocobalamin (VITAMIN B12) tablet 1,000 mcg  1,000 mcg Oral DAILY  ferrous sulfate tablet 325 mg  1 Tab Oral DAILY WITH BREAKFAST  sodium chloride (NS) flush 5-40 mL  5-40 mL IntraVENous Q8H  
 sodium chloride (NS) flush 5-40 mL  5-40 mL IntraVENous PRN  
 acetaminophen (TYLENOL) tablet 650 mg  650 mg Oral Q4H PRN  
 
______________________________________________________________________ EXPECTED LENGTH OF STAY: 5d 16h ACTUAL LENGTH OF STAY:          13 Lasha Hernandes MD

## 2019-04-02 NOTE — PROGRESS NOTES
4/2/19; 10:30 -  
CM participated in IDRs on patient. Patient's WBC looks good today. Patient is anticipated to discharge in 1-2 days with [facility]. CM to submit update to [employer] via [employer]. CRM: Talisha Hunter, MPH, [facility]; Z: [phone] 
 
13:50 -  
CM submitted referral update to [facility] and noted the referral has not been accepted in [employer]. CM contacted [facility] Govern: [phone]) to confirm ability to accept. Geovanna Wilcox confirmed plan to accept the patient. CRM: Talisha Hunter, MPH, [address] [place] [address]; Z: [phone]

## 2019-04-03 ENCOUNTER — APPOINTMENT (OUTPATIENT)
Dept: CT IMAGING | Age: 84
DRG: 749 | End: 2019-04-03
Attending: SURGERY
Payer: MEDICARE

## 2019-04-03 LAB
ANION GAP SERPL CALC-SCNC: 6 MMOL/L (ref 5–15)
BASOPHILS # BLD: 0.1 K/UL (ref 0–0.1)
BASOPHILS NFR BLD: 1 % (ref 0–1)
BUN SERPL-MCNC: 7 MG/DL (ref 6–20)
BUN/CREAT SERPL: 10 (ref 12–20)
CALCIUM SERPL-MCNC: 9.2 MG/DL (ref 8.5–10.1)
CHLORIDE SERPL-SCNC: 102 MMOL/L (ref 97–108)
CO2 SERPL-SCNC: 30 MMOL/L (ref 21–32)
COMMENT, HOLDF: NORMAL
CREAT SERPL-MCNC: 0.71 MG/DL (ref 0.55–1.02)
DIFFERENTIAL METHOD BLD: ABNORMAL
EOSINOPHIL # BLD: 0.5 K/UL (ref 0–0.4)
EOSINOPHIL NFR BLD: 7 % (ref 0–7)
ERYTHROCYTE [DISTWIDTH] IN BLOOD BY AUTOMATED COUNT: 16.1 % (ref 11.5–14.5)
GLUCOSE SERPL-MCNC: 101 MG/DL (ref 65–100)
HCT VFR BLD AUTO: 31.8 % (ref 35–47)
HGB BLD-MCNC: 10 G/DL (ref 11.5–16)
IMM GRANULOCYTES # BLD AUTO: 0.1 K/UL (ref 0–0.04)
IMM GRANULOCYTES NFR BLD AUTO: 1 % (ref 0–0.5)
LYMPHOCYTES # BLD: 2.4 K/UL (ref 0.8–3.5)
LYMPHOCYTES NFR BLD: 30 % (ref 12–49)
MCH RBC QN AUTO: 30.6 PG (ref 26–34)
MCHC RBC AUTO-ENTMCNC: 31.4 G/DL (ref 30–36.5)
MCV RBC AUTO: 97.2 FL (ref 80–99)
MONOCYTES # BLD: 1 K/UL (ref 0–1)
MONOCYTES NFR BLD: 13 % (ref 5–13)
NEUTS SEG # BLD: 3.9 K/UL (ref 1.8–8)
NEUTS SEG NFR BLD: 48 % (ref 32–75)
NRBC # BLD: 0 K/UL (ref 0–0.01)
NRBC BLD-RTO: 0 PER 100 WBC
PLATELET # BLD AUTO: 412 K/UL (ref 150–400)
PMV BLD AUTO: 8.8 FL (ref 8.9–12.9)
POTASSIUM SERPL-SCNC: 3.1 MMOL/L (ref 3.5–5.1)
RBC # BLD AUTO: 3.27 M/UL (ref 3.8–5.2)
SAMPLES BEING HELD,HOLD: NORMAL
SODIUM SERPL-SCNC: 138 MMOL/L (ref 136–145)
WBC # BLD AUTO: 7.9 K/UL (ref 3.6–11)

## 2019-04-03 PROCEDURE — 85025 COMPLETE CBC W/AUTO DIFF WBC: CPT

## 2019-04-03 PROCEDURE — 74011250636 HC RX REV CODE- 250/636: Performed by: NURSE PRACTITIONER

## 2019-04-03 PROCEDURE — 80048 BASIC METABOLIC PNL TOTAL CA: CPT

## 2019-04-03 PROCEDURE — 36415 COLL VENOUS BLD VENIPUNCTURE: CPT

## 2019-04-03 PROCEDURE — 65270000032 HC RM SEMIPRIVATE

## 2019-04-03 PROCEDURE — 74011000258 HC RX REV CODE- 258: Performed by: SURGERY

## 2019-04-03 PROCEDURE — 74011250637 HC RX REV CODE- 250/637: Performed by: SURGERY

## 2019-04-03 PROCEDURE — 74011636320 HC RX REV CODE- 636/320: Performed by: RADIOLOGY

## 2019-04-03 PROCEDURE — 74177 CT ABD & PELVIS W/CONTRAST: CPT

## 2019-04-03 PROCEDURE — 74011250636 HC RX REV CODE- 250/636: Performed by: SURGERY

## 2019-04-03 PROCEDURE — 74011000258 HC RX REV CODE- 258: Performed by: RADIOLOGY

## 2019-04-03 PROCEDURE — 74011250637 HC RX REV CODE- 250/637: Performed by: INTERNAL MEDICINE

## 2019-04-03 RX ORDER — SODIUM CHLORIDE 0.9 % (FLUSH) 0.9 %
10 SYRINGE (ML) INJECTION
Status: COMPLETED | OUTPATIENT
Start: 2019-04-03 | End: 2019-04-03

## 2019-04-03 RX ORDER — DIPHENHYDRAMINE HCL 25 MG
25 CAPSULE ORAL
Status: DISCONTINUED | OUTPATIENT
Start: 2019-04-03 | End: 2019-04-04 | Stop reason: HOSPADM

## 2019-04-03 RX ADMIN — FERROUS SULFATE TAB 325 MG (65 MG ELEMENTAL FE) 325 MG: 325 (65 FE) TAB at 07:23

## 2019-04-03 RX ADMIN — PIPERACILLIN SODIUM,TAZOBACTAM SODIUM 3.38 G: 3; .375 INJECTION, POWDER, FOR SOLUTION INTRAVENOUS at 19:56

## 2019-04-03 RX ADMIN — CYANOCOBALAMIN TAB 500 MCG 1000 MCG: 500 TAB at 09:05

## 2019-04-03 RX ADMIN — DIPHENOXYLATE HYDROCHLORIDE AND ATROPINE SULFATE 1 TABLET: 2.5; .025 TABLET ORAL at 16:19

## 2019-04-03 RX ADMIN — Medication 10 ML: at 11:31

## 2019-04-03 RX ADMIN — PIPERACILLIN SODIUM,TAZOBACTAM SODIUM 3.38 G: 3; .375 INJECTION, POWDER, FOR SOLUTION INTRAVENOUS at 04:40

## 2019-04-03 RX ADMIN — ENOXAPARIN SODIUM 40 MG: 100 INJECTION SUBCUTANEOUS at 16:00

## 2019-04-03 RX ADMIN — Medication 10 ML: at 16:00

## 2019-04-03 RX ADMIN — PIPERACILLIN SODIUM,TAZOBACTAM SODIUM 3.38 G: 3; .375 INJECTION, POWDER, FOR SOLUTION INTRAVENOUS at 12:21

## 2019-04-03 RX ADMIN — IOPAMIDOL 100 ML: 755 INJECTION, SOLUTION INTRAVENOUS at 11:31

## 2019-04-03 RX ADMIN — Medication 1 CAPSULE: at 09:05

## 2019-04-03 RX ADMIN — IOHEXOL 50 ML: 240 INJECTION, SOLUTION INTRATHECAL; INTRAVASCULAR; INTRAVENOUS; ORAL at 11:31

## 2019-04-03 RX ADMIN — SODIUM CHLORIDE 100 ML: 900 INJECTION, SOLUTION INTRAVENOUS at 11:31

## 2019-04-03 NOTE — PROGRESS NOTES
Bedside shift change report given to Vero (oncoming nurse) by Rick Langford (offgoing nurse). Report included the following information SBAR, Kardex, Intake/Output and MAR.

## 2019-04-03 NOTE — PROGRESS NOTES
Bedside shift change report given to [facility], RN (oncoming nurse) by Rosanna Almeida RN (offgoing nurse). Report included the following information SBAR, Kardex, Procedure Summary, Intake/Output and Recent Results.

## 2019-04-03 NOTE — PROGRESS NOTES
Bedside and Verbal shift change report given to [address] (oncoming nurse) by Kerry Camacho (offgoing nurse). Report included the following information SBAR.

## 2019-04-03 NOTE — PROGRESS NOTES
Spiritual Care Assessment/Progress Note [address] 
 
 
NAME: Silvino Ramires      MRN: [id number] AGE: 81 y.o. SEX: female Shinto Affiliation: [facility]  
Language: [place] 4/3/2019     Total Time (in minutes): 16 Spiritual Assessment begun in [facility] [phone] through conversation with: 
  
    [x]Patient        [] Family    [] Friend(s) Reason for Consult: Initial/Spiritual assessment, patient floor Spiritual beliefs: (Please include comment if needed) [x] Identifies with a fidel tradition: Oriental orthodox    
   [] Supported by a fidel community:        
   [] Claims no spiritual orientation:       
   [] Seeking spiritual identity:            
   [] Adheres to an individual form of spirituality:       
   [] Not able to assess:                   
 
    
Identified resources for coping:  
   [] Prayer                           
   [] Music                  [] Guided Imagery [x] Family/friends                 [] Pet visits [] Devotional reading                         [] Unknown 
   [] Other:                                         
 
 
Interventions offered during this visit: (See comments for more details) Patient Interventions: Affirmation of emotions/emotional suffering, Affirmation of fidel, Coping skills reviewed/reinforced, Iconic (affirming the presence of God/Higher Power) Plan of Care: 
 
 [] Support spiritual and/or cultural needs  
 [] Support AMD and/or advance care planning process    
 [] Support grieving process 
 [] Coordinate Rites and/or Rituals  
 [] Coordination with community clergy [] No spiritual needs identified at this time 
 [] Detailed Plan of Care below (See Comments)  [] Make referral to Music Therapy 
[] Make referral to Pet Therapy    
[] Make referral to Addiction services 
[] Make referral to [facility] 
[] Make referral to Spiritual Care Partner 
[] No future visits requested       
[x] Follow up visits as needed Comments:  visit for initial spiritual assessment. Patient sitting up in chair at bedside reading. Good eye contact, smiling, polite. Says she is feeling better. Provided spiritual presence and listening as she spoke about her present thoughts, feelings, and concerns. Spoke about her recent health and the events leading to this hospitalization. Lives alone and has a son who lives close by and provides care. Has good friends who also provide care and support. Described active fidel and trust in God. Says she hopes to be discharged home with home health in the next day or so. Appeared comforted and encouraged as a result of this visit and expressed gratitude for this visit. Visited by Rev. Patricia Moore, [address] [employer] [employer]  paging service: [phone])

## 2019-04-03 NOTE — PROGRESS NOTES
4/3/19; 10:30 -  
CM participated in IDRs on patient. Patient has been advanced to a regular diet and is tolerating it well. Patient's PO intake needs to increase though. Plan includes an interval CT today, 4/3. CRM: Henrry Sharma MPH, [facility]; Z: [phone] 
 
11:45 -  
CM sent update to [facility] via [employer] and rounded on patient with Zeny Haley. Patient's HH orders include wound care and drain management. [facility] Friday, [facility]/5. CRM: Henrry Sharma MPH, [facility]; Z: [phone] 
 
14:15 -  
CM rounded on patient with attending. CT results are not back yet. CRM: Henrry Sharma MPH, [facility]; Z: [phone] 
 
14:45 -  
CM received call from [address] [place] [address] [address] [place]: [phone]), and CM provided clinical update, including discharge disposition plan. CRM: Henrry Sharma, MPH,  [facility]; Z: [phone]

## 2019-04-04 VITALS
BODY MASS INDEX: 24.58 KG/M2 | SYSTOLIC BLOOD PRESSURE: 123 MMHG | HEIGHT: 67 IN | OXYGEN SATURATION: 96 % | HEART RATE: 83 BPM | WEIGHT: 156.6 LBS | RESPIRATION RATE: 14 BRPM | DIASTOLIC BLOOD PRESSURE: 58 MMHG | TEMPERATURE: 98.1 F

## 2019-04-04 LAB
BASOPHILS # BLD: 0.1 K/UL (ref 0–0.1)
BASOPHILS NFR BLD: 1 % (ref 0–1)
COMMENT, HOLDF: NORMAL
DIFFERENTIAL METHOD BLD: ABNORMAL
EOSINOPHIL # BLD: 0.5 K/UL (ref 0–0.4)
EOSINOPHIL NFR BLD: 6 % (ref 0–7)
ERYTHROCYTE [DISTWIDTH] IN BLOOD BY AUTOMATED COUNT: 16.2 % (ref 11.5–14.5)
HCT VFR BLD AUTO: 32.8 % (ref 35–47)
HGB BLD-MCNC: 10 G/DL (ref 11.5–16)
IMM GRANULOCYTES # BLD AUTO: 0 K/UL (ref 0–0.04)
IMM GRANULOCYTES NFR BLD AUTO: 1 % (ref 0–0.5)
LYMPHOCYTES # BLD: 2.2 K/UL (ref 0.8–3.5)
LYMPHOCYTES NFR BLD: 25 % (ref 12–49)
MCH RBC QN AUTO: 29.9 PG (ref 26–34)
MCHC RBC AUTO-ENTMCNC: 30.5 G/DL (ref 30–36.5)
MCV RBC AUTO: 97.9 FL (ref 80–99)
MONOCYTES # BLD: 1.1 K/UL (ref 0–1)
MONOCYTES NFR BLD: 12 % (ref 5–13)
NEUTS SEG # BLD: 4.8 K/UL (ref 1.8–8)
NEUTS SEG NFR BLD: 55 % (ref 32–75)
NRBC # BLD: 0 K/UL (ref 0–0.01)
NRBC BLD-RTO: 0 PER 100 WBC
PLATELET # BLD AUTO: 468 K/UL (ref 150–400)
PMV BLD AUTO: 8.8 FL (ref 8.9–12.9)
RBC # BLD AUTO: 3.35 M/UL (ref 3.8–5.2)
SAMPLES BEING HELD,HOLD: NORMAL
WBC # BLD AUTO: 8.8 K/UL (ref 3.6–11)

## 2019-04-04 PROCEDURE — 74011250636 HC RX REV CODE- 250/636: Performed by: SURGERY

## 2019-04-04 PROCEDURE — 85025 COMPLETE CBC W/AUTO DIFF WBC: CPT

## 2019-04-04 PROCEDURE — 36415 COLL VENOUS BLD VENIPUNCTURE: CPT

## 2019-04-04 PROCEDURE — 74011250637 HC RX REV CODE- 250/637: Performed by: SURGERY

## 2019-04-04 PROCEDURE — 74011000258 HC RX REV CODE- 258: Performed by: SURGERY

## 2019-04-04 PROCEDURE — 74011250637 HC RX REV CODE- 250/637: Performed by: INTERNAL MEDICINE

## 2019-04-04 RX ADMIN — FERROUS SULFATE TAB 325 MG (65 MG ELEMENTAL FE) 325 MG: 325 (65 FE) TAB at 07:06

## 2019-04-04 RX ADMIN — Medication 1 CAPSULE: at 09:06

## 2019-04-04 RX ADMIN — PIPERACILLIN SODIUM,TAZOBACTAM SODIUM 3.38 G: 3; .375 INJECTION, POWDER, FOR SOLUTION INTRAVENOUS at 04:27

## 2019-04-04 RX ADMIN — CYANOCOBALAMIN TAB 500 MCG 1000 MCG: 500 TAB at 09:06

## 2019-04-04 RX ADMIN — DIPHENOXYLATE HYDROCHLORIDE AND ATROPINE SULFATE 1 TABLET: 2.5; .025 TABLET ORAL at 09:06

## 2019-04-04 NOTE — PROGRESS NOTES
Patient discharged home with home health. Pt discharged with DEEPAK drain. Peripheral IV discontinued. Pt off unit via wheelchair. Pt's son to transport pt home.

## 2019-04-04 NOTE — PROGRESS NOTES
Surgery Progress Note Admit Date: 3/20/2019 Subjective:  
 
 Pt complains of wanting to go home, would like the drain removed but understands its importance   Pts pain present - adequately treated. No SOB. No CP. Pt is ambulating. Patient 's current diet is Regular. . Pt reports  no nausea and no vomiting. Pt reports no fever or chills Bowel Movements: Normal and Flatus Objective:  
 
Patient Vitals for the past 8 hrs: 
 BP Temp Pulse Resp SpO2 Weight 04/04/19 0808 123/58 98.1 °F (36.7 °C) 83 14 96 %   
04/04/19 0436      156 lb 9.6 oz (71 kg) No intake/output data recorded. 04/02 1901 - 04/04 0700 In: 800 [P.O.:200; I.V.:600] Out: 795 [Urine:750; Drains:45]ip Physical Exam: 
 
General: alert, cooperative, no distress Cardiac: normal S1 and S2 Lungs: Normal chest wall and respirations. Clear to auscultation. Abdomen: soft, protuberant, nondistended, nontender, without guarding, without rebound, DEEPAK is ss Wounds:clean, dry, no drainage Neuro: alert, oriented x 3, no defects noted in general exam. 
Extremities: no edema CBC:  
Lab Results Component Value Date/Time WBC 8.8 04/04/2019 04:40 AM  
 RBC 3.35 (L) 04/04/2019 04:40 AM  
 HGB 10.0 (L) 04/04/2019 04:40 AM  
 HCT 32.8 (L) 04/04/2019 04:40 AM  
 PLATELET 195 (H) 54/61/7380 04:40 AM  
 
BMP:  
Lab Results Component Value Date/Time Glucose 101 (H) 04/03/2019 10:52 AM  
 Sodium 138 04/03/2019 10:52 AM  
 Potassium 3.1 (L) 04/03/2019 10:52 AM  
 Chloride 102 04/03/2019 10:52 AM  
 CO2 30 04/03/2019 10:52 AM  
 BUN 7 04/03/2019 10:52 AM  
 Creatinine 0.71 04/03/2019 10:52 AM  
 Calcium 9.2 04/03/2019 10:52 AM  
 
CMP: 
Lab Results Component Value Date/Time  Glucose 101 (H) 04/03/2019 10:52 AM  
 Sodium 138 04/03/2019 10:52 AM  
 Potassium 3.1 (L) 04/03/2019 10:52 AM  
 Chloride 102 04/03/2019 10:52 AM  
 CO2 30 04/03/2019 10:52 AM  
 BUN 7 04/03/2019 10:52 AM  
 Creatinine 0.71 04/03/2019 10:52 AM  
 Calcium 9.2 04/03/2019 10:52 AM  
 Anion gap 6 04/03/2019 10:52 AM  
 BUN/Creatinine ratio 10 (L) 04/03/2019 10:52 AM  
 Alk. phosphatase 58 03/21/2019 02:20 AM  
 Protein, total 6.2 (L) 03/21/2019 02:20 AM  
 Albumin 2.2 (L) 03/21/2019 02:20 AM  
 Globulin 4.0 03/21/2019 02:20 AM  
 A-G Ratio 0.6 (L) 03/21/2019 02:20 AM  
 
 
Radiology review: CT 
  
FINDINGS:  
LUNG BASES: There is atelectasis right base INCIDENTALLY IMAGED HEART AND MEDIASTINUM: Unremarkable. LIVER: There are multiple round lesions within the liver. The largest measures 
fluid density compatible with cysts GALLBLADDER: Unremarkable. SPLEEN: No mass. PANCREAS: No mass or ductal dilatation. ADRENALS: Unremarkable. KIDNEYS: No mass, calculus, or hydronephrosis. STOMACH: Unremarkable. SMALL BOWEL: Loops of ileum remains thickened and inflamed. In the right pelvis 
there is a recurrent rim-enhancing abscess measuring 2.5 x 1.0 x 2.2 cm best 
seen on series 3-68 and coronal image 58. Given the surrounding loops of bowel 
this is not amenable to percutaneous drainage. COLON: There is diverticulosis of the sigmoid colon with asymmetric wall 
thickening of the rectum there is a possible fistulous tract to the left of the 
rectum best seen on axial image 3-75 and coronal image 76. APPENDIX: Not identified PERITONEUM: There is a surgically placed drain in the right abdomen. The tip 
terminates in the right pelvis. There is no significant free fluid. RETROPERITONEUM: There are vascular calcifications. No pathologic adenopathy. There is stranding in the mesentery in the right lower quadrant and in the 
mesorectal fat REPRODUCTIVE ORGANS: There is a small intrauterine device URINARY BLADDER: Small amount of gas within the bladder. Question previous 
catheterization BONES: No destructive bone lesion. ADDITIONAL COMMENTS: Small amount of gas within the right abdominal wall is likely postprocedural. There is stranding in the right inguinal region at site 
of previous abscess. No recurrent drainable fluid collection in the right groin 
  
IMPRESSION IMPRESSION: 
  
1. Small recurrent abscess in loops of the distal ileum. Given the surrounding 
bowel loops this is not amenable to percutaneous drainage 2. Asymmetric thickening of the mid rectal wall with possible sinus tract in the 
mesial rectal fat. There is no perirectal abscess. Assessment:  
Pt is POD #8 s/p Procedure(s): DIAGNOSTIC LAPAROSCOPY WITH LYSIS OF ADHESIONS AND DRAINAGE OF PELVIC ABSCESS Plan:  
Diet: regular diet Activity: walk in [place] Pain management GI and DVT prophylaxis Drains Home with DEEPAK drain Meds: transition to oral abx Labs: WBC WNL Antibiotics: transition to Augmentin Ok for DC from a surgical standpoint--Dr. Herman Bales has reviewed the CT findings and cleared her to go , will need [facility] for DEEPAK care and wound care to RLQ groin wound, I made her a f/u appointment with Jim Cota NP for DEEPAK removal for Tuesday 4/9 at 10 AM 
Further plan per Dr. Herman Eaton PA-C

## 2019-04-04 NOTE — DISCHARGE INSTRUCTIONS
PATIENT DISCHARGE INSTRUCTIONS      Wound care:  Change dressing to right groin daily    Your surgeon is sending you home with a drain in place after your procedure. You are to empty the drain 3 times  per day and record the date, time and amount. Please bring this information with you to your follow-up appointment  Call our office immediately if the drain output changes quality, color or has a bad smell. [id number] [facility] Surgery at [address], [facility].  [phone]    Future Appointments   Date Time Provider Krysten Jauregui   2019 10:00 AM Regla Hernandez NP [facility]             PATIENT DISCHARGE INSTRUCTIONS    Leticia Grier / [id number] : 1935    Admitted 3/20/2019 Discharged: 3/29/2019       · It is important that you take the medication exactly as they are prescribed. · Keep your medication in the bottles provided by the pharmacist and keep a list of the medication names, dosages, and times to be taken in your wallet. · Do not take other medications without consulting your doctor. What to do at Home    Recommended Diet: Regular Diet    Recommended Activity: No heavy lifting, pushing, pulling x 1 week; wound care through home nursing    If you experience any of the following symptoms (fever, chills, worsening abdominal pain), please follow up with General Surgery. Call [phone] to schedule Surgery follow-up appointment for 2 week after hospital discharge.       Signed By: Kay Bailey MD     2019

## 2019-04-04 NOTE — PROGRESS NOTES
4/4/19; 10:30 -  
CM participated in IDRs on patient. Patient may discharge today, 4/4/, with [facility] for drain management and wound care. Patient has [facility] set up through [employer]. Agency is already aware of patient's plan and is anticipating seeing patient tomorrow, 4/5, for MYRA. Information is on patient's AVS. CRM: Elizabet Sky, MPH, [address] [place] [address]; Z: [phone]

## 2019-04-04 NOTE — PROGRESS NOTES
Bedside shift change report given to MAYI Wallace (oncoming nurse) by Love Ashley, Student Nurse (offgoing nurse). Report included the following information SBAR, Kardex, Intake/Output and MAR.

## 2019-04-04 NOTE — PROGRESS NOTES
Bedside shift change report given to Corinne (oncoming nurse) by Greenpie (offgoing nurse). Report included the following information SBAR.

## 2019-04-04 NOTE — PROGRESS NOTES
Hospitalist Progress Note Shawn Galvan MD 
Answering service: [phone] OR [phone] from in house phone Cell: [phone] Date of Service:  2019 NAME:  Cesar Canada :  1935 MRN:  [id number] Admission Summary:  
81-year-old female with  a complicated medical course, apparently presented to [facility] and then was transferred to [facility] with 10-day history of increasing right lower quadrant pain, urinary urgency, rigors and passage of fecal material either in her urine or from her vagina, on 2019. The patient also had some constipation associated with diarrhea, but no bloody stools associated with the same. The patient was found to have a pelvic abscess, and they were concerned that there may be a fistula present. The patient underwent a CT scan which showed a pelvic abscess. A CT-guided drainage of the abscess had produced 400 mL of purulent fluid. They placed a drain which continued to produce 925 mL of purulent fluid. Her drain then fell out the next day. A repeat CT scan was then performed that showed a large pelvic abscess. She underwent CT-guided drainage of the abscess that produced another 370 mL of purulent fluid. Her drain continued to produce purulent fluid throughout the hospital course, and the patient was eventually discharged with a drain in place. The patient was started on IV Zosyn, continued to have IV Zosyn throughout her hospital course and then eventually was switched to ciprofloxacin orally. The patient was also found to have IUD that has been placed about 50 years back, and she was supposed to follow up for outpatient followup and possible removal.  GYN had consulted on her while she was in the hospital and they felt that the patient does not need the IUD removed at that point of time.   The patient reports that she was discharged with home healthcare, and today, the healthcare nurse came and noted that the patient had an abscess on her right groin and that started draining. The patient reports that she also had decreased appetite, not eating or drinking well, and had some nausea associated with diarrhea. The patient reports that she got concerned and decided to come to the hospital.  The patient reports that she also had some shortness of breath while she was walking in the parking lot today, reports that she has finished her antibiotics. 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 The patient reports that she had the abscess drained at [facility] again on 03/13/2019 and was prescribed doxycycline. The patient presented to the ER  and was found to have a groin abscess as well as a pelvic abscess. Dr. Serenity Wagner from Surgery was consulted and requested the patient to be admitted to the hospitalist service. Interval history / Subjective:  
  CC: Pelvic Abscess. Patient sitting up comfortably on am rounds. Denied any fevers or chills. Assessment & Plan:  
 
Recurrent posterior pelvic abscess 
-CT abd 3/20 2.1 x 5.8 x 3.4 cm posterior pelvic collection suspicious for abscess, possibly secondary to diverticulitis -s/p CT guided drainage 3/22, culture shows S mitis, alpha strep, rare candida 
-On Vanc/zosyn. 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 
-s/p Colonoscopy 3/26/19. Outpatient  GI follow up. -GI and surgical F/Us noted and appreciated. -Continue zosyn 
-GI lite as tolerated Labs in am 
-Hopefully home in the next 2-3 days 
  
 
Right groin abscess 
-CT abd 3/20 Soft tissue collection in the deep soft tissues of the right groin measuring 2.3 x 4.6 x 3.5 cm suspicious for abscess. -S/p I&D -F/U CT ABD/Pelvis 3/29/19 with almost complete resolution of right groin fluid and no new collections. 
-Culture shows coagulase negative staph. -Resolving leukocytosis. -Sub-optimal growth of Candida albicans from urine. Avoid Davalos catheters. 
-Continue Zosyn. Will hold off on Antifungals for now. -CT  Of Abdomen dated 4/3 showed Small recurrent abscess in loops of the distal ileum. Given the surrounding bowel loops this is not amenable to percutaneous drainage Asymmetric thickening of the mid rectal wall with possible sinus tract in the 
mesial rectal fat. There is no perirectal abscess  Awaiting for surgical team imput Mild dehydration 
-resolved Anemia 
-Vit b12 deficiency. Supplementation. 
-Outpatient follow up Hypokalemia 
-repletion with F/U Shortness of breath 
-resolved. Encourage incentive spirometry Regular diet Prophylaxis: DVT. Directives: Partial Code with a guarded prognosis. D/W patient. Plan: Prolonged hospital stay due to the decompensation in the medical problems. Anticipate D/C to home with probable HomeHealth arrangements in the next few days. D/W patient. Hospital Problems  Date Reviewed: 3/28/2019 Codes Class Noted POA Abscess of abdominal cavity ([facility] ICD-10-CM: K65.1 ICD-9-CM: 723.18  3/28/2019 Yes * (Principal) Abdominal pain ICD-10-CM: R10.9 ICD-9-CM: 789.00  3/20/2019 Yes Review of Systems: A comprehensive review of systems was negative except for that written in the HPI. Vital Signs:  
 Last 24hrs VS reviewed since prior progress note. Most recent are: 
Visit Vitals BP 98/63 Pulse 99 Temp 98.6 °F (37 °C) Resp 16 Ht 5' 7\" (1.702 m) Wt 71.4 kg (157 lb 6.4 oz) SpO2 94% BMI 24.65 kg/m² Intake/Output Summary (Last 24 hours) at 4/4/2019 0023 Last data filed at 4/3/2019 2000 Gross per 24 hour Intake 800 ml Output 785 ml Net 15 ml Physical Examination: Constitutional:  No acute distress, cooperative, pleasant   
ENT:  Oral mucous moist, oropharynx benign. Neck supple, Resp:  CTA bilaterally. No wheezing/rhonchi/rales. No accessory muscle use CV:  Regular rhythm, normal rate, no murmurs, gallops, rubs GI:  drain in place with output, Soft, non distended, non tender. normoactive bowel sounds, no hepatosplenomegaly Musculoskeletal:  No edema, warm, 2+ pulses throughout Neurologic: Awake, alert and oriented. Skin: Right groin wound Data Review:  
 Review and/or order of clinical lab test 
 
 
Labs:  
 
Recent Labs 04/03/19 
4203 04/02/19 
7202 WBC 7.9 7.5 HGB 10.0* 8.8* HCT 31.8* 28.2*  
* 406* Recent Labs 04/03/19 
1052 04/01/19 
5604  139  
K 3.1* 3.1*  
 106 CO2 30 25 BUN 7 8 CREA 0.71 0.66 * 97  
CA 9.2 8.7 No results for input(s): SGOT, GPT, ALT, AP, TBIL, TBILI, TP, ALB, GLOB, GGT, AML, LPSE in the last 72 hours. No lab exists for component: AMYP, HLPSE No results for input(s): INR, PTP, APTT in the last 72 hours. No lab exists for component: INREXT, INREXT No results for input(s): FE, TIBC, PSAT, FERR in the last 72 hours. Lab Results Component Value Date/Time Folate 14.1 03/23/2019 02:17 AM  
  
No results for input(s): PH, PCO2, PO2 in the last 72 hours. No results for input(s): CPK, CKNDX, TROIQ in the last 72 hours. No lab exists for component: CPKMB No results found for: CHOL, CHOLX, CHLST, CHOLV, HDL, LDL, LDLC, DLDLP, TGLX, TRIGL, TRIGP, CHHD, CHHDX No results found for: Slava Pedroza Lab Results Component Value Date/Time  Color YELLOW/STRAW 03/25/2019 05:30 PM  
 Appearance CLEAR 03/25/2019 05:30 PM  
 Specific gravity 1.019 03/25/2019 05:30 PM  
 pH (UA) 5.5 03/25/2019 05:30 PM  
 Protein NEGATIVE  03/25/2019 05:30 PM  
 Glucose NEGATIVE  03/25/2019 05:30 PM  
 Ketone NEGATIVE  03/25/2019 05:30 PM  
 Bilirubin NEGATIVE  03/25/2019 05:30 PM  
 Urobilinogen 0.2 03/25/2019 05:30 PM  
 Nitrites NEGATIVE  03/25/2019 05:30 PM  
 Leukocyte Esterase SMALL (A) 03/25/2019 05:30 PM  
 Epithelial cells MODERATE (A) 03/25/2019 05:30 PM  
 Bacteria NEGATIVE  03/25/2019 05:30 PM  
 WBC 5-10 03/25/2019 05:30 PM  
 RBC 0-5 03/25/2019 05:30 PM  
 
 
 
Medications Reviewed:  
 
Current Facility-Administered Medications Medication Dose Route Frequency  diphenhydrAMINE (BENADRYL) capsule 25 mg  25 mg Oral QHS PRN  
 diphenoxylate-atropine (LOMOTIL) tablet 1 Tab  1 Tab Oral QID PRN  
 enoxaparin (LOVENOX) injection 40 mg  40 mg SubCUTAneous Q24H  piperacillin-tazobactam (ZOSYN) 3.375 g in 0.9% sodium chloride (MBP/ADV) 100 mL  3.375 g IntraVENous Q8H  
 sodium chloride (NS) flush 5-40 mL  5-40 mL IntraVENous Q8H  
 sodium chloride (NS) flush 5-40 mL  5-40 mL IntraVENous PRN  
 oxyCODONE IR (ROXICODONE) tablet 5 mg  5 mg Oral Q4H PRN  
 morphine injection 2 mg  2 mg IntraVENous Q4H PRN  
 sodium chloride (NS) flush 5-40 mL  5-40 mL IntraVENous Q8H  
 sodium chloride (NS) flush 5-40 mL  5-40 mL IntraVENous PRN  
 lactobac ac& pc-s.therm-b.anim (JOSE Q/RISAQUAD)  1 Cap Oral DAILY  cyanocobalamin (VITAMIN B12) tablet 1,000 mcg  1,000 mcg Oral DAILY  ferrous sulfate tablet 325 mg  1 Tab Oral DAILY WITH BREAKFAST  sodium chloride (NS) flush 5-40 mL  5-40 mL IntraVENous Q8H  
 sodium chloride (NS) flush 5-40 mL  5-40 mL IntraVENous PRN  
 acetaminophen (TYLENOL) tablet 650 mg  650 mg Oral Q4H PRN  
 
______________________________________________________________________ EXPECTED LENGTH OF STAY: 5d 16h ACTUAL LENGTH OF STAY:          15 Zoë Alaniz MD

## 2019-04-04 NOTE — PROGRESS NOTES
Discharge instructions and medications have been reviewed using teaching/reback method. IV access has been removed.

## 2019-04-06 NOTE — DISCHARGE SUMMARY
Discharge Summary     PATIENT ID: Nirav Ramon  MRN: [id number]   YOB: 1935    DATE OF ADMISSION: 3/20/2019  1:04 PM    DATE OF DISCHARGE: 4/6/2019  PRIMARY CARE PROVIDER: Rula Kee leelee General Surgeon       DISCHARGING PHYSICIAN: Sebastien Darby MD    To contact this individual call [phone]. CONSULTATIONS: IP CONSULT TO GENERAL SURGERY  IP CONSULT TO GASTROENTEROLOGY  IP CONSULT TO CARDIOLOGY    PROCEDURES/SURGERIES: Procedure(s):  DIAGNOSTIC LAPAROSCOPY WITH LYSIS OF ADHESIONS AND DRAINAGE OF PELVIC ABSCESS    ADMITTING [facility] COURSE:   71-year-old female with  a complicated medical course, apparently presented to [facility] and then was transferred to [facility] with 10-day history of increasing right lower quadrant pain, urinary urgency, rigors and passage of fecal material either in her urine or from her vagina, on 02/22/2019. The patient also had some constipation associated with diarrhea, but no bloody stools associated with the same. The patient was found to have a pelvic abscess, and they were concerned that there may be a fistula present.  The patient underwent a CT scan which showed a pelvic abscess.  A CT-guided drainage of the abscess had produced 400 mL of purulent fluid.  They placed a drain which continued to produce 925 mL of purulent fluid.  Her drain then fell out the next day.  A repeat CT scan was then performed that showed a large pelvic abscess.  She underwent CT-guided drainage of the abscess that produced another 370 mL of purulent fluid.  Her drain continued to produce purulent fluid throughout the hospital course, and the patient was eventually discharged with a drain in place.  The patient was started on IV Zosyn, continued to have IV Zosyn throughout her hospital course and then eventually was switched to ciprofloxacin orally.  The patient was also found to have IUD that has been placed about 50 years back, and she was supposed to follow up for outpatient followup and possible removal. Krishna Finn had consulted on her while she was in the hospital and they felt that the patient does not need the IUD removed at that point of time.  The patient reports that she was discharged with home healthcare, and today, the healthcare nurse came and noted that the patient had an abscess on her right groin and that started draining.  The patient reports that she also had decreased appetite, not eating or drinking well, and had some nausea associated with diarrhea.  The patient reports that she got concerned and decided to come to the hospital.  The patient reports that she also had some shortness of breath while she was walking in the parking lot today, reports that she has finished her antibiotics.  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  The patient reports that she had the abscess drained at [facility] again on 03/13/2019 and was prescribed doxycycline. The patient presented to the ER  and was found to have a groin abscess as well as a pelvic abscess.  Dr. Margi Lopez from Surgery was consulted and requested the patient to be admitted to the hospitalist service. Recurrent posterior pelvic abscess  -CT abd 3/20 2.1 x 5.8 x 3.4 cm posterior pelvic collection suspicious for abscess, possibly secondary to diverticulitis  -s/p CT guided drainage 3/22, culture shows S mitis, alpha strep, rare candida  -On Vanc/zosyn. 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 Outpatient  GI follow up. -GI and surgical F/Us noted and appreciated. - Started on zosyn  -GI lite as tolerated  -CT  Of Abdomen dated 4/3 showed Small recurrent abscess in loops of the distal ileum.  Given the surrounding bowel loops this is not amenable to percutaneous drainage Asymmetric thickening of the mid rectal wall with possible sinus tract in the mesial rectal fat. There is no perirectal abscess    -  Consulted by surgical team ,ZENOBIA from a surgical standpoint as per note dated 4/6 -Dr. Herman Bales has reviewed the CT findings and cleared her to go - DEEPAK care and wound care to RLQ groin wound  Trained and home nurse visit arranged by    - f/u appointment with Wil Arita NP for DEEPAK removal for Tuesday 4/9 at 10 AM  Further plan per Dr. Herman Bales   --transition to Augmentin        Right groin abscess  -CT abd 3/20 Soft tissue collection in the deep soft tissues of the right groin measuring 2.3 x 4.6 x 3.5 cm suspicious for abscess. -S/p I&D  -F/U CT ABD/Pelvis 3/29/19 with almost complete resolution of right groin fluid and no new collections.  -Culture shows coagulase negative staph. -Resolving leukocytosis. -Sub-optimal growth of Candida albicans from urine. Avoid Davalos catheters. -r         DISCHARGE DIAGNOSES / PLAN:      1. Recurrent posterior pelvic abscess        2. Right groin abscess           No results found for this or any previous visit (from the past 24 hour(s)). PENDING TEST RESULTS:   At the time of discharge the following test results are still pending: none    FOLLOW UP APPOINTMENTS:    Follow-up Information     Follow up With Specialties Details Why [address] [facility] In 1 day [facility] that will follow you at home [address] [place] [address] Or [facility] Surgeon               ADDITIONAL CARE RECOMMENDATIONS: none    DIET: Cardiac Diet    ACTIVITY: Activity as tolerated            DISCHARGE MEDICATIONS:  Discharge Medication List as of 4/4/2019  1:01 PM      START taking these medications    Details   L. acidoph & paracasei- S therm- Bifido (JOSE-Q/RISAQUAD) 8 billion cell cap cap Take 1 Cap by mouth daily. , Print, Disp-30 Cap, R-0 oxyCODONE IR (ROXICODONE) 5 mg immediate release tablet Take 1 Tab by mouth every four (4) hours as needed for Pain for up to 3 days. Max Daily Amount: 30 mg., Print, Disp-20 Tab, R-0      amoxicillin-clavulanate (AUGMENTIN) 875-125 mg per tablet Take 1 Tab by mouth two (2) times a day for 7 days. , Print, Disp-14 Tab, R-0         CONTINUE these medications which have NOT CHANGED    Details   aspirin 81 mg chewable tablet Take 81 mg by mouth daily. , Historical Med         STOP taking these medications       doxycycline (MONODOX) 100 mg capsule Comments:   Reason for Stopping:                 DISPOSITION:    Home With:   OT  PT  HH  RN       Long term SNF/Inpatient Rehab    Independent/assisted living    Hospice    Other:       PATIENT CONDITION AT DISCHARGE:     Functional status    Poor     Deconditioned     Independent      Cognition     Lucid     Forgetful     Dementia          Code status     Full code     DNR      PHYSICAL EXAMINATION AT DISCHARGE  Visit Vitals  /58 (BP 1 Location: Left arm, BP Patient Position: Sitting)   Pulse 83   Temp 98.1 °F (36.7 °C)   Resp 14   Ht 5' 7\" (1.702 m)   Wt 71 kg (156 lb 9.6 oz)   SpO2 96%   BMI 24.53 kg/m²      No data recorded. O2 Flow Rate (L/min): 2 l/min   O2 Device: Room air  No data found.   No intake or output data in the 24 hours ending 04/06/19 1926  Last shift:    04/06 1901 - 04/07 0700  In: 80 [P.O.:480; I.V.:100]  Out: 260 [Urine:250; Drains:10]  Last 3 shifts:    04/05 0701 - 04/06 1900  In: 80 [P.O.:480; I.V.:100]  Out: 260 [Urine:250; Drains:10]    General:   Alert, cooperative, no acute distress   Head:   Atraumatic   Eyes:   Conjunctivae clear   ENT:  Oral mucosa normal   Neck:  Supple, trachea midline, no adenopathy   No JVD   Back:    No CVA tenderness    Chest wall:    No tenderness or deformities    Lungs:   bilateral wheezing     Heart:   Regular rhythm, no murmur   Abdomen:    Soft, non-tender   No masses or organomegaly    Extremities: No edema or DVT signs   Pulses:  Symmetric all extremities   Skin:  Warm and dry    No rashes or lesions   Neurologic:  Oriented   No focal deficits           CHRONIC MEDICAL DIAGNOSES:  Problem List as of 4/4/2019 Date Reviewed: 3/28/2019          Codes Class Noted - Resolved    Abscess of abdominal cavity ([facility] ICD-10-CM: K65.1  ICD-9-CM: 567.22  3/28/2019 - Present        * (Principal) Abdominal pain ICD-10-CM: R10.9  ICD-9-CM: 789.00  3/20/2019 - Present              Greater than  45 minutes were spent with the patient on counseling and coordination of care    Signed:   Krunal Sarmiento MD  4/6/2019  7:26 PM   .

## 2019-04-09 ENCOUNTER — OFFICE VISIT (OUTPATIENT)
Dept: SURGERY | Age: 84
End: 2019-04-09

## 2019-04-09 ENCOUNTER — TELEPHONE (OUTPATIENT)
Dept: SURGERY | Age: 84
End: 2019-04-09

## 2019-04-09 VITALS
SYSTOLIC BLOOD PRESSURE: 129 MMHG | OXYGEN SATURATION: 96 % | TEMPERATURE: 98.5 F | HEIGHT: 67 IN | DIASTOLIC BLOOD PRESSURE: 61 MMHG | RESPIRATION RATE: 18 BRPM | WEIGHT: 160.5 LBS | HEART RATE: 110 BPM | BODY MASS INDEX: 25.19 KG/M2

## 2019-04-09 DIAGNOSIS — Z09 FOLLOW-UP EXAMINATION AFTER ABDOMINAL SURGERY: Primary | ICD-10-CM

## 2019-04-09 NOTE — TELEPHONE ENCOUNTER
LM with Bry Torres for pt to shower   Can have [facility] every other day and teach wound care   DEEPAK drain site can just keep clean and dry   Wound pack with 1/4 \" saline moist gauze and cover with 2x2   If pt can do herself can DC home care

## 2019-04-09 NOTE — PROGRESS NOTES
1. Have you been to the ER, urgent care clinic since your last visit? Hospitalized since your last visit? No    2. Have you seen or consulted any other health care providers outside of the [address] [place] [address] since your last visit? Include any pap smears or colon screening.  No

## 2019-04-09 NOTE — PATIENT INSTRUCTIONS
OK to shower and wash as normal     You may shower with the dressings OFF. After bathing, pat the area dry and nurse can repack the wound. Add Zinc 25 - 50 mg every day to help with taste (you can get in the vitamin section at drug or grocery store)     America Bowling the antibiotics     Ok to use the chocolate shake you like vs the Ensure          Potassium-Rich Diet: Care Instructions  Your Care Instructions    Potassium is a mineral. It helps keep the right mix of fluids in your body. It also helps your nerves and muscles work as they should. You'll find it in milk and meats. It's also in all fresh foods, including fruits and vegetables. Most adults need about 5 grams of potassium a day. The foods you eat should supply all that you need. Some health conditions can cause a loss of potassium. For example, kidney problems and stomach problems with vomiting and diarrhea can cause you to lose this mineral. Some medicines, such as water pills (diuretics), can cause low potassium. If you can't get enough potassium from what you eat, your doctor may advise you to take supplements. Follow-up care is a key part of your treatment and safety. Be sure to make and go to all appointments, and call your doctor if you are having problems. It's also a good idea to know your test results and keep a list of the medicines you take. How can you care for yourself at home? · Plan your diet around foods that are rich in potassium. Fresh, unprocessed whole foods have the most. These foods include:  ? Milk and other dairy products. ? Vegetables, especially broccoli, cooked dry beans, tomatoes, potatoes, artichokes, winter squash, and spinach. ? Fruits, especially citrus fruits, bananas, and apricots. Dried apricots contain more potassium than fresh apricots. ? Meat, poultry, and fish. · Ask your doctor about using a salt substitute or \"light\" salt. These often contain potassium. Where can you learn more?   Go to http://lia-rashaun.info/. Enter H315 in the search box to learn more about \"Potassium-Rich Diet: Care Instructions. \"  Current as of: March 28, 2018  Content Version: 11.9  © 1162-5539 [employer]. Care instructions adapted under license by [employer] (which disclaims liability or warranty for this information). If you have questions about a medical condition or this instruction, always ask your healthcare professional. [address] any warranty or liability for your use of this information.

## 2019-04-09 NOTE — PROGRESS NOTES
HISTORY OF PRESENT ILLNESS  Silvino Ramires is a 81 y.o. female. HPI  Chief Complaint   Patient presents with    Post OP Follow Up     2 weeks post Laparoscopic drainage of pelvic abscess x 2 with lysis of Adhesions     Silvino Ramires is here for surgical follow up and wants the DEEPAK drain removed. Review of Systems   Constitutional: Positive for malaise/fatigue (improving). Negative for chills and fever. Generally feels better   No pain   Wants tube gone   Appetite slow to return and bad taste in mouth    Respiratory: Negative for cough and shortness of breath. Cardiovascular: Negative for chest pain, palpitations and leg swelling. Gastrointestinal: Negative for abdominal pain, blood in stool, constipation, diarrhea (BM 1-2 per day and little loose / soft ), heartburn, melena, nausea and vomiting. DEEPAK drain with < 30 ml serous drainage past 3 days   [address] has been coming every day to do wound care    Genitourinary: Negative for dysuria. Musculoskeletal: Negative for falls. Skin: Negative for itching and rash. Neurological: Negative for dizziness. Psychiatric/Behavioral:        Wants to be back to normal        Physical Exam   Constitutional: She is oriented to person, place, and time. No distress. /61 (BP 1 Location: Left arm, BP Patient Position: Sitting)   Pulse (!) 110   Temp 98.5 °F (36.9 °C) (Oral)   Resp 18   Ht 5' 7\" (1.702 m)   Wt 160 lb 8 oz (72.8 kg)   SpO2 96%   BMI 25.14 kg/m²   Appears well and younger than stated age    Cardiovascular: Regular rhythm. Tachycardia 110     Pulmonary/Chest: Effort normal and breath sounds normal. No respiratory distress. Abdominal: Soft. Bowel sounds are normal. She exhibits no distension and no mass. There is tenderness (little tender at DEEPAK site ). There is no rebound and no guarding.    DEEPAK with scant serous drainage   Small wound in suprapubic area is beefy red and moist; some firmness jordyn wound, no erythema, no odor Musculoskeletal:   Ambulating independently    Neurological: She is alert and oriented to person, place, and time. Skin: Skin is warm and dry. She is not diaphoretic. Psychiatric: She has a normal mood and affect. ASSESSMENT and PLAN  Doing well   DEEPAK drain removed and skin cleaned gently with moist gauze pad   Small wound repacked with 1/4\" nu gauze and covered with 2x2   OK to shower and may take down dressings prior to bathing   Pat dry and then cover DEEPAK drain site with 2x2; redress wound loosely pack with 1/4\" wick and cover with 2x2   Daily walking   Reviewed diet   Finish antibiotics (done 4/11/19)  [place] Flores to have home care every other day for another 7-10 days and then should be ok to discharge and she can manage any wound care needs   If she can do herself can DC home care   PRN follow up with us and will turn back over to PCP   Bryson Martinez verbalized understanding and questions were answered to the best of my knowledge and ability. Skin care  educational materials were provided.

## 2019-04-11 ENCOUNTER — TELEPHONE (OUTPATIENT)
Dept: FAMILY MEDICINE CLINIC | Age: 84
End: 2019-04-11

## 2019-04-11 NOTE — TELEPHONE ENCOUNTER
----- Message from Mia Castorena sent at 4/11/2019 10:56 AM EDT -----  Regarding: Dr. Sherri Lemons with [address] [facility] is calling for sooner np appt for hospital f/u. She stated her heart rate has been racing. Pt's contact is [phone].

## 2019-04-23 ENCOUNTER — TELEPHONE (OUTPATIENT)
Dept: FAMILY MEDICINE CLINIC | Age: 84
End: 2019-04-23

## 2019-04-23 NOTE — TELEPHONE ENCOUNTER
----- Message from Olga Saliva sent at 4/23/2019  8:32 AM EDT -----  Regarding: Dr. Mily Van([address]) requested a call from the nurse regarding pt's being d/c, and if the doctor would like blood work done or anything else before being d/c. Best contact number [phone].

## 2019-04-23 NOTE — TELEPHONE ENCOUNTER
Left message patient has never been seen at this practice and Dr Brett Jacob would not be able to give orders until patient has been seen.

## 2019-06-03 ENCOUNTER — OFFICE VISIT (OUTPATIENT)
Dept: FAMILY MEDICINE CLINIC | Age: 84
End: 2019-06-03

## 2019-06-03 VITALS
SYSTOLIC BLOOD PRESSURE: 119 MMHG | TEMPERATURE: 98 F | DIASTOLIC BLOOD PRESSURE: 62 MMHG | WEIGHT: 159.4 LBS | OXYGEN SATURATION: 97 % | RESPIRATION RATE: 16 BRPM | BODY MASS INDEX: 25.62 KG/M2 | HEART RATE: 86 BPM | HEIGHT: 66 IN

## 2019-06-03 DIAGNOSIS — Z87.898 HISTORY OF ABDOMINAL ABSCESS: ICD-10-CM

## 2019-06-03 DIAGNOSIS — D51.9 ANEMIA DUE TO VITAMIN B12 DEFICIENCY, UNSPECIFIED B12 DEFICIENCY TYPE: ICD-10-CM

## 2019-06-03 DIAGNOSIS — L65.9 HAIR THINNING: ICD-10-CM

## 2019-06-03 DIAGNOSIS — N30.00 ACUTE CYSTITIS WITHOUT HEMATURIA: ICD-10-CM

## 2019-06-03 DIAGNOSIS — Z13.6 ENCOUNTER FOR SCREENING FOR CARDIOVASCULAR DISORDERS: ICD-10-CM

## 2019-06-03 DIAGNOSIS — R53.82 CHRONIC FATIGUE: Primary | ICD-10-CM

## 2019-06-03 DIAGNOSIS — Z09 HOSPITAL DISCHARGE FOLLOW-UP: ICD-10-CM

## 2019-06-03 DIAGNOSIS — Z79.899 ENCOUNTER FOR LONG-TERM (CURRENT) USE OF MEDICATIONS: ICD-10-CM

## 2019-06-03 PROBLEM — N13.30 BILATERAL HYDRONEPHROSIS: Status: ACTIVE | Noted: 2019-02-14

## 2019-06-03 PROBLEM — N13.5 URETERAL OBSTRUCTION: Status: ACTIVE | Noted: 2019-02-15

## 2019-06-03 PROBLEM — R65.21 SEPTIC SHOCK (HCC): Status: ACTIVE | Noted: 2019-02-14

## 2019-06-03 PROBLEM — N73.9 PELVIC ABSCESS IN FEMALE: Status: ACTIVE | Noted: 2019-02-14

## 2019-06-03 PROBLEM — A41.9 SEPTIC SHOCK (HCC): Status: ACTIVE | Noted: 2019-02-14

## 2019-06-03 PROBLEM — Z97.5 IUD (INTRAUTERINE DEVICE) IN PLACE: Status: ACTIVE | Noted: 2019-02-14

## 2019-06-03 PROBLEM — N17.9 ACUTE KIDNEY INJURY (HCC): Status: ACTIVE | Noted: 2019-02-15

## 2019-06-03 LAB
BILIRUB UR QL STRIP: NEGATIVE
GLUCOSE UR-MCNC: NEGATIVE MG/DL
KETONES P FAST UR STRIP-MCNC: NEGATIVE MG/DL
PH UR STRIP: 5.5 [PH] (ref 4.6–8)
PROT UR QL STRIP: NEGATIVE
SP GR UR STRIP: 1.02 (ref 1–1.03)
UA UROBILINOGEN AMB POC: NORMAL (ref 0.2–1)
URINALYSIS CLARITY POC: NORMAL
URINALYSIS COLOR POC: YELLOW
URINE BLOOD POC: NORMAL
URINE LEUKOCYTES POC: NORMAL
URINE NITRITES POC: POSITIVE

## 2019-06-03 RX ORDER — BISMUTH SUBSALICYLATE 262 MG
1 TABLET,CHEWABLE ORAL DAILY
COMMUNITY

## 2019-06-03 RX ORDER — SULFAMETHOXAZOLE AND TRIMETHOPRIM 800; 160 MG/1; MG/1
1 TABLET ORAL 2 TIMES DAILY
Qty: 6 TAB | Refills: 0 | Status: SHIPPED | OUTPATIENT
Start: 2019-06-03 | End: 2019-06-06

## 2019-06-03 NOTE — PROGRESS NOTES
Chief Complaint   Patient presents with   [address] patient   Seen @ [facility] 2/14/19 transferred to [facility] Sepsis and Renal Failure unable to treat transferred to [facility] 2/14/2019 Abdominal Abcess Discharged after 8 days. Had 9 CT scans ,abscess resolved. 3/22/19 recurrent abscess admitted [facility] acute hospital. Seen @ [facility] 5/6/19 UTI. History of dizziness unrelated to activity off and on. Fatigue more recent.

## 2019-06-03 NOTE — PROGRESS NOTES
Subjective:     Chief Complaint   Patient presents with   [facility] patient        She  is a 81 y.o. female who presents for evaluation of:  New pt to [facility] care. 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 Had: DIAGNOSTIC LAPAROSCOPY WITH LYSIS OF ADHESIONS AND DRAINAGE OF PELVIC ABSCESS    Prior to this hospitalization she had not been seeing a doctor for many years and had no significant health issues. Sx of dizziness recently. [facility]  Treated for UTI with Augmentin. Overall symptoms have fully resolved. Dizziness unrelated to activity off and on. Fatigue more recent but she understands there is a significant component to being in the hospital for such a long time and then going to rehab after this. ROS  Gen - no fever/chills  Resp - no dyspnea or cough  CV - no chest pain or VALLES  Rest per HPI    History reviewed. No pertinent past medical history. Past Surgical History:   Procedure Laterality Date    ABDOMEN SURGERY PROC UNLISTED      Abcess drained     COLONOSCOPY Left 3/26/2019    COLONOSCOPY performed by Janis Chandler MD at [address] [place] [address]  03/27/2019    HX OTHER SURGICAL  03/27/2019    Laparoscopic drainage of pelvic abscess x 2 with lysis of adhesions    HX TONSILLECTOMY      LAP,DIAGNOSTIC ABDOMEN  03/27/2019     Current Outpatient Medications on File Prior to Visit   Medication Sig Dispense Refill    multivitamin (ONE A DAY) tablet Take 1 Tab by mouth daily.  aspirin 81 mg chewable tablet Take 81 mg by mouth daily. No current facility-administered medications on file prior to visit.          Objective:     Vitals:    06/03/19 1009   BP: 119/62   Pulse: 86   Resp: 16   Temp: 98 °F (36.7 °C)   TempSrc: Oral   SpO2: 97%   Weight: 159 lb 6.4 oz (72.3 kg)   Height: 5' 5.5\" (1.664 m)     Physical Examination:  General appearance - alert, well appearing, and in no distress  Eyes -sclera anicteric  Neck - supple, no significant adenopathy, no thyromegaly, no bruits  Chest - clear to auscultation, no wheezes, rales or rhonchi, symmetric air entry  Heart - normal rate, regular rhythm, normal S1, S2, no murmurs, rubs, clicks or gallops  Neurological - alert, oriented, no focal findings or movement disorder noted  Extremitiesno edema  Psychnormal mood and affect    Assessment/ Plan:   Diagnoses and all orders for this visit:    1. Chronic fatigue  -     HEMOGLOBIN A1C WITH EAG; Future  -     METABOLIC PANEL, COMPREHENSIVE; Future  -     TSH 3RD GENERATION; Future  -     CBC W/O DIFF; Future  -     VITAMIN B12; Future  -     METHYLMALONIC ACID; Future    2. Anemia due to vitamin B12 deficiency, unspecified B12 deficiency type  -     CBC W/O DIFF; Future  -     VITAMIN B12; Future  -     METHYLMALONIC ACID; Future    3. Encounter for screening for cardiovascular disorders  -     LIPID PANEL; Future    4. History of abdominal abscess  -     METABOLIC PANEL, COMPREHENSIVE; Future  -     CBC W/O DIFF; Future    5. Hospital discharge follow-up  -     HEMOGLOBIN A1C WITH EAG; Future  -     LIPID PANEL; Future  -     METABOLIC PANEL, COMPREHENSIVE; Future  -     TSH 3RD GENERATION; Future  -     CBC W/O DIFF; Future    6. Hair thinning  -     TSH 3RD GENERATION; Future    7. Encounter for long-term (current) use of medications  -     HEMOGLOBIN A1C WITH EAG; Future  -     LIPID PANEL; Future  -     METABOLIC PANEL, COMPREHENSIVE; Future  -     TSH 3RD GENERATION; Future  -     CBC W/O DIFF; Future  -     VITAMIN B12; Future  -     METHYLMALONIC ACID; Future  -     AMB POC URINALYSIS DIP STICK AUTO W/O MICRO  -     CULTURE, URINE    8. Acute cystitis without hematuria  -     AMB POC URINALYSIS DIP STICK AUTO W/O MICRO  -     CULTURE, URINE  -     trimethoprim-sulfamethoxazole (BACTRIM DS) 160-800 mg per tablet;  Take 1 Tab by mouth two (2) times a day for 3 days. She has done well overall after such as significant hospitalization with a major surgery at her age. She seems to be recovering well. Rechecking the labs to ensure full resolution of anemia, evaluate further for thyroid disorders, and review B12 deficiency concerns were previously noted. We also rechecking her urine which was abnormal in the office so we will send for culture and treat for cystitis at this point given very recent symptoms though these were not classic irritative voiding symptoms associated with UTIs. We also discussed the likelihood that her hair thinning is actually related to going through a stressful event rather than underlying medical disorder. I spent > 50% of the 30 min visit counseling and educating about: Etiology of abscesses, urinary tract infections and associated symptoms, concerns about B12 deficiency and anemia, fatigue, hair thinning and clarifying diagnoses for patient. I have discussed the diagnosis with the patient and the intended plan as seen in the above orders. The patient has received an after-visit summary and questions were answered concerning future plans. I have discussed medication side effects and warnings with the patient as well. The patient verbalizes understanding and agreement with the plan. Follow-up and Dispositions    · Return in about 6 weeks (around 7/15/2019).

## 2019-06-07 LAB — BACTERIA UR CULT: ABNORMAL

## 2019-08-12 DIAGNOSIS — Z09 HOSPITAL DISCHARGE FOLLOW-UP: ICD-10-CM

## 2019-08-12 DIAGNOSIS — R53.82 CHRONIC FATIGUE: ICD-10-CM

## 2019-08-12 DIAGNOSIS — L65.9 HAIR THINNING: ICD-10-CM

## 2019-08-12 DIAGNOSIS — Z79.899 ENCOUNTER FOR LONG-TERM (CURRENT) USE OF MEDICATIONS: ICD-10-CM

## 2019-08-12 DIAGNOSIS — Z13.6 ENCOUNTER FOR SCREENING FOR CARDIOVASCULAR DISORDERS: ICD-10-CM

## 2019-08-12 DIAGNOSIS — D51.9 ANEMIA DUE TO VITAMIN B12 DEFICIENCY, UNSPECIFIED B12 DEFICIENCY TYPE: ICD-10-CM

## 2019-08-12 DIAGNOSIS — Z87.898 HISTORY OF ABDOMINAL ABSCESS: ICD-10-CM

## 2019-09-14 LAB
ALBUMIN SERPL-MCNC: 4.3 G/DL (ref 3.5–4.7)
ALBUMIN/GLOB SERPL: 1.5 {RATIO} (ref 1.2–2.2)
ALP SERPL-CCNC: 63 IU/L (ref 39–117)
ALT SERPL-CCNC: 12 IU/L (ref 0–32)
AST SERPL-CCNC: 18 IU/L (ref 0–40)
BILIRUB SERPL-MCNC: 0.4 MG/DL (ref 0–1.2)
BUN SERPL-MCNC: 16 MG/DL (ref 8–27)
BUN/CREAT SERPL: 20 (ref 12–28)
CALCIUM SERPL-MCNC: 9.6 MG/DL (ref 8.7–10.3)
CHLORIDE SERPL-SCNC: 104 MMOL/L (ref 96–106)
CHOLEST SERPL-MCNC: 265 MG/DL (ref 100–199)
CO2 SERPL-SCNC: 23 MMOL/L (ref 20–29)
CREAT SERPL-MCNC: 0.81 MG/DL (ref 0.57–1)
ERYTHROCYTE [DISTWIDTH] IN BLOOD BY AUTOMATED COUNT: 13.5 % (ref 12.3–15.4)
EST. AVERAGE GLUCOSE BLD GHB EST-MCNC: 114 MG/DL
GLOBULIN SER CALC-MCNC: 2.8 G/DL (ref 1.5–4.5)
GLUCOSE SERPL-MCNC: 92 MG/DL (ref 65–99)
HBA1C MFR BLD: 5.6 % (ref 4.8–5.6)
HCT VFR BLD AUTO: 43.7 % (ref 34–46.6)
HDLC SERPL-MCNC: 62 MG/DL
HGB BLD-MCNC: 14.3 G/DL (ref 11.1–15.9)
LDLC SERPL CALC-MCNC: 176 MG/DL (ref 0–99)
Lab: NORMAL
MCH RBC QN AUTO: 32.1 PG (ref 26.6–33)
MCHC RBC AUTO-ENTMCNC: 32.7 G/DL (ref 31.5–35.7)
MCV RBC AUTO: 98 FL (ref 79–97)
METHYLMALONATE SERPL-SCNC: 272 NMOL/L (ref 0–378)
PLATELET # BLD AUTO: 337 X10E3/UL (ref 150–450)
POTASSIUM SERPL-SCNC: 4.3 MMOL/L (ref 3.5–5.2)
PROT SERPL-MCNC: 7.1 G/DL (ref 6–8.5)
RBC # BLD AUTO: 4.45 X10E6/UL (ref 3.77–5.28)
SODIUM SERPL-SCNC: 142 MMOL/L (ref 134–144)
TRIGL SERPL-MCNC: 137 MG/DL (ref 0–149)
TSH SERPL DL<=0.005 MIU/L-ACNC: 1.41 UIU/ML (ref 0.45–4.5)
VIT B12 SERPL-MCNC: 428 PG/ML (ref 232–1245)
VLDLC SERPL CALC-MCNC: 27 MG/DL (ref 5–40)
WBC # BLD AUTO: 6.9 X10E3/UL (ref 3.4–10.8)

## 2019-09-16 ENCOUNTER — OFFICE VISIT (OUTPATIENT)
Dept: FAMILY MEDICINE CLINIC | Age: 84
End: 2019-09-16

## 2019-09-16 VITALS
HEIGHT: 66 IN | HEART RATE: 70 BPM | SYSTOLIC BLOOD PRESSURE: 139 MMHG | BODY MASS INDEX: 26.52 KG/M2 | DIASTOLIC BLOOD PRESSURE: 53 MMHG | OXYGEN SATURATION: 95 % | TEMPERATURE: 96.6 F | RESPIRATION RATE: 16 BRPM | WEIGHT: 165 LBS

## 2019-09-16 DIAGNOSIS — R82.90 MALODOROUS URINE: ICD-10-CM

## 2019-09-16 DIAGNOSIS — Z13.31 SCREENING FOR DEPRESSION: ICD-10-CM

## 2019-09-16 DIAGNOSIS — E78.2 MIXED HYPERLIPIDEMIA: ICD-10-CM

## 2019-09-16 DIAGNOSIS — Z23 ENCOUNTER FOR IMMUNIZATION: ICD-10-CM

## 2019-09-16 DIAGNOSIS — Z87.898 HISTORY OF ABDOMINAL ABSCESS: ICD-10-CM

## 2019-09-16 DIAGNOSIS — Z00.00 MEDICARE ANNUAL WELLNESS VISIT, SUBSEQUENT: Primary | ICD-10-CM

## 2019-09-16 DIAGNOSIS — Z13.39 SCREENING FOR ALCOHOLISM: ICD-10-CM

## 2019-09-16 NOTE — PROGRESS NOTES
Chief Complaint   Patient presents with    Follow-up     3 month and review lab results   1. Have you been to the ER, urgent care clinic since your last visit? Hospitalized since your last visit? No    2. Have you seen or consulted any other health care providers outside of the [address] [place] [address] since your last visit? Include any pap smears or colon screening. No   Would like letter of test results  Urine dark in color    She denies any symptoms , reactions or allergies that would exclude them from being immunized today. Risks and adverse reactions were discussed and the VIS was given to them. All questions were addressed. She was observed for 15 min post injection. There were no reactions observed.     Blaine Hollis LPN

## 2019-09-16 NOTE — PROGRESS NOTES
Subjective:     Chief Complaint   Patient presents with    Follow-up     3 month and review lab results        She  is a 81 y.o. female who presents for evaluation of:  Newer patient. 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 Overall, she is feeling much better than she felt at her last appointment. We reviewed her labs from last week today. Hemoglobin has normalized. There is no sign of B12 deficiency including normal B12 levels and MMA. CBC, A1c, and TSH were all normal.    Patient also reports malodorous urine today so we are rechecking a UA. Denies any pelvic pain, frequency, urgency, or dysuria. ROS  Gen - no fever/chills  Resp - no dyspnea or cough  CV - no chest pain or VALLES  Rest per HPI    History reviewed. No pertinent past medical history. Past Surgical History:   Procedure Laterality Date    ABDOMEN SURGERY PROC UNLISTED      Abcess drained     COLONOSCOPY Left 3/26/2019    COLONOSCOPY performed by Garo Cowan MD at [address] [place] [address]  03/27/2019    HX OTHER SURGICAL  03/27/2019    Laparoscopic drainage of pelvic abscess x 2 with lysis of adhesions    HX TONSILLECTOMY      LAP,DIAGNOSTIC ABDOMEN  03/27/2019     Current Outpatient Medications on File Prior to Visit   Medication Sig Dispense Refill    multivitamin (ONE A DAY) tablet Take 1 Tab by mouth daily.  aspirin 81 mg chewable tablet Take 81 mg by mouth daily. No current facility-administered medications on file prior to visit.          Objective:     Vitals:    09/16/19 1100   BP: 139/53   Pulse: 70   Resp: 16   Temp: 96.6 °F (35.9 °C)   TempSrc: Oral   SpO2: 95%   Weight: 165 lb (74.8 kg)   Height: 5' 5.5\" (1.664 m)     Physical Examination:  General appearance - alert, well appearing, and in no distress  Eyes -sclera anicteric  ENT left TM obstructed by cerumen, right TM normal, turbinates normal, edentulous upper mouth with normal oropharynx  Neck - supple, no significant adenopathy, no thyromegaly  Chest - clear to auscultation, no wheezes, rales or rhonchi, symmetric air entry  Heart - normal rate, regular rhythm, normal S1, S2, no murmurs, rubs, clicks or gallops  Abdomen soft, NT, ND  Neurological - alert, oriented, no focal findings or movement disorder noted  Extremitiesno edema  Psychnormal mood and affect    Assessment/ Plan:   Diagnoses and all orders for this visit:    1. Medicare annual wellness visit, subsequent  2. Screening for alcoholism  -     [place] ANNUAL ALCOHOL SCREEN 15 MIN  3. Screening for depression  -     DEPRESSION SCREEN ANNUAL    4. Malodorous urinechecking UA and urine culture. Likely just asymptomatic bacteriuria but is noticing some cloudiness so may be worthwhile to treat. Encouraged hydration  -     URINALYSIS W/ RFLX MICROSCOPIC  -     CULTURE, URINE    5. History of abdominal abscessresolved after surgery    6. Mixed hyperlipidemia had discussion about the utility of statin and patient declines. Continue aspirin    7. Encounter for immunization  -     INFLUENZA VACCINE INACTIVATED (IIV), SUBUNIT, ADJUVANTED, IM  -     [place] IMMUNIZ ADMIN,1 SINGLE/COMB VAC/TOXOID    Discussed getting bone density scan and patient declines DEXA at this point. She would like to have this ordered at her next appointment. She is up-to-date on pneumonia and flu vaccines through her pharmacy and will plan to get the shingles vaccine soon. I have discussed the diagnosis with the patient and the intended plan as seen in the above orders. The patient has received an after-visit summary and questions were answered concerning future plans. I have discussed medication side effects and warnings with the patient as well.   The patient verbalizes understanding and agreement with the plan.    Follow-up and Dispositions    · Return in about 3 months (around 12/16/2019), or if symptoms worsen or fail to improve. This is the Subsequent Medicare Annual Wellness Exam, performed 12 months or more after the Initial AWV or the last Subsequent AWV    I have reviewed the patient's medical history in detail and updated the computerized patient record. History   History reviewed. No pertinent past medical history. Past Surgical History:   Procedure Laterality Date    ABDOMEN SURGERY PROC UNLISTED      Abcess drained     COLONOSCOPY Left 3/26/2019    COLONOSCOPY performed by Karly Condon MD at [address] HX LYSIS OF ADHESIONS  03/27/2019    HX OTHER SURGICAL  03/27/2019    Laparoscopic drainage of pelvic abscess x 2 with lysis of adhesions    HX TONSILLECTOMY      LAP,DIAGNOSTIC ABDOMEN  03/27/2019     Current Outpatient Medications   Medication Sig Dispense Refill    multivitamin (ONE A DAY) tablet Take 1 Tab by mouth daily.  aspirin 81 mg chewable tablet Take 81 mg by mouth daily. No Known Allergies  History reviewed. No pertinent family history. Social History     Tobacco Use    Smoking status: Never Smoker    Smokeless tobacco: Never Used   Substance Use Topics    Alcohol use: Not Currently     Patient Active Problem List   Diagnosis Code    Abdominal pain R10.9    Abscess of abdominal cavity ([facility] K65.1    Ureteral obstruction N13.5    Septic shock (HCC) A41.9, R65.21    Pelvic abscess in female N75.10    IUD (intrauterine device) in place Z97.5    Bilateral hydronephrosis N13.30    Acute kidney injury ([facility] N17.9       Depression Risk Factor Screening:     3 most recent PHQ Screens 9/16/2019   Little interest or pleasure in doing things Not at all   Feeling down, depressed, irritable, or hopeless Not at all   Total Score PHQ 2 0     Alcohol Risk Factor Screening: You do not drink alcohol or very rarely.     Functional Ability and Level of Safety:   Hearing Loss  Hearing is good. Activities of Daily Living  The home contains: no safety equipment. Patient does total self care    Fall Risk  Fall Risk Assessment, last 12 mths 6/3/2019   Able to walk? Yes   Fall in past 12 months? No       Abuse Screen  Patient is not abused    Cognitive Screening   Evaluation of Cognitive Function:  Has your family/caregiver stated any concerns about your memory: no  Normal, Verbal Fluency Test    Patient Care Team   Patient Care Team:  Teodora Nissen, MD as PCP - General (Family Practice)  Rosaura Zapata NP as Nurse Practitioner (General Surgery)    Assessment/Plan   Education and counseling provided:  Are appropriate based on today's review and evaluation    Diagnoses and all orders for this visit:    1. Medicare annual wellness visit, subsequent    2. Screening for alcoholism  -     [place] ANNUAL ALCOHOL SCREEN 15 MIN    3. Screening for depression  -     DEPRESSION SCREEN ANNUAL    4. Malodorous urine  -     URINALYSIS W/ RFLX MICROSCOPIC  -     CULTURE, URINE    5. History of abdominal abscess    6. Mixed hyperlipidemia    7.  Encounter for immunization  -     INFLUENZA VACCINE INACTIVATED (IIV), SUBUNIT, ADJUVANTED, IM  -     [place] IMMUNIZ ADMIN,1 SINGLE/COMB VAC/TOXOID        Health Maintenance Due   Topic Date Due    Shingrix Vaccine Age 49> (1 of 2) 07/26/1986    GLAUCOMA SCREENING Q2Y  07/26/2001    Bone Densitometry (Dexa) Screening  07/26/2001    Pneumococcal 65+ years (1 of 2 - PCV13) 07/26/2001    MEDICARE YEARLY EXAM  03/20/2019

## 2019-09-16 NOTE — PATIENT INSTRUCTIONS
Medicare Wellness Visit, Female     The best way to live healthy is to have a lifestyle where you eat a well-balanced diet, exercise regularly, limit alcohol use, and quit all forms of tobacco/nicotine, if applicable. Regular preventive services are another way to keep healthy. Preventive services (vaccines, screening tests, monitoring & exams) can help personalize your care plan, which helps you manage your own care. Screening tests can find health problems at the earliest stages, when they are easiest to treat. [facility] follows the current, evidence-based guidelines published by the [facility] ([facility]) when recommending preventive services for our patients. Because we follow these guidelines, sometimes recommendations change over time as research supports it. (For example, mammograms used to be recommended annually. Even though Medicare will still pay for an annual mammogram, the newer guidelines recommend a mammogram every two years for women of average risk.)  Of course, you and your doctor may decide to screen more often for some diseases, based on your risk and your health status. Preventive services for you include:  - Medicare offers their members a free annual wellness visit, which is time for you and your primary care provider to discuss and plan for your preventive service needs. Take advantage of this benefit every year!  -All adults over the age of 72 should receive the recommended pneumonia vaccines. Current USPSTF guidelines recommend a series of two vaccines for the best pneumonia protection.   -All adults should have a flu vaccine yearly and a tetanus vaccine every 10 years. All adults age 61 and older should receive a shingles vaccine once in their lifetime.    -A bone mass density test is recommended when a woman turns 65 to screen for osteoporosis. This test is only recommended one time, as a screening.  Some providers will use this same test as a disease monitoring tool if you already have osteoporosis. -All adults age 38-68 who are overweight should have a diabetes screening test once every three years.   -Other screening tests and preventive services for persons with diabetes include: an eye exam to screen for diabetic retinopathy, a kidney function test, a foot exam, and stricter control over your cholesterol.   -Cardiovascular screening for adults with routine risk involves an electrocardiogram (ECG) at intervals determined by your doctor.   -Colorectal cancer screenings should be done for adults age 54-65 with no increased risk factors for colorectal cancer. There are a number of acceptable methods of screening for this type of cancer. Each test has its own benefits and drawbacks. Discuss with your doctor what is most appropriate for you during your annual wellness visit. The different tests include: colonoscopy (considered the best screening method), a fecal occult blood test, a fecal DNA test, and sigmoidoscopy. -Breast cancer screenings are recommended every other year for women of normal risk, age 54-69.  -Cervical cancer screenings for women over age 72 are only recommended with certain risk factors.   -All adults born between [facility] should be screened once for Hepatitis C.      Here is a list of your current Health Maintenance items (your personalized list of preventive services) with a due date:  Health Maintenance Due   Topic Date Due    Shingles Vaccine (1 of 2) 07/26/1986    Glaucoma Screening   07/26/2001    Bone Mineral Density   07/26/2001    Pneumococcal Vaccine (1 of 2 - PCV13) 07/26/2001    Annual Well Visit  03/20/2019

## 2019-09-17 LAB
APPEARANCE UR: CLEAR
BACTERIA #/AREA URNS HPF: ABNORMAL /[HPF]
BILIRUB UR QL STRIP: NEGATIVE
CASTS URNS QL MICRO: ABNORMAL /LPF
COLOR UR: YELLOW
CRYSTALS URNS MICRO: ABNORMAL
EPI CELLS #/AREA URNS HPF: >10 /HPF (ref 0–10)
GLUCOSE UR QL: NEGATIVE
HGB UR QL STRIP: ABNORMAL
KETONES UR QL STRIP: NEGATIVE
LEUKOCYTE ESTERASE UR QL STRIP: ABNORMAL
MICRO URNS: ABNORMAL
MUCOUS THREADS URNS QL MICRO: PRESENT
NITRITE UR QL STRIP: NEGATIVE
PH UR STRIP: 5.5 [PH] (ref 5–7.5)
PROT UR QL STRIP: NEGATIVE
RBC #/AREA URNS HPF: ABNORMAL /HPF (ref 0–2)
SP GR UR: 1.02 (ref 1–1.03)
UNIDENT CRYS URNS QL MICRO: PRESENT
UROBILINOGEN UR STRIP-MCNC: 0.2 MG/DL (ref 0.2–1)
WBC #/AREA URNS HPF: >30 /HPF (ref 0–5)

## 2019-09-20 LAB — BACTERIA UR CULT: ABNORMAL

## 2020-08-14 NOTE — PROGRESS NOTES
Bedside and Verbal shift change report given to Tomasa Halsted, RN (oncoming nurse) by Ronal Neri RN (offgoing nurse). Report included the following information SBAR, Kardex, ED Summary, Intake/Output, MAR and Recent Results. decreased ROM/impaired postural control/impaired balance/cognition/impaired coordination/impaired motor control/abnormal muscle tone/impaired sensory feedback/decreased strength

## 2020-10-08 ENCOUNTER — VIRTUAL VISIT (OUTPATIENT)
Dept: FAMILY MEDICINE CLINIC | Age: 85
End: 2020-10-08
Payer: MEDICARE

## 2020-10-08 DIAGNOSIS — Z78.0 POSTMENOPAUSAL STATE: ICD-10-CM

## 2020-10-08 DIAGNOSIS — Z79.899 ENCOUNTER FOR LONG-TERM (CURRENT) USE OF MEDICATIONS: ICD-10-CM

## 2020-10-08 DIAGNOSIS — Z00.00 MEDICARE ANNUAL WELLNESS VISIT, SUBSEQUENT: Primary | ICD-10-CM

## 2020-10-08 DIAGNOSIS — E78.2 MIXED HYPERLIPIDEMIA: ICD-10-CM

## 2020-10-08 PROCEDURE — G8400 PT W/DXA NO RESULTS DOC: HCPCS | Performed by: FAMILY MEDICINE

## 2020-10-08 PROCEDURE — G8536 NO DOC ELDER MAL SCRN: HCPCS | Performed by: FAMILY MEDICINE

## 2020-10-08 PROCEDURE — G8432 DEP SCR NOT DOC, RNG: HCPCS | Performed by: FAMILY MEDICINE

## 2020-10-08 PROCEDURE — G0439 PPPS, SUBSEQ VISIT: HCPCS | Performed by: FAMILY MEDICINE

## 2020-10-08 PROCEDURE — 1101F PT FALLS ASSESS-DOCD LE1/YR: CPT | Performed by: FAMILY MEDICINE

## 2020-10-08 PROCEDURE — G8421 BMI NOT CALCULATED: HCPCS | Performed by: FAMILY MEDICINE

## 2020-10-08 PROCEDURE — G8427 DOCREV CUR MEDS BY ELIG CLIN: HCPCS | Performed by: FAMILY MEDICINE

## 2020-10-08 NOTE — PROGRESS NOTES
Chief Complaint   Patient presents with   [facility] Annual Wellness Visit     Medicare   1. Have you been to the ER, urgent care clinic since your last visit? Hospitalized since your last visit? No    2. Have you seen or consulted any other health care providers outside of the [address] [place] [address] since your last visit? Include any pap smears or colon screening.  No

## 2020-10-08 NOTE — PATIENT INSTRUCTIONS
Medicare Wellness Visit, Female The best way to live healthy is to have a lifestyle where you eat a well-balanced diet, exercise regularly, limit alcohol use, and quit all forms of tobacco/nicotine, if applicable. Regular preventive services are another way to keep healthy. Preventive services (vaccines, screening tests, monitoring & exams) can help personalize your care plan, which helps you manage your own care. Screening tests can find health problems at the earliest stages, when they are easiest to treat. [facility] follows the current, evidence-based guidelines published by the [facility] ([facility]) when recommending preventive services for our patients. Because we follow these guidelines, sometimes recommendations change over time as research supports it. (For example, mammograms used to be recommended annually. Even though Medicare will still pay for an annual mammogram, the newer guidelines recommend a mammogram every two years for women of average risk). Of course, you and your doctor may decide to screen more often for some diseases, based on your risk and your co-morbidities (chronic disease you are already diagnosed with). Preventive services for you include: - Medicare offers their members a free annual wellness visit, which is time for you and your primary care provider to discuss and plan for your preventive service needs. Take advantage of this benefit every year! 
-All adults over the age of 72 should receive the recommended pneumonia vaccines. Current USPSTF guidelines recommend a series of two vaccines for the best pneumonia protection.  
-All adults should have a flu vaccine yearly and a tetanus vaccine every 10 years.  
-All adults age 48 and older should receive the shingles vaccines (series of two vaccines). -All adults age 38-68 who are overweight should have a diabetes screening test once every three years. -All adults born between [birth year] and 1964 should be screened once for Hepatitis C. 
-Other screening tests and preventive services for persons with diabetes include: an eye exam to screen for diabetic retinopathy, a kidney function test, a foot exam, and stricter control over your cholesterol.  
-Cardiovascular screening for adults with routine risk involves an electrocardiogram (ECG) at intervals determined by your doctor.  
-Colorectal cancer screenings should be done for adults age 54-65 with no increased risk factors for colorectal cancer. There are a number of acceptable methods of screening for this type of cancer. Each test has its own benefits and drawbacks. Discuss with your doctor what is most appropriate for you during your annual wellness visit. The different tests include: colonoscopy (considered the best screening method), a fecal occult blood test, a fecal DNA test, and sigmoidoscopy. 
 
-A bone mass density test is recommended when a woman turns 65 to screen for osteoporosis. This test is only recommended one time, as a screening. Some providers will use this same test as a disease monitoring tool if you already have osteoporosis. -Breast cancer screenings are recommended every other year for women of normal risk, age 54-69. 
-Cervical cancer screenings for women over age 72 are only recommended with certain risk factors. Here is a list of your current Health Maintenance items (your personalized list of preventive services) with a due date: 
Health Maintenance Due Topic Date Due  
 DTaP/Tdap/Td  (1 - Tdap) 07/26/1957  Shingles Vaccine (1 of 2) 07/26/1986  Glaucoma Screening   07/26/2001  Bone Mineral Density   07/26/2001  Pneumococcal Vaccine (1 of 1 - PPSV23) 07/26/2001  Yearly Flu Vaccine (1) 09/01/2020 [facility] Annual Well Visit  09/16/2020

## 2020-10-08 NOTE — PROGRESS NOTES
This is the Subsequent Medicare Annual Wellness Exam, performed 12 months or more after the Initial AWV or the last Subsequent AWV    I have reviewed the patient's medical history in detail and updated the computerized patient record. History     Doing excellent since last appt. No major concerns. UTD on vaccines. Will get flu shot at pharmacy. Denies any skin lesions, lumps or bumps. Denies any shortness of breath, chest pain, or palpitations. Feeling very well and continues to go up and down her steps and walk around her house. Patient Active Problem List   Diagnosis Code    Abdominal pain R10.9    Abscess of abdominal cavity ([facility] K65.1    Ureteral obstruction N13.5    Septic shock (HCC) A41.9, R65.21    Pelvic abscess in female N73.9    IUD (intrauterine device) in place Z97.5    Bilateral hydronephrosis N13.30    Acute kidney injury ([facility] [address] [address] N17.9     History reviewed. No pertinent past medical history. Past Surgical History:   Procedure Laterality Date    ABDOMEN SURGERY PROC UNLISTED      Abcess drained     COLONOSCOPY Left 3/26/2019    COLONOSCOPY performed by Evelio Plummer MD at [address] HX LYSIS OF ADHESIONS  03/27/2019    HX OTHER SURGICAL  03/27/2019    Laparoscopic drainage of pelvic abscess x 2 with lysis of adhesions    HX TONSILLECTOMY      LAP,DIAGNOSTIC ABDOMEN  03/27/2019     Current Outpatient Medications   Medication Sig Dispense Refill    multivitamin (ONE A DAY) tablet Take 1 Tab by mouth daily.  aspirin 81 mg chewable tablet Take 81 mg by mouth daily. No Known Allergies    History reviewed. No pertinent family history.   Social History     Tobacco Use    Smoking status: Never Smoker    Smokeless tobacco: Never Used   Substance Use Topics    Alcohol use: Not Currently       Depression Risk Factor Screening:     3 most recent PHQ Screens 9/16/2019   Little interest or pleasure in doing things Not at all   Feeling down, depressed, irritable, or hopeless Not at all   Total Score PHQ 2 0       Alcohol Risk Screen   Do you average more than 1 drink per night or more than 7 drinks a week:  No    On any one occasion in the past three months have you have had more than 3 drinks containing alcohol:  No        Functional Ability and Level of Safety:   Hearing: Hearing is good. Activities of Daily Living: The home contains: no safety equipment. Patient does total self care     Ambulation: with no difficulty     Fall Risk:  Fall Risk Assessment, last 12 mths 6/3/2019   Able to walk? Yes   Fall in past 12 months? No     Abuse Screen:  Patient is not abused       Cognitive Screening   Has your family/caregiver stated any concerns about your memory: no    Cognitive Screening: Normal - Verbal Fluency Test    Patient Care Team   Patient Care Team:  Rakan Frias MD as PCP - General (Family Medicine)  Rakan Frias MD as PCP - [facility]  Denny Crocker NP as Nurse Practitioner (General Surgery)    Assessment/Plan   Education and counseling provided:  Are appropriate based on today's review and evaluation    Diagnoses and all orders for this visit:    1. Medicare annual wellness visit, subsequent  -     HEMOGLOBIN A1C WITH EAG  -     LIPID PANEL  -     METABOLIC PANEL, COMPREHENSIVE  -     TSH 3RD GENERATION  -     CBC W/O DIFF    2. Postmenopausal state  -     DEXA BONE DENSITY STUDY AXIAL; Future    3. Mixed hyperlipidemia  -     HEMOGLOBIN A1C WITH EAG  -     LIPID PANEL  -     METABOLIC PANEL, COMPREHENSIVE  -     TSH 3RD GENERATION    4.  Encounter for long-term (current) use of medications  -     HEMOGLOBIN A1C WITH EAG  -     LIPID PANEL  -     METABOLIC PANEL, COMPREHENSIVE  -     TSH 3RD GENERATION  -     CBC W/O DIFF        Health Maintenance Due   Topic Date Due    DTaP/Tdap/Td series (1 - Tdap) 07/26/1957    Shingrix Vaccine Age 50> (1 of 2) 07/26/1986    GLAUCOMA SCREENING Q2Y  07/26/2001    Bone Densitometry (Dexa) Screening  07/26/2001    Pneumococcal 65+ years (1 of 1 - PPSV23) 07/26/2001    Flu Vaccine (1) 09/01/2020    Medicare Yearly Exam  09/16/2020       Agusto Aldana, who was evaluated through a synchronous (real-time) audio only encounter, and/or her healthcare decision maker, is aware that it is a billable service, with coverage as determined by her insurance carrier. She provided verbal consent to proceed: Yes, and patient identification was verified. It was conducted pursuant to the emergency declaration under the [address] [place] [address], [address] [place] [address] authority and the [employer] and [employer] General Act. A caregiver was present when appropriate. Ability to conduct physical exam was limited. I was in the office. The patient was at home.     Hayley Lal MD

## 2020-11-05 NOTE — PROGRESS NOTES
{Bedside shift change report given to Josué Aguilar (oncoming nurse) by Gisele Doss (offgoing nurse). Report included the following information SBAR, Kardex, MAR, Recent Results and Quality Measures. Four or more times a week

## 2021-02-21 ENCOUNTER — APPOINTMENT (OUTPATIENT)
Dept: GENERAL RADIOLOGY | Age: 86
DRG: 853 | End: 2021-02-21
Attending: EMERGENCY MEDICINE
Payer: MEDICARE

## 2021-02-21 ENCOUNTER — APPOINTMENT (OUTPATIENT)
Dept: CT IMAGING | Age: 86
DRG: 853 | End: 2021-02-21
Attending: INTERNAL MEDICINE
Payer: MEDICARE

## 2021-02-21 ENCOUNTER — HOSPITAL ENCOUNTER (INPATIENT)
Age: 86
LOS: 5 days | Discharge: HOME | DRG: 853 | End: 2021-02-26
Attending: EMERGENCY MEDICINE | Admitting: INTERNAL MEDICINE
Payer: MEDICARE

## 2021-02-21 DIAGNOSIS — E86.0 SEVERE DEHYDRATION: Primary | ICD-10-CM

## 2021-02-21 DIAGNOSIS — K41.90 FEMORAL HERNIA OF RIGHT SIDE: ICD-10-CM

## 2021-02-21 DIAGNOSIS — N17.9 ACUTE KIDNEY INJURY (HCC): ICD-10-CM

## 2021-02-21 PROBLEM — J18.9 CAP (COMMUNITY ACQUIRED PNEUMONIA): Status: ACTIVE | Noted: 2021-02-21

## 2021-02-21 LAB
ALBUMIN SERPL-MCNC: 3.6 G/DL (ref 3.5–5)
ALBUMIN/GLOB SERPL: 0.8 {RATIO} (ref 1.1–2.2)
ALP SERPL-CCNC: 78 U/L (ref 45–117)
ALT SERPL-CCNC: 30 U/L (ref 12–78)
ANION GAP SERPL CALC-SCNC: 7 MMOL/L (ref 5–15)
AST SERPL-CCNC: 26 U/L (ref 15–37)
BILIRUB SERPL-MCNC: 0.6 MG/DL (ref 0.2–1)
BUN SERPL-MCNC: 56 MG/DL (ref 6–20)
BUN/CREAT SERPL: 34 (ref 12–20)
CALCIUM SERPL-MCNC: 10.6 MG/DL (ref 8.5–10.1)
CHLORIDE SERPL-SCNC: 95 MMOL/L (ref 97–108)
CO2 SERPL-SCNC: 31 MMOL/L (ref 21–32)
COMMENT, HOLDF: NORMAL
CREAT SERPL-MCNC: 1.64 MG/DL (ref 0.55–1.02)
GLOBULIN SER CALC-MCNC: 4.8 G/DL (ref 2–4)
GLUCOSE SERPL-MCNC: 163 MG/DL (ref 65–100)
LACTATE SERPL-SCNC: 1.8 MMOL/L (ref 0.4–2)
LIPASE SERPL-CCNC: 94 U/L (ref 73–393)
MAGNESIUM SERPL-MCNC: 2.3 MG/DL (ref 1.6–2.4)
POTASSIUM SERPL-SCNC: 3.5 MMOL/L (ref 3.5–5.1)
PROT SERPL-MCNC: 8.4 G/DL (ref 6.4–8.2)
SAMPLES BEING HELD,HOLD: NORMAL
SODIUM SERPL-SCNC: 133 MMOL/L (ref 136–145)
TROPONIN I SERPL-MCNC: <0.05 NG/ML

## 2021-02-21 PROCEDURE — 85025 COMPLETE CBC W/AUTO DIFF WBC: CPT

## 2021-02-21 PROCEDURE — 0202U NFCT DS 22 TRGT SARS-COV-2: CPT

## 2021-02-21 PROCEDURE — 36415 COLL VENOUS BLD VENIPUNCTURE: CPT

## 2021-02-21 PROCEDURE — 99284 EMERGENCY DEPT VISIT MOD MDM: CPT

## 2021-02-21 PROCEDURE — 74011000258 HC RX REV CODE- 258: Performed by: EMERGENCY MEDICINE

## 2021-02-21 PROCEDURE — 74018 RADEX ABDOMEN 1 VIEW: CPT

## 2021-02-21 PROCEDURE — 83690 ASSAY OF LIPASE: CPT

## 2021-02-21 PROCEDURE — 87040 BLOOD CULTURE FOR BACTERIA: CPT

## 2021-02-21 PROCEDURE — 74011250636 HC RX REV CODE- 250/636: Performed by: EMERGENCY MEDICINE

## 2021-02-21 PROCEDURE — 80053 COMPREHEN METABOLIC PANEL: CPT

## 2021-02-21 PROCEDURE — 74176 CT ABD & PELVIS W/O CONTRAST: CPT

## 2021-02-21 PROCEDURE — 93005 ELECTROCARDIOGRAM TRACING: CPT

## 2021-02-21 PROCEDURE — 83735 ASSAY OF MAGNESIUM: CPT

## 2021-02-21 PROCEDURE — 65660000000 HC RM CCU STEPDOWN

## 2021-02-21 PROCEDURE — 84484 ASSAY OF TROPONIN QUANT: CPT

## 2021-02-21 PROCEDURE — 74011250636 HC RX REV CODE- 250/636: Performed by: INTERNAL MEDICINE

## 2021-02-21 PROCEDURE — 71045 X-RAY EXAM CHEST 1 VIEW: CPT

## 2021-02-21 PROCEDURE — 83605 ASSAY OF LACTIC ACID: CPT

## 2021-02-21 RX ORDER — ALBUTEROL SULFATE 0.83 MG/ML
2.5 SOLUTION RESPIRATORY (INHALATION)
Status: DISCONTINUED | OUTPATIENT
Start: 2021-02-21 | End: 2021-02-26 | Stop reason: HOSPADM

## 2021-02-21 RX ORDER — SODIUM CHLORIDE 9 MG/ML
75 INJECTION, SOLUTION INTRAVENOUS CONTINUOUS
Status: DISCONTINUED | OUTPATIENT
Start: 2021-02-21 | End: 2021-02-23

## 2021-02-21 RX ORDER — SODIUM CHLORIDE 0.9 % (FLUSH) 0.9 %
5-10 SYRINGE (ML) INJECTION AS NEEDED
Status: DISCONTINUED | OUTPATIENT
Start: 2021-02-21 | End: 2021-02-26 | Stop reason: HOSPADM

## 2021-02-21 RX ORDER — ACETAMINOPHEN 325 MG/1
650 TABLET ORAL
Status: DISCONTINUED | OUTPATIENT
Start: 2021-02-21 | End: 2021-02-26 | Stop reason: HOSPADM

## 2021-02-21 RX ORDER — ONDANSETRON 2 MG/ML
4 INJECTION INTRAMUSCULAR; INTRAVENOUS
Status: DISCONTINUED | OUTPATIENT
Start: 2021-02-21 | End: 2021-02-26 | Stop reason: HOSPADM

## 2021-02-21 RX ORDER — IPRATROPIUM BROMIDE AND ALBUTEROL SULFATE 2.5; .5 MG/3ML; MG/3ML
3 SOLUTION RESPIRATORY (INHALATION)
Status: DISCONTINUED | OUTPATIENT
Start: 2021-02-22 | End: 2021-02-21

## 2021-02-21 RX ORDER — HEPARIN SODIUM 5000 [USP'U]/ML
5000 INJECTION, SOLUTION INTRAVENOUS; SUBCUTANEOUS EVERY 8 HOURS
Status: DISCONTINUED | OUTPATIENT
Start: 2021-02-21 | End: 2021-02-26 | Stop reason: HOSPADM

## 2021-02-21 RX ORDER — SODIUM CHLORIDE 0.9 % (FLUSH) 0.9 %
5-40 SYRINGE (ML) INJECTION AS NEEDED
Status: DISCONTINUED | OUTPATIENT
Start: 2021-02-21 | End: 2021-02-26 | Stop reason: HOSPADM

## 2021-02-21 RX ORDER — SODIUM CHLORIDE 0.9 % (FLUSH) 0.9 %
5-40 SYRINGE (ML) INJECTION EVERY 8 HOURS
Status: DISCONTINUED | OUTPATIENT
Start: 2021-02-21 | End: 2021-02-26 | Stop reason: HOSPADM

## 2021-02-21 RX ADMIN — Medication 10 ML: at 23:13

## 2021-02-21 RX ADMIN — HEPARIN SODIUM 5000 UNITS: 5000 INJECTION INTRAVENOUS; SUBCUTANEOUS at 23:11

## 2021-02-21 RX ADMIN — AZITHROMYCIN MONOHYDRATE 500 MG: 500 INJECTION, POWDER, LYOPHILIZED, FOR SOLUTION INTRAVENOUS at 23:17

## 2021-02-21 RX ADMIN — CEFTRIAXONE SODIUM 2 G: 2 INJECTION, POWDER, FOR SOLUTION INTRAMUSCULAR; INTRAVENOUS at 23:10

## 2021-02-21 RX ADMIN — SODIUM CHLORIDE 1000 ML: 9 INJECTION, SOLUTION INTRAVENOUS at 20:22

## 2021-02-22 ENCOUNTER — ANESTHESIA EVENT (OUTPATIENT)
Dept: SURGERY | Age: 86
DRG: 853 | End: 2021-02-22
Payer: MEDICARE

## 2021-02-22 ENCOUNTER — APPOINTMENT (OUTPATIENT)
Dept: GENERAL RADIOLOGY | Age: 86
DRG: 853 | End: 2021-02-22
Attending: SURGERY
Payer: MEDICARE

## 2021-02-22 ENCOUNTER — ANESTHESIA (OUTPATIENT)
Dept: SURGERY | Age: 86
DRG: 853 | End: 2021-02-22
Payer: MEDICARE

## 2021-02-22 LAB
APPEARANCE UR: CLEAR
ATRIAL RATE: 123 BPM
B PERT DNA SPEC QL NAA+PROBE: NOT DETECTED
BACTERIA URNS QL MICRO: ABNORMAL /HPF
BASOPHILS # BLD: 0 K/UL (ref 0–0.1)
BASOPHILS NFR BLD: 0 % (ref 0–1)
BILIRUB UR QL: NEGATIVE
BORDETELLA PARAPERTUSSIS PCR, BORPAR: NOT DETECTED
C PNEUM DNA SPEC QL NAA+PROBE: NOT DETECTED
CALCULATED P AXIS, ECG09: 75 DEGREES
CALCULATED R AXIS, ECG10: 45 DEGREES
CALCULATED T AXIS, ECG11: 62 DEGREES
COLOR UR: ABNORMAL
COVID-19 RAPID TEST, COVR: NOT DETECTED
DIAGNOSIS, 93000: NORMAL
DIFFERENTIAL METHOD BLD: ABNORMAL
EOSINOPHIL # BLD: 0 K/UL (ref 0–0.4)
EOSINOPHIL NFR BLD: 0 % (ref 0–7)
EPITH CASTS URNS QL MICRO: ABNORMAL /LPF
ERYTHROCYTE [DISTWIDTH] IN BLOOD BY AUTOMATED COUNT: 12.8 % (ref 11.5–14.5)
FLUAV H1 2009 PAND RNA SPEC QL NAA+PROBE: NOT DETECTED
FLUAV H1 RNA SPEC QL NAA+PROBE: NOT DETECTED
FLUAV H3 RNA SPEC QL NAA+PROBE: NOT DETECTED
FLUAV SUBTYP SPEC NAA+PROBE: NOT DETECTED
FLUBV RNA SPEC QL NAA+PROBE: NOT DETECTED
GLUCOSE UR STRIP.AUTO-MCNC: NEGATIVE MG/DL
HADV DNA SPEC QL NAA+PROBE: NOT DETECTED
HCOV 229E RNA SPEC QL NAA+PROBE: NOT DETECTED
HCOV HKU1 RNA SPEC QL NAA+PROBE: NOT DETECTED
HCOV NL63 RNA SPEC QL NAA+PROBE: NOT DETECTED
HCOV OC43 RNA SPEC QL NAA+PROBE: NOT DETECTED
HCT VFR BLD AUTO: 45 % (ref 35–47)
HGB BLD-MCNC: 15.3 G/DL (ref 11.5–16)
HGB UR QL STRIP: ABNORMAL
HMPV RNA SPEC QL NAA+PROBE: NOT DETECTED
HPIV1 RNA SPEC QL NAA+PROBE: NOT DETECTED
HPIV2 RNA SPEC QL NAA+PROBE: NOT DETECTED
HPIV3 RNA SPEC QL NAA+PROBE: NOT DETECTED
HPIV4 RNA SPEC QL NAA+PROBE: NOT DETECTED
HYALINE CASTS URNS QL MICRO: ABNORMAL /LPF (ref 0–5)
IMM GRANULOCYTES # BLD AUTO: 0.3 K/UL (ref 0–0.04)
IMM GRANULOCYTES NFR BLD AUTO: 1 % (ref 0–0.5)
KETONES UR QL STRIP.AUTO: NEGATIVE MG/DL
LEUKOCYTE ESTERASE UR QL STRIP.AUTO: ABNORMAL
LYMPHOCYTES # BLD: 1.1 K/UL (ref 0.8–3.5)
LYMPHOCYTES NFR BLD: 4 % (ref 12–49)
M PNEUMO DNA SPEC QL NAA+PROBE: NOT DETECTED
MCH RBC QN AUTO: 32 PG (ref 26–34)
MCHC RBC AUTO-ENTMCNC: 34 G/DL (ref 30–36.5)
MCV RBC AUTO: 94.1 FL (ref 80–99)
MONOCYTES # BLD: 4.2 K/UL (ref 0–1)
MONOCYTES NFR BLD: 15 % (ref 5–13)
NEUTS SEG # BLD: 22.4 K/UL (ref 1.8–8)
NEUTS SEG NFR BLD: 80 % (ref 32–75)
NITRITE UR QL STRIP.AUTO: NEGATIVE
NRBC # BLD: 0 K/UL (ref 0–0.01)
NRBC BLD-RTO: 0 PER 100 WBC
P-R INTERVAL, ECG05: 168 MS
PATH REV BLD -IMP: ABNORMAL
PH UR STRIP: 5.5 [PH] (ref 5–8)
PLATELET # BLD AUTO: 348 K/UL (ref 150–400)
PMV BLD AUTO: 9.3 FL (ref 8.9–12.9)
PROT UR STRIP-MCNC: 30 MG/DL
Q-T INTERVAL, ECG07: 282 MS
QRS DURATION, ECG06: 64 MS
QTC CALCULATION (BEZET), ECG08: 403 MS
RBC # BLD AUTO: 4.78 M/UL (ref 3.8–5.2)
RBC #/AREA URNS HPF: ABNORMAL /HPF (ref 0–5)
RBC MORPH BLD: ABNORMAL
RSV RNA SPEC QL NAA+PROBE: NOT DETECTED
RV+EV RNA SPEC QL NAA+PROBE: NOT DETECTED
SARS-COV-2 PCR, COVPCR: NOT DETECTED
SOURCE, COVRS: NORMAL
SP GR UR REFRACTOMETRY: 1.03 (ref 1–1.03)
UA: UC IF INDICATED,UAUC: ABNORMAL
UROBILINOGEN UR QL STRIP.AUTO: 0.2 EU/DL (ref 0.2–1)
VENTRICULAR RATE, ECG03: 123 BPM
WBC # BLD AUTO: 28 K/UL (ref 3.6–11)
WBC MORPH BLD: ABNORMAL
WBC URNS QL MICRO: ABNORMAL /HPF (ref 0–4)

## 2021-02-22 PROCEDURE — 2709999900 HC NON-CHARGEABLE SUPPLY: Performed by: SURGERY

## 2021-02-22 PROCEDURE — 76010000131 HC OR TIME 2 TO 2.5 HR: Performed by: SURGERY

## 2021-02-22 PROCEDURE — 74011250636 HC RX REV CODE- 250/636: Performed by: NURSE ANESTHETIST, CERTIFIED REGISTERED

## 2021-02-22 PROCEDURE — 74018 RADEX ABDOMEN 1 VIEW: CPT

## 2021-02-22 PROCEDURE — 77030027138 HC INCENT SPIROMETER -A

## 2021-02-22 PROCEDURE — 74011250636 HC RX REV CODE- 250/636: Performed by: INTERNAL MEDICINE

## 2021-02-22 PROCEDURE — 74011250636 HC RX REV CODE- 250/636: Performed by: NURSE PRACTITIONER

## 2021-02-22 PROCEDURE — 77030040361 HC SLV COMPR DVT MDII -B: Performed by: SURGERY

## 2021-02-22 PROCEDURE — 74011000258 HC RX REV CODE- 258: Performed by: NURSE PRACTITIONER

## 2021-02-22 PROCEDURE — 65270000029 HC RM PRIVATE

## 2021-02-22 PROCEDURE — 99024 POSTOP FOLLOW-UP VISIT: CPT | Performed by: SURGERY

## 2021-02-22 PROCEDURE — P9045 ALBUMIN (HUMAN), 5%, 250 ML: HCPCS | Performed by: NURSE ANESTHETIST, CERTIFIED REGISTERED

## 2021-02-22 PROCEDURE — 77030002996 HC SUT SLK J&J -A: Performed by: SURGERY

## 2021-02-22 PROCEDURE — 88302 TISSUE EXAM BY PATHOLOGIST: CPT

## 2021-02-22 PROCEDURE — 74011250636 HC RX REV CODE- 250/636: Performed by: SURGERY

## 2021-02-22 PROCEDURE — 77030002986 HC SUT PROL J&J -A: Performed by: SURGERY

## 2021-02-22 PROCEDURE — 99231 SBSQ HOSP IP/OBS SF/LOW 25: CPT | Performed by: SURGERY

## 2021-02-22 PROCEDURE — C9113 INJ PANTOPRAZOLE SODIUM, VIA: HCPCS | Performed by: NURSE PRACTITIONER

## 2021-02-22 PROCEDURE — 87635 SARS-COV-2 COVID-19 AMP PRB: CPT

## 2021-02-22 PROCEDURE — 74011000250 HC RX REV CODE- 250: Performed by: NURSE PRACTITIONER

## 2021-02-22 PROCEDURE — 77030026438 HC STYL ET INTUB CARD -A: Performed by: ANESTHESIOLOGY

## 2021-02-22 PROCEDURE — 74011250637 HC RX REV CODE- 250/637: Performed by: INTERNAL MEDICINE

## 2021-02-22 PROCEDURE — 81001 URINALYSIS AUTO W/SCOPE: CPT

## 2021-02-22 PROCEDURE — 51798 US URINE CAPACITY MEASURE: CPT

## 2021-02-22 PROCEDURE — 77030002966 HC SUT PDS J&J -A: Performed by: SURGERY

## 2021-02-22 PROCEDURE — 77030002933 HC SUT MCRYL J&J -A: Performed by: SURGERY

## 2021-02-22 PROCEDURE — 77030008771 HC TU NG SALEM SUMP -A: Performed by: ANESTHESIOLOGY

## 2021-02-22 PROCEDURE — 77030008684 HC TU ET CUF COVD -B: Performed by: ANESTHESIOLOGY

## 2021-02-22 PROCEDURE — 49550 RPR REM HERNIA INIT REDUCE: CPT | Performed by: SURGERY

## 2021-02-22 PROCEDURE — 76210000006 HC OR PH I REC 0.5 TO 1 HR: Performed by: SURGERY

## 2021-02-22 PROCEDURE — 0YU70JZ SUPPLEMENT RIGHT FEMORAL REGION WITH SYNTHETIC SUBSTITUTE, OPEN APPROACH: ICD-10-PCS | Performed by: SURGERY

## 2021-02-22 PROCEDURE — 74011000250 HC RX REV CODE- 250: Performed by: NURSE ANESTHETIST, CERTIFIED REGISTERED

## 2021-02-22 PROCEDURE — 76060000035 HC ANESTHESIA 2 TO 2.5 HR: Performed by: SURGERY

## 2021-02-22 PROCEDURE — 74011000258 HC RX REV CODE- 258: Performed by: SURGERY

## 2021-02-22 PROCEDURE — C1781 MESH (IMPLANTABLE): HCPCS | Performed by: SURGERY

## 2021-02-22 PROCEDURE — 77030019908 HC STETH ESOPH SIMS -A: Performed by: ANESTHESIOLOGY

## 2021-02-22 PROCEDURE — 77030013079 HC BLNKT BAIR HGGR 3M -A: Performed by: ANESTHESIOLOGY

## 2021-02-22 PROCEDURE — 74011000250 HC RX REV CODE- 250: Performed by: SURGERY

## 2021-02-22 PROCEDURE — 77030010507 HC ADH SKN DERMBND J&J -B: Performed by: SURGERY

## 2021-02-22 PROCEDURE — 77030031139 HC SUT VCRL2 J&J -A: Performed by: SURGERY

## 2021-02-22 DEVICE — PERFIX PLUG, 1.3" X 1.55" (3.3 CM X 3.9 CM), MEDIUM (CONTENTS: 2)
Type: IMPLANTABLE DEVICE | Site: INGUINAL | Status: FUNCTIONAL
Brand: PERFIX

## 2021-02-22 RX ORDER — EPHEDRINE SULFATE/0.9% NACL/PF 50 MG/5 ML
5 SYRINGE (ML) INTRAVENOUS AS NEEDED
Status: DISCONTINUED | OUTPATIENT
Start: 2021-02-22 | End: 2021-02-22 | Stop reason: HOSPADM

## 2021-02-22 RX ORDER — LIDOCAINE HYDROCHLORIDE 10 MG/ML
0.1 INJECTION, SOLUTION EPIDURAL; INFILTRATION; INTRACAUDAL; PERINEURAL AS NEEDED
Status: DISCONTINUED | OUTPATIENT
Start: 2021-02-22 | End: 2021-02-22 | Stop reason: HOSPADM

## 2021-02-22 RX ORDER — DEXAMETHASONE SODIUM PHOSPHATE 4 MG/ML
INJECTION, SOLUTION INTRA-ARTICULAR; INTRALESIONAL; INTRAMUSCULAR; INTRAVENOUS; SOFT TISSUE AS NEEDED
Status: DISCONTINUED | OUTPATIENT
Start: 2021-02-22 | End: 2021-02-22 | Stop reason: HOSPADM

## 2021-02-22 RX ORDER — MIDAZOLAM HYDROCHLORIDE 1 MG/ML
1 INJECTION, SOLUTION INTRAMUSCULAR; INTRAVENOUS AS NEEDED
Status: DISCONTINUED | OUTPATIENT
Start: 2021-02-22 | End: 2021-02-22 | Stop reason: HOSPADM

## 2021-02-22 RX ORDER — BUPIVACAINE HYDROCHLORIDE 2.5 MG/ML
INJECTION, SOLUTION EPIDURAL; INFILTRATION; INTRACAUDAL AS NEEDED
Status: DISCONTINUED | OUTPATIENT
Start: 2021-02-22 | End: 2021-02-22 | Stop reason: HOSPADM

## 2021-02-22 RX ORDER — PROPOFOL 10 MG/ML
INJECTION, EMULSION INTRAVENOUS AS NEEDED
Status: DISCONTINUED | OUTPATIENT
Start: 2021-02-22 | End: 2021-02-22 | Stop reason: HOSPADM

## 2021-02-22 RX ORDER — SODIUM CHLORIDE 0.9 % (FLUSH) 0.9 %
5-40 SYRINGE (ML) INJECTION AS NEEDED
Status: DISCONTINUED | OUTPATIENT
Start: 2021-02-22 | End: 2021-02-22 | Stop reason: HOSPADM

## 2021-02-22 RX ORDER — GLYCOPYRROLATE 0.2 MG/ML
INJECTION INTRAMUSCULAR; INTRAVENOUS AS NEEDED
Status: DISCONTINUED | OUTPATIENT
Start: 2021-02-22 | End: 2021-02-22 | Stop reason: HOSPADM

## 2021-02-22 RX ORDER — ACETAMINOPHEN 325 MG/1
650 TABLET ORAL ONCE
Status: DISCONTINUED | OUTPATIENT
Start: 2021-02-22 | End: 2021-02-22 | Stop reason: HOSPADM

## 2021-02-22 RX ORDER — SODIUM CHLORIDE, SODIUM LACTATE, POTASSIUM CHLORIDE, CALCIUM CHLORIDE 600; 310; 30; 20 MG/100ML; MG/100ML; MG/100ML; MG/100ML
125 INJECTION, SOLUTION INTRAVENOUS CONTINUOUS
Status: DISCONTINUED | OUTPATIENT
Start: 2021-02-22 | End: 2021-02-22 | Stop reason: HOSPADM

## 2021-02-22 RX ORDER — ONDANSETRON 2 MG/ML
4 INJECTION INTRAMUSCULAR; INTRAVENOUS AS NEEDED
Status: DISCONTINUED | OUTPATIENT
Start: 2021-02-22 | End: 2021-02-22 | Stop reason: HOSPADM

## 2021-02-22 RX ORDER — OXYCODONE AND ACETAMINOPHEN 5; 325 MG/1; MG/1
1 TABLET ORAL AS NEEDED
Status: DISCONTINUED | OUTPATIENT
Start: 2021-02-22 | End: 2021-02-22 | Stop reason: HOSPADM

## 2021-02-22 RX ORDER — SODIUM CHLORIDE 0.9 % (FLUSH) 0.9 %
5-40 SYRINGE (ML) INJECTION EVERY 8 HOURS
Status: DISCONTINUED | OUTPATIENT
Start: 2021-02-22 | End: 2021-02-22 | Stop reason: HOSPADM

## 2021-02-22 RX ORDER — ROCURONIUM BROMIDE 10 MG/ML
INJECTION, SOLUTION INTRAVENOUS AS NEEDED
Status: DISCONTINUED | OUTPATIENT
Start: 2021-02-22 | End: 2021-02-22 | Stop reason: HOSPADM

## 2021-02-22 RX ORDER — MIDAZOLAM HYDROCHLORIDE 1 MG/ML
0.5 INJECTION, SOLUTION INTRAMUSCULAR; INTRAVENOUS
Status: DISCONTINUED | OUTPATIENT
Start: 2021-02-22 | End: 2021-02-22 | Stop reason: HOSPADM

## 2021-02-22 RX ORDER — FENTANYL CITRATE 50 UG/ML
50 INJECTION, SOLUTION INTRAMUSCULAR; INTRAVENOUS AS NEEDED
Status: DISCONTINUED | OUTPATIENT
Start: 2021-02-22 | End: 2021-02-22 | Stop reason: HOSPADM

## 2021-02-22 RX ORDER — NEOSTIGMINE METHYLSULFATE 1 MG/ML
INJECTION INTRAVENOUS AS NEEDED
Status: DISCONTINUED | OUTPATIENT
Start: 2021-02-22 | End: 2021-02-22 | Stop reason: HOSPADM

## 2021-02-22 RX ORDER — FENTANYL CITRATE 50 UG/ML
INJECTION, SOLUTION INTRAMUSCULAR; INTRAVENOUS AS NEEDED
Status: DISCONTINUED | OUTPATIENT
Start: 2021-02-22 | End: 2021-02-22 | Stop reason: HOSPADM

## 2021-02-22 RX ORDER — SODIUM CHLORIDE 9 MG/ML
1000 INJECTION, SOLUTION INTRAVENOUS CONTINUOUS
Status: DISCONTINUED | OUTPATIENT
Start: 2021-02-22 | End: 2021-02-22 | Stop reason: HOSPADM

## 2021-02-22 RX ORDER — MORPHINE SULFATE 10 MG/ML
2 INJECTION, SOLUTION INTRAMUSCULAR; INTRAVENOUS
Status: DISCONTINUED | OUTPATIENT
Start: 2021-02-22 | End: 2021-02-22 | Stop reason: HOSPADM

## 2021-02-22 RX ORDER — SODIUM CHLORIDE, SODIUM LACTATE, POTASSIUM CHLORIDE, CALCIUM CHLORIDE 600; 310; 30; 20 MG/100ML; MG/100ML; MG/100ML; MG/100ML
INJECTION, SOLUTION INTRAVENOUS
Status: DISCONTINUED | OUTPATIENT
Start: 2021-02-22 | End: 2021-02-22 | Stop reason: HOSPADM

## 2021-02-22 RX ORDER — HYDROMORPHONE HYDROCHLORIDE 1 MG/ML
0.2 INJECTION, SOLUTION INTRAMUSCULAR; INTRAVENOUS; SUBCUTANEOUS
Status: DISCONTINUED | OUTPATIENT
Start: 2021-02-22 | End: 2021-02-22 | Stop reason: HOSPADM

## 2021-02-22 RX ORDER — ALBUMIN HUMAN 50 G/1000ML
SOLUTION INTRAVENOUS AS NEEDED
Status: DISCONTINUED | OUTPATIENT
Start: 2021-02-22 | End: 2021-02-22 | Stop reason: HOSPADM

## 2021-02-22 RX ORDER — SODIUM CHLORIDE 9 MG/ML
50 INJECTION, SOLUTION INTRAVENOUS CONTINUOUS
Status: DISCONTINUED | OUTPATIENT
Start: 2021-02-22 | End: 2021-02-22 | Stop reason: HOSPADM

## 2021-02-22 RX ORDER — LIDOCAINE HYDROCHLORIDE 20 MG/ML
INJECTION, SOLUTION EPIDURAL; INFILTRATION; INTRACAUDAL; PERINEURAL AS NEEDED
Status: DISCONTINUED | OUTPATIENT
Start: 2021-02-22 | End: 2021-02-22 | Stop reason: HOSPADM

## 2021-02-22 RX ORDER — BUPIVACAINE HYDROCHLORIDE AND EPINEPHRINE 5; 5 MG/ML; UG/ML
30 INJECTION, SOLUTION EPIDURAL; INTRACAUDAL; PERINEURAL ONCE
Status: DISCONTINUED | OUTPATIENT
Start: 2021-02-23 | End: 2021-02-22 | Stop reason: HOSPADM

## 2021-02-22 RX ORDER — DIPHENHYDRAMINE HYDROCHLORIDE 50 MG/ML
12.5 INJECTION, SOLUTION INTRAMUSCULAR; INTRAVENOUS AS NEEDED
Status: DISCONTINUED | OUTPATIENT
Start: 2021-02-22 | End: 2021-02-22 | Stop reason: HOSPADM

## 2021-02-22 RX ORDER — FENTANYL CITRATE 50 UG/ML
25 INJECTION, SOLUTION INTRAMUSCULAR; INTRAVENOUS
Status: DISCONTINUED | OUTPATIENT
Start: 2021-02-22 | End: 2021-02-22 | Stop reason: HOSPADM

## 2021-02-22 RX ORDER — SUCCINYLCHOLINE CHLORIDE 20 MG/ML
INJECTION INTRAMUSCULAR; INTRAVENOUS AS NEEDED
Status: DISCONTINUED | OUTPATIENT
Start: 2021-02-22 | End: 2021-02-22 | Stop reason: HOSPADM

## 2021-02-22 RX ORDER — ONDANSETRON 2 MG/ML
INJECTION INTRAMUSCULAR; INTRAVENOUS AS NEEDED
Status: DISCONTINUED | OUTPATIENT
Start: 2021-02-22 | End: 2021-02-22 | Stop reason: HOSPADM

## 2021-02-22 RX ORDER — HYDROMORPHONE HYDROCHLORIDE 1 MG/ML
1 INJECTION, SOLUTION INTRAMUSCULAR; INTRAVENOUS; SUBCUTANEOUS
Status: DISCONTINUED | OUTPATIENT
Start: 2021-02-22 | End: 2021-02-26 | Stop reason: HOSPADM

## 2021-02-22 RX ADMIN — PIPERACILLIN AND TAZOBACTAM 3.38 G: 3; .375 INJECTION, POWDER, LYOPHILIZED, FOR SOLUTION INTRAVENOUS at 08:59

## 2021-02-22 RX ADMIN — SODIUM CHLORIDE 40 MG: 9 INJECTION, SOLUTION INTRAMUSCULAR; INTRAVENOUS; SUBCUTANEOUS at 01:07

## 2021-02-22 RX ADMIN — SUCCINYLCHOLINE CHLORIDE 120 MG: 20 INJECTION, SOLUTION INTRAMUSCULAR; INTRAVENOUS at 02:42

## 2021-02-22 RX ADMIN — Medication 10 ML: at 22:00

## 2021-02-22 RX ADMIN — HEPARIN SODIUM 5000 UNITS: 5000 INJECTION INTRAVENOUS; SUBCUTANEOUS at 21:37

## 2021-02-22 RX ADMIN — SODIUM CHLORIDE, POTASSIUM CHLORIDE, SODIUM LACTATE AND CALCIUM CHLORIDE: 600; 310; 30; 20 INJECTION, SOLUTION INTRAVENOUS at 02:36

## 2021-02-22 RX ADMIN — NEOSTIGMINE METHYLSULFATE 3 MG: 1 INJECTION, SOLUTION INTRAVENOUS at 04:17

## 2021-02-22 RX ADMIN — FENTANYL CITRATE 50 MCG: 50 INJECTION, SOLUTION INTRAMUSCULAR; INTRAVENOUS at 03:21

## 2021-02-22 RX ADMIN — ROCURONIUM BROMIDE 10 MG: 10 SOLUTION INTRAVENOUS at 03:22

## 2021-02-22 RX ADMIN — HEPARIN SODIUM 5000 UNITS: 5000 INJECTION INTRAVENOUS; SUBCUTANEOUS at 14:36

## 2021-02-22 RX ADMIN — ONDANSETRON HYDROCHLORIDE 4 MG: 2 INJECTION, SOLUTION INTRAMUSCULAR; INTRAVENOUS at 03:07

## 2021-02-22 RX ADMIN — SODIUM CHLORIDE 75 ML/HR: 9 INJECTION, SOLUTION INTRAVENOUS at 05:18

## 2021-02-22 RX ADMIN — Medication 10 ML: at 06:38

## 2021-02-22 RX ADMIN — PROPOFOL 100 MG: 10 INJECTION, EMULSION INTRAVENOUS at 02:42

## 2021-02-22 RX ADMIN — HEPARIN SODIUM 5000 UNITS: 5000 INJECTION INTRAVENOUS; SUBCUTANEOUS at 06:38

## 2021-02-22 RX ADMIN — AZITHROMYCIN MONOHYDRATE 500 MG: 500 INJECTION, POWDER, LYOPHILIZED, FOR SOLUTION INTRAVENOUS at 21:37

## 2021-02-22 RX ADMIN — LIDOCAINE HYDROCHLORIDE 50 MG: 20 INJECTION, SOLUTION EPIDURAL; INFILTRATION; INTRACAUDAL; PERINEURAL at 02:42

## 2021-02-22 RX ADMIN — ROCURONIUM BROMIDE 5 MG: 10 SOLUTION INTRAVENOUS at 02:42

## 2021-02-22 RX ADMIN — DEXAMETHASONE SODIUM PHOSPHATE 4 MG: 4 INJECTION, SOLUTION INTRAMUSCULAR; INTRAVENOUS at 03:07

## 2021-02-22 RX ADMIN — PHENYLEPHRINE HYDROCHLORIDE 50 MCG/MIN: 10 INJECTION INTRAVENOUS at 02:52

## 2021-02-22 RX ADMIN — ONDANSETRON 4 MG: 2 INJECTION INTRAMUSCULAR; INTRAVENOUS at 01:06

## 2021-02-22 RX ADMIN — FENTANYL CITRATE 50 MCG: 50 INJECTION, SOLUTION INTRAMUSCULAR; INTRAVENOUS at 02:42

## 2021-02-22 RX ADMIN — ROCURONIUM BROMIDE 25 MG: 10 SOLUTION INTRAVENOUS at 02:55

## 2021-02-22 RX ADMIN — PIPERACILLIN AND TAZOBACTAM 3.38 G: 3; .375 INJECTION, POWDER, LYOPHILIZED, FOR SOLUTION INTRAVENOUS at 16:49

## 2021-02-22 RX ADMIN — PHENOL 1 SPRAY: 1.4 SPRAY ORAL at 11:25

## 2021-02-22 RX ADMIN — GLYCOPYRROLATE 0.4 MG: 0.2 INJECTION, SOLUTION INTRAMUSCULAR; INTRAVENOUS at 04:17

## 2021-02-22 RX ADMIN — PIPERACILLIN AND TAZOBACTAM 3.38 G: 3; .375 INJECTION, POWDER, LYOPHILIZED, FOR SOLUTION INTRAVENOUS at 03:13

## 2021-02-22 RX ADMIN — Medication 10 ML: at 14:36

## 2021-02-22 RX ADMIN — ALBUMIN (HUMAN) 250 ML: 12.5 INJECTION, SOLUTION INTRAVENOUS at 02:51

## 2021-02-22 NOTE — ED NOTES
TRANSFER - OUT REPORT:    Verbal report given to PACU(name) on [facility]       Report consisted of patients Situation, Background, Assessment and   Recommendations(SBAR). Information from the following report(s) SBAR, MAR and Recent Results was reviewed with the receiving nurse. Lines:   Peripheral IV 02/21/21 Left Antecubital (Active)   Site Assessment Clean, dry, & intact 02/21/21 1957   Phlebitis Assessment 0 02/21/21 1957   Infiltration Assessment 0 02/21/21 1957   Dressing Status Clean, dry, & intact 02/21/21 1957   Dressing Type Transparent 02/21/21 1957   Hub Color/Line Status Pink;Flushed;Patent 02/21/21 1957   Action Taken Blood drawn 02/21/21 1957        Opportunity for questions and clarification was provided.       Patient transported with:   Registered Nurse

## 2021-02-22 NOTE — PROGRESS NOTES
Transitions of Care plan: TBD    CM attempted to meet with patient for initial assessment. Patient stated she wanted to rest, as staff kept waking her up. CM apologized and will meet with patient at a later time.     Adrian PLASCENCIA, ACM-SW

## 2021-02-22 NOTE — ED PROVIDER NOTES
81-year-old female history of diverticulitis presents with a chief complaint of vomiting. The patient reports that she started vomiting on Friday night and had multiple episodes throughout the nighttime. She did have some abdominal pain in her left lower quadrant which is now resolved. She denies having any fevers, chills, chest pain, shortness of breath, black or bloody stools. [facility] and they felt that she had an irregular heartbeat and referred her to the ER. History reviewed. No pertinent past medical history. Past Surgical History:   Procedure Laterality Date    COLONOSCOPY Left 3/26/2019    COLONOSCOPY performed by Tyrone Mccauley MD at [address] HX LYSIS OF ADHESIONS  03/27/2019    HX OTHER SURGICAL  03/27/2019    Laparoscopic drainage of pelvic abscess x 2 with lysis of adhesions    HX TONSILLECTOMY      [place] ABDOMEN SURGERY PROC UNLISTED      Abcess drained     [place] LAP,DIAGNOSTIC ABDOMEN  03/27/2019         History reviewed. No pertinent family history.     Social History     Socioeconomic History    Marital status:      Spouse name: Not on file    Number of children: Not on file    Years of education: Not on file    Highest education level: Not on file   Occupational History    Not on file   Social Needs    Financial resource strain: Not on file    Food insecurity     Worry: Not on file     Inability: Not on file    Transportation needs     Medical: Not on file     Non-medical: Not on file   Tobacco Use    Smoking status: Never Smoker    Smokeless tobacco: Never Used   Substance and Sexual Activity    Alcohol use: Not Currently    Drug use: Never    Sexual activity: Not Currently   Lifestyle    Physical activity     Days per week: Not on file     Minutes per session: Not on file    Stress: Not on file   Relationships    Social connections     Talks on phone: Not on file     Gets together: Not on file     Attends [employer] service: Not on file     Active member of club or organization: Not on file     Attends meetings of clubs or organizations: Not on file     Relationship status: Not on file    Intimate partner violence     Fear of current or ex partner: Not on file     Emotionally abused: Not on file     Physically abused: Not on file     Forced sexual activity: Not on file   Other Topics Concern    Not on file   Social History Narrative    Not on file         ALLERGIES: Patient has no known allergies. Review of Systems   Constitutional: Negative for fever. HENT: Negative for rhinorrhea. Respiratory: Negative for shortness of breath. Cardiovascular: Negative for chest pain. Gastrointestinal: Positive for abdominal pain and vomiting. Genitourinary: Negative for dysuria. Musculoskeletal: Negative for back pain. Skin: Negative for wound. Neurological: Negative for headaches. Psychiatric/Behavioral: Negative for confusion. Vitals:    02/21/21 1919   BP: 125/68   Pulse: (!) 101   Resp: 16   Temp: 97.6 °F (36.4 °C)   SpO2: 91%   Weight: 73 kg (161 lb)   Height: 5' 5.5\" (1.664 m)            Physical Exam  Vitals signs and nursing note reviewed. Constitutional:       General: She is not in acute distress. Appearance: Normal appearance. She is not ill-appearing, toxic-appearing or diaphoretic. HENT:      Head: Normocephalic and atraumatic. Mouth/Throat:      Mouth: Mucous membranes are dry. Eyes:      Extraocular Movements: Extraocular movements intact. Neck:      Musculoskeletal: Normal range of motion. Cardiovascular:      Rate and Rhythm: Regular rhythm. Tachycardia present. Pulses: Normal pulses. Heart sounds: Normal heart sounds. No murmur. No friction rub. No gallop. Pulmonary:      Effort: Pulmonary effort is normal. No respiratory distress. Breath sounds: Normal breath sounds. No wheezing. Abdominal:      General: Abdomen is flat.  Bowel sounds are normal. There is no distension. Palpations: Abdomen is soft. Tenderness: There is no abdominal tenderness. There is no guarding. Comments: Nontender abdomen   Musculoskeletal: Normal range of motion. Skin:     General: Skin is warm and dry. Neurological:      Mental Status: She is alert and oriented to person, place, and time. Psychiatric:         Mood and Affect: Mood normal.          MDM  Number of Diagnoses or Management Options  Acute kidney injury ([address]  Severe dehydration  Diagnosis management comments: 81-year-old female presents with multiple episodes of vomiting throughout the weekend. Patient's abdomen is nontender and I do not feel that a CT is indicated at this time; however, I will obtain plain film imaging of the chest and abdomen to evaluate her bowel gas pattern. She is clinically dehydrated and tachycardic. She was given IV fluids and laboratory studies were obtained along with a chest x-ray and abdominal x-ray. Chest x-ray shows questionable pneumonia. She was started on antibiotics for community-acquired pneumonia. KUB is unremarkable. Lactate is normal.  White blood cell count 28,000 this is likely multifactorial and due to excessive vomiting, dehydration and possible sepsis. She will be admitted for management of her severe dehydration, acute kidney injury and community-acquired pneumonia. She is comfortable and agreeable with plan of care. EKG shows a sinus tachycardia rate of 123, normal intervals, normal axis, no ischemic changes.     Perfect Serve Consult for Admission  8:40 PM    ED Room Number: ER08/08  Patient Name and age:  Thang Andrade 81 y.o.  female  Working Diagnosis: Severe dehydration  (primary encounter diagnosis)  Acute kidney injury ([address]    COVID-19 Suspicion:  no  Sepsis present:  no  Reassessment needed: no  Code Status:  Full Code  Readmission: no  Isolation Requirements:  no  Recommended Level of Care:  telemetry  Department:[facility] - [phone]      While completing and reviewing the patient's chart after my shift I noted that the hospitalist had ordered a CT of the abdomen and pelvis without IV contrast.  The CT shows a right inguinal hernia causing a small bowel obstruction and portal gas. I spoke with the hospitalist via perfect serve regarding these results and he requested that I make the patient n.p.o. which I did and I also spoke with the night nurse practitioner, Breanne Thomas. I made him aware of the results and recommended that he speak with surgery as soon as possible.         Amount and/or Complexity of Data Reviewed  Clinical lab tests: ordered and reviewed  Tests in the radiology section of CPT®: ordered and reviewed           Procedures

## 2021-02-22 NOTE — PROGRESS NOTES
Admit Date: 2021    POD Day of Surgery    Procedure:  Procedure(s):  RIGHT OPEN  INGUINAL HERNIA REPAIR WITH MESH    HOSPITAL DAY:     ANTIBIOTICS:       Subjective:     Patient feeling better, abdominal symptoms seem to have resolved, no nausea or vomiting, NG tube in place but minimal NG tube output. Patient denies any flatus. Instructions from Dr. Reynold Gordon indicated there was some bowel congestion and wanted to wait until flatus or lower GI function until NG tube removed. Review of Systems  The above patient says abdominal pain seems to be improved only some mild incisional pain, no nausea or vomiting  Objective:     Blood pressure 114/68, pulse 76, temperature 98.7 °F (37.1 °C), resp. rate 16, height 5' 5.5\" (1.664 m), weight 161 lb (73 kg), SpO2 93 %. Temp (24hrs), Av.4 °F (36.9 °C), Min:97.6 °F (36.4 °C), Max:98.8 °F (37.1 °C)          Physical Exam  Vitals signs and nursing note reviewed. Exam conducted with a chaperone present (Sister present). Constitutional:       General: She is not in acute distress. Appearance: Normal appearance. She is not ill-appearing. Cardiovascular:      Rate and Rhythm: Normal rate. Abdominal:      Comments: Abdomen soft flat nontender a few bowel sounds present right groin inguinal incision clean healing well no mass or tenderness   Neurological:      General: No focal deficit present. Mental Status: She is alert and oriented to person, place, and time.    Psychiatric:         Mood and Affect: Mood normal.         Behavior: Behavior normal.            Labs:   Recent Results (from the past 24 hour(s))   EKG, 12 LEAD, INITIAL    Collection Time: 21  7:50 PM   Result Value Ref Range    Ventricular Rate 123 BPM    Atrial Rate 123 BPM    P-R Interval 168 ms    QRS Duration 64 ms    Q-T Interval 282 ms    QTC Calculation (Bezet) 403 ms    Calculated P Axis 75 degrees    Calculated R Axis 45 degrees    Calculated T Axis 62 degrees    Diagnosis Sinus tachycardia  Low voltage QRS  Septal infarct (cited on or before 20-MAR-2019)  When compared with ECG of 20-MAR-2019 12:57,  Previous ECG has undetermined rhythm, needs review     SAMPLES BEING HELD    Collection Time: 02/21/21  7:57 PM   Result Value Ref Range    SAMPLES BEING HELD 1 RED, 1 DANIEL     COMMENT        Add-on orders for these samples will be processed based on acceptable specimen integrity and analyte stability, which may vary by analyte. CBC WITH AUTOMATED DIFF    Collection Time: 02/21/21  7:57 PM   Result Value Ref Range    WBC 28.0 (H) 3.6 - 11.0 K/uL    RBC 4.78 3.80 - 5.20 M/uL    HGB 15.3 11.5 - 16.0 g/dL    HCT 45.0 35.0 - 47.0 %    MCV 94.1 80.0 - 99.0 FL    MCH 32.0 26.0 - 34.0 PG    MCHC 34.0 30.0 - 36.5 g/dL    RDW 12.8 11.5 - 14.5 %    PLATELET 593 038 - 351 K/uL    MPV 9.3 8.9 - 12.9 FL    NRBC 0.0 0  WBC    ABSOLUTE NRBC 0.00 0.00 - 0.01 K/uL    NEUTROPHILS 80 (H) 32 - 75 %    LYMPHOCYTES 4 (L) 12 - 49 %    MONOCYTES 15 (H) 5 - 13 %    EOSINOPHILS 0 0 - 7 %    BASOPHILS 0 0 - 1 %    IMMATURE GRANULOCYTES 1 (H) 0.0 - 0.5 %    ABS. NEUTROPHILS 22.4 (H) 1.8 - 8.0 K/UL    ABS. LYMPHOCYTES 1.1 0.8 - 3.5 K/UL    ABS. MONOCYTES 4.2 (H) 0.0 - 1.0 K/UL    ABS. EOSINOPHILS 0.0 0.0 - 0.4 K/UL    ABS. BASOPHILS 0.0 0.0 - 0.1 K/UL    ABS. IMM.  GRANS. 0.3 (H) 0.00 - 0.04 K/UL    DF SMEAR SCANNED      RBC COMMENTS NORMOCYTIC, NORMOCHROMIC      WBC COMMENTS Pathology Review Requested     METABOLIC PANEL, COMPREHENSIVE    Collection Time: 02/21/21  7:57 PM   Result Value Ref Range    Sodium 133 (L) 136 - 145 mmol/L    Potassium 3.5 3.5 - 5.1 mmol/L    Chloride 95 (L) 97 - 108 mmol/L    CO2 31 21 - 32 mmol/L    Anion gap 7 5 - 15 mmol/L    Glucose 163 (H) 65 - 100 mg/dL    BUN 56 (H) 6 - 20 MG/DL    Creatinine 1.64 (H) 0.55 - 1.02 MG/DL    BUN/Creatinine ratio 34 (H) 12 - 20      GFR est AA 36 (L) >60 ml/min/1.73m2    GFR est non-AA 30 (L) >60 ml/min/1.73m2    Calcium 10.6 (H) 8.5 - 10.1 MG/DL    Bilirubin, total 0.6 0.2 - 1.0 MG/DL    ALT (SGPT) 30 12 - 78 U/L    AST (SGOT) 26 15 - 37 U/L    Alk.  phosphatase 78 45 - 117 U/L    Protein, total 8.4 (H) 6.4 - 8.2 g/dL    Albumin 3.6 3.5 - 5.0 g/dL    Globulin 4.8 (H) 2.0 - 4.0 g/dL    A-G Ratio 0.8 (L) 1.1 - 2.2     TROPONIN I    Collection Time: 02/21/21  7:57 PM   Result Value Ref Range    Troponin-I, Qt. <0.05 <0.05 ng/mL   LIPASE    Collection Time: 02/21/21  7:57 PM   Result Value Ref Range    Lipase 94 73 - 393 U/L   MAGNESIUM    Collection Time: 02/21/21  7:57 PM   Result Value Ref Range    Magnesium 2.3 1.6 - 2.4 mg/dL   CULTURE, BLOOD, PAIRED    Collection Time: 02/21/21  9:59 PM    Specimen: Blood   Result Value Ref Range    Special Requests: NO SPECIAL REQUESTS      Culture result: NO GROWTH AFTER 8 HOURS     LACTIC ACID    Collection Time: 02/21/21 10:04 PM   Result Value Ref Range    Lactic acid 1.8 0.4 - 2.0 MMOL/L   RESPIRATORY VIRUS PANEL W/COVID-19, PCR    Collection Time: 02/21/21 11:32 PM    Specimen: Nasopharyngeal   Result Value Ref Range    Adenovirus Not detected NOTD      Coronavirus 229E Not detected NOTD      Coronavirus HKU1 Not detected NOTD      Coronavirus CVNL63 Not detected NOTD      Coronavirus OC43 Not detected NOTD      Metapneumovirus Not detected NOTD      Rhinovirus and Enterovirus Not detected NOTD      Influenza A Not detected NOTD      Influenza A, subtype H1 Not detected NOTD      Influenza A, subtype H3 Not detected NOTD      INFLUENZA A H1N1 PCR Not detected NOTD      Influenza B Not detected NOTD      Parainfluenza 1 Not detected NOTD      Parainfluenza 2 Not detected NOTD      Parainfluenza 3 Not detected NOTD      Parainfluenza virus 4 Not detected NOTD      RSV by PCR Not detected NOTD      B. parapertussis, PCR Not detected NOTD      Bordetella pertussis - PCR Not detected NOTD      Chlamydophila pneumoniae DNA, QL, PCR Not detected NOTD      Mycoplasma pneumoniae DNA, QL, PCR Not detected NOTD SARS-CoV-2, PCR Not detected NOTD     COVID-19 RAPID TEST    Collection Time: 02/22/21  3:22 AM   Result Value Ref Range    Specimen source Nasopharyngeal      COVID-19 rapid test Not detected NOTD         Data Review images and reports reviewed    Assessment:     Active Problems:    CAP (community acquired pneumonia) (2/21/2021)        Plan/Recommendations/Medical Decision Making:     Continue present treatment     Could probably clamp NG tube and keep in place and check aspirate but Dr. Miguel A De Souza stated based on his operative findings of bowel congestion to hold off on feeding until lower GI function returns.   No evidence of complications    White blood cell count was elevated yesterday, will recheck today, would recommend continue antibiotics until white blood cell count normalized      FACE TO FACE time including review of any indicated imaging, discussion with patient, and other providers, exam and discussion with patient:    15         minutes    Francy Ellis MD , [facility]

## 2021-02-22 NOTE — CONSULTS
Chief Complaint:  Small bowel obstruction    HPI:  82 yo woman with hx diverticulitis who presented to ER with nausea and vomiting. Pt reported symptoms developed Friday evening. She denied any abdominal pain. No prior abdominal operation. CT scan showed small bowel obstruction secondary to right inguinal hernia. She does have portal venous gas concerning for bowel ischemia. No lactic acidosis. Her WBC is 28K. No fever or chills. History reviewed. No pertinent past medical history. Past Surgical History:   Procedure Laterality Date    COLONOSCOPY Left 3/26/2019    COLONOSCOPY performed by Eryn Shultz MD at [address] HX LYSIS OF ADHESIONS  03/27/2019    HX OTHER SURGICAL  03/27/2019    Laparoscopic drainage of pelvic abscess x 2 with lysis of adhesions    HX TONSILLECTOMY      CT ABDOMEN SURGERY PROC UNLISTED      Abcess drained     CT LAP,DIAGNOSTIC ABDOMEN  03/27/2019       No current facility-administered medications on file prior to encounter. Current Outpatient Medications on File Prior to Encounter   Medication Sig Dispense Refill    multivitamin (ONE A DAY) tablet Take 1 Tab by mouth daily.  aspirin 81 mg chewable tablet Take 81 mg by mouth daily.          No Known Allergies    Review of Systems - General ROS: negative  Psychological ROS: negative  Respiratory ROS: negative  Cardiovascular ROS: negative  Gastrointestinal ROS: positive for nausea and vomiting    Visit Vitals  BP (!) 144/92   Pulse (!) 120   Temp 97.6 °F (36.4 °C)   Resp 20   Ht 5' 5.5\" (1.664 m)   Wt 161 lb (73 kg)   SpO2 91%   BMI 26.38 kg/m²           Physical Exam:    Gen:  NAD  Pulm:  Unlabored  Abd:  S/ND/Non-TTP without guarding or rebound/irreducible right inguinal hernia    Recent Results (from the past 24 hour(s))   EKG, 12 LEAD, INITIAL    Collection Time: 02/21/21  7:50 PM   Result Value Ref Range    Ventricular Rate 123 BPM    Atrial Rate 123 BPM    P-R Interval 168 ms    QRS Duration 64 ms    Q-T Interval 282 ms    QTC Calculation (Bezet) 403 ms    Calculated P Axis 75 degrees    Calculated R Axis 45 degrees    Calculated T Axis 62 degrees    Diagnosis       Sinus tachycardia  Low voltage QRS  Septal infarct (cited on or before 20-MAR-2019)  When compared with ECG of 20-MAR-2019 12:57,  Previous ECG has undetermined rhythm, needs review     SAMPLES BEING HELD    Collection Time: 02/21/21  7:57 PM   Result Value Ref Range    SAMPLES BEING HELD 1 RED, 1 DANIEL     COMMENT        Add-on orders for these samples will be processed based on acceptable specimen integrity and analyte stability, which may vary by analyte. CBC WITH AUTOMATED DIFF    Collection Time: 02/21/21  7:57 PM   Result Value Ref Range    WBC 28.0 (H) 3.6 - 11.0 K/uL    RBC 4.78 3.80 - 5.20 M/uL    HGB 15.3 11.5 - 16.0 g/dL    HCT 45.0 35.0 - 47.0 %    MCV 94.1 80.0 - 99.0 FL    MCH 32.0 26.0 - 34.0 PG    MCHC 34.0 30.0 - 36.5 g/dL    RDW 12.8 11.5 - 14.5 %    PLATELET 607 099 - 409 K/uL    MPV 9.3 8.9 - 12.9 FL    NRBC 0.0 0  WBC    ABSOLUTE NRBC 0.00 0.00 - 0.01 K/uL    NEUTROPHILS 80 (H) 32 - 75 %    LYMPHOCYTES 4 (L) 12 - 49 %    MONOCYTES 15 (H) 5 - 13 %    EOSINOPHILS 0 0 - 7 %    BASOPHILS 0 0 - 1 %    IMMATURE GRANULOCYTES 1 (H) 0.0 - 0.5 %    ABS. NEUTROPHILS 22.4 (H) 1.8 - 8.0 K/UL    ABS. LYMPHOCYTES 1.1 0.8 - 3.5 K/UL    ABS. MONOCYTES 4.2 (H) 0.0 - 1.0 K/UL    ABS. EOSINOPHILS 0.0 0.0 - 0.4 K/UL    ABS. BASOPHILS 0.0 0.0 - 0.1 K/UL    ABS. IMM.  GRANS. 0.3 (H) 0.00 - 0.04 K/UL    DF SMEAR SCANNED      RBC COMMENTS NORMOCYTIC, NORMOCHROMIC      WBC COMMENTS Pathology Review Requested     METABOLIC PANEL, COMPREHENSIVE    Collection Time: 02/21/21  7:57 PM   Result Value Ref Range    Sodium 133 (L) 136 - 145 mmol/L    Potassium 3.5 3.5 - 5.1 mmol/L    Chloride 95 (L) 97 - 108 mmol/L    CO2 31 21 - 32 mmol/L    Anion gap 7 5 - 15 mmol/L    Glucose 163 (H) 65 - 100 mg/dL    BUN 56 (H) 6 - 20 MG/DL    Creatinine 1.64 (H) 0.55 - 1.02 MG/DL    BUN/Creatinine ratio 34 (H) 12 - 20      GFR est AA 36 (L) >60 ml/min/1.73m2    GFR est non-AA 30 (L) >60 ml/min/1.73m2    Calcium 10.6 (H) 8.5 - 10.1 MG/DL    Bilirubin, total 0.6 0.2 - 1.0 MG/DL    ALT (SGPT) 30 12 - 78 U/L    AST (SGOT) 26 15 - 37 U/L    Alk. phosphatase 78 45 - 117 U/L    Protein, total 8.4 (H) 6.4 - 8.2 g/dL    Albumin 3.6 3.5 - 5.0 g/dL    Globulin 4.8 (H) 2.0 - 4.0 g/dL    A-G Ratio 0.8 (L) 1.1 - 2.2     TROPONIN I    Collection Time: 02/21/21  7:57 PM   Result Value Ref Range    Troponin-I, Qt. <0.05 <0.05 ng/mL   LIPASE    Collection Time: 02/21/21  7:57 PM   Result Value Ref Range    Lipase 94 73 - 393 U/L   MAGNESIUM    Collection Time: 02/21/21  7:57 PM   Result Value Ref Range    Magnesium 2.3 1.6 - 2.4 mg/dL   LACTIC ACID    Collection Time: 02/21/21 10:04 PM   Result Value Ref Range    Lactic acid 1.8 0.4 - 2.0 MMOL/L       AP:  80 yo woman with small bowel obstruction secondary to incarcerated right inguinal hernia    - Incarcerated right inguinal hernia:  Concerning for ischemic bowel. Hernia is not reducible. - Plan for emergent OR for open hernia repair with mesh, possible bowel resection  - Risks and benefits explained.   Pt wish to pursue with operation  - Antibiotic therapy

## 2021-02-22 NOTE — H&P
Hospitalist History and Physical  Osiris Girard MD  Answering service: [phone] from in house phone        Date of Service:  2021  NAME:  Ara Adam  :  1935  MRN:  [id number]  Primary Care Provider: Mercedez Cox MD    Chief Complaint:   Chief Complaint   Patient presents with    Vomiting    Referral Request       History of Present Illness:     Ara Adam is a 81 y.o. female with no significant past medical history came for elevation of nausea vomiting since yesterday. Patient is awake, alert and oriented able to answer my question follow my request.  Patient reports that she developed severe onset of nausea vomiting since yesterday. Vomiting is aggressive, multiple times a day, nonbloody, nonbloody unable to keep anything down. She denies any fevers cough or chest pain. Had some left lower quadrant abdominal pain on Friday which has resolved today. Patient has history of diverticulitis in the past and was concerned and that so he came for evaluation. In the ED, patient was tachycardic but saturating well on room air, afebrile, normotensive but blood work revealed acute kidney injury and leukocytosis. Chest x-ray was suspicious right lower lobe pneumonia, KUB without any acute abnormality. Patient was started on IV fluids and IV antibiotics and is being admitted for further evaluation and management. Patient is currently medically stable, has no chest pain cough fever chills or rigors night sweats. Chart reviewed at length. Review of Systems:  Pertinent positives noted in HPI. All other systems were reviewed and are negative. Past Medical and surgical history:   History reviewed. No pertinent past medical history.    Past Surgical History:   Procedure Laterality Date    COLONOSCOPY Left 3/26/2019    COLONOSCOPY performed by Kendall Dillon MD at [address] HX LYSIS OF ADHESIONS  2019    [address] OTHER SURGICAL  2019 Laparoscopic drainage of pelvic abscess x 2 with lysis of adhesions    HX TONSILLECTOMY      UT ABDOMEN SURGERY PROC UNLISTED      Abcess drained     UT LAP,DIAGNOSTIC ABDOMEN  03/27/2019       Home medications:  Prior to Admission medications    Medication Sig Start Date End Date Taking? Authorizing Provider   multivitamin (ONE A DAY) tablet Take 1 Tab by mouth daily. Provider, Historical   aspirin 81 mg chewable tablet Take 81 mg by mouth daily. Provider, Historical       Allergies:  No Known Allergies    Family history:   History reviewed. No pertinent family history. SOCIAL HISTORY:  Patient resides at Home. Patient ambulates with outside. Smoking history: reports that she has never smoked. She has never used smokeless tobacco.  Drug History:  reports no history of drug use. Alcohol history:  reports previous alcohol use. Objective:       Physical Exam:   Visit Vitals  /68 (BP 1 Location: Left upper arm, BP Patient Position: At rest)   Pulse (!) 101   Temp 97.6 °F (36.4 °C)   Resp 16   Ht 5' 5.5\" (1.664 m)   Wt 73 kg (161 lb)   SpO2 91%   BMI 26.38 kg/m²     General appearance: alert, cooperative, no distress, appears stated age  Head: Normocephalic, without obvious abnormality, atraumatic  Eyes: negative findings: anicteric sclera and conjunctivae and sclerae normal  Throat: Lips, mucosa, and tongue normal. Teeth and gums normal  Neck: supple, symmetrical, trachea midline and no adenopathy  Lungs: RLL zone crepts, no wheezing  Heart: Sinus tachycardia, no murmurs  Abdomen: soft, non-tender. Bowel sounds normal. No masses,  no organomegaly  Extremities: extremities normal, atraumatic, no cyanosis or edema  Pulses: 2+ and symmetric  Skin: Skin color, texture, turgor normal. No rashes or lesions  Neurologic: Grossly normal    ECG:  sinus tachycardia     Laboratory and other diagnostic Data Review: All diagnostic labs and studies have been reviewed.     Xr Abd (kub)    Result Date: 2/21/2021  INDICATION: Vomiting FINDINGS: Single supine view of the abdomen demonstrates a nonspecific intestinal gas pattern. No soft tissue mass or pathological soft tissue calcification is seen. Degenerative changes are present in the thoracolumbar spine. Intrauterine device overlies the pelvis. Nonspecific intestinal gas pattern. Xr Chest Port    Result Date: 2/21/2021  INDICATION: Vomiting COMPARISON: Chest CT from March 20, 2019 FINDINGS: AP portable imaging of the chest performed at 8:40 PM demonstrates a stable cardiomediastinal silhouette. There is dense consolidation at the right lung base. The left lung is clear. No pleural effusion is evident. No significant osseous abnormalities are seen. Right basilar airspace disease likely represents pneumonia. Patient Vitals for the past 12 hrs:   Temp Pulse Resp BP SpO2   02/21/21 1919 97.6 °F (36.4 °C) (!) 101 16 125/68 91 %       Recent Labs     02/21/21 1957   WBC 28.0*   HGB 15.3   HCT 45.0        Recent Labs     02/21/21 1957   *   K 3.5   CL 95*   CO2 31   BUN 56*   CREA 1.64*   *   CA 10.6*   MG 2.3     Recent Labs     02/21/21 1957   ALT 30   AP 78   TBILI 0.6   TP 8.4*   ALB 3.6   GLOB 4.8*   LPSE 94     No results for input(s): INR, PTP, APTT, INREXT in the last 72 hours. No results for input(s): FE, TIBC, PSAT, FERR in the last 72 hours. Lab Results   Component Value Date/Time    Folate 14.1 03/23/2019 02:17 AM      No results for input(s): PH, PCO2, PO2 in the last 72 hours.   Recent Labs     02/21/21 1957   TROIQ <0.05     Lab Results   Component Value Date/Time    Cholesterol, total 265 (H) 09/11/2019 08:46 AM    HDL Cholesterol 62 09/11/2019 08:46 AM    LDL, calculated 176 (H) 09/11/2019 08:46 AM    Triglyceride 137 09/11/2019 08:46 AM     No results found for: [facility]  Lab Results   Component Value Date/Time    Color Yellow 09/16/2019 11:32 AM    Appearance Clear 09/16/2019 11:32 AM    Specific gravity 1.019 03/25/2019 05:30 PM    pH (UA) 5.5 09/16/2019 11:32 AM    Protein NEGATIVE  03/25/2019 05:30 PM    Glucose NEGATIVE  03/25/2019 05:30 PM    Ketone Negative 09/16/2019 11:32 AM    Bilirubin Negative 09/16/2019 11:32 AM    Urobilinogen 0.2 03/25/2019 05:30 PM    Nitrites Negative 09/16/2019 11:32 AM    Leukocyte Esterase 1+ (A) 09/16/2019 11:32 AM    Epithelial cells MODERATE (A) 03/25/2019 05:30 PM    Bacteria Moderate (A) 09/16/2019 11:32 AM    WBC >30 (A) 09/16/2019 11:32 AM    RBC 3-10 (A) 09/16/2019 11:32 AM       Assessment:   Given the patient's current clinical presentation, I have a high level of concern for decompensation if discharged from the emergency department. Complex decision making was performed, which includes reviewing the patient's available past medical records, laboratory results, and x-ray films. My assessment of this patient's clinical condition and my plan of care is as follows. Active Problems:    CAP (community acquired pneumonia) (2/21/2021)        Plan:     - RLL PNA, POA  - Sepsis, sec to PNA (WBC 28 K, HR above 100)  Admit to tele  IVF  CXR shows RLL pneumonia  Stat BC x 2  IV azithromycin and ceftriaxone  Bronchodilators PRN    - Aggressive vomiting, resolved POA  Zofran PRN  CT abd/pelvis wo contrast, it will eval lung base as well    - Elevated Cr  Likely GARRY  IVF  Avoid nephrotoxins  Repeat labs in am    -Hyponatremia, hypercalcemia  Likely due to volume depletion  IVF  Repeat labs in am      Diet: Regular Diet  Activity: Activity as tolerated  DVT prophylaxis: Heparin SQ TID  Isolation precautions: none  Consultations: none  Anticipated disposition: home  Code status: Full Code    Admit to inpatient status     Patient was explained about the risk of admission including and not a complete list including risk of falls,fractures,blood clots,allergic reactions,infections.  Patient/family also understands and agrees to the treatment plan including medications and side effect profiles and also understand the risk with radiation while undergoing imaging studies. The patient and the family/friends (after permission given by the patient to discuss) understand this and agree with the admission plan. Signed By: Marj Arguelles MD     02/21/21  9:40 PM        Patient's emergency contacts:  Extended Emergency Contact Information  Primary Emergency Contact: None, none  Home Phone: [phone]  Relation: Unknown  Secondary Emergency Contact: Eric SÁNCHEZ  Home Phone: [phone]  Relation: Child     Please note that this dictation was completed with "[employer] the [employer] voice recognition software. Quite often unanticipated grammatical, syntax, homophones, and other interpretive errors are inadvertently transcribed by the computer software. Please disregard these errors. Please excuse any errors that have escaped final proofreading.

## 2021-02-22 NOTE — PERIOP NOTES
TRANSFER - OUT REPORT:    Verbal report given to Rema(name) on Silva Herbert  being transferred to [facility](unit) for routine post - op       Report consisted of patients Situation, Background, Assessment and   Recommendations(SBAR). Time Pre op antibiotic [id number]  Anesthesia Stop time: 6347    Information from the following report(s) SBAR, OR Summary, Intake/Output, MAR, Recent Results and Cardiac Rhythm NSR. was reviewed with the receiving nurse. Opportunity for questions and clarification was provided. Is the patient on 02? NO    Is the patient on a monitor? NO    Is the nurse transporting with the patient? YES    Surgical Waiting Area notified of patient's transfer from PACU? NO      The following personal items collected during your admission accompanied patient upon transfer:   Dental Appliance:    Vision:    Hearing Aid:    Jewelry:    Clothing: Clothing: With patient  Other Valuables:  Other Valuables: Purse, With patient  Valuables sent to safe:

## 2021-02-22 NOTE — ED NOTES
Pt ambulatory to ED with c/o N/V onset Friday. Referred from [facility] for further evaluation of low oxygenation and abnormal EKG. Pt denies CP. Reports LLQ abdominal pain onset Friday with hx of diverticulitis.

## 2021-02-22 NOTE — BRIEF OP NOTE
Brief Postoperative Note    Patient: Christina Ferreira  YOB: 1935  MRN: [id number]    Date of Procedure: 2/22/2021     Pre-Op Diagnosis: RIGHT INGUINAL HERNIA POSSIBLE ISCHEMIC BOWEL    Post-Op Diagnosis: INCARCERATED RIGHT FEMORAL HERNIA    Procedure(s):  RIGHT OPEN  INGUINAL HERNIA REPAIR WITH MESH    Surgeon(s):  Carrie Cardona MD    Surgical Assistant: Surg Asst-1: Angela Morgan    Anesthesia: General     Estimated Blood Loss (mL): Minimal    Complications: None    Specimens:   ID Type Source Tests Collected by Time Destination   1 : hernia sac Fresh Hernia Sac  Carrie Cardona MD 2/22/2021 6174 Pathology        Implants:   Implant Name Type Inv. Item Serial No.  Lot No. LRB No. Used Action   PLUG [facility] M W1.3XL1. 55IN INGUINAL POLYPR REP PRESHAPED - [id number]  [address] [place] [address] M W1.3XL1. 55IN INGUINAL POLYPR REP PRESHAPED [id number] BARD DAVOL_WD [id number] Right 1 Implanted       Drains:   Nasogastric Tube 02/22/21 (Active)       Findings: There was a loop of small bowel that was incarcerated in a femoral hernia with venous congestion which pink up after release from incarceration. No bowel ischemia or necrosis. Femoral hernia was repaired with medium size plug mesh.     Electronically Signed by Sanjana Oglesby MD on 2/22/2021 at 4:40 AM

## 2021-02-22 NOTE — PROGRESS NOTES
Hospitalist Cross Coverage NP     Name: Ginger Broussard  YOB: 1935  MRN: [id number]  Admission Date: 2/21/2021  8:13 PM    Date of service: 2/22/2021 12:49 AM                                Overnight update:        CT scan positive for small bowel obstruction secondary to right inguinal hernia with associated small portal venous gas. Physical Exam  Vitals signs and nursing note reviewed. Constitutional:       General: She is not in acute distress. Appearance: She is ill-appearing. HENT:      Mouth/Throat:      Mouth: Mucous membranes are moist.   Cardiovascular:      Rate and Rhythm: Normal rate. Pulmonary:      Effort: Pulmonary effort is normal.   Abdominal:      General: Bowel sounds are normal. There is no distension. Palpations: Abdomen is soft. Tenderness: There is no abdominal tenderness. Skin:     General: Skin is warm and dry. Neurological:      General: No focal deficit present. Mental Status: She is oriented to person, place, and time. A/P  - Discussed CT results with Dr Bk Smith, general surgery. Recommends repeat CT scan in AM, intraabdominal abx coverage, NPO status. Will evaluate patient.      Mj VILLALOBOSP-C, PA-C  [phone] or [employer]

## 2021-02-22 NOTE — PROGRESS NOTES
Hospitalist Progress Note  Valentín Guerin MD  Answering service: [phone] OR [phone] from in house phone      Date of Service:  2021  NAME:  Ervin Avalos  :  1935  MRN:  [id number]    Admission Summary:   85F p/w N/V- got urgent surgery overnight for incarcerated R inguinal hernia  Interval history / Subjective:   Patient seen and examined at bedside, feels well, no n/v, NGT clamped. Not passing gas per rectum yet. BS + though. Assessment & Plan:     #. RLL PNA, POA- seen CXR. Also CT a/p showed R side PNA. #. Sepsis, sec to PNA (WBC 28 K, HR above 100)  - IVF- BCs NGTD- IV azithromycin and ceftriaxone- Bronchodilators PRN. PT/OT    #. S/p R inguinal hernia repair: urgent surgery overnight. - Aggressive vomiting, resolved POA- Zofran PRN- CT a/p: SBO 2nd to hernia. - surgery following, NGT in situ. No PO till bowels move. #. GARRY: mild, Avoid nephrotoxins, monitor   #. Hyponatremia, hypercalcemia- Likely due to volume depletion. monitor    Code status: Full  DVT prophylaxis: Heparin  Care Plan discussed with: Patient/Family and Nurse  Disposition: TBD >2days     Hospital Problems  Date Reviewed: 10/8/2020          Codes Class Noted POA    CAP (community acquired pneumonia) ICD-10-CM: J18.9  ICD-9-CM: 465  2021 Unknown            Review of Systems:   Pertinent items are mentioned in interval history. Vital Signs:    Last 24hrs VS reviewed since prior progress note.  Most recent are:  Visit Vitals  /68 (BP 1 Location: Right arm, BP Patient Position: At rest)   Pulse 76   Temp 98.7 °F (37.1 °C)   Resp 16   Ht 5' 5.5\" (1.664 m)   Wt 73 kg (161 lb)   SpO2 93%   BMI 26.38 kg/m²         Intake/Output Summary (Last 24 hours) at 2021 1026  Last data filed at 2021 0530  Gross per 24 hour   Intake 2500 ml   Output 200 ml   Net 2300 ml        Physical Examination:   Evaluated face to face and examined 02/22/21    General:  Alert, oriented, No acute distress, slim pleasant elderly [employer]  Card:  S1, S2 without murmur, good peripheral perfusion  Resp:  No accessory muscle use, Good AE, no wheezes, some crepitations R  Abd:  Soft, mild-tender, non-distended, BS+, RLQ fresh surgical scar noted. Extremities:  No cyanosis or clubbing, no significant edema  Neuro:  Grossly normal, no focal neuro deficits, follows commands   Psych:  Good insight, not agitated. Data Review:    Review and/or order of clinical lab test  Review and/or order of tests in the radiology section of CPT  Review and/or order of tests in the medicine section of CPT  Labs:     Recent Labs     02/21/21 1957   WBC 28.0*   HGB 15.3   HCT 45.0        Recent Labs     02/21/21 1957   *   K 3.5   CL 95*   CO2 31   BUN 56*   CREA 1.64*   *   CA 10.6*   MG 2.3     Recent Labs     02/21/21 1957   ALT 30   AP 78   TBILI 0.6   TP 8.4*   ALB 3.6   GLOB 4.8*   LPSE 94     No results for input(s): INR, PTP, APTT, INREXT in the last 72 hours. No results for input(s): FE, TIBC, PSAT, FERR in the last 72 hours. Lab Results   Component Value Date/Time    Folate 14.1 03/23/2019 02:17 AM      No results for input(s): PH, PCO2, PO2 in the last 72 hours.   Recent Labs     02/21/21 1957   TROIQ <0.05     Lab Results   Component Value Date/Time    Cholesterol, total 265 (H) 09/11/2019 08:46 AM    HDL Cholesterol 62 09/11/2019 08:46 AM    LDL, calculated 176 (H) 09/11/2019 08:46 AM    Triglyceride 137 09/11/2019 08:46 AM     No results found for: [facility]  Lab Results   Component Value Date/Time    Color Yellow 09/16/2019 11:32 AM    Appearance Clear 09/16/2019 11:32 AM    Specific gravity 1.019 03/25/2019 05:30 PM    pH (UA) 5.5 09/16/2019 11:32 AM    Protein NEGATIVE  03/25/2019 05:30 PM    Glucose NEGATIVE  03/25/2019 05:30 PM    Ketone Negative 09/16/2019 11:32 AM    Bilirubin Negative 09/16/2019 11:32 AM    Urobilinogen 0.2 03/25/2019 05:30 PM Nitrites Negative 09/16/2019 11:32 AM    Leukocyte Esterase 1+ (A) 09/16/2019 11:32 AM    Epithelial cells MODERATE (A) 03/25/2019 05:30 PM    Bacteria Moderate (A) 09/16/2019 11:32 AM    WBC >30 (A) 09/16/2019 11:32 AM    RBC 3-10 (A) 09/16/2019 11:32 AM     Medications Reviewed:     Current Facility-Administered Medications   Medication Dose Route Frequency    piperacillin-tazobactam (ZOSYN) 3.375 g in 0.9% sodium chloride (MBP/ADV) 100 mL MBP  3.375 g IntraVENous Q8H    HYDROmorphone (PF) (DILAUDID) injection 1 mg  1 mg IntraVENous Q3H PRN    phenol throat spray (CHLORASEPTIC) 1 Spray  1 Spray Oral PRN    0.9% sodium chloride infusion  75 mL/hr IntraVENous CONTINUOUS    sodium chloride (NS) flush 5-10 mL  5-10 mL IntraVENous PRN    azithromycin (ZITHROMAX) 500 mg in 0.9% sodium chloride 250 mL (VIAL-MATE)  500 mg IntraVENous Q24H    sodium chloride (NS) flush 5-40 mL  5-40 mL IntraVENous Q8H    sodium chloride (NS) flush 5-40 mL  5-40 mL IntraVENous PRN    acetaminophen (TYLENOL) tablet 650 mg  650 mg Oral Q4H PRN    albuterol (PROVENTIL VENTOLIN) nebulizer solution 2.5 mg  2.5 mg Nebulization Q4H PRN    heparin (porcine) injection 5,000 Units  5,000 Units SubCUTAneous Q8H    ondansetron (ZOFRAN) injection 4 mg  4 mg IntraVENous Q6H PRN   ______________________________________________________________________  EXPECTED LENGTH OF STAY: - - -  ACTUAL LENGTH OF STAY:          1               Cameron Wallace MD

## 2021-02-22 NOTE — PROGRESS NOTES
I have reviewed and agree with the charting of senior nursing student, [facility]. Bedside shift change report given to Rony Fitzgerald RN (oncoming nurse) by Richardson Buckner RN (offgoing nurse). Report included the following information SBAR, Kardex, Procedure Summary, Intake/Output, MAR and Recent Results.

## 2021-02-23 ENCOUNTER — APPOINTMENT (OUTPATIENT)
Dept: GENERAL RADIOLOGY | Age: 86
DRG: 853 | End: 2021-02-23
Attending: SURGERY
Payer: MEDICARE

## 2021-02-23 LAB
ANION GAP SERPL CALC-SCNC: 5 MMOL/L (ref 5–15)
BUN SERPL-MCNC: 30 MG/DL (ref 6–20)
BUN/CREAT SERPL: 32 (ref 12–20)
CALCIUM SERPL-MCNC: 8.6 MG/DL (ref 8.5–10.1)
CHLORIDE SERPL-SCNC: 105 MMOL/L (ref 97–108)
CO2 SERPL-SCNC: 29 MMOL/L (ref 21–32)
CREAT SERPL-MCNC: 0.95 MG/DL (ref 0.55–1.02)
ERYTHROCYTE [DISTWIDTH] IN BLOOD BY AUTOMATED COUNT: 12.9 % (ref 11.5–14.5)
GLUCOSE SERPL-MCNC: 107 MG/DL (ref 65–100)
HCT VFR BLD AUTO: 34.1 % (ref 35–47)
HGB BLD-MCNC: 11.2 G/DL (ref 11.5–16)
MCH RBC QN AUTO: 31.5 PG (ref 26–34)
MCHC RBC AUTO-ENTMCNC: 32.8 G/DL (ref 30–36.5)
MCV RBC AUTO: 96.1 FL (ref 80–99)
NRBC # BLD: 0 K/UL (ref 0–0.01)
NRBC BLD-RTO: 0 PER 100 WBC
PLATELET # BLD AUTO: 259 K/UL (ref 150–400)
PMV BLD AUTO: 9.5 FL (ref 8.9–12.9)
POTASSIUM SERPL-SCNC: 3.4 MMOL/L (ref 3.5–5.1)
RBC # BLD AUTO: 3.55 M/UL (ref 3.8–5.2)
SODIUM SERPL-SCNC: 139 MMOL/L (ref 136–145)
WBC # BLD AUTO: 13.9 K/UL (ref 3.6–11)

## 2021-02-23 PROCEDURE — 74011250636 HC RX REV CODE- 250/636: Performed by: INTERNAL MEDICINE

## 2021-02-23 PROCEDURE — 74011250637 HC RX REV CODE- 250/637: Performed by: INTERNAL MEDICINE

## 2021-02-23 PROCEDURE — C9113 INJ PANTOPRAZOLE SODIUM, VIA: HCPCS | Performed by: INTERNAL MEDICINE

## 2021-02-23 PROCEDURE — 97165 OT EVAL LOW COMPLEX 30 MIN: CPT

## 2021-02-23 PROCEDURE — 97116 GAIT TRAINING THERAPY: CPT

## 2021-02-23 PROCEDURE — 97161 PT EVAL LOW COMPLEX 20 MIN: CPT

## 2021-02-23 PROCEDURE — 74019 RADEX ABDOMEN 2 VIEWS: CPT

## 2021-02-23 PROCEDURE — 65270000029 HC RM PRIVATE

## 2021-02-23 PROCEDURE — 74011250636 HC RX REV CODE- 250/636: Performed by: SURGERY

## 2021-02-23 PROCEDURE — 74011000258 HC RX REV CODE- 258: Performed by: SURGERY

## 2021-02-23 PROCEDURE — 36415 COLL VENOUS BLD VENIPUNCTURE: CPT

## 2021-02-23 PROCEDURE — 74011000250 HC RX REV CODE- 250: Performed by: INTERNAL MEDICINE

## 2021-02-23 PROCEDURE — 85027 COMPLETE CBC AUTOMATED: CPT

## 2021-02-23 PROCEDURE — 80048 BASIC METABOLIC PNL TOTAL CA: CPT

## 2021-02-23 RX ORDER — CALCIUM CARBONATE 200(500)MG
200 TABLET,CHEWABLE ORAL
Status: DISCONTINUED | OUTPATIENT
Start: 2021-02-23 | End: 2021-02-26 | Stop reason: HOSPADM

## 2021-02-23 RX ORDER — DEXTROSE, SODIUM CHLORIDE, AND POTASSIUM CHLORIDE 5; .9; .15 G/100ML; G/100ML; G/100ML
75 INJECTION INTRAVENOUS CONTINUOUS
Status: DISCONTINUED | OUTPATIENT
Start: 2021-02-23 | End: 2021-02-25

## 2021-02-23 RX ORDER — POTASSIUM CHLORIDE 14.9 MG/ML
10 INJECTION INTRAVENOUS
Status: COMPLETED | OUTPATIENT
Start: 2021-02-23 | End: 2021-02-23

## 2021-02-23 RX ADMIN — POTASSIUM CHLORIDE 10 MEQ: 200 INJECTION, SOLUTION INTRAVENOUS at 17:50

## 2021-02-23 RX ADMIN — Medication 10 ML: at 14:37

## 2021-02-23 RX ADMIN — POTASSIUM CHLORIDE, DEXTROSE MONOHYDRATE AND SODIUM CHLORIDE 75 ML/HR: 150; 5; 900 INJECTION, SOLUTION INTRAVENOUS at 15:08

## 2021-02-23 RX ADMIN — CALCIUM CARBONATE (ANTACID) CHEW TAB 500 MG 200 MG: 500 CHEW TAB at 17:09

## 2021-02-23 RX ADMIN — CALCIUM CARBONATE (ANTACID) CHEW TAB 500 MG 200 MG: 500 CHEW TAB at 12:49

## 2021-02-23 RX ADMIN — HEPARIN SODIUM 5000 UNITS: 5000 INJECTION INTRAVENOUS; SUBCUTANEOUS at 05:40

## 2021-02-23 RX ADMIN — POTASSIUM CHLORIDE 10 MEQ: 200 INJECTION, SOLUTION INTRAVENOUS at 12:51

## 2021-02-23 RX ADMIN — PIPERACILLIN AND TAZOBACTAM 3.38 G: 3; .375 INJECTION, POWDER, LYOPHILIZED, FOR SOLUTION INTRAVENOUS at 10:24

## 2021-02-23 RX ADMIN — AZITHROMYCIN MONOHYDRATE 500 MG: 500 INJECTION, POWDER, LYOPHILIZED, FOR SOLUTION INTRAVENOUS at 21:31

## 2021-02-23 RX ADMIN — Medication 10 ML: at 21:32

## 2021-02-23 RX ADMIN — PIPERACILLIN AND TAZOBACTAM 3.38 G: 3; .375 INJECTION, POWDER, LYOPHILIZED, FOR SOLUTION INTRAVENOUS at 01:30

## 2021-02-23 RX ADMIN — HEPARIN SODIUM 5000 UNITS: 5000 INJECTION INTRAVENOUS; SUBCUTANEOUS at 14:00

## 2021-02-23 RX ADMIN — POTASSIUM CHLORIDE 10 MEQ: 200 INJECTION, SOLUTION INTRAVENOUS at 14:37

## 2021-02-23 RX ADMIN — Medication 10 ML: at 06:00

## 2021-02-23 RX ADMIN — SODIUM CHLORIDE 75 ML/HR: 9 INJECTION, SOLUTION INTRAVENOUS at 01:32

## 2021-02-23 RX ADMIN — POTASSIUM CHLORIDE 10 MEQ: 200 INJECTION, SOLUTION INTRAVENOUS at 16:20

## 2021-02-23 RX ADMIN — HEPARIN SODIUM 5000 UNITS: 5000 INJECTION INTRAVENOUS; SUBCUTANEOUS at 21:30

## 2021-02-23 RX ADMIN — PANTOPRAZOLE SODIUM 40 MG: 40 INJECTION, POWDER, FOR SOLUTION INTRAVENOUS at 12:51

## 2021-02-23 RX ADMIN — PIPERACILLIN AND TAZOBACTAM 3.38 G: 3; .375 INJECTION, POWDER, LYOPHILIZED, FOR SOLUTION INTRAVENOUS at 17:10

## 2021-02-23 NOTE — PROGRESS NOTES
ARNALDO PLAN:    RUR-11%    Patient was admitted from home and may likely be discharged home upon discharge    Patient to follow up with PCP/Specialist    CM needs to get 1st IM Letter completed in am    Reason for Admission:  Right Inguinal hernia, Right, s/p Inguinal Hernia repai                    RUR Score:  11%                   Plan for utilizing home health:      TBD    PCP: First and Last name:  Dr. Kristen Pinzon   Name of Practice:    Are you a current patient: Yes/No: Yes   Approximate date of last visit:    Can you participate in a virtual visit with your PCP: Yes                    Current Advanced Directive/Advance Care Plan:  No ACP. Patient declined to continue conversation    Healthcare Decision Maker:   Click here to complete [address] including selection of the Healthcare Decision Maker Relationship (ie \"Primary\")                         Transition of Care Plan:   CM called patient on the phone to discuss discharge planning. Patient was not interested in talking with this CM. She hung up the phone after a few minutes. Patient said she lives alone in her private residence with a few steps. She also said she did not yet know who will provide transportation home. CM will follow up with patient am. Adama Waite MSA, RN, CRM  Care Management Interventions  PCP Verified by CM:  Yes  Palliative Care Criteria Met (RRAT>21 & CHF Dx)?: No  Transition of Care Consult (CM Consult): Discharge Planning  MyChart Signup: No  Discharge Durable Medical Equipment: No  Health Maintenance Reviewed: Yes  Physical Therapy Consult: No  Occupational Therapy Consult: No  Speech Therapy Consult: No  Current Support Network: Lives Alone  Confirm Follow Up Transport: Family  Discharge Location  Discharge Placement: Skilled nursing facility

## 2021-02-23 NOTE — PROGRESS NOTES
Hospitalist Progress Note  Izabella Arreaga MD  Answering service: [phone] OR [phone] from in house phone      Date of Service:  2021  NAME:  Cristi Nicholson  :  1935  MRN:  [id number]    Admission Summary:   85F p/w N/V- got urgent surgery overnight for incarcerated R inguinal hernia  Interval history / Subjective:   Patient seen and examined at bedside, feels unwell generally, can't specify, had coughed up some phlegm, not sob, sats 97% on RA. Bowels rumble, not passing gas yet. Repeat AXR pending, if ok surgery will remove NGT today and will start ice chips/ liquids. Assessment & Plan:     #. RLL PNA, POA- seen CXR. Also CT a/p showed R side PNA. Suspect aspiration 'as was vomiting repeatedly from bowel obstruction'. #. Acute hypoxic respiratory failure: 2nd to above. Needing supplemental O2. #. Sepsis, sec to PNA (WBC 28 K, HR above 100)- resolved. - IVF- BCs NGTD- IV azithromycin and ceftriaxone- Bronchodilators PRN. PT/OT    #. S/p R inguinal hernia repair: urgent surgery overnight. - Zofran PRN- CT a/p: SBO 2nd to hernia. - surgery following, NGT in situ. Repeat AXR  pending if ok will start po, remove NGT. #. GARRY: mild, Avoid nephrotoxins, monitor   #. Hyponatremia, hypercalcemia- Likely due to volume depletion. monitor    Code status: Full  DVT prophylaxis: Heparin  Care Plan discussed with: Patient/Family and Nurse  Disposition: [facility]  Date Reviewed: 10/8/2020          Codes Class Noted POA    CAP (community acquired pneumonia) ICD-10-CM: J18.9  ICD-9-CM: 443  2021 Unknown            Review of Systems:   Pertinent items are mentioned in interval history. Vital Signs:    Last 24hrs VS reviewed since prior progress note.  Most recent are:  Visit Vitals  /60 (BP 1 Location: Left arm, BP Patient Position: At rest)   Pulse 67   Temp 98.5 °F (36.9 °C)   Resp 18   Ht 5' 5.5\" (1.664 m)   Wt 73 kg (161 lb)   SpO2 94%   BMI 26.38 kg/m²         Intake/Output Summary (Last 24 hours) at 2/23/2021 1207  Last data filed at 2/23/2021 1026  Gross per 24 hour   Intake 0 ml   Output 1250 ml   Net -1250 ml        Physical Examination:   Evaluated face to face and examined 02/23/21    General:  Alert, oriented, slim pleasant elderly [employer]  Resp:  No accessory muscle use, no wheezes, some crepitations R  Abd:  Soft, mild-tender, non-distended, BS+, RLQ fresh surgical scar noted. Extremities:  No cyanosis or clubbing, no significant edema  Neuro:  Grossly normal, no focal neuro deficits, follows commands   Psych:  Good insight, not agitated. Data Review:    Review and/or order of clinical lab test  Review and/or order of tests in the radiology section of CPT  Review and/or order of tests in the medicine section of [facility]  Labs:     Recent Labs     02/23/21 0128 02/21/21 1957   WBC 13.9* 28.0*   HGB 11.2* 15.3   HCT 34.1* 45.0    348     Recent Labs     02/23/21  0128 02/21/21 1957    133*   K 3.4* 3.5    95*   CO2 29 31   BUN 30* 56*   CREA 0.95 1.64*   * 163*   CA 8.6 10.6*   MG  --  2.3     Recent Labs     02/21/21 1957   ALT 30   AP 78   TBILI 0.6   TP 8.4*   ALB 3.6   GLOB 4.8*   LPSE 94     No results for input(s): INR, PTP, APTT, INREXT, INREXT in the last 72 hours. No results for input(s): FE, TIBC, PSAT, FERR in the last 72 hours. Lab Results   Component Value Date/Time    Folate 14.1 03/23/2019 02:17 AM      No results for input(s): PH, PCO2, PO2 in the last 72 hours.   Recent Labs     02/21/21 1957   TROIQ <0.05     Lab Results   Component Value Date/Time    Cholesterol, total 265 (H) 09/11/2019 08:46 AM    HDL Cholesterol 62 09/11/2019 08:46 AM    LDL, calculated 176 (H) 09/11/2019 08:46 AM    Triglyceride 137 09/11/2019 08:46 AM     No results found for: [facility]  Lab Results   Component Value Date/Time    Color YELLOW/STRAW 02/22/2021 04:29 PM    Appearance CLEAR 02/22/2021 04:29 PM    Specific gravity 1.026 02/22/2021 04:29 PM    pH (UA) 5.5 02/22/2021 04:29 PM    Protein 30 (A) 02/22/2021 04:29 PM    Glucose Negative 02/22/2021 04:29 PM    Ketone Negative 02/22/2021 04:29 PM    Bilirubin Negative 02/22/2021 04:29 PM    Urobilinogen 0.2 02/22/2021 04:29 PM    Nitrites Negative 02/22/2021 04:29 PM    Leukocyte Esterase TRACE (A) 02/22/2021 04:29 PM    Epithelial cells MODERATE (A) 02/22/2021 04:29 PM    Bacteria 1+ (A) 02/22/2021 04:29 PM    WBC 5-10 02/22/2021 04:29 PM    RBC 5-10 02/22/2021 04:29 PM     Medications Reviewed:     Current Facility-Administered Medications   Medication Dose Route Frequency    piperacillin-tazobactam (ZOSYN) 3.375 g in 0.9% sodium chloride (MBP/ADV) 100 mL MBP  3.375 g IntraVENous Q8H    HYDROmorphone (PF) (DILAUDID) injection 1 mg  1 mg IntraVENous Q3H PRN    phenol throat spray (CHLORASEPTIC) 1 Spray  1 Spray Oral PRN    0.9% sodium chloride infusion  75 mL/hr IntraVENous CONTINUOUS    sodium chloride (NS) flush 5-10 mL  5-10 mL IntraVENous PRN    azithromycin (ZITHROMAX) 500 mg in 0.9% sodium chloride 250 mL (VIAL-MATE)  500 mg IntraVENous Q24H    sodium chloride (NS) flush 5-40 mL  5-40 mL IntraVENous Q8H    sodium chloride (NS) flush 5-40 mL  5-40 mL IntraVENous PRN    acetaminophen (TYLENOL) tablet 650 mg  650 mg Oral Q4H PRN    albuterol (PROVENTIL VENTOLIN) nebulizer solution 2.5 mg  2.5 mg Nebulization Q4H PRN    heparin (porcine) injection 5,000 Units  5,000 Units SubCUTAneous Q8H    ondansetron (ZOFRAN) injection 4 mg  4 mg IntraVENous Q6H PRN   ______________________________________________________________________  EXPECTED LENGTH OF STAY: 9d 19h  ACTUAL LENGTH OF STAY:          2               Dimitris Dominguez MD

## 2021-02-23 NOTE — PROGRESS NOTES
Problem: Self Care Deficits Care Plan (Adult)  Goal: *Acute Goals and Plan of Care (Insert Text)  Description:   FUNCTIONAL STATUS PRIOR TO ADMISSION: Pt lives alone and was independent with all ADLs and IADLs. HOME SUPPORT: The patient lived alone with no local support. Pt reports son lives abroad in [address] [place] [address]  Initiated 2/23/2021  1. Patient will perform bathing with supervision/set-up within 7 day(s). 2.  Patient will perform upper body dressing with independence within 7 day(s). 3.  Patient will perform lower body dressing with supervision/set-up with AE PRN within 7 day(s). 4.  Patient will perform toilet transfers with modified independence within 7 day(s). 5.  Patient will perform all aspects of toileting with modified independence within 7 day(s). Outcome: Not Met     OCCUPATIONAL THERAPY EVALUATION  Patient: Argelia Rowe (26 y.o. female)  Date: 2/23/2021  Primary Diagnosis: CAP (community acquired pneumonia) [J18.9]  Procedure(s) (LRB):  RIGHT OPEN  INGUINAL HERNIA REPAIR WITH MESH (Right) 1 Day Post-Op   Precautions:   Fall    ASSESSMENT  Based on the objective data described below, the patient presents with decreased endurance, activity tolerance, balance, ng tube, ability to perform ADLs and functional transfers, and limited by 10/10 abdominal pain. Pt lives alone and does not have local friends or family that can provide physical assistance or limited support. Pt performing bed mobility with SBA and transferred with [facility] SPT to [facility] quickly 2/2 bladder urgency. RN aware. OT encouraged OOB to chair and importance of OOB activity and pt agreeable but politely declining 2/2 10/10 pain and requesting return to bed. Overall pt is functioning below her baseline and would benefit from continued therapy services. Infer pt will make progress once pain managed and at this time recommending HHOT as pain seems to be biggest barrier.  However, pt may need IPR at discharge pending progress and ability to perform ADLs at supervision to mod I level as pt lives alone. Current Level of Function Impacting Discharge (ADLs/self-care): min A transfers, mod A LB ADLs    Functional Outcome Measure: The patient scored 45/100 on the Barthel Index outcome measure which is indicative of 55% impairment in ADLs. Other factors to consider for discharge: Pt lives alone and was independent prior     Patient will benefit from skilled therapy intervention to address the above noted impairments. PLAN :  Recommendations and Planned Interventions: self care training, functional mobility training, therapeutic exercise, balance training, therapeutic activities, endurance activities, patient education, home safety training and family training/education    Frequency/Duration: Patient will be followed by occupational therapy 5 times a week to address goals. Recommendation for discharge: (in order for the patient to meet his/her long term goals)  Occupational therapy at least 2 days/week in the home  vs. IPR    This discharge recommendation:  Has not yet been discussed the attending provider and/or case management    IF patient discharges home will need the following DME: TBD       SUBJECTIVE:   Patient stated I'm so sorry, I just don't feel well.     OBJECTIVE DATA SUMMARY:   HISTORY:   History reviewed. No pertinent past medical history.   Past Surgical History:   Procedure Laterality Date    COLONOSCOPY Left 3/26/2019    COLONOSCOPY performed by Neil Elizalde MD at [address] HX LYSIS OF ADHESIONS  03/27/2019    HX OTHER SURGICAL  03/27/2019    Laparoscopic drainage of pelvic abscess x 2 with lysis of adhesions    HX TONSILLECTOMY      [place] ABDOMEN SURGERY PROC UNLISTED      Abcess drained     [place] LAP,DIAGNOSTIC ABDOMEN  03/27/2019       Expanded or extensive additional review of patient history:     Home Situation  Home Environment: Private residence  # Steps to Enter: 0(0 to enter, 4 upon entering)  One/Two Story Residence: Two story  # of Interior Steps: 12  Lift Chair Available: Yes  Living Alone: Yes  Support Systems: Child(gerard)  Patient Expects to be Discharged to[de-identified] Private residence  Current DME Used/Available at Home: Walker, rolling, Shower chair, Grab bars  Tub or Shower Type: Shower    Hand dominance: Right    EXAMINATION OF PERFORMANCE DEFICITS:  Cognitive/Behavioral Status:                      Skin: visible skin intact    Edema: none noted    Hearing: Auditory  Auditory Impairment: None    Vision/Perceptual:                                     Range of Motion:  AROM: Generally decreased, functional  PROM: Generally decreased, functional                      Strength:    Strength: Generally decreased, functional                Coordination:  Coordination: Within functional limits  Fine Motor Skills-Upper: Left Intact; Right Intact    Gross Motor Skills-Upper: Left Intact; Right Intact    Tone & Sensation:    Tone: Normal  Sensation: Intact                      Balance:  Sitting: Intact  Standing: Intact; With support    Functional Mobility and Transfers for ADLs:  Bed Mobility:  Supine to Sit: Supervision  Sit to Supine: Supervision  Scooting: Supervision    Transfers:  Sit to Stand: Contact guard assistance  Toilet Transfer : Contact guard assistance    ADL Assessment:  Feeding: Total assistance; Independent(total A 2/2 feeding tube only)    Oral Facial Hygiene/Grooming: Independent    Bathing: Minimum assistance    Upper Body Dressing: Minimum assistance    Lower Body Dressing:  Moderate assistance    Toileting: Minimum assistance                ADL Intervention and task modifications:                                          Therapeutic Exercise:     Functional Measure:  Barthel Index:    Bathin  Bladder: 10  Bowels: 10  Groomin  Dressin  Feedin(feeding tube)  Mobility: 0  Stairs: 0  Toilet Use: 5  Transfer (Bed to Chair and Back): 10  Total: 45/100        The Barthel ADL Index: Guidelines  1. The index should be used as a record of what a patient does, not as a record of what a patient could do. 2. The main aim is to establish degree of independence from any help, physical or verbal, however minor and for whatever reason. 3. The need for supervision renders the patient not independent. 4. A patient's performance should be established using the best available evidence. Asking the patient, friends/relatives and nurses are the usual sources, but direct observation and common sense are also important. However direct testing is not needed. 5. Usually the patient's performance over the preceding 24-48 hours is important, but occasionally longer periods will be relevant. 6. Middle categories imply that the patient supplies over 50 per cent of the effort. 7. Use of aids to be independent is allowed. Jarvis Valera., Barthel, DVinceW. (3642). Functional evaluation: the Barthel Index. [address] [facility] (14)2. [facility] [place], [place] Angelika Roberson, [facility], [address] [place] [address] (1999). Measuring the change indisability after inpatient rehabilitation; comparison of the responsiveness of the Barthel Index and Functional [place] Measure. Journal of Neurology, Neurosurgery, and Psychiatry, 66(4), 619-483. Julio Marroquin, N.J.A, JO-ANN Shipman, & Salas Boateng M.A. (2004.) Assessment of post-stroke quality of life in cost-effectiveness studies: The usefulness of the Barthel Index and the EuroQoL-5D.  Quality of Life Research, 15, 464-86       Occupational Therapy Evaluation Charge Determination   History Examination Decision-Making   LOW Complexity : Brief history review  LOW Complexity : 1-3 performance deficits relating to physical, cognitive , or psychosocial skils that result in activity limitations and / or participation restrictions  LOW Complexity : No comorbidities that affect functional and no verbal or physical assistance needed to complete eval tasks       Based on the above components, the patient evaluation is determined to be of the following complexity level: LOW   Pain Rating:  10/10: RN aware    Activity Tolerance:   Fair    After treatment patient left in no apparent distress:    Supine in bed, Call bell within reach and Side rails x 3    COMMUNICATION/EDUCATION:   The patients plan of care was discussed with: Physical therapist and Registered nurse. Home safety education was provided and the patient/caregiver indicated understanding., Patient/family have participated as able in goal setting and plan of care. and Patient/family agree to work toward stated goals and plan of care. This patients plan of care is appropriate for delegation to [facility].     Thank you for this referral.  Gibson Cochran  Time Calculation: 15 mins

## 2021-02-23 NOTE — PROGRESS NOTES
Bedside shift change report given to Suleiman Valencia RN (oncoming nurse) by Hany Krause (offgoing nurse). Report included the following information SBAR and Kardex.

## 2021-02-23 NOTE — ANESTHESIA POSTPROCEDURE EVALUATION
Procedure(s):  RIGHT OPEN  INGUINAL HERNIA REPAIR WITH MESH. general    Anesthesia Post Evaluation      Multimodal analgesia: multimodal analgesia used between 6 hours prior to anesthesia start to PACU discharge  Patient location during evaluation: PACU  Patient participation: complete - patient participated  Level of consciousness: awake  Pain score: 2  Pain management: adequate  Airway patency: patent  Anesthetic complications: no  Cardiovascular status: acceptable  Respiratory status: acceptable  Hydration status: acceptable  Comments: I have evaluated the patient and meets criteria for discharge from PACU. Ady Humphreys MD  Post anesthesia nausea and vomiting:  controlled  Final Post Anesthesia Temperature Assessment:  Normothermia (36.0-37.5 degrees C)      INITIAL Post-op Vital signs:   Vitals Value Taken Time   /56 02/22/21 0545   Temp 37.1 °C (98.8 °F) 02/22/21 0530   Pulse 79 02/22/21 0555   Resp 18 02/22/21 0555   SpO2 96 % 02/22/21 0554   Vitals shown include unvalidated device data.

## 2021-02-23 NOTE — PROGRESS NOTES
Problem: Mobility Impaired (Adult and Pediatric)  Goal: *Acute Goals and Plan of Care (Insert Text)  Description: FUNCTIONAL STATUS PRIOR TO ADMISSION: Patient was independent and active without use of DME.    HOME SUPPORT PRIOR TO ADMISSION: The patient lived alone with no local support. Physical Therapy Goals  Initiated 2/23/2021  1. Patient will move from supine to sit and sit to supine  in bed with modified independence within 7 day(s). 2.  Patient will transfer from bed to chair and chair to bed with modified independence using the least restrictive device within 7 day(s). 3.  Patient will perform sit to stand with modified independence within 7 day(s). 4.  Patient will ambulate with modified independence for 200 feet with the least restrictive device within 7 day(s). 5.  Patient will ascend/descend 4 stairs with 1 handrail(s) with modified independence within 7 day(s). Outcome: Progressing Towards Goal   PHYSICAL THERAPY EVALUATION  Patient: Ara Adam (71 y.o. female)  Date: 2/23/2021  Primary Diagnosis: CAP (community acquired pneumonia) [J18.9]  Procedure(s) (LRB):  RIGHT OPEN  INGUINAL HERNIA REPAIR WITH MESH (Right) 1 Day Post-Op   Precautions:   Fall      ASSESSMENT  Based on the objective data described below, the patient presents with decreased endurance and mobility compared to baseline after being admitted with nausea and vomiting, a R inguinal hernia and pna. She underwent hernia repair with mesh yesterday and has clamped NGT. Prior to this, she was independent and active. At this time, she is moving slowly overall, but benefited from RW for security. She was able to tolerate room air at rest, but SpO2 dropped to 87% with limited activity, so replaced her 2L nasal cannula. Anticipate that she will progress well as she improves medically and we will continue to advance her activity as she is able to tolerate.  Educated her about the importance of mobilization for both her respiratory and surgical issues and she is highly motivated. Current Level of Function Impacting Discharge (mobility/balance): min assist for short distance ambulation with RW    Functional Outcome Measure: The patient scored Total: 45/100 on the Barthel Index which is indicative of moderately impaired ability to care for basic self needs/dependency on others. Other factors to consider for discharge: lives alone     Patient will benefit from skilled therapy intervention to address the above noted impairments. PLAN :  Recommendations and Planned Interventions: transfer training, gait training, therapeutic exercises, patient and family training/education, and therapeutic activities      Frequency/Duration: Patient will be followed by physical therapy:  5 times a week to address goals. Recommendation for discharge: (in order for the patient to meet his/her long term goals)  To be determined: possibly HHPT if her activity does not progress adequately    This discharge recommendation:  Has been made in collaboration with the attending provider and/or case management    IF patient discharges home will need the following DME: none         SUBJECTIVE:   Patient stated I just wish I could have a ginger ale.     OBJECTIVE DATA SUMMARY:   HISTORY:    History reviewed. No pertinent past medical history.   Past Surgical History:   Procedure Laterality Date    COLONOSCOPY Left 3/26/2019    COLONOSCOPY performed by Meenakshi Reyes MD at [address] HX LYSIS OF ADHESIONS  03/27/2019    HX OTHER SURGICAL  03/27/2019    Laparoscopic drainage of pelvic abscess x 2 with lysis of adhesions    HX TONSILLECTOMY      AR ABDOMEN SURGERY PROC UNLISTED      Abcess drained     AR LAP,DIAGNOSTIC ABDOMEN  03/27/2019       Personal factors and/or comorbidities impacting plan of care: as noted above    Home Situation  Home Environment: Private residence  # Steps to Enter: 0(0 to enter, 4 upon entering)  One/Two Story Residence: Two story  # of Interior Steps: 12(has stair lift)  Lift Chair Available: Yes  Living Alone: Yes  Support Systems: Child(gerard)  Patient Expects to be Discharged to[de-identified] Private residence  Current DME Used/Available at Home: Josefina Richard, rolling, Shower chair, Grab bars  Tub or Shower Type: Shower    EXAMINATION/PRESENTATION/DECISION MAKING:   Critical Behavior:              Hearing: Auditory  Auditory Impairment: None  Skin:  incision intact with no drainage noted  Edema: none  Range Of Motion:  AROM: Generally decreased, functional           PROM: Generally decreased, functional           Strength:    Strength: Generally decreased, functional                    Tone & Sensation:   Tone: Normal              Sensation: Intact               Coordination:  Coordination: Within functional limits  Vision:      Functional Mobility:  Bed Mobility:     Supine to Sit: Minimum assistance; Additional time  Sit to Supine: (up in chair after)  Scooting: Supervision  Transfers:  Sit to Stand: Contact guard assistance; Additional time; Adaptive equipment  Stand to Sit: Stand-by assistance; Adaptive equipment; Additional time        Bed to Chair: Contact guard assistance; Adaptive equipment; Additional time              Balance:   Sitting: Intact  Standing: Intact; With support  Ambulation/Gait Training:  Distance (ft): 20 Feet (ft)  Assistive Device: Walker, rolling  Ambulation - Level of Assistance: Contact guard assistance; Adaptive equipment; Additional time        Gait Abnormalities: Decreased step clearance;Trunk sway increased              Speed/Courtney: Slow  Step Length: Right shortened;Left shortened        Interventions: Safety awareness training; Tactile cues; Verbal cues            Stairs:               Therapeutic Exercises:       Functional Measure:  Barthel Index:    Bathin  Bladder: 10  Bowels: 10  Groomin  Dressin  Feedin(feeding tube)  Mobility: 0  Stairs: 0  Toilet Use: 5  Transfer (Bed to Chair and Back): 10  Total: 45/100       The Barthel ADL Index: Guidelines  1. The index should be used as a record of what a patient does, not as a record of what a patient could do. 2. The main aim is to establish degree of independence from any help, physical or verbal, however minor and for whatever reason. 3. The need for supervision renders the patient not independent. 4. A patient's performance should be established using the best available evidence. Asking the patient, friends/relatives and nurses are the usual sources, but direct observation and common sense are also important. However direct testing is not needed. 5. Usually the patient's performance over the preceding 24-48 hours is important, but occasionally longer periods will be relevant. 6. Middle categories imply that the patient supplies over 50 per cent of the effort. 7. Use of aids to be independent is allowed. Yandy Roca, Barthel, DBETHANY. (9983). Functional evaluation: the Barthel Index. [facility] (14)2. Arsalan Mora eli FCO Hernandez, Ta Blackwood., Sharmaine Jarrell., [place], [address] [place] [address] (1999). Measuring the change indisability after inpatient rehabilitation; comparison of the responsiveness of the Barthel Index and Functional [place] Measure. Journal of Neurology, Neurosurgery, and Psychiatry, 66(4), 925-313. Leonardo Gonzalez, N.J.A, JO-ANN Shipman, & Konrad Gutierrez, M.A. (2004.) Assessment of post-stroke quality of life in cost-effectiveness studies: The usefulness of the Barthel Index and the EuroQoL-5D.  Quality of Life Research, 15, 746-20        Physical Therapy Evaluation Charge Determination   History Examination Presentation Decision-Making   HIGH Complexity :3+ comorbidities / personal factors will impact the outcome/ POC  MEDIUM Complexity : 3 Standardized tests and measures addressing body structure, function, activity limitation and / or participation in recreation  LOW Complexity : Stable, uncomplicated  LOW Complexity : FOTO score of       Based on the above components, the patient evaluation is determined to be of the following complexity level: LOW     Pain Rating:  No complaints of pain    Activity Tolerance:   Fair and desaturates with exertion and requires oxygen    After treatment patient left in no apparent distress:   Sitting in chair and Call bell within reach    COMMUNICATION/EDUCATION:   The patients plan of care was discussed with: Occupational therapist, Registered nurse, and Physician. Fall prevention education was provided and the patient/caregiver indicated understanding., Patient/family have participated as able in goal setting and plan of care. , and Patient/family agree to work toward stated goals and plan of care.     Thank you for this referral.  Mary Bains, PT   Time Calculation: 18 mins

## 2021-02-23 NOTE — PROGRESS NOTES
Progress Note    Patient: Ara Adam MRN: [id number]  SSN: [id number]    YOB: 1935  Age: 81 y.o. Sex: female      Admit Date: 2021    1 Day Post-Op    Procedure:  Procedure(s):  RIGHT OPEN  INGUINAL HERNIA REPAIR WITH MESH    Subjective:     No acute surgical issues. Pt is doing okay. Reported some coughing today. Right groin pain is under control. No flatus or BM yet.      Objective:     Visit Vitals  /60 (BP 1 Location: Left arm, BP Patient Position: At rest)   Pulse 67   Temp 98.5 °F (36.9 °C)   Resp 18   Ht 5' 5.5\" (1.664 m)   Wt 161 lb (73 kg)   SpO2 94%   BMI 26.38 kg/m²       Temp (24hrs), Av.6 °F (37 °C), Min:98.2 °F (36.8 °C), Max:99.1 °F (37.3 °C)        Physical Exam:    Gen:  NAD  Pulm:  Unlabored  Abd:  S/ND/appropriate TTP  Wound:  C/D/I    Recent Results (from the past 24 hour(s))   URINALYSIS W/ REFLEX CULTURE    Collection Time: 21  4:29 PM    Specimen: Urine   Result Value Ref Range    Color YELLOW/STRAW      Appearance CLEAR CLEAR      Specific gravity 1.026 1.003 - 1.030      pH (UA) 5.5 5.0 - 8.0      Protein 30 (A) NEG mg/dL    Glucose Negative NEG mg/dL    Ketone Negative NEG mg/dL    Bilirubin Negative NEG      Blood SMALL (A) NEG      Urobilinogen 0.2 0.2 - 1.0 EU/dL    Nitrites Negative NEG      Leukocyte Esterase TRACE (A) NEG      UA:UC IF INDICATED CULTURE NOT INDICATED BY UA RESULT CNI      WBC 5-10 0 - 4 /hpf    RBC 5-10 0 - 5 /hpf    Epithelial cells MODERATE (A) FEW /lpf    Bacteria 1+ (A) NEG /hpf    Hyaline cast 2-5 0 - 5 /lpf   CBC W/O DIFF    Collection Time: 21  1:28 AM   Result Value Ref Range    WBC 13.9 (H) 3.6 - 11.0 K/uL    RBC 3.55 (L) 3.80 - 5.20 M/uL    HGB 11.2 (L) 11.5 - 16.0 g/dL    HCT 34.1 (L) 35.0 - 47.0 %    MCV 96.1 80.0 - 99.0 FL    MCH 31.5 26.0 - 34.0 PG    MCHC 32.8 30.0 - 36.5 g/dL    RDW 12.9 11.5 - 14.5 %    PLATELET 707 944 - 606 K/uL    MPV 9.5 8.9 - 12.9 FL    NRBC 0.0 0  WBC    ABSOLUTE NRBC 0. 00 0.00 - 5.54 K/uL   METABOLIC PANEL, BASIC    Collection Time: 02/23/21  1:28 AM   Result Value Ref Range    Sodium 139 136 - 145 mmol/L    Potassium 3.4 (L) 3.5 - 5.1 mmol/L    Chloride 105 97 - 108 mmol/L    CO2 29 21 - 32 mmol/L    Anion gap 5 5 - 15 mmol/L    Glucose 107 (H) 65 - 100 mg/dL    BUN 30 (H) 6 - 20 MG/DL    Creatinine 0.95 0.55 - 1.02 MG/DL    BUN/Creatinine ratio 32 (H) 12 - 20      GFR est AA >60 >60 ml/min/1.73m2    GFR est non-AA 56 (L) >60 ml/min/1.73m2    Calcium 8.6 8.5 - 10.1 MG/DL         Assessment:     Hospital Problems  Date Reviewed: 10/8/2020          Codes Class Noted POA    CAP (community acquired pneumonia) ICD-10-CM: J18.9  ICD-9-CM: 947  2/21/2021 Unknown              Plan/Recommendations/Medical Decision Making:     - Right femoral hernia repair:  No surgical issues  - Small bowel obstruction:  Follow-up AXR.   If AXR shows resolution of SBO then can remove NG tube and start clears  - Continue antibiotic therapy  - Out of bed as tolerated

## 2021-02-24 LAB
ANION GAP SERPL CALC-SCNC: 5 MMOL/L (ref 5–15)
BUN SERPL-MCNC: 19 MG/DL (ref 6–20)
BUN/CREAT SERPL: 28 (ref 12–20)
CALCIUM SERPL-MCNC: 8.3 MG/DL (ref 8.5–10.1)
CHLORIDE SERPL-SCNC: 111 MMOL/L (ref 97–108)
CO2 SERPL-SCNC: 26 MMOL/L (ref 21–32)
COMMENT, HOLDF: NORMAL
CREAT SERPL-MCNC: 0.68 MG/DL (ref 0.55–1.02)
GLUCOSE SERPL-MCNC: 115 MG/DL (ref 65–100)
MAGNESIUM SERPL-MCNC: 2.3 MG/DL (ref 1.6–2.4)
PHOSPHATE SERPL-MCNC: 0.9 MG/DL (ref 2.6–4.7)
POTASSIUM SERPL-SCNC: 3.7 MMOL/L (ref 3.5–5.1)
SAMPLES BEING HELD,HOLD: NORMAL
SODIUM SERPL-SCNC: 142 MMOL/L (ref 136–145)

## 2021-02-24 PROCEDURE — 65270000029 HC RM PRIVATE

## 2021-02-24 PROCEDURE — 83735 ASSAY OF MAGNESIUM: CPT

## 2021-02-24 PROCEDURE — 36415 COLL VENOUS BLD VENIPUNCTURE: CPT

## 2021-02-24 PROCEDURE — 84100 ASSAY OF PHOSPHORUS: CPT

## 2021-02-24 PROCEDURE — 74011250636 HC RX REV CODE- 250/636: Performed by: SURGERY

## 2021-02-24 PROCEDURE — 74011000250 HC RX REV CODE- 250: Performed by: INTERNAL MEDICINE

## 2021-02-24 PROCEDURE — 97535 SELF CARE MNGMENT TRAINING: CPT

## 2021-02-24 PROCEDURE — 74011250637 HC RX REV CODE- 250/637: Performed by: INTERNAL MEDICINE

## 2021-02-24 PROCEDURE — 74011250636 HC RX REV CODE- 250/636: Performed by: INTERNAL MEDICINE

## 2021-02-24 PROCEDURE — 74011250637 HC RX REV CODE- 250/637: Performed by: NURSE PRACTITIONER

## 2021-02-24 PROCEDURE — 94760 N-INVAS EAR/PLS OXIMETRY 1: CPT

## 2021-02-24 PROCEDURE — 74011250637 HC RX REV CODE- 250/637: Performed by: SURGERY

## 2021-02-24 PROCEDURE — 80048 BASIC METABOLIC PNL TOTAL CA: CPT

## 2021-02-24 PROCEDURE — C9113 INJ PANTOPRAZOLE SODIUM, VIA: HCPCS | Performed by: INTERNAL MEDICINE

## 2021-02-24 PROCEDURE — 74011000258 HC RX REV CODE- 258: Performed by: SURGERY

## 2021-02-24 RX ORDER — DOCUSATE SODIUM 100 MG/1
100 CAPSULE, LIQUID FILLED ORAL 2 TIMES DAILY
Status: DISCONTINUED | OUTPATIENT
Start: 2021-02-24 | End: 2021-02-26 | Stop reason: HOSPADM

## 2021-02-24 RX ORDER — LANOLIN ALCOHOL/MO/W.PET/CERES
100 CREAM (GRAM) TOPICAL DAILY
Status: DISCONTINUED | OUTPATIENT
Start: 2021-02-25 | End: 2021-02-26 | Stop reason: HOSPADM

## 2021-02-24 RX ADMIN — CALCIUM CARBONATE (ANTACID) CHEW TAB 500 MG 200 MG: 500 CHEW TAB at 16:24

## 2021-02-24 RX ADMIN — CALCIUM CARBONATE (ANTACID) CHEW TAB 500 MG 200 MG: 500 CHEW TAB at 06:08

## 2021-02-24 RX ADMIN — HEPARIN SODIUM 5000 UNITS: 5000 INJECTION INTRAVENOUS; SUBCUTANEOUS at 16:23

## 2021-02-24 RX ADMIN — PIPERACILLIN AND TAZOBACTAM 3.38 G: 3; .375 INJECTION, POWDER, LYOPHILIZED, FOR SOLUTION INTRAVENOUS at 18:03

## 2021-02-24 RX ADMIN — Medication 10 ML: at 06:16

## 2021-02-24 RX ADMIN — DOCUSATE SODIUM 100 MG: 100 CAPSULE, LIQUID FILLED ORAL at 18:00

## 2021-02-24 RX ADMIN — PIPERACILLIN AND TAZOBACTAM 3.38 G: 3; .375 INJECTION, POWDER, LYOPHILIZED, FOR SOLUTION INTRAVENOUS at 01:28

## 2021-02-24 RX ADMIN — HEPARIN SODIUM 5000 UNITS: 5000 INJECTION INTRAVENOUS; SUBCUTANEOUS at 21:27

## 2021-02-24 RX ADMIN — PIPERACILLIN AND TAZOBACTAM 3.38 G: 3; .375 INJECTION, POWDER, LYOPHILIZED, FOR SOLUTION INTRAVENOUS at 09:38

## 2021-02-24 RX ADMIN — HYDROMORPHONE HYDROCHLORIDE 1 MG: 1 INJECTION, SOLUTION INTRAMUSCULAR; INTRAVENOUS; SUBCUTANEOUS at 06:08

## 2021-02-24 RX ADMIN — AZITHROMYCIN MONOHYDRATE 500 MG: 500 INJECTION, POWDER, LYOPHILIZED, FOR SOLUTION INTRAVENOUS at 21:27

## 2021-02-24 RX ADMIN — POTASSIUM CHLORIDE, DEXTROSE MONOHYDRATE AND SODIUM CHLORIDE 75 ML/HR: 150; 5; 900 INJECTION, SOLUTION INTRAVENOUS at 22:41

## 2021-02-24 RX ADMIN — DIBASIC SODIUM PHOSPHATE, MONOBASIC POTASSIUM PHOSPHATE AND MONOBASIC SODIUM PHOSPHATE 1 TABLET: 852; 155; 130 TABLET ORAL at 03:44

## 2021-02-24 RX ADMIN — DOCUSATE SODIUM 100 MG: 100 CAPSULE, LIQUID FILLED ORAL at 13:13

## 2021-02-24 RX ADMIN — DIBASIC SODIUM PHOSPHATE, MONOBASIC POTASSIUM PHOSPHATE AND MONOBASIC SODIUM PHOSPHATE 1 TABLET: 852; 155; 130 TABLET ORAL at 18:02

## 2021-02-24 RX ADMIN — HEPARIN SODIUM 5000 UNITS: 5000 INJECTION INTRAVENOUS; SUBCUTANEOUS at 06:08

## 2021-02-24 RX ADMIN — DIBASIC SODIUM PHOSPHATE, MONOBASIC POTASSIUM PHOSPHATE AND MONOBASIC SODIUM PHOSPHATE 1 TABLET: 852; 155; 130 TABLET ORAL at 09:38

## 2021-02-24 RX ADMIN — CALCIUM CARBONATE (ANTACID) CHEW TAB 500 MG 200 MG: 500 CHEW TAB at 12:08

## 2021-02-24 RX ADMIN — POTASSIUM PHOSPHATE, MONOBASIC AND POTASSIUM PHOSPHATE, DIBASIC: 224; 236 INJECTION, SOLUTION, CONCENTRATE INTRAVENOUS at 12:56

## 2021-02-24 RX ADMIN — PANTOPRAZOLE SODIUM 40 MG: 40 INJECTION, POWDER, FOR SOLUTION INTRAVENOUS at 13:13

## 2021-02-24 NOTE — PROGRESS NOTES
Physical Therapy  Patient currently sleeping and declines to participate at this time. Will defer and continue to follow.   Naye Velarde, PT, DPT

## 2021-02-24 NOTE — PROGRESS NOTES
Problem: Self Care Deficits Care Plan (Adult)  Goal: *Acute Goals and Plan of Care (Insert Text)  Description:   FUNCTIONAL STATUS PRIOR TO ADMISSION: Pt lives alone and was independent with all ADLs and IADLs. HOME SUPPORT: The patient lived alone with no local support. Pt reports son lives abroad in [address] [place] [address]  Initiated 2/23/2021  1. Patient will perform bathing with supervision/set-up within 7 day(s). 2.  Patient will perform upper body dressing with independence within 7 day(s). 3.  Patient will perform lower body dressing with supervision/set-up with AE PRN within 7 day(s). 4.  Patient will perform toilet transfers with modified independence within 7 day(s). 5.  Patient will perform all aspects of toileting with modified independence within 7 day(s). Outcome: Progressing Towards Goal    OCCUPATIONAL THERAPY TREATMENT  Patient: Myriam Millan (27 y.o. female)  Date: 2/24/2021  Diagnosis: CAP (community acquired pneumonia) [J18.9] <principal problem not specified>  Procedure(s) (LRB):  RIGHT OPEN  INGUINAL HERNIA REPAIR WITH MESH (Right) 2 Days Post-Op  Precautions: Fall  Chart, occupational therapy assessment, plan of care, and goals were reviewed. ASSESSMENT  Patient continues with skilled OT services and is progressing towards goals. Greeted sitting up on BSC. Toileting performed with SBA/set-up CGA for transfer to chair (without AE). Pt agreeable to sit up in chair. Friend present at time of evaluation. Participated in education regarding home safety/fall prevention and LB self-care with DME. Declined items at this time. May benefit from follow up to ensure awareness. Denied pain this date. Set up for grooming up in the chair. Will likely benefit from [facility] at d/c to optimize safe transition to home, appears pain has improved. OT to continue to follow to provide continued training/education and opportunities for safe ADL performance.       Current Level of Function Impacting Discharge (ADLs): min A LB adls, CGA transfers    Other factors to consider for discharge: lives alone         PLAN :  Patient continues to benefit from skilled intervention to address the above impairments. Continue treatment per established plan of care. to address goals. Recommend with staff: Recommend with nursing, ADLs with supervision/setup, once Egress Test completed then OOB to chair 3x/day and toileting via functional mobility to and from bathroom CGA assist. Thank you for completing as able in order to maintain patient strength, endurance and independence. Recommend next OT session: LB self care, toileting    Recommendation for discharge: (in order for the patient to meet his/her long term goals)  Occupational therapy at least 2 days/week in the home     This discharge recommendation:  Has not yet been discussed the attending provider and/or case management    IF patient discharges home will need the following DME: AE: long handled bathing, AE: long handled dressing, and patient owns DME required for discharge       SUBJECTIVE:   Patient stated I have all those things from the last time they checked out my house.     OBJECTIVE DATA SUMMARY:   Cognitive/Behavioral Status:   Alert, oriented                   Functional Mobility and Transfers for ADLs:  Bed Mobility:   Not performed, greeting sitting up in bed    Transfers:  Sit to Stand: Stand-by assistance  Functional Transfers  Toilet Transfer : Stand-by assistance  Bed to Chair: Contact guard assistance(cues to slow down, was observed tangled in IV pole)    Balance:  Sitting: Intact  Standing: Intact; With support    ADL Intervention:       Grooming  Brushing Teeth: Set-up                        Toileting  Toileting Assistance: Stand-by assistance  Bladder Hygiene: Stand-by assistance  Clothing Management: Stand-by assistance         Pain:  None reported    Activity Tolerance:   Fair    After treatment patient left in no apparent distress:   Sitting in chair, Call bell within reach, and Caregiver / family present    COMMUNICATION/COLLABORATION:   The patients plan of care was discussed with: Physical therapist and Registered nurse.      Cecilia Borja OT  Time Calculation: 17 mins

## 2021-02-24 NOTE — OP NOTES
[address] [place]   OPERATIVE REPORT    Name:  Jr Lopez  MR#:  [id number]  :  1935  ACCOUNT #:  [de-identified]  DATE OF SERVICE:  2021      PREOPERATIVE DIAGNOSIS:  Incarcerated right inguinal hernia with ischemic bowel. POSTOPERATIVE DIAGNOSIS:  Incarcerated right femoral hernia. PROCEDURE PERFORMED:  Open right femoral hernia repair with mesh. SURGEON:  Terrence Madsen MD    ASSISTANT:  Blair Prince SA    ANESTHESIA:  General endotracheal.    COMPLICATIONS:  None. SPECIMENS REMOVED:  Hernia sac. IMPLANTS:  A 1.3 x 1.55-inch plug mesh. ESTIMATED BLOOD LOSS:  Minimal.    DRAINS AND TUBES:  None. FINDINGS:  There was a loop of small bowel that was incarcerated in the femoral hernia with venous congestion but pinked up after released from incarceration. No evidence of bowel ischemia or necrosis. Femoral hernia was repaired with medium size plug mesh. INDICATIONS FOR OPERATION:  The patient is an 28-year-old woman with a history of diverticulitis who presented to the emergency department with nausea and vomiting. She had a CT scan performed which showed an incarcerated inguinal hernia causing a bowel obstruction. The hernia was not reducible in the ER, so the decision was made to take her to the operating room emergently for an open hernia repair with mesh. PROCEDURE IN DETAIL:  After informed consent was obtained from the patient, the patient was taken to the operating room, placed in supine position on the operating table. She underwent general anesthesia with endotracheal intubation without any complication. A Davalos catheter was inserted. The patient's abdomen and pelvis and groin were then prepped and draped in the usual sterile surgical fashion. A surgical time-out was then performed. Preoperative antibiotic was given prior to skin incision. After the time-out was performed, we examined the right groin.   The patient was noted to have an incarcerated hernia at this site. We identified the landmarks including the pubic symphysis and the superior anterior iliac spine. A 5-cm incision was made approximately 2 fingerbreadths above the ilioinguinal ligament. Dissection was then taken down using a combination of blunt dissection and electrocautery. The external oblique fibers were then identified. We then tried to trace this down to the inguinal ring, however, the defect was noted to be below the inguinal ring. The patient had incarcerated femoral hernia with small bowel. The small bowel showed some venous congestion but there was no evidence of ischemia or necrosis. The small bowel was then released by enlarging the femoral hernia defect. Once this was enlarged, the small bowel was examined, it was viable, it pinked up quickly and peristalsing. The small bowel was then delivered back into the peritoneal cavity. We then suture ligated the hernia sac and the hernia sac was excised and passed off as specimen. The hernia defect was then repaired with a medium size plug mesh. The plug mesh was placed into the hernia defect and secured with 2-0 Prolene on 4 corners. After this was completed, we then sewed the overlying tissue over the hernia defect. A Prolene mesh was then placed over the hernia defect. This was secured to the pubic tubercle. It was then secured to the conjoint tendon and 2-0 Prolene suture was used to run the mesh on the medial side securing it to the fibers of the internal oblique, on the lateral side, it was secured to the shelving edge of the inguinal ligament. Once this was completed, we then closed the external oblique fibers using 2-0 silk. 3-0 silk was then used to reapproximate the subcutaneous tissue, 4-0 Monocryl was then used to close the skin. The patient tolerated the procedure well. There were no complications associated with the operation.   Dr. Lawence Rinne, the attending surgeon, was present during the entirety of the case. All lap, needle, and instrument counts were correct x2. The patient was successfully extubated and was then transported to PACU in stable condition.         Za Ridley MD      [id number]  D:  02/24/2021 0:23  T:  02/24/2021 5:35  JOB #:  [id number]

## 2021-02-24 NOTE — PROGRESS NOTES
Patient still without significant BM or gas today, tolerating ice chips and po meds well. Spent time in the chair with PT today. Bedside shift change report given to Reese Gee (oncoming nurse) by Bronwyn Mccarthy RN (offgoing nurse). Report included the following information SBAR, Kardex, MAR and Recent Results.

## 2021-02-24 NOTE — PROGRESS NOTES
Progress Note    Patient: Silva Herbert MRN: [id number]  SSN: [id number]    YOB: 1935  Age: 81 y.o. Sex: female      Admit Date: 2021    2 Day Post-Op    Procedure:  Procedure(s):  RIGHT OPEN  INGUINAL HERNIA REPAIR WITH MESH    Subjective:     No acute surgical issues. Pt is doing  Well. NG tube was removed and pt was started on clears. No nausea or vomiting. Pain is under control. Pt had recorded BM in chart but she wasn't sure she had one. Objective:     Visit Vitals  /75 (BP 1 Location: Right upper arm, BP Patient Position: At rest)   Pulse 67   Temp 97.5 °F (36.4 °C)   Resp 18   Ht 5' 5.5\" (1.664 m)   Wt 164 lb 11.2 oz (74.7 kg)   SpO2 94%   BMI 26.99 kg/m²       Temp (24hrs), Av.1 °F (36.7 °C), Min:97.5 °F (36.4 °C), Max:98.5 °F (36.9 °C)        Physical Exam:    Gen:  NAD  Pulm:  Unlabored  Abd:  S/ND/appropriate TTP  Wound:  C/D/I    Recent Results (from the past 24 hour(s))   PHOSPHORUS    Collection Time: 21  1:36 AM   Result Value Ref Range    Phosphorus 0.9 (LL) 2.6 - 4.7 MG/DL   SAMPLES BEING HELD    Collection Time: 21  1:36 AM   Result Value Ref Range    SAMPLES BEING HELD  1 lav     COMMENT        Add-on orders for these samples will be processed based on acceptable specimen integrity and analyte stability, which may vary by analyte.    METABOLIC PANEL, BASIC    Collection Time: 21  1:37 AM   Result Value Ref Range    Sodium 142 136 - 145 mmol/L    Potassium 3.7 3.5 - 5.1 mmol/L    Chloride 111 (H) 97 - 108 mmol/L    CO2 26 21 - 32 mmol/L    Anion gap 5 5 - 15 mmol/L    Glucose 115 (H) 65 - 100 mg/dL    BUN 19 6 - 20 MG/DL    Creatinine 0.68 0.55 - 1.02 MG/DL    BUN/Creatinine ratio 28 (H) 12 - 20      GFR est AA >60 >60 ml/min/1.73m2    GFR est non-AA >60 >60 ml/min/1.73m2    Calcium 8.3 (L) 8.5 - 10.1 MG/DL   MAGNESIUM    Collection Time: 21  1:37 AM   Result Value Ref Range    Magnesium 2.3 1.6 - 2.4 mg/dL         Assessment: Hospital Problems  Date Reviewed: 10/8/2020          Codes Class Noted POA    CAP (community acquired pneumonia) ICD-10-CM: J18.9  ICD-9-CM: 889  2/21/2021 Unknown              Plan/Recommendations/Medical Decision Making:     - Right femoral hernia repair:  No surgical issues  - Small bowel obstruction:  Resolving  - Advance to GI lite this evening  - Bowel regiment  - Possible DC home tomorrow  - Continue antibiotic therapy  - Out of bed as tolerated

## 2021-02-25 PROBLEM — K41.90 FEMORAL HERNIA OF RIGHT SIDE: Status: ACTIVE | Noted: 2021-02-25

## 2021-02-25 LAB
ANION GAP SERPL CALC-SCNC: 6 MMOL/L (ref 5–15)
BUN SERPL-MCNC: 12 MG/DL (ref 6–20)
BUN/CREAT SERPL: 14 (ref 12–20)
CALCIUM SERPL-MCNC: 7.9 MG/DL (ref 8.5–10.1)
CHLORIDE SERPL-SCNC: 109 MMOL/L (ref 97–108)
CO2 SERPL-SCNC: 25 MMOL/L (ref 21–32)
CREAT SERPL-MCNC: 0.83 MG/DL (ref 0.55–1.02)
ERYTHROCYTE [DISTWIDTH] IN BLOOD BY AUTOMATED COUNT: 12.5 % (ref 11.5–14.5)
GLUCOSE SERPL-MCNC: 148 MG/DL (ref 65–100)
HCT VFR BLD AUTO: 35.4 % (ref 35–47)
HGB BLD-MCNC: 11.3 G/DL (ref 11.5–16)
MCH RBC QN AUTO: 31.7 PG (ref 26–34)
MCHC RBC AUTO-ENTMCNC: 31.9 G/DL (ref 30–36.5)
MCV RBC AUTO: 99.4 FL (ref 80–99)
NRBC # BLD: 0 K/UL (ref 0–0.01)
NRBC BLD-RTO: 0 PER 100 WBC
PLATELET # BLD AUTO: 262 K/UL (ref 150–400)
PMV BLD AUTO: 9.4 FL (ref 8.9–12.9)
POTASSIUM SERPL-SCNC: 4.3 MMOL/L (ref 3.5–5.1)
RBC # BLD AUTO: 3.56 M/UL (ref 3.8–5.2)
SODIUM SERPL-SCNC: 140 MMOL/L (ref 136–145)
WBC # BLD AUTO: 12.2 K/UL (ref 3.6–11)

## 2021-02-25 PROCEDURE — 74011250636 HC RX REV CODE- 250/636: Performed by: SURGERY

## 2021-02-25 PROCEDURE — 74011000250 HC RX REV CODE- 250: Performed by: INTERNAL MEDICINE

## 2021-02-25 PROCEDURE — 65270000029 HC RM PRIVATE

## 2021-02-25 PROCEDURE — 74011250637 HC RX REV CODE- 250/637: Performed by: INTERNAL MEDICINE

## 2021-02-25 PROCEDURE — 97535 SELF CARE MNGMENT TRAINING: CPT

## 2021-02-25 PROCEDURE — 97116 GAIT TRAINING THERAPY: CPT

## 2021-02-25 PROCEDURE — 74011000258 HC RX REV CODE- 258: Performed by: SURGERY

## 2021-02-25 PROCEDURE — 74011250637 HC RX REV CODE- 250/637: Performed by: SURGERY

## 2021-02-25 PROCEDURE — 36415 COLL VENOUS BLD VENIPUNCTURE: CPT

## 2021-02-25 PROCEDURE — 85027 COMPLETE CBC AUTOMATED: CPT

## 2021-02-25 PROCEDURE — 80048 BASIC METABOLIC PNL TOTAL CA: CPT

## 2021-02-25 PROCEDURE — C9113 INJ PANTOPRAZOLE SODIUM, VIA: HCPCS | Performed by: INTERNAL MEDICINE

## 2021-02-25 PROCEDURE — 74011250636 HC RX REV CODE- 250/636: Performed by: INTERNAL MEDICINE

## 2021-02-25 PROCEDURE — 97530 THERAPEUTIC ACTIVITIES: CPT

## 2021-02-25 PROCEDURE — 74011250637 HC RX REV CODE- 250/637: Performed by: NURSE PRACTITIONER

## 2021-02-25 RX ORDER — HYDROCODONE BITARTRATE AND ACETAMINOPHEN 5; 325 MG/1; MG/1
1 TABLET ORAL
Status: DISCONTINUED | OUTPATIENT
Start: 2021-02-25 | End: 2021-02-26 | Stop reason: HOSPADM

## 2021-02-25 RX ORDER — HYDROCODONE BITARTRATE AND ACETAMINOPHEN 5; 325 MG/1; MG/1
1 TABLET ORAL
Qty: 20 TAB | Refills: 0 | Status: SHIPPED | OUTPATIENT
Start: 2021-02-25 | End: 2021-02-25 | Stop reason: SDUPTHER

## 2021-02-25 RX ORDER — HYDROCODONE BITARTRATE AND ACETAMINOPHEN 5; 325 MG/1; MG/1
1 TABLET ORAL
Qty: 20 TAB | Refills: 0 | Status: SHIPPED | OUTPATIENT
Start: 2021-02-25 | End: 2021-03-02 | Stop reason: ALTCHOICE

## 2021-02-25 RX ORDER — AMOXICILLIN AND CLAVULANATE POTASSIUM 875; 125 MG/1; MG/1
1 TABLET, FILM COATED ORAL EVERY 12 HOURS
Qty: 14 TAB | Refills: 0 | Status: SHIPPED | OUTPATIENT
Start: 2021-02-25 | End: 2021-03-02 | Stop reason: ALTCHOICE

## 2021-02-25 RX ORDER — DOCUSATE SODIUM 100 MG/1
100 CAPSULE, LIQUID FILLED ORAL
Qty: 30 CAP | Refills: 0 | Status: SHIPPED | OUTPATIENT
Start: 2021-02-25 | End: 2021-03-02 | Stop reason: ALTCHOICE

## 2021-02-25 RX ADMIN — DOCUSATE SODIUM 100 MG: 100 CAPSULE, LIQUID FILLED ORAL at 08:28

## 2021-02-25 RX ADMIN — Medication 100 MG: at 08:28

## 2021-02-25 RX ADMIN — DIBASIC SODIUM PHOSPHATE, MONOBASIC POTASSIUM PHOSPHATE AND MONOBASIC SODIUM PHOSPHATE 1 TABLET: 852; 155; 130 TABLET ORAL at 08:28

## 2021-02-25 RX ADMIN — CALCIUM CARBONATE (ANTACID) CHEW TAB 500 MG 200 MG: 500 CHEW TAB at 07:05

## 2021-02-25 RX ADMIN — PIPERACILLIN AND TAZOBACTAM 3.38 G: 3; .375 INJECTION, POWDER, LYOPHILIZED, FOR SOLUTION INTRAVENOUS at 18:36

## 2021-02-25 RX ADMIN — PIPERACILLIN AND TAZOBACTAM 3.38 G: 3; .375 INJECTION, POWDER, LYOPHILIZED, FOR SOLUTION INTRAVENOUS at 01:57

## 2021-02-25 RX ADMIN — PANTOPRAZOLE SODIUM 40 MG: 40 INJECTION, POWDER, FOR SOLUTION INTRAVENOUS at 14:03

## 2021-02-25 RX ADMIN — CALCIUM CARBONATE (ANTACID) CHEW TAB 500 MG 200 MG: 500 CHEW TAB at 18:36

## 2021-02-25 RX ADMIN — HEPARIN SODIUM 5000 UNITS: 5000 INJECTION INTRAVENOUS; SUBCUTANEOUS at 22:08

## 2021-02-25 RX ADMIN — DOCUSATE SODIUM 100 MG: 100 CAPSULE, LIQUID FILLED ORAL at 18:36

## 2021-02-25 RX ADMIN — POTASSIUM CHLORIDE, DEXTROSE MONOHYDRATE AND SODIUM CHLORIDE 75 ML/HR: 150; 5; 900 INJECTION, SOLUTION INTRAVENOUS at 12:17

## 2021-02-25 RX ADMIN — CALCIUM CARBONATE (ANTACID) CHEW TAB 500 MG 200 MG: 500 CHEW TAB at 14:02

## 2021-02-25 RX ADMIN — Medication 10 ML: at 18:38

## 2021-02-25 RX ADMIN — HEPARIN SODIUM 5000 UNITS: 5000 INJECTION INTRAVENOUS; SUBCUTANEOUS at 14:02

## 2021-02-25 RX ADMIN — HEPARIN SODIUM 5000 UNITS: 5000 INJECTION INTRAVENOUS; SUBCUTANEOUS at 07:05

## 2021-02-25 RX ADMIN — PIPERACILLIN AND TAZOBACTAM 3.38 G: 3; .375 INJECTION, POWDER, LYOPHILIZED, FOR SOLUTION INTRAVENOUS at 08:30

## 2021-02-25 RX ADMIN — DIBASIC SODIUM PHOSPHATE, MONOBASIC POTASSIUM PHOSPHATE AND MONOBASIC SODIUM PHOSPHATE 1 TABLET: 852; 155; 130 TABLET ORAL at 18:36

## 2021-02-25 NOTE — PROGRESS NOTES
Patient tolerated diet. Bedside shift change report given to Smita Maier (oncoming nurse) by Estela Steinberg (offgoing nurse). Report included the following information SBAR, Kardex, Intake/Output, MAR and Recent Results.

## 2021-02-25 NOTE — PROGRESS NOTES
Hospitalist Progress Note  Amanda Ellison MD  Answering service: [phone] OR [phone] from in house phone      Date of Service:  2021  NAME:  Jennie Doss  :  1935  MRN:  [id number]    Admission Summary:   85F p/w N/V- got urgent surgery overnight for incarcerated R inguinal hernia  Interval history / Subjective:   Patient seen and examined at bedside, feels better, sitting on chair, sister visiting, counseled at length, answered their questions. Started on clears. Tolerating well. Assessment & Plan:     #. RLL PNA, POA- seen CXR. Also CT a/p showed R side PNA. Suspect aspiration 'as was vomiting repeatedly from bowel obstruction'. #. Acute hypoxic respiratory failure: 2nd to above. Needing supplemental O2. #. Sepsis, sec to PNA (WBC 28 K, HR above 100)- resolved. - IVF- BCs NGTD- IV azithromycin and ceftriaxone- Bronchodilators PRN. PT/OT    #. S/p R inguinal hernia repair: urgent surgery overnight. - Zofran PRN- CT a/p: SBO 2nd to hernia. - surgery following, NGT in situ. Repeat AXR  pending if ok will start po, remove NGT. #. GARRY: mild, Avoid nephrotoxins, monitor   #. Hyponatremia, hypercalcemia- Likely due to volume depletion. monitor    Code status: Full  DVT prophylaxis: Heparin  Care Plan discussed with: Patient/Family and Nurse  Disposition: [facility]  Date Reviewed: 10/8/2020          Codes Class Noted POA    Femoral hernia of right side ICD-10-CM: K41.90  ICD-9-CM: 553.00  2021 Unknown        CAP (community acquired pneumonia) ICD-10-CM: J18.9  ICD-9-CM: 486  2021 Unknown            Review of Systems:   Pertinent items are mentioned in interval history. Vital Signs:    Last 24hrs VS reviewed since prior progress note.  Most recent are:  Visit Vitals  BP (!) 146/75 (BP 1 Location: Left arm, BP Patient Position: Sitting)   Pulse 72   Temp 98.4 °F (36.9 °C)   Resp 14   Ht 5' 5.5\" (1.664 m)   Wt 74.7 kg (164 lb 11.2 oz)   SpO2 95%   BMI 26.99 kg/m²         Intake/Output Summary (Last 24 hours) at 2/25/2021 1239  Last data filed at 2/24/2021 1511  Gross per 24 hour   Intake    Output 200 ml   Net -200 ml        Physical Examination:   Evaluated face to face and examined 02/25/21    General:  Alert, oriented, slim pleasant elderly [employer]  Resp:  No accessory muscle use, no wheezes, some crepitations R  Abd:  Soft, mild-tender, non-distended, BS+, RLQ fresh surgical scar noted. Extremities:  No cyanosis or clubbing, no significant edema  Neuro:  Grossly normal, no focal neuro deficits, follows commands   Psych:  Good insight, not agitated. Data Review:    Review and/or order of clinical lab test  Review and/or order of tests in the radiology section of CPT  Review and/or order of tests in the medicine section of CPT  Labs:     Recent Labs     02/25/21  0204 02/23/21  0128   WBC 12.2* 13.9*   HGB 11.3* 11.2*   HCT 35.4 34.1*    259     Recent Labs     02/25/21  0204 02/24/21  0137 02/24/21  0136 02/23/21  0128    142  --  139   K 4.3 3.7  --  3.4*   * 111*  --  105   CO2 25 26  --  29   BUN 12 19  --  30*   CREA 0.83 0.68  --  0.95   * 115*  --  107*   CA 7.9* 8.3*  --  8.6   MG  --  2.3  --   --    PHOS  --   --  0.9*  --      No results for input(s): ALT, AP, TBIL, TBILI, TP, ALB, GLOB, GGT, AML, LPSE in the last 72 hours. No lab exists for component: SGOT, GPT, AMYP, HLPSE  No results for input(s): INR, PTP, APTT, INREXT, INREXT in the last 72 hours. No results for input(s): FE, TIBC, PSAT, FERR in the last 72 hours. Lab Results   Component Value Date/Time    Folate 14.1 03/23/2019 02:17 AM      No results for input(s): PH, PCO2, PO2 in the last 72 hours. No results for input(s): CPK, CKNDX, TROIQ in the last 72 hours.     No lab exists for component: CPKMB  Lab Results   Component Value Date/Time    Cholesterol, total 265 (H) 09/11/2019 08:46 AM    HDL Cholesterol 62 09/11/2019 08:46 AM    LDL, calculated 176 (H) 09/11/2019 08:46 AM    Triglyceride 137 09/11/2019 08:46 AM     No results found for: [facility]  Lab Results   Component Value Date/Time    Color YELLOW/STRAW 02/22/2021 04:29 PM    Appearance CLEAR 02/22/2021 04:29 PM    Specific gravity 1.026 02/22/2021 04:29 PM    pH (UA) 5.5 02/22/2021 04:29 PM    Protein 30 (A) 02/22/2021 04:29 PM    Glucose Negative 02/22/2021 04:29 PM    Ketone Negative 02/22/2021 04:29 PM    Bilirubin Negative 02/22/2021 04:29 PM    Urobilinogen 0.2 02/22/2021 04:29 PM    Nitrites Negative 02/22/2021 04:29 PM    Leukocyte Esterase TRACE (A) 02/22/2021 04:29 PM    Epithelial cells MODERATE (A) 02/22/2021 04:29 PM    Bacteria 1+ (A) 02/22/2021 04:29 PM    WBC 5-10 02/22/2021 04:29 PM    RBC 5-10 02/22/2021 04:29 PM     Medications Reviewed:     Current Facility-Administered Medications   Medication Dose Route Frequency    HYDROcodone-acetaminophen (NORCO) 5-325 mg per tablet 1 Tab  1 Tab Oral Q4H PRN    phosphorus (K PHOS NEUTRAL) 250 mg tablet 1 Tab  1 Tab Oral BID    thiamine HCL (B-1) tablet 100 mg  100 mg Oral DAILY    docusate sodium (COLACE) capsule 100 mg  100 mg Oral BID    dextrose 5% - 0.9% NaCl with KCl 20 mEq/L infusion  75 mL/hr IntraVENous CONTINUOUS    pantoprazole (PROTONIX) 40 mg in 0.9% sodium chloride 10 mL injection  40 mg IntraVENous Q24H    calcium carbonate (TUMS) chewable tablet 200 mg [elemental]  200 mg Oral TID WITH MEALS    piperacillin-tazobactam (ZOSYN) 3.375 g in 0.9% sodium chloride (MBP/ADV) 100 mL MBP  3.375 g IntraVENous Q8H    HYDROmorphone (PF) (DILAUDID) injection 1 mg  1 mg IntraVENous Q3H PRN    phenol throat spray (CHLORASEPTIC) 1 Spray  1 Spray Oral PRN    sodium chloride (NS) flush 5-10 mL  5-10 mL IntraVENous PRN    azithromycin (ZITHROMAX) 500 mg in 0.9% sodium chloride 250 mL (VIAL-MATE)  500 mg IntraVENous Q24H    sodium chloride (NS) flush 5-40 mL  5-40 mL IntraVENous Q8H  sodium chloride (NS) flush 5-40 mL  5-40 mL IntraVENous PRN    acetaminophen (TYLENOL) tablet 650 mg  650 mg Oral Q4H PRN    albuterol (PROVENTIL VENTOLIN) nebulizer solution 2.5 mg  2.5 mg Nebulization Q4H PRN    heparin (porcine) injection 5,000 Units  5,000 Units SubCUTAneous Q8H    ondansetron (ZOFRAN) injection 4 mg  4 mg IntraVENous Q6H PRN   ______________________________________________________________________  EXPECTED LENGTH OF STAY: 9d 19h  ACTUAL LENGTH OF STAY:          4               Dragan Kruger MD

## 2021-02-25 NOTE — PROGRESS NOTES
Progress Note    Patient: Citlali Jeffers MRN: [id number]  SSN: [id number]    YOB: 1935  Age: 81 y.o. Sex: female      Admit Date: 2021    3 Day Post-Op    Procedure:  Procedure(s):  RIGHT OPEN  INGUINAL HERNIA REPAIR WITH MESH    Subjective:     No acute surgical issues. Pt is doing  Well. Passing flatus and having BM. Tolerating diet without nausea or vomiting. Pain is under control.        Objective:     Visit Vitals  BP (!) 146/75 (BP 1 Location: Left arm, BP Patient Position: Sitting)   Pulse 72   Temp 98.4 °F (36.9 °C)   Resp 14   Ht 5' 5.5\" (1.664 m)   Wt 164 lb 11.2 oz (74.7 kg)   SpO2 95%   BMI 26.99 kg/m²       Temp (24hrs), Av.7 °F (37.1 °C), Min:98.3 °F (36.8 °C), Max:99.1 °F (37.3 °C)        Physical Exam:    Gen:  NAD  Pulm:  Unlabored  Abd:  S/ND/appropriate TTP  Wound:  C/D/I    Recent Results (from the past 24 hour(s))   CBC W/O DIFF    Collection Time: 21  2:04 AM   Result Value Ref Range    WBC 12.2 (H) 3.6 - 11.0 K/uL    RBC 3.56 (L) 3.80 - 5.20 M/uL    HGB 11.3 (L) 11.5 - 16.0 g/dL    HCT 35.4 35.0 - 47.0 %    MCV 99.4 (H) 80.0 - 99.0 FL    MCH 31.7 26.0 - 34.0 PG    MCHC 31.9 30.0 - 36.5 g/dL    RDW 12.5 11.5 - 14.5 %    PLATELET 344 303 - 849 K/uL    MPV 9.4 8.9 - 12.9 FL    NRBC 0.0 0  WBC    ABSOLUTE NRBC 0.00 0.00 - 2.19 K/uL   METABOLIC PANEL, BASIC    Collection Time: 21  2:04 AM   Result Value Ref Range    Sodium 140 136 - 145 mmol/L    Potassium 4.3 3.5 - 5.1 mmol/L    Chloride 109 (H) 97 - 108 mmol/L    CO2 25 21 - 32 mmol/L    Anion gap 6 5 - 15 mmol/L    Glucose 148 (H) 65 - 100 mg/dL    BUN 12 6 - 20 MG/DL    Creatinine 0.83 0.55 - 1.02 MG/DL    BUN/Creatinine ratio 14 12 - 20      GFR est AA >60 >60 ml/min/1.73m2    GFR est non-AA >60 >60 ml/min/1.73m2    Calcium 7.9 (L) 8.5 - 10.1 MG/DL         Assessment:     Hospital Problems  Date Reviewed: 10/8/2020          Codes Class Noted POA    CAP (community acquired pneumonia) ICD-10-CM: J18.9  ICD-9-CM: 439  2/21/2021 Unknown              Plan/Recommendations/Medical Decision Making:     - Right femoral hernia repair:  No surgical issues  - Small bowel obstruction:  Resolving  - GI lite this evening  - Bowel regiment  - Hopefully can be DC home tomorrow

## 2021-02-25 NOTE — PROGRESS NOTES
Hospitalist Progress Note  Kenyon Atkinson MD  Answering service: [phone] OR [phone] from in house phone      Date of Service:  2021  NAME:  Ramila Pimentel  :  1935  MRN:  [id number]    Admission Summary:   85F p/w N/V- got urgent surgery overnight for incarcerated R inguinal hernia  Interval history / Subjective:   Patient seen and examined at bedside, feels well, tolerating diet well. Had BM. Off oxygen. Needs home health, she doesn't feel confident going home today as lives alone. Will have family there tomorrow 'coming from out of state'. Assessment & Plan:     #. RLL PNA, POA- seen CXR. Also CT a/p showed R side PNA. Suspect aspiration 'as was vomiting repeatedly from bowel obstruction'. #. Acute hypoxic respiratory failure: 2nd to above. Needing supplemental O2.- resolved. #. Sepsis, sec to PNA (WBC 28 K, HR above 100)- resolved. - IVF- BCs coag -ve staph- likely contamination- zosyn- Bronchodilators PRN. PT/OT    #. S/p R inguinal hernia repair: urgent surgery overnight. - Zofran PRN- CT a/p: SBO 2nd to hernia. - surgery following, NGT in situ. Repeat AXR ok will start po, removed NGT. - tolerating diet well. Surgery cleared her for dc. #. GARRY: mild, Avoid nephrotoxins, monitor   #. Hyponatremia, hypercalcemia- Likely due to volume depletion. monitor    Code status: Full  DVT prophylaxis: Heparin  Care Plan discussed with: Patient/Family and Nurse  Disposition: [facility] Problems  Date Reviewed: 10/8/2020          Codes Class Noted POA    Femoral hernia of right side ICD-10-CM: K41.90  ICD-9-CM: 553.00  2021 Unknown        CAP (community acquired pneumonia) ICD-10-CM: J18.9  ICD-9-CM: 486  2021 Unknown            Review of Systems:   Pertinent items are mentioned in interval history. Vital Signs:    Last 24hrs VS reviewed since prior progress note.  Most recent are:  Visit Vitals  BP (!) 146/75 (BP 1 Location: Left arm, BP Patient Position: Sitting)   Pulse 72   Temp 98.4 °F (36.9 °C)   Resp 14   Ht 5' 5.5\" (1.664 m)   Wt 74.7 kg (164 lb 11.2 oz)   SpO2 95%   BMI 26.99 kg/m²         Intake/Output Summary (Last 24 hours) at 2/25/2021 1240  Last data filed at 2/24/2021 1511  Gross per 24 hour   Intake    Output 200 ml   Net -200 ml        Physical Examination:   Evaluated face to face and examined 02/25/21    General:  Alert, oriented, slim pleasant elderly [employer]  Resp:  No accessory muscle use,   Abd:  Soft, mild-tender, non-distended, BS+, RLQ fresh surgical scar noted. Extremities:  No cyanosis or clubbing, no significant edema  Neuro:  Grossly normal, no focal neuro deficits, follows commands   Psych:  Good insight, not agitated. Data Review:    Review and/or order of clinical lab test  Review and/or order of tests in the radiology section of CPT  Review and/or order of tests in the medicine section of CPT  Labs:     Recent Labs     02/25/21  0204 02/23/21  0128   WBC 12.2* 13.9*   HGB 11.3* 11.2*   HCT 35.4 34.1*    259     Recent Labs     02/25/21  0204 02/24/21  0137 02/24/21  0136 02/23/21  0128    142  --  139   K 4.3 3.7  --  3.4*   * 111*  --  105   CO2 25 26  --  29   BUN 12 19  --  30*   CREA 0.83 0.68  --  0.95   * 115*  --  107*   CA 7.9* 8.3*  --  8.6   MG  --  2.3  --   --    PHOS  --   --  0.9*  --      No results for input(s): ALT, AP, TBIL, TBILI, TP, ALB, GLOB, GGT, AML, LPSE in the last 72 hours. No lab exists for component: SGOT, GPT, AMYP, HLPSE  No results for input(s): INR, PTP, APTT, INREXT, INREXT in the last 72 hours. No results for input(s): FE, TIBC, PSAT, FERR in the last 72 hours. Lab Results   Component Value Date/Time    Folate 14.1 03/23/2019 02:17 AM      No results for input(s): PH, PCO2, PO2 in the last 72 hours. No results for input(s): CPK, CKNDX, TROIQ in the last 72 hours.     No lab exists for component: CPKMB  Lab Results Component Value Date/Time    Cholesterol, total 265 (H) 09/11/2019 08:46 AM    HDL Cholesterol 62 09/11/2019 08:46 AM    LDL, calculated 176 (H) 09/11/2019 08:46 AM    Triglyceride 137 09/11/2019 08:46 AM     No results found for: [facility]  Lab Results   Component Value Date/Time    Color YELLOW/STRAW 02/22/2021 04:29 PM    Appearance CLEAR 02/22/2021 04:29 PM    Specific gravity 1.026 02/22/2021 04:29 PM    pH (UA) 5.5 02/22/2021 04:29 PM    Protein 30 (A) 02/22/2021 04:29 PM    Glucose Negative 02/22/2021 04:29 PM    Ketone Negative 02/22/2021 04:29 PM    Bilirubin Negative 02/22/2021 04:29 PM    Urobilinogen 0.2 02/22/2021 04:29 PM    Nitrites Negative 02/22/2021 04:29 PM    Leukocyte Esterase TRACE (A) 02/22/2021 04:29 PM    Epithelial cells MODERATE (A) 02/22/2021 04:29 PM    Bacteria 1+ (A) 02/22/2021 04:29 PM    WBC 5-10 02/22/2021 04:29 PM    RBC 5-10 02/22/2021 04:29 PM     Medications Reviewed:     Current Facility-Administered Medications   Medication Dose Route Frequency    HYDROcodone-acetaminophen (NORCO) 5-325 mg per tablet 1 Tab  1 Tab Oral Q4H PRN    phosphorus (K PHOS NEUTRAL) 250 mg tablet 1 Tab  1 Tab Oral BID    thiamine HCL (B-1) tablet 100 mg  100 mg Oral DAILY    docusate sodium (COLACE) capsule 100 mg  100 mg Oral BID    dextrose 5% - 0.9% NaCl with KCl 20 mEq/L infusion  75 mL/hr IntraVENous CONTINUOUS    pantoprazole (PROTONIX) 40 mg in 0.9% sodium chloride 10 mL injection  40 mg IntraVENous Q24H    calcium carbonate (TUMS) chewable tablet 200 mg [elemental]  200 mg Oral TID WITH MEALS    piperacillin-tazobactam (ZOSYN) 3.375 g in 0.9% sodium chloride (MBP/ADV) 100 mL MBP  3.375 g IntraVENous Q8H    HYDROmorphone (PF) (DILAUDID) injection 1 mg  1 mg IntraVENous Q3H PRN    phenol throat spray (CHLORASEPTIC) 1 Spray  1 Spray Oral PRN    sodium chloride (NS) flush 5-10 mL  5-10 mL IntraVENous PRN    azithromycin (ZITHROMAX) 500 mg in 0.9% sodium chloride 250 mL (VIAL-MATE)  500 mg IntraVENous Q24H    sodium chloride (NS) flush 5-40 mL  5-40 mL IntraVENous Q8H    sodium chloride (NS) flush 5-40 mL  5-40 mL IntraVENous PRN    acetaminophen (TYLENOL) tablet 650 mg  650 mg Oral Q4H PRN    albuterol (PROVENTIL VENTOLIN) nebulizer solution 2.5 mg  2.5 mg Nebulization Q4H PRN    heparin (porcine) injection 5,000 Units  5,000 Units SubCUTAneous Q8H    ondansetron (ZOFRAN) injection 4 mg  4 mg IntraVENous Q6H PRN   ______________________________________________________________________  EXPECTED LENGTH OF STAY: 9d 19h  ACTUAL LENGTH OF STAY:          4               Wendi Nichols MD

## 2021-02-25 NOTE — PROGRESS NOTES
Problem: Mobility Impaired (Adult and Pediatric)  Goal: *Acute Goals and Plan of Care (Insert Text)  Description: FUNCTIONAL STATUS PRIOR TO ADMISSION: Patient was independent and active without use of DME.    HOME SUPPORT PRIOR TO ADMISSION: The patient lived alone with no local support. Physical Therapy Goals  Initiated 2/23/2021  1. Patient will move from supine to sit and sit to supine  in bed with modified independence within 7 day(s). 2.  Patient will transfer from bed to chair and chair to bed with modified independence using the least restrictive device within 7 day(s). 3.  Patient will perform sit to stand with modified independence within 7 day(s). 4.  Patient will ambulate with modified independence for 200 feet with the least restrictive device within 7 day(s). 5.  Patient will ascend/descend 4 stairs with 1 handrail(s) with modified independence within 7 day(s). Outcome: Progressing Towards Goal   PHYSICAL THERAPY TREATMENT  Patient: Anneliese Ward (89 y.o. female)  Date: 2/25/2021  Diagnosis: CAP (community acquired pneumonia) [J18.9] <principal problem not specified>  Procedure(s) (LRB):  RIGHT OPEN  INGUINAL HERNIA REPAIR WITH MESH (Right) 3 Days Post-Op  Precautions: Fall  Chart, physical therapy assessment, plan of care and goals were reviewed. ASSESSMENT  Patient continues with skilled PT services and is progressing towards goals. Upon arrival pt up in bedside chair and looking forward to discharge home possible tomorrow. Patient amb in hallway with standby guard only without assistive device and performed stairs with supervision only. Discussed possible need for RW at discharge (pt already has) for long distance .       Current Level of Function Impacting Discharge (mobility/balance): Standby assist to supervision for functional mobility    Other factors to consider for discharge: per pt [address] daughters will taking pt home and assist as needed         PLAN :  Patient continues to benefit from skilled intervention to address the above impairments. Continue treatment per established plan of care. to address goals. Recommendation for discharge: (in order for the patient to meet his/her long term goals)  Home Health PT - 2 x wk    This discharge recommendation:  Has been made in collaboration with the attending provider and/or case management    IF patient discharges home will need the following DME: patient owns DME required for discharge       SUBJECTIVE:   Patient stated I have a walker out in the garage - but I never use it.     OBJECTIVE DATA SUMMARY:   Critical Behavior:  Neurologic State: Alert  Orientation Level: Oriented X4  Cognition: Appropriate decision making, Appropriate for age attention/concentration, Appropriate safety awareness  Safety/Judgement: Awareness of environment  Functional Mobility Training:        Transfers:  Sit to Stand: Stand-by assistance  Stand to Sit: Stand-by assistance                             Balance:  Sitting: Intact  Standing: Intact; Without support  Ambulation/Gait Training:  Distance (ft): 200 Feet (ft)  Assistive Device: (none)  Ambulation - Level of Assistance: Stand-by assistance        Gait Abnormalities: Decreased step clearance              Speed/Courtney: Slow  Step Length: Right shortened;Left shortened              Stairs:  Number of Stairs Trained: 6(step over step using one rail)  Stairs - Level of Assistance: Stand-by assistance    Activity Tolerance:   Fair - requires rest but overall improving - amb hallway distances and performed stairs    After treatment patient left in no apparent distress: In bathroom with OT    COMMUNICATION/COLLABORATION:   The patients plan of care was discussed with: Occupational therapist and Registered nurse.      Adria Schofield, PT   Time Calculation: 15 mins

## 2021-02-25 NOTE — PROGRESS NOTES
Bedside shift change report given to Stone Perez RN (oncoming nurse) by Maria Elena Elizalde RN (offgoing nurse). Report included the following information SBAR, Kardex, Intake/Output, MAR and Recent Results.

## 2021-02-25 NOTE — PROGRESS NOTES
Transition of care plan: Home with [facility] when medically stable. RUR- 14%    Patient used [facility] following her last admission and would prefer to use again. CM sent referral to [address], pending approval.     1:38PM: [facility] accepted patient with a Monday start date. TEMO Whipple, MSW Supervisee  S.  [employer] , [facility]

## 2021-02-25 NOTE — PROGRESS NOTES
Problem: Self Care Deficits Care Plan (Adult)  Goal: *Acute Goals and Plan of Care (Insert Text)  Description:   FUNCTIONAL STATUS PRIOR TO ADMISSION: Pt lives alone and was independent with all ADLs and IADLs. HOME SUPPORT: The patient lived alone with no local support. Pt reports son lives abroad in [address]  Initiated 2/23/2021- Goals Met at Set/up to S due to IV management   1. Patient will perform bathing with supervision/set-up within 7 day(s). 2.  Patient will perform upper body dressing with independence within 7 day(s). 3.  Patient will perform lower body dressing with supervision/set-up with AE PRN within 7 day(s). 4.  Patient will perform toilet transfers with modified independence within 7 day(s). 5.  Patient will perform all aspects of toileting with modified independence within 7 day(s).       2/25/2021 1150 by Radha ELDER  Outcome: Progressing Towards Goal     OCCUPATIONAL THERAPY TREATMENT/DISCHARGE  Patient: Daya Peters (54 y.o. female)  Date: 2/25/2021  Diagnosis: CAP (community acquired pneumonia) [J18.9] <principal problem not specified>  Procedure(s) (LRB):  RIGHT OPEN  INGUINAL HERNIA REPAIR WITH MESH (Right) 3 Days Post-Op  Precautions: Fall  Chart, occupational therapy assessment, plan of care, and goals were reviewed. ASSESSMENT  Patient continues with skilled OT services and has progressed towards goals. AAOx4, overall agreeable and tolerant for basic ADLs and functional mobility today without DME and with no LOB. Pt needed set/up to supervision to manage IV pole but anticipate without it she will be mod I for toileting, grooming, and other ADLs. Per the patient her grand daughters will be there for assistance starting tomorrow to help with decreased energy and endurance to help with IADLs and set/up assistance as needed. Recommend HHOT to assist in safe progression back to independent and living alone. No further acute care OT needs.  ZENOBIA acute care OT . Current Level of Function (ADLs/self-care): set/up    Other factors to consider for discharge: level of support, recommend home with support for initial days          PLAN :  Rationale for discharge: Goals achieved  Recommend with staff: encourage independence as able  Recommendation for discharge: (in order for the patient to meet his/her long term goals)  Occupational therapy at least 2 days/week in the home     This discharge recommendation:  Has been made in collaboration with the attending provider and/or case management    IF patient discharges home will need the following DME:has needed       SUBJECTIVE:   Patient stated I stay active with the [employer] and everything.     OBJECTIVE DATA SUMMARY:   Cognitive/Behavioral Status:  Neurologic State: Alert  Orientation Level: Oriented X4  Cognition: Appropriate decision making; Appropriate for age attention/concentration; Appropriate safety awareness  Perception: Appears intact  Perseveration: No perseveration noted  Safety/Judgement: Awareness of environment    Functional Mobility and Transfers for ADLs:      Transfers:   Supervision to mod I without DME          Balance:  Sitting: Intact  Standing: Intact; Without support    ADL Intervention:       Grooming  Grooming Assistance: Set-up  Position Performed: Standing  Washing Face: Set-up  Washing Hands: Set-up  Brushing/Combing Hair: Set-up              Upper Body Dressing Assistance  Dressing Assistance: Set-up    Lower Body Dressing Assistance  Dressing Assistance: Set-up         Cognitive Retraining  Safety/Judgement: Awareness of environment    Therapeutic Exercises:   Ambulates in the room with close S and cues for safety  Discussion on IS to take home and complete on a semi frequent basis     Pain:  2/10    Activity Tolerance:   Good    After treatment patient left in no apparent distress:   Sitting in chair, Call bell within reach, and RN student in the room    COMMUNICATION/COLLABORATION: The patients plan of care was discussed with: Physical therapist, Registered nurse, and Case management.      Devang Saez  Time Calculation: 24 mins

## 2021-02-26 VITALS
SYSTOLIC BLOOD PRESSURE: 127 MMHG | OXYGEN SATURATION: 95 % | HEART RATE: 84 BPM | DIASTOLIC BLOOD PRESSURE: 68 MMHG | WEIGHT: 164.7 LBS | TEMPERATURE: 97.8 F | RESPIRATION RATE: 18 BRPM | BODY MASS INDEX: 26.47 KG/M2 | HEIGHT: 66 IN

## 2021-02-26 LAB
ANION GAP SERPL CALC-SCNC: 6 MMOL/L (ref 5–15)
BUN SERPL-MCNC: 11 MG/DL (ref 6–20)
BUN/CREAT SERPL: 14 (ref 12–20)
CALCIUM SERPL-MCNC: 8 MG/DL (ref 8.5–10.1)
CHLORIDE SERPL-SCNC: 109 MMOL/L (ref 97–108)
CO2 SERPL-SCNC: 25 MMOL/L (ref 21–32)
COMMENT, HOLDF: NORMAL
CREAT SERPL-MCNC: 0.78 MG/DL (ref 0.55–1.02)
GLUCOSE SERPL-MCNC: 105 MG/DL (ref 65–100)
POTASSIUM SERPL-SCNC: 3.4 MMOL/L (ref 3.5–5.1)
SAMPLES BEING HELD,HOLD: NORMAL
SODIUM SERPL-SCNC: 140 MMOL/L (ref 136–145)

## 2021-02-26 PROCEDURE — 74011250637 HC RX REV CODE- 250/637: Performed by: INTERNAL MEDICINE

## 2021-02-26 PROCEDURE — 74011250636 HC RX REV CODE- 250/636: Performed by: SURGERY

## 2021-02-26 PROCEDURE — 80048 BASIC METABOLIC PNL TOTAL CA: CPT

## 2021-02-26 PROCEDURE — 74011000258 HC RX REV CODE- 258: Performed by: SURGERY

## 2021-02-26 PROCEDURE — 36415 COLL VENOUS BLD VENIPUNCTURE: CPT

## 2021-02-26 PROCEDURE — 99024 POSTOP FOLLOW-UP VISIT: CPT | Performed by: NURSE PRACTITIONER

## 2021-02-26 PROCEDURE — 74011250637 HC RX REV CODE- 250/637: Performed by: NURSE PRACTITIONER

## 2021-02-26 RX ORDER — POTASSIUM CHLORIDE 750 MG/1
40 TABLET, FILM COATED, EXTENDED RELEASE ORAL
Status: COMPLETED | OUTPATIENT
Start: 2021-02-26 | End: 2021-02-26

## 2021-02-26 RX ADMIN — PIPERACILLIN AND TAZOBACTAM 3.38 G: 3; .375 INJECTION, POWDER, LYOPHILIZED, FOR SOLUTION INTRAVENOUS at 01:51

## 2021-02-26 RX ADMIN — POTASSIUM BICARBONATE 40 MEQ: 391 TABLET, EFFERVESCENT ORAL at 07:15

## 2021-02-26 RX ADMIN — CALCIUM CARBONATE (ANTACID) CHEW TAB 500 MG 200 MG: 500 CHEW TAB at 07:15

## 2021-02-26 RX ADMIN — POTASSIUM CHLORIDE 40 MEQ: 750 TABLET, FILM COATED, EXTENDED RELEASE ORAL at 10:59

## 2021-02-26 RX ADMIN — Medication 100 MG: at 08:32

## 2021-02-26 RX ADMIN — DIBASIC SODIUM PHOSPHATE, MONOBASIC POTASSIUM PHOSPHATE AND MONOBASIC SODIUM PHOSPHATE 1 TABLET: 852; 155; 130 TABLET ORAL at 08:32

## 2021-02-26 RX ADMIN — CALCIUM CARBONATE (ANTACID) CHEW TAB 500 MG 200 MG: 500 CHEW TAB at 11:00

## 2021-02-26 RX ADMIN — HEPARIN SODIUM 5000 UNITS: 5000 INJECTION INTRAVENOUS; SUBCUTANEOUS at 06:41

## 2021-02-26 RX ADMIN — PIPERACILLIN AND TAZOBACTAM 3.38 G: 3; .375 INJECTION, POWDER, LYOPHILIZED, FOR SOLUTION INTRAVENOUS at 09:44

## 2021-02-26 NOTE — ROUTINE PROCESS
[facility] follow-up PCP transitional care appointment has been scheduled with Dr. Janusz Flores for March 2 @ 3PM. Pending patient discharge. Kati Beltran, Care Management Specialist.    Pt will d/c with home health.

## 2021-02-26 NOTE — DISCHARGE INSTRUCTIONS
1.  Do not drive while on pain medication. You should take a stool softener while on pain medication to prevent constipation. 2.  Do not lift anything greater than 10 pounds for 4 weeks. 3.  You may resume your home medications. 4.  You may shower but do not swim or soak in tub for 2 weeks. 5.  Please call [phone] to schedule a follow-up with Dr. Nicho Womack within 2 weeks. Patient Education        Inguinal Hernia Repair Surgery: What to Expect at Home  Your Recovery     After surgery to repair a hernia, you're likely to have pain for a few days. You may also feel like you have the flu. And you may have a low fever and feel tired and nauseated. This is common. You should start to feel better after a few days. And you'll probably feel much better in 7 days. For a few weeks you may feel twinges or pulling in the groin area when you move. Men may have some bruising on the scrotum and along the penis. This is normal.  This care sheet gives you a general idea about how long it will take for you to recover. But each person recovers at a different pace. Follow the steps below to get better as quickly as possible. How can you care for yourself at home? Activity    · Rest when you feel tired.     · You may shower 24 to 48 hours after surgery, if your doctor okays it. Pat the incision dry. Do not take a bath for the first 2 weeks, or until your doctor tells you it is okay.     · Allow the area to heal. Don't move quickly or lift anything heavy until you are feeling better.     · Be active. Walking is a good choice.     · You most likely can return to light activity after 1 to 3 weeks, depending on the type of surgery you had. Diet    · You can eat your normal diet. If your stomach is upset, try bland, low-fat foods like plain rice, broiled chicken, toast, and yogurt.     · If your bowel movements are not regular right after surgery, try to avoid constipation and straining. Drink plenty of water.  Your doctor may suggest fiber, a stool softener, or a mild laxative. Medicines    · Be safe with medicines. Read and follow all instructions on the label. ? If the doctor gave you a prescription medicine for pain, take it as prescribed. ? If you are not taking a prescription pain medicine, ask your doctor if you can take an over-the-counter medicine.     · Your doctor will tell you if and when you can restart your medicines. He or she will also give you instructions about taking any new medicines. Incision care    · You will have a dressing over the cut (incision). A dressing helps the cut heal and protects it. Your doctor will tell you how to take care of this.     · If you have skin adhesive on the cut, leave it on until it falls off. Skin adhesive is also called liquid stitches or glue.     · If you have strips of tape on the cut, leave the tape on for a week or until it falls off.     · If you had stitches, your doctor will tell you when to come back to have them removed.     · Wash the area daily with warm, soapy water, and pat it dry. Don't use hydrogen peroxide or alcohol. They can slow healing. Ice    · Put ice or a cold pack on the area for 10 to 20 minutes at a time. Try to do this every 1 to 2 hours for the next 3 days (when you are awake) or until the swelling goes down. Put a thin cloth between the ice and your skin. Follow-up care is a key part of your treatment and safety. Be sure to make and go to all appointments, and call your doctor if you are having problems. It's also a good idea to know your test results and keep a list of the medicines you take. When should you call for help? Call 911 anytime you think you may need emergency care. For example, call if:    · You passed out (lost consciousness).     · You are short of breath.    Call your doctor now or seek immediate medical care if:    · You have pain that does not get better after you take pain medicine.     · You have loose stitches, or your incision comes open.     · Bright red blood has soaked through your bandage.     · You are sick to your stomach or cannot drink fluids.     · You have signs of a blood clot in your leg (called a deep vein thrombosis), such as:  ? Pain in your calf, back of the knee, thigh, or groin. ? Redness and swelling in your leg or groin.     · You cannot pass stools or gas.     · You have symptoms of infection, such as:  ? Increased pain, swelling, warmth, or redness. ? Red streaks leading from the incision. ? Pus draining from the incision. ? A fever. Watch closely for changes in your health, and be sure to contact your doctor if you have any problems. Where can you learn more? Go to http://www.gray.com/  Enter D758 in the search box to learn more about \"Inguinal Hernia Repair Surgery: What to Expect at Home. \"  Current as of: April 15, 2020               Content Version: 12.6  © 4562-4314 [employer]. Care instructions adapted under license by [employer] (which disclaims liability or warranty for this information). If you have questions about a medical condition or this instruction, always ask your healthcare professional. [place] [address] any warranty or liability for your use of this information. Discharge Instructions       PATIENT ID: Anne-Marie Rodriguez  MRN: [id number]   YOB: 1935    DATE OF ADMISSION: 2/21/2021  8:13 PM    DATE OF DISCHARGE: 2/26/2021    PRIMARY CARE PROVIDER: Levy Del Cid MD     ATTENDING PHYSICIAN: Heather Bates MD  DISCHARGING PROVIDER: Bryson Scott MD    To contact this individual call [phone] and ask the  to page. If unavailable ask to be transferred the Adult Hospitalist Department.     DISCHARGE DIAGNOSES SBO due to inguinal hernia- repaired surgically    CONSULTATIONS: IP CONSULT TO GENERAL SURGERY    PROCEDURES/SURGERIES: Procedure(s):  RIGHT OPEN  INGUINAL HERNIA REPAIR WITH MESH    FOLLOW UP APPOINTMENTS:   Follow-up Information     Follow up With Specialties Details Why Contact Info    [facility] Call Call on Monday to set up home visit if the agency has not already contacted you. [address] Luigi Madsen MD Family Medicine In 1 week  [facility] [facility]  [address] [address] [phone]      SAHIL, [address], MD General Surgery In 2 weeks  [address] [facility]  [address] [place] [address] [phone]             ADDITIONAL CARE RECOMMENDATIONS: follow up with PCP and surgery    DIET: Resume previous diet    DISCHARGE MEDICATIONS:  Finish abx course po    · It is important that you take the medication exactly as they are prescribed. · Keep your medication in the bottles provided by the pharmacist and keep a list of the medication names, dosages, and times to be taken in your wallet. · Do not take other medications without consulting your doctor. NOTIFY YOUR PHYSICIAN FOR ANY OF THE FOLLOWING:   Fever over 101 degrees for 24 hours. Chest pain, shortness of breath, fever, chills, nausea, vomiting, diarrhea, change in mentation, falling, weakness, bleeding. Severe pain or pain not relieved by medications. Or, any other signs or symptoms that you may have questions about. It was our pleasure to help take care of you and we hope you get well very soon.     DISPOSITION:    Home With:   OT  PT  HH  RN       SNF/Inpatient Rehab/LTAC   x Independent/assisted living    Hospice    Other:     CDMP Checked:   Yes x     PROBLEM LIST Updated:  Yes x     Signed:   Princess Gage MD  2/26/2021  10:33 AM

## 2021-02-26 NOTE — PROGRESS NOTES
General Surgery Daily Progress Note    Admit Date: 2021  Post-Operative Day: 4 Days Post-Op from Procedure(s):  RIGHT OPEN  INGUINAL HERNIA REPAIR WITH MESH     Subjective:     Last 24 hrs: Pt is tolerating gi lite diet; had 2 big \"blow outs\"         Objective:     Blood pressure (!) 141/76, pulse 84, temperature 99.1 °F (37.3 °C), resp. rate 18, height 5' 5.5\" (1.664 m), weight 164 lb 11.2 oz (74.7 kg), SpO2 96 %. Temp (24hrs), Av.5 °F (36.9 °C), Min:97.6 °F (36.4 °C), Max:99.1 °F (37.3 °C)      _____________________  Physical Exam:     Alert and Oriented, x3, in no acute distress. Cardiovascular: RRR, no peripheral edema  Abdomen: soft, RLQ incision is c/d/i      Assessment:   Active Problems:    CAP (community acquired pneumonia) (2021)      Femoral hernia of right side (2021)            Plan:     May go home from surgical standpoint  F/u w/ Dr Juana Claire in 2 weeks    Data Review:    Recent Labs     21  0204   WBC 12.2*   HGB 11.3*   HCT 35.4        Recent Labs     21  0159 21  0204 21  0137 21  0136    140 142  --    K 3.4* 4.3 3.7  --    * 109* 111*  --    CO2   --    * 148* 115*  --    BUN 11 12 19  --    CREA 0.78 0.83 0.68  --    CA 8.0* 7.9* 8.3*  --    MG  --   --  2.3  --    PHOS  --   --   --  0.9*     No results for input(s): AML, LPSE in the last 72 hours.         ______________________  Medications:    Current Facility-Administered Medications   Medication Dose Route Frequency    HYDROcodone-acetaminophen (NORCO) 5-325 mg per tablet 1 Tab  1 Tab Oral Q4H PRN    phosphorus (K PHOS NEUTRAL) 250 mg tablet 1 Tab  1 Tab Oral BID    thiamine HCL (B-1) tablet 100 mg  100 mg Oral DAILY    docusate sodium (COLACE) capsule 100 mg  100 mg Oral BID    pantoprazole (PROTONIX) 40 mg in 0.9% sodium chloride 10 mL injection  40 mg IntraVENous Q24H    calcium carbonate (TUMS) chewable tablet 200 mg [elemental]  200 mg Oral TID WITH MEALS    piperacillin-tazobactam (ZOSYN) 3.375 g in 0.9% sodium chloride (MBP/ADV) 100 mL MBP  3.375 g IntraVENous Q8H    HYDROmorphone (PF) (DILAUDID) injection 1 mg  1 mg IntraVENous Q3H PRN    phenol throat spray (CHLORASEPTIC) 1 Spray  1 Spray Oral PRN    sodium chloride (NS) flush 5-10 mL  5-10 mL IntraVENous PRN    sodium chloride (NS) flush 5-40 mL  5-40 mL IntraVENous Q8H    sodium chloride (NS) flush 5-40 mL  5-40 mL IntraVENous PRN    acetaminophen (TYLENOL) tablet 650 mg  650 mg Oral Q4H PRN    albuterol (PROVENTIL VENTOLIN) nebulizer solution 2.5 mg  2.5 mg Nebulization Q4H PRN    heparin (porcine) injection 5,000 Units  5,000 Units SubCUTAneous Q8H    ondansetron (ZOFRAN) injection 4 mg  4 mg IntraVENous Q6H PRN       Mathew Cordova NP  2/26/2021

## 2021-02-26 NOTE — PROGRESS NOTES
[employer] message sent to hospitalist on call, CELESTINE Puga: \"Pt's K 3.4 this am and Pt is supposed to d/c today. Can we get an order for PO potassium? Please place orders if appropriate or call the unit for clarification. Thanks. \"

## 2021-02-26 NOTE — PROGRESS NOTES
Physician Progress Note      PATIENT:               Mg Ghosh  CSN #:                  [id number]  :                       1935  ADMIT DATE:       2021 8:13 PM  [address] [place] DATE:        2021 2:29 PM  RESPONDING  PROVIDER #:        JOSI OLIVARES [facility] MD          QUERY TEXT:    Dr. Jasmyn Schmitz,    Patient admitted with sepsis secondary to RLL PNA and strangulated inguinal hernia. Noted documentation of acute respiratory failure in Hospitalist progress notes starting on 21 through the D/C summary. Pt noted to have pulse oximetry  on RA to 2 LPM O2 by NC. Progress notes consistently document that the pt had no shortness of breath and respiratory distress. There is no, dyspnea or accessory muscle use noted, and there are no blood gases noted. In order to support the diagnosis of acute respiratory failure, please include additional clinical indicators in your documentation. Or please document if the diagnosis of acute respiratory failure has been ruled out after further study. The medical record reflects the following:  Risk Factors: PNA, suspicious for aspiration, sepsis, strangulated hernia, vomiting  Clinical Indicators:  - Progress notes consistently document that the pt had no shortness of breath and respiratory distress. There is no, dyspnea or accessory muscle use noted, and there are no blood gases noted. - Hospitalist progress notes and D/C summary note - RLL PNA, POA- seen CXR. Also CT a/p showed R side PNA. Suspect aspiration 'as was vomiting repeatedly from bowel obstruction'. #. Acute hypoxic respiratory failure: 2nd to above.  Needing supplemental O2.  - Pulse oximetry % on RA - 2 LPM O2 by NC  - CT = Patchy airspace disease in the right middle and lower lobes  is compatible with pneumonia  - CXR = Right basilar airspace disease likely represents pneumonia  Treatment: Supplemental O2 by NC up to 2 LPM, IV Abx, imaging, pulse oximetry    Thank-you,  Dilshad Mishra RN, CRCR  Clinical   [phone]    Acute Respiratory Failure Clinical Indicators per  MS-DRG Training Guide and Quick Reference Guide:  pO2 < 60 mmHg or SpO2 (pulse oximetry) < 91% breathing room air  pCO2 > 50 and pH < 7.35  P/F ratio (pO2 / FIO2) < 300  pO2 decrease or pCO2 increase by 10 mmHg from baseline (if known)  Supplemental oxygen of 40% or more  Presence of respiratory distress, tachypnea, dyspnea, shortness of breath, wheezing  Unable to speak in complete sentences  Use of accessory muscles to breathe  Extreme anxiety and feeling of impending doom  Tripod position  Confusion/altered mental status/obtunded  Options provided:  -- Acute Hypoxic Respiratory Failure as evidenced by, Please document evidence. -- Acute Hypoxic Respiratory Failure ruled out after study  -- Other - I will add my own diagnosis  -- Disagree - Not applicable / Not valid  -- Disagree - Clinically unable to determine / Unknown  -- Refer to Clinical Documentation Reviewer    PROVIDER RESPONSE TEXT:    Acute Hypoxic Respiratory Failure has been ruled out after study.     Query created by: Ethelyn Angelucci on 2/26/2021 3:14 PM      Electronically signed by:  JOSI OLIVARES [facility] MD 2/26/2021 3:53 PM

## 2021-02-26 NOTE — PROGRESS NOTES
Bedside shift change report given to Rayne Reyes (oncoming nurse) by [facility], RN (offgoing nurse). Report included the following information SBAR, Kardex, Procedure Summary, Intake/Output, MAR and Recent Results.

## 2021-02-26 NOTE — DISCHARGE SUMMARY
Discharge Summary       PATIENT ID: Chi Russell  MRN: [id number]   YOB: 1935    DATE OF ADMISSION: 2/21/2021  8:13 PM    DATE OF DISCHARGE: 2/26/2021   PRIMARY CARE PROVIDER: Lourdes Lemus MD     ATTENDING PHYSICIAN: Cameron Wallace MD  DISCHARGING PROVIDER: Cameron Wallace MD    To contact this individual call [phone] and ask the  to page. If unavailable ask to be transferred the Adult Hospitalist Department. CONSULTATIONS: IP CONSULT TO GENERAL SURGERY    PROCEDURES/SURGERIES: Procedure(s):  RIGHT OPEN  INGUINAL HERNIA REPAIR WITH MESH    ADMITTING DIAGNOSES & HOSPITAL COURSE:   Evaluated and treated for acute SBO caused by strangulated inguinal hernia, needed surgical repair. Improved well. Risk of Re-Admission: low  DISCHARGE DIAGNOSES / PLAN:      #. RLL PNA, POA- seen CXR. Also CT a/p showed R side PNA. Suspect aspiration 'as was vomiting repeatedly from bowel obstruction'. #. Acute hypoxic respiratory failure: 2nd to above. Needing supplemental O2.- resolved. #. Sepsis, sec to PNA (WBC 28 K, HR above 100)- resolved. - IVF- BCs coag -ve staph- likely contamination- zosyn, switch to augmentin on dc. -PT/OT evaluated, dc home with home health. Will have family helping her on dc.     #. S/p R inguinal hernia repair: urgent surgery overnight. - Zofran PRN- CT a/p: SBO 2nd to hernia. - surgery following, NGT in situ. Repeat AXR ok will start po, removed NGT. - tolerating diet well. Surgery cleared her for dc. Will need f/u with Dr Rhett Perez 'surgery' in 2 weeks.     #. GARRY: mild, Avoid nephrotoxins, monitor   #. Hyponatremia, hypercalcemia- Likely due to volume depletion. monitor     FOLLOW UP APPOINTMENTS:    Follow-up Information     Follow up With Specialties Details Why [facility] Call Call on Monday to set up home visit if the agency has not already contacted you.   [address] [facility], [address] [place] [address] 9[address] [facility]    Jenny Brush MD Family Medicine In 1 week  [address]  [phone]      Dyan Bennett MD General Surgery In 2 weeks  [address] [facility]  [address] [place] [address]  [phone]           ADDITIONAL CARE RECOMMENDATIONS:  Follow up with PCP and surgery    DIET: Resume previous diet    ACTIVITY: Activity as tolerated    DISCHARGE MEDICATIONS:  Current Discharge Medication List      START taking these medications    Details   docusate sodium (COLACE) 100 mg capsule Take 1 Cap by mouth two (2) times daily as needed for Constipation. Qty: 30 Cap, Refills: 0      amoxicillin-clavulanate (AUGMENTIN) 875-125 mg per tablet Take 1 Tab by mouth every twelve (12) hours for 7 days. Qty: 14 Tab, Refills: 0      HYDROcodone-acetaminophen (NORCO) 5-325 mg per tablet Take 1 Tab by mouth every six (6) hours as needed for Pain for up to 7 days. Max Daily Amount: 4 Tabs. Qty: 20 Tab, Refills: 0    Associated Diagnoses: Femoral hernia of right side         CONTINUE these medications which have NOT CHANGED    Details   multivitamin (ONE A DAY) tablet Take 1 Tab by mouth daily. aspirin 81 mg chewable tablet Take 81 mg by mouth daily. NOTIFY YOUR PHYSICIAN FOR ANY OF THE FOLLOWING:   Fever over 101 degrees for 24 hours. Chest pain, shortness of breath, fever, chills, nausea, vomiting, diarrhea, change in mentation, falling, weakness, bleeding. Severe pain or pain not relieved by medications. Or, any other signs or symptoms that you may have questions about.     DISPOSITION:    Home With:   OT  PT x HH  RN       Long term SNF/Inpatient Rehab   x Independent/assisted living    Hospice    Other:     PATIENT CONDITION AT DISCHARGE:     Functional status    Poor     Deconditioned     Independent      Cognition     Lucid     Forgetful     Dementia      Catheters/lines (plus indication)    Davalos     PICC     PEG     None      Code status     Full code     DNR      PHYSICAL EXAMINATION AT DISCHARGE:  Patient seen and examined at bedside, Condition stable, explained discharge and follow up plans. BP (!) 141/76   Pulse 84   Temp 99.1 °F (37.3 °C)   Resp 18   Ht 5' 5.5\" (1.664 m)   Wt 74.7 kg (164 lb 11.2 oz)   SpO2 96%   BMI 26.99 kg/m²   General:  Alert, oriented, No acute distress. Comfortable. Not on O2. Resp:  No accessory muscle use, Good AE. Neuro:  Grossly normal, no focal neuro deficits, follows commands     CHRONIC MEDICAL DIAGNOSES:  Problem List as of 2/26/2021 Date Reviewed: 10/8/2020          Codes Class Noted - Resolved    Femoral hernia of right side ICD-10-CM: K41.90  ICD-9-CM: 553.00  2/25/2021 - Present        CAP (community acquired pneumonia) ICD-10-CM: J18.9  ICD-9-CM: 486  2/21/2021 - Present        Abscess of abdominal cavity ([facility] ICD-10-CM: K65.1  ICD-9-CM: 567.22  3/28/2019 - Present        Abdominal pain ICD-10-CM: R10.9  ICD-9-CM: 789.00  3/20/2019 - Present        Ureteral obstruction ICD-10-CM: N13.5  ICD-9-CM: 593.4  2/15/2019 - Present        Acute kidney injury ([facility] ICD-10-CM: N17.9  ICD-9-CM: 584.9  2/15/2019 - Present        Septic shock ([facility] ICD-10-CM: A41.9, R65.21  ICD-9-CM: 038.9, 785.52, 995.92  2/14/2019 - Present        Pelvic abscess in female ICD-10-CM: N73.9  ICD-9-CM: 614.4  2/14/2019 - Present        IUD (intrauterine device) in place ICD-10-CM: Z97.5  ICD-9-CM: V45.51  2/14/2019 - Present        Bilateral hydronephrosis ICD-10-CM: N13.30  ICD-9-CM: 677  2/14/2019 - Present              37 minutes were spent with the patient on counseling and coordination of care.     Signed:   Dimitris Dominguez MD  2/26/2021  10:22 AM

## 2021-02-27 LAB
BACTERIA SPEC CULT: ABNORMAL
SERVICE CMNT-IMP: ABNORMAL

## 2021-03-02 ENCOUNTER — VIRTUAL VISIT (OUTPATIENT)
Dept: FAMILY MEDICINE CLINIC | Age: 86
End: 2021-03-02
Payer: MEDICARE

## 2021-03-02 DIAGNOSIS — N17.9 AKI (ACUTE KIDNEY INJURY) (HCC): ICD-10-CM

## 2021-03-02 DIAGNOSIS — E87.1 HYPONATREMIA: ICD-10-CM

## 2021-03-02 DIAGNOSIS — Z98.890 S/P INGUINAL HERNIA REPAIR: ICD-10-CM

## 2021-03-02 DIAGNOSIS — K56.609 SBO (SMALL BOWEL OBSTRUCTION) (HCC): ICD-10-CM

## 2021-03-02 DIAGNOSIS — D72.819 LEUKOPENIA, UNSPECIFIED TYPE: ICD-10-CM

## 2021-03-02 DIAGNOSIS — Z87.19 S/P INGUINAL HERNIA REPAIR: ICD-10-CM

## 2021-03-02 DIAGNOSIS — J18.9 COMMUNITY ACQUIRED PNEUMONIA, UNSPECIFIED LATERALITY: ICD-10-CM

## 2021-03-02 DIAGNOSIS — E87.6 HYPOKALEMIA: ICD-10-CM

## 2021-03-02 DIAGNOSIS — Z09 HOSPITAL DISCHARGE FOLLOW-UP: Primary | ICD-10-CM

## 2021-03-02 PROCEDURE — 99443 PR PHYS/QHP TELEPHONE EVALUATION 21-30 MIN: CPT | Performed by: FAMILY MEDICINE

## 2021-03-02 NOTE — PROGRESS NOTES
Chief Complaint   Patient presents with   [facility] Follow Up     Discharged from [address] 2/26/21 Admitted 2/21/21   1. Have you been to the ER, urgent care clinic since your last visit? Hospitalized since your last visit? Yes Where: Pneumonia ,Hernia    2. Have you seen or consulted any other health care providers outside of the [address] [place] [address] since your last visit? Include any pap smears or colon screening.  No

## 2021-03-02 NOTE — PROGRESS NOTES
Hieu Guerrero (: 1935) is a 81 y.o. female, established patient, here for evaluation of the following chief complaint(s):   Hospital Follow Up (Discharged from [facility] 21 Admitted 21)       ASSESSMENT/PLAN:  Doing well with no concerns after sgy or hospitalization. Hydration status improved. Labs as below. 1. Hospital discharge follow-up  -     CBC W/O DIFF; Future  -     METABOLIC PANEL, COMPREHENSIVE; Future    2. Community acquired pneumonia, unspecified laterality  -     CBC W/O DIFF; Future    3. SBO (small bowel obstruction) (HCC)  -     CBC W/O DIFF; Future    4. S/P inguinal hernia repair  -     CBC W/O DIFF; Future    5. Leukopenia, unspecified type  -     CBC W/O DIFF; Future    6. Hypokalemia  -     METABOLIC PANEL, COMPREHENSIVE; Future    7. Hyponatremia  -     METABOLIC PANEL, COMPREHENSIVE; Future    8. GARRY (acute kidney injury) ([facility]  -     METABOLIC PANEL, COMPREHENSIVE; Future        Return in about 6 weeks (around 2021), or if symptoms worsen or fail to improve. SUBJECTIVE/OBJECTIVE:  Hospital follow-up. Admitted from /26/2021 at [facility] for community-acquired pneumonia vs asp PNA from SBO. Also had a femoral hernia of the right side and had a right open inguinal hernia repair with mesh due to acute SBO from strangulated inguinal hernia. Sx all started with intractable vomiting. Notes suggest patient had SIRS from CAP. Doing well with recovery. Has appt with me next month. Unable to do virtual appt. Has f/u with Dr. Nicho Womack next week. Able to get 2nd COVID vaccine 1 day after discharge. Having regular BMs, pain controlled, and walking up and down her steps without trouble. Not using home health. ROS  Gen - no fever/chills  Resp - no dyspnea or cough  CV - no chest pain or VALLES  Rest per HPI    No flowsheet data found.     Unable to examine    On this date 2021 I have spent 20 minutes reviewing previous notes, test results and audio visit with the patient discussing the diagnosis and importance of compliance with the treatment plan as well as documenting on the day of the visit. Hieu Guerrero was evaluated through a synchronous (real-time) audio telephone encounter. The patient (or guardian if applicable) is aware that this is a billable service. Verbal consent to proceed has been obtained within the past 12 months. The visit was conducted pursuant to the emergency declaration under the [address] [place] [address] and the [employer] Act. Patient identification was verified, and a caregiver was present when appropriate. The patient was located in a state where the provider was credentialed to provide care. An electronic signature was used to authenticate this note.   -- Lilo Payan MD

## 2021-03-09 ENCOUNTER — OFFICE VISIT (OUTPATIENT)
Dept: SURGERY | Age: 86
End: 2021-03-09
Payer: MEDICARE

## 2021-03-09 VITALS
BODY MASS INDEX: 26.2 KG/M2 | HEART RATE: 104 BPM | SYSTOLIC BLOOD PRESSURE: 120 MMHG | HEIGHT: 66 IN | DIASTOLIC BLOOD PRESSURE: 82 MMHG | WEIGHT: 163 LBS | OXYGEN SATURATION: 96 % | TEMPERATURE: 98.2 F | RESPIRATION RATE: 18 BRPM

## 2021-03-09 DIAGNOSIS — K41.90 FEMORAL HERNIA OF RIGHT SIDE: Primary | ICD-10-CM

## 2021-03-09 PROCEDURE — 99024 POSTOP FOLLOW-UP VISIT: CPT | Performed by: SURGERY

## 2021-03-09 NOTE — PROGRESS NOTES
1. Have you been to the ER, urgent care clinic since your last visit? Hospitalized since your last visit? Not since surgery    2. Have you seen or consulted any other health care providers outside of the [address] [place] [address] since your last visit? Include any pap smears or colon screening.   No

## 2021-03-13 LAB
ALBUMIN SERPL-MCNC: 3.7 G/DL (ref 3.6–4.6)
ALBUMIN/GLOB SERPL: 1.1 {RATIO} (ref 1.2–2.2)
ALP SERPL-CCNC: 70 IU/L (ref 39–117)
ALT SERPL-CCNC: 20 IU/L (ref 0–32)
AST SERPL-CCNC: 17 IU/L (ref 0–40)
BILIRUB SERPL-MCNC: 0.3 MG/DL (ref 0–1.2)
BUN SERPL-MCNC: 15 MG/DL (ref 8–27)
BUN/CREAT SERPL: 19 (ref 12–28)
CALCIUM SERPL-MCNC: 9.6 MG/DL (ref 8.7–10.3)
CHLORIDE SERPL-SCNC: 102 MMOL/L (ref 96–106)
CO2 SERPL-SCNC: 23 MMOL/L (ref 20–29)
CREAT SERPL-MCNC: 0.78 MG/DL (ref 0.57–1)
ERYTHROCYTE [DISTWIDTH] IN BLOOD BY AUTOMATED COUNT: 11.8 % (ref 11.7–15.4)
GLOBULIN SER CALC-MCNC: 3.3 G/DL (ref 1.5–4.5)
GLUCOSE SERPL-MCNC: 98 MG/DL (ref 65–99)
HCT VFR BLD AUTO: 37.5 % (ref 34–46.6)
HGB BLD-MCNC: 12.5 G/DL (ref 11.1–15.9)
MCH RBC QN AUTO: 31.6 PG (ref 26.6–33)
MCHC RBC AUTO-ENTMCNC: 33.3 G/DL (ref 31.5–35.7)
MCV RBC AUTO: 95 FL (ref 79–97)
PLATELET # BLD AUTO: 439 X10E3/UL (ref 150–450)
POTASSIUM SERPL-SCNC: 4.2 MMOL/L (ref 3.5–5.2)
PROT SERPL-MCNC: 7 G/DL (ref 6–8.5)
RBC # BLD AUTO: 3.95 X10E6/UL (ref 3.77–5.28)
SODIUM SERPL-SCNC: 141 MMOL/L (ref 134–144)
WBC # BLD AUTO: 14.5 X10E3/UL (ref 3.4–10.8)

## 2021-03-15 NOTE — PROGRESS NOTES
I have reviewed discharge instructions with the patient. The patient verbalized understanding. Discharge medications reviewed with patient and appropriate educational materials and side effects teaching were provided. IV intact

## 2021-03-16 ENCOUNTER — DOCUMENTATION ONLY (OUTPATIENT)
Dept: FAMILY MEDICINE CLINIC | Age: 86
End: 2021-03-16

## 2021-03-16 DIAGNOSIS — D72.829 LEUKOCYTOSIS, UNSPECIFIED TYPE: Primary | ICD-10-CM

## 2021-03-17 NOTE — PROGRESS NOTES
Reason for Visit:  Follow-up right inguinal hernia repair with mesh    Brief History:  80 yo woman with incarcerated right femoral hernia s/p open repair with mesh presented to clinic for follow-up. Pt has been doing well. Tolerating diet without nausea or vomiting. Pain is under control and not taking pain medication. She is having return of bowel function. No fever or chills.     Visit Vitals  /82 (BP 1 Location: Left upper arm, BP Patient Position: Sitting, BP Cuff Size: Small adult)   Pulse (!) 104   Temp 98.2 °F (36.8 °C) (Oral)   Resp 18   Ht 5' 5.5\" (1.664 m)   Wt 163 lb (73.9 kg)   SpO2 96%   BMI 26.71 kg/m²         Physical Exam:    Gen:  NAD  Pulm:  Unlabored  Abd:  S/ND/right groin with mild TTP without erythema or drainage  Wound:  C/D/I    AP: 80 yo woman with right femoral hernia presented to office for follow-up    - No acute surgical issues.  - No lifting more than 10 pounds for 4 more weeks  - Diet as tolerated  - Follow-up prn

## 2021-04-13 LAB
BASOPHILS # BLD AUTO: 0.1 X10E3/UL (ref 0–0.2)
BASOPHILS NFR BLD AUTO: 1 %
EOSINOPHIL # BLD AUTO: 0.1 X10E3/UL (ref 0–0.4)
EOSINOPHIL NFR BLD AUTO: 1 %
ERYTHROCYTE [DISTWIDTH] IN BLOOD BY AUTOMATED COUNT: 12.5 % (ref 11.7–15.4)
HCT VFR BLD AUTO: 36.8 % (ref 34–46.6)
HGB BLD-MCNC: 12.6 G/DL (ref 11.1–15.9)
IMM GRANULOCYTES # BLD AUTO: 0.1 X10E3/UL (ref 0–0.1)
IMM GRANULOCYTES NFR BLD AUTO: 1 %
LYMPHOCYTES # BLD AUTO: 2.8 X10E3/UL (ref 0.7–3.1)
LYMPHOCYTES NFR BLD AUTO: 31 %
MCH RBC QN AUTO: 32.1 PG (ref 26.6–33)
MCHC RBC AUTO-ENTMCNC: 34.2 G/DL (ref 31.5–35.7)
MCV RBC AUTO: 94 FL (ref 79–97)
MONOCYTES # BLD AUTO: 1.4 X10E3/UL (ref 0.1–0.9)
MONOCYTES NFR BLD AUTO: 16 %
NEUTROPHILS # BLD AUTO: 4.6 X10E3/UL (ref 1.4–7)
NEUTROPHILS NFR BLD AUTO: 50 %
PLATELET # BLD AUTO: 311 X10E3/UL (ref 150–450)
RBC # BLD AUTO: 3.92 X10E6/UL (ref 3.77–5.28)
WBC # BLD AUTO: 8.9 X10E3/UL (ref 3.4–10.8)

## 2021-04-14 ENCOUNTER — OFFICE VISIT (OUTPATIENT)
Dept: FAMILY MEDICINE CLINIC | Age: 86
End: 2021-04-14
Payer: MEDICARE

## 2021-04-14 VITALS
HEART RATE: 100 BPM | WEIGHT: 161 LBS | SYSTOLIC BLOOD PRESSURE: 144 MMHG | BODY MASS INDEX: 25.88 KG/M2 | TEMPERATURE: 96.9 F | HEIGHT: 66 IN | DIASTOLIC BLOOD PRESSURE: 80 MMHG | OXYGEN SATURATION: 96 % | RESPIRATION RATE: 18 BRPM

## 2021-04-14 DIAGNOSIS — Z98.890 S/P INGUINAL HERNIA REPAIR: ICD-10-CM

## 2021-04-14 DIAGNOSIS — Z87.19 S/P INGUINAL HERNIA REPAIR: ICD-10-CM

## 2021-04-14 DIAGNOSIS — K56.609 SBO (SMALL BOWEL OBSTRUCTION) (HCC): Primary | ICD-10-CM

## 2021-04-14 DIAGNOSIS — E78.2 MIXED HYPERLIPIDEMIA: ICD-10-CM

## 2021-04-14 DIAGNOSIS — Z23 ENCOUNTER FOR IMMUNIZATION: ICD-10-CM

## 2021-04-14 DIAGNOSIS — Z78.0 POSTMENOPAUSAL STATE: ICD-10-CM

## 2021-04-14 PROCEDURE — 99213 OFFICE O/P EST LOW 20 MIN: CPT | Performed by: FAMILY MEDICINE

## 2021-04-14 PROCEDURE — G8536 NO DOC ELDER MAL SCRN: HCPCS | Performed by: FAMILY MEDICINE

## 2021-04-14 PROCEDURE — 1090F PRES/ABSN URINE INCON ASSESS: CPT | Performed by: FAMILY MEDICINE

## 2021-04-14 PROCEDURE — G8427 DOCREV CUR MEDS BY ELIG CLIN: HCPCS | Performed by: FAMILY MEDICINE

## 2021-04-14 PROCEDURE — G8419 CALC BMI OUT NRM PARAM NOF/U: HCPCS | Performed by: FAMILY MEDICINE

## 2021-04-14 PROCEDURE — G8400 PT W/DXA NO RESULTS DOC: HCPCS | Performed by: FAMILY MEDICINE

## 2021-04-14 PROCEDURE — 1101F PT FALLS ASSESS-DOCD LE1/YR: CPT | Performed by: FAMILY MEDICINE

## 2021-04-14 PROCEDURE — G8510 SCR DEP NEG, NO PLAN REQD: HCPCS | Performed by: FAMILY MEDICINE

## 2021-04-14 NOTE — PROGRESS NOTES
Ashwin Mckeon (: 1935) is a 81 y.o. female, established patient, here for evaluation of the following chief complaint(s):  Follow-up (6 week)       ASSESSMENT/PLAN:  Doing well after hospitalization. No further issues. Plan for routine care. 1. SBO (small bowel obstruction) ([facility] [address]  2. S/P inguinal hernia repair  3. Mixed hyperlipidemia  4. Encounter for immunization  -     PNEUMOCOCCAL POLYSACCHARIDE VACCINE, 23-VALENT, ADULT OR IMMUNOSUPPRESSED PT DOSE,  -     IN IMMUNIZ ADMIN,1 SINGLE/COMB VAC/TOXOID  5. Postmenopausal state  -     DEXA BONE DENSITY STUDY AXIAL; Future      Return in about 3 months (around 2021), or if symptoms worsen or fail to improve. SUBJECTIVE/OBJECTIVE:    Doing well today, recovered from PNA and surgery after hospitalization. No new concerns. BMs normal.  No pain. Reviewed labs and CBC and CMP are normal.    Blood pressure (!) 144/80, pulse 100, temperature 96.9 °F (36.1 °C), temperature source Temporal, resp. rate 18, height 5' 5.5\" (1.664 m), weight 161 lb (73 kg), SpO2 96 %. PE  General appearance - alert, well appearing, and in no distress  Eyes -sclera anicteric  Neck - supple, no significant adenopathy, no thyromegaly  Chest - clear to auscultation, no wheezes, rales or rhonchi, symmetric air entry  Heart - normal rate, regular rhythm, normal S1, S2, no murmurs, rubs, clicks or gallops  Neurological - alert, oriented, normal speech, no focal findings or movement disorder noted  Extr - no edema  Psych - normal mood and affect    On this date 2021 I have spent 20 minutes reviewing previous notes, test results and face to face with the patient discussing the diagnosis and importance of compliance with the treatment plan as well as documenting on the day of the visit. An electronic signature was used to authenticate this note.   -- José Le MD

## 2021-04-14 NOTE — PROGRESS NOTES
Chief Complaint   Patient presents with    Follow-up     6 week   1. Have you been to the ER, urgent care clinic since your last visit? Hospitalized since your last visit? No    2. Have you seen or consulted any other health care providers outside of the [address] [place] [address] since your last visit? Include any pap smears or colon screening. Lenora  Kayce Puckett is a 81 y.o. female who presents for routine immunizations. She denies any symptoms , reactions or allergies that would exclude them from being immunized today. Risks and adverse reactions were discussed and the VIS was given to them. All questions were addressed. She was observed for 15 min post injection. There were no reactions observed.     Yaritza Marte LPN

## 2021-04-15 PROCEDURE — G0009 ADMIN PNEUMOCOCCAL VACCINE: HCPCS | Performed by: FAMILY MEDICINE

## 2021-04-15 PROCEDURE — 90732 PPSV23 VACC 2 YRS+ SUBQ/IM: CPT | Performed by: FAMILY MEDICINE

## 2021-06-24 ENCOUNTER — DOCUMENTATION ONLY (OUTPATIENT)
Dept: FAMILY MEDICINE CLINIC | Age: 86
End: 2021-06-24

## 2022-03-18 PROBLEM — R65.21 SEPTIC SHOCK (HCC): Status: ACTIVE | Noted: 2019-02-14

## 2022-03-18 PROBLEM — K41.90 FEMORAL HERNIA OF RIGHT SIDE: Status: ACTIVE | Noted: 2021-02-25

## 2022-03-18 PROBLEM — N13.5 URETERAL OBSTRUCTION: Status: ACTIVE | Noted: 2019-02-15

## 2022-03-18 PROBLEM — A41.9 SEPTIC SHOCK (HCC): Status: ACTIVE | Noted: 2019-02-14

## 2022-03-18 PROBLEM — R10.9 ABDOMINAL PAIN: Status: ACTIVE | Noted: 2019-03-20

## 2022-03-19 PROBLEM — N17.9 ACUTE KIDNEY INJURY (HCC): Status: ACTIVE | Noted: 2019-02-15

## 2022-03-19 PROBLEM — K65.1 ABSCESS OF ABDOMINAL CAVITY (HCC): Status: ACTIVE | Noted: 2019-03-28

## 2022-03-19 PROBLEM — N13.30 BILATERAL HYDRONEPHROSIS: Status: ACTIVE | Noted: 2019-02-14

## 2022-03-19 PROBLEM — Z97.5 IUD (INTRAUTERINE DEVICE) IN PLACE: Status: ACTIVE | Noted: 2019-02-14

## 2022-03-19 PROBLEM — J18.9 CAP (COMMUNITY ACQUIRED PNEUMONIA): Status: ACTIVE | Noted: 2021-02-21

## 2022-03-20 PROBLEM — N73.9 PELVIC ABSCESS IN FEMALE: Status: ACTIVE | Noted: 2019-02-14

## 2022-11-15 ENCOUNTER — OFFICE VISIT (OUTPATIENT)
Dept: FAMILY MEDICINE CLINIC | Age: 87
End: 2022-11-15
Payer: MEDICARE

## 2022-11-15 VITALS
OXYGEN SATURATION: 95 % | TEMPERATURE: 97.3 F | HEIGHT: 66 IN | BODY MASS INDEX: 25.39 KG/M2 | RESPIRATION RATE: 16 BRPM | DIASTOLIC BLOOD PRESSURE: 73 MMHG | HEART RATE: 116 BPM | WEIGHT: 158 LBS | SYSTOLIC BLOOD PRESSURE: 126 MMHG

## 2022-11-15 DIAGNOSIS — R00.0 TACHYCARDIA: ICD-10-CM

## 2022-11-15 DIAGNOSIS — E78.2 MIXED HYPERLIPIDEMIA: ICD-10-CM

## 2022-11-15 DIAGNOSIS — Z01.818 PREOP EXAMINATION: Primary | ICD-10-CM

## 2022-11-15 DIAGNOSIS — H25.9 AGE-RELATED CATARACT OF BOTH EYES, UNSPECIFIED AGE-RELATED CATARACT TYPE: ICD-10-CM

## 2022-11-15 DIAGNOSIS — Z79.899 ENCOUNTER FOR LONG-TERM (CURRENT) USE OF MEDICATIONS: ICD-10-CM

## 2022-11-15 PROCEDURE — G8536 NO DOC ELDER MAL SCRN: HCPCS | Performed by: FAMILY MEDICINE

## 2022-11-15 PROCEDURE — 1123F ACP DISCUSS/DSCN MKR DOCD: CPT | Performed by: FAMILY MEDICINE

## 2022-11-15 PROCEDURE — 1101F PT FALLS ASSESS-DOCD LE1/YR: CPT | Performed by: FAMILY MEDICINE

## 2022-11-15 PROCEDURE — 93000 ELECTROCARDIOGRAM COMPLETE: CPT | Performed by: FAMILY MEDICINE

## 2022-11-15 PROCEDURE — 1090F PRES/ABSN URINE INCON ASSESS: CPT | Performed by: FAMILY MEDICINE

## 2022-11-15 PROCEDURE — G8427 DOCREV CUR MEDS BY ELIG CLIN: HCPCS | Performed by: FAMILY MEDICINE

## 2022-11-15 PROCEDURE — 99214 OFFICE O/P EST MOD 30 MIN: CPT | Performed by: FAMILY MEDICINE

## 2022-11-15 PROCEDURE — G8417 CALC BMI ABV UP PARAM F/U: HCPCS | Performed by: FAMILY MEDICINE

## 2022-11-15 PROCEDURE — G8510 SCR DEP NEG, NO PLAN REQD: HCPCS | Performed by: FAMILY MEDICINE

## 2022-11-15 NOTE — PROGRESS NOTES
Chief Complaint   Patient presents with    Pre-op Exam     Eye Surgery    1. Have you been to the ER, urgent care clinic since your last visit? Hospitalized since your last visit? No    2. Have you seen or consulted any other health care providers outside of the [address] [place] [address] since your last visit? Include any pap smears or colon screening.  Yes Where: Dr Janice Pritchard Cataract Surgery with lens implant Topical Sedation

## 2022-11-15 NOTE — PROGRESS NOTES
Leah Ramos (: 1935) is a 81 y.o. female, established patient, here for evaluation of the following chief complaint(s):  Pre-op Exam (Eye Surgery)       ASSESSMENT/PLAN:  Diagnoses and all orders for this visit:    1. Preop examination  2. Age-related cataract of both eyes, unspecified age-related cataract type  - pt is a lower risk patient for a low risk surgery - cleared for surgery. - reviewed her asymptomatic sinus tachycardia and sending to cardiology but will still be cleared for surgery given no symptoms and HR down to 103 on EKG. Encouraged hydration. 3. Mixed hyperlipidemia - checking labs  -     HEMOGLOBIN A1C WITH EAG; Future  -     LIPID PANEL; Future  -     METABOLIC PANEL, COMPREHENSIVE; Future  -     TSH 3RD GENERATION; Future    4. Encounter for long-term (current) use of medications  -     HEMOGLOBIN A1C WITH EAG; Future  -     LIPID PANEL; Future  -     METABOLIC PANEL, COMPREHENSIVE; Future  -     TSH 3RD GENERATION; Future  -     CBC W/O DIFF; Future  -     URINALYSIS W/ RFLX MICROSCOPIC; Future    5. Tachycardia - EKG shows sinus tachycardia with rate down to 103. To see Cardiology to be thorough. -     METABOLIC PANEL, COMPREHENSIVE; Future  -     TSH 3RD GENERATION; Future  -     CBC W/O DIFF; Future  -     AMB POC EKG ROUTINE W/ 12 LEADS, INTER & REP  -     REFERRAL TO CARDIOLOGY    Return in about 4 weeks (around 2022), or if symptoms worsen or fail to improve. Subjective:   Pt is a 81 y.o. female with PMH sig for SBO related to femoral hernia s/p repair, ureteral obstruction with hydronephrosis, pelvic abscess, and CAP here for preop for cataract surgery with Dr. Roman Davalos under topical anesthesia with IV sedation. No allergy to latex. No bleeding pathology. No problems with anesthesia in past.  No hx of JESS. Able to perform 4 METs without difficulty. Sig stressors this year with nephew dying unexpectedly.     ROS  Gen - no fever/chills  Resp - no dyspnea or cough  CV - no chest pain or VALLES  Rest per HPI    History reviewed. No pertinent past medical history. Past Surgical History:   Procedure Laterality Date    COLONOSCOPY Left 3/26/2019    COLONOSCOPY performed by Katerine Elizondo MD at [address] [place] [address] Avenue  02/22/2021    Open Right Femoral Hernia Repair with mesh-[facility]-Dr. Tiara Carias    HX LYSIS OF ADHESIONS  03/27/2019    HX OTHER SURGICAL  03/27/2019    Laparoscopic drainage of pelvic abscess x 2 with lysis of adhesions    HX TONSILLECTOMY      GA ABDOMEN SURGERY PROC UNLISTED      Abcess drained     GA LAP,DIAGNOSTIC ABDOMEN  03/27/2019        Objective:     Blood pressure 126/73, pulse (!) 116, temperature 97.3 °F (36.3 °C), temperature source Temporal, resp. rate 16, height 5' 5.5\" (1.664 m), weight 158 lb (71.7 kg), SpO2 95 %. Physical Exam  General appearance - alert, well appearing, and in no distress  Eyes -sclera anicteric  Neck - supple, no significant adenopathy, no thyromegaly  Chest - clear to auscultation, no wheezes, rales or rhonchi, symmetric air entry  Heart - slightly tachycardic in 100s, regular rhythm, normal S1, S2, no murmurs, rubs, clicks or gallops  Neurological - alert, oriented, normal speech, no focal findings or movement disorder noted  Extr - no edema  Psych - normal mood and affect    EKG - sinus tachycardia to 106, no st changes    On this date 11/15/2022 I have spent 30 minutes reviewing previous notes, test results and face to face with the patient discussing the diagnosis and importance of compliance with the treatment plan as well as documenting on the day of the visit. An electronic signature was used to authenticate this note.   -- Davide Bocanegra MD

## 2022-11-16 LAB
ALBUMIN SERPL-MCNC: 3.7 G/DL (ref 3.6–4.6)
ALBUMIN/GLOB SERPL: 1.2 {RATIO} (ref 1.2–2.2)
ALP SERPL-CCNC: 78 IU/L (ref 44–121)
ALT SERPL-CCNC: 18 IU/L (ref 0–32)
AST SERPL-CCNC: 22 IU/L (ref 0–40)
BILIRUB SERPL-MCNC: 0.3 MG/DL (ref 0–1.2)
BUN SERPL-MCNC: 12 MG/DL (ref 8–27)
BUN/CREAT SERPL: 16 (ref 12–28)
CALCIUM SERPL-MCNC: 8.9 MG/DL (ref 8.7–10.3)
CHLORIDE SERPL-SCNC: 102 MMOL/L (ref 96–106)
CHOLEST SERPL-MCNC: 228 MG/DL (ref 100–199)
CO2 SERPL-SCNC: 22 MMOL/L (ref 20–29)
CREAT SERPL-MCNC: 0.75 MG/DL (ref 0.57–1)
EGFR: 77 ML/MIN/1.73
EST. AVERAGE GLUCOSE BLD GHB EST-MCNC: 126 MG/DL
GLOBULIN SER CALC-MCNC: 3.2 G/DL (ref 1.5–4.5)
GLUCOSE SERPL-MCNC: 100 MG/DL (ref 70–99)
HBA1C MFR BLD: 6 % (ref 4.8–5.6)
HDLC SERPL-MCNC: 38 MG/DL
LDLC SERPL CALC-MCNC: 149 MG/DL (ref 0–99)
POTASSIUM SERPL-SCNC: 4.4 MMOL/L (ref 3.5–5.2)
PROT SERPL-MCNC: 6.9 G/DL (ref 6–8.5)
SODIUM SERPL-SCNC: 138 MMOL/L (ref 134–144)
TRIGL SERPL-MCNC: 224 MG/DL (ref 0–149)
TSH SERPL DL<=0.005 MIU/L-ACNC: 2.84 UIU/ML (ref 0.45–4.5)
VLDLC SERPL CALC-MCNC: 41 MG/DL (ref 5–40)

## 2022-11-23 NOTE — PERIOP NOTES
[facility]  Ambulatory Surgery Unit  Pre-operative Instructions    Surgery/Procedure Date  Thursday, December 1, 2022            Tentative Arrival Time TBD      1. On the day of your surgery/procedure, please report to the Ambulatory Surgery Unit Registration Desk and sign in at your designated time. The Ambulatory Surgery Unit is located in [facility] on the [facility] side of the [facility] across from the [address] [place] [address]. Please have all of your health insurance cards, co-payment, and a photo ID.    **TWO adults may accompany you the day of the procedure. We have limited seating available. If our waiting room is at capacity, your ride may be asked to remain in their vehicle. No one under 15 is allowed in the waiting room. 2. You must have someone with you to drive you home, as you should not drive a car for 24 hours following anesthesia. Please make arrangements for a responsible adult friend or family member to stay with you for at least the first 24 hours after your surgery. 3. Do not have anything to eat or drink (including water, gum, mints, coffee, juice) after 11:59 PM, Wednesday. This may not apply to medications prescribed by your physician. (Please note below the special instructions with medications to take the morning of surgery, if applicable.)    4. We recommend you do not drink any alcoholic beverages for 24 hours before and after your surgery. 5. Contact your surgeons office for instructions on the following medications: non-steroidal anti-inflammatory drugs (i.e. Advil, Aleve), vitamins, and supplements. (Some surgeons will want you to stop these medications prior to surgery and others may allow you to take them)   **If you are currently taking Plavix, Coumadin, Aspirin and/or other blood-thinning agents, contact your surgeon for instructions. ** Your surgeon will partner with the physician prescribing these medications to determine if it is safe to stop or if you need to continue taking. Please do not stop taking these medications without instructions from your surgeon. 6. In an effort to help prevent surgical site infection, we ask that you shower with an anti-bacterial soap (i.e. Dial/Safeguard, or the soap provided to you at your preadmission testing appointment) for 3 days prior to and on the morning of surgery, using a fresh towel after each shower. (Please begin this process with fresh bed linens.) Do not apply any lotions, powders, or deodorants after the shower on the day of your procedure. If applicable, please do not shave the operative site for 48 hours prior to surgery. 7. Wear comfortable clothes. Wear glasses instead of contacts. Do not bring any jewelry or money (other than copays or fees as instructed). Do not wear make-up, particularly mascara, the morning of your surgery. Do not wear nail polish, particularly if you are having foot /hand surgery. Wear your hair loose or down, no ponytails, buns, barry pins or clips. All body piercings must be removed. 8. You should understand that if you do not follow these instructions your surgery may be cancelled. If your physical condition changes (i.e. fever, cold or flu) please contact your surgeon as soon as possible. 9. It is important that you be on time. If a situation occurs where you may be late, or if you have any questions or problems, please call [phone].    10. Your surgery time may be subject to change. You will receive a phone call the day prior to surgery to confirm your arrival time. 11. Pediatric patients: please bring a change of clothes, diapers, bottle/sippy cup, pacifier, etc.      Special Instructions: Take all medications and inhalers, as prescribed, on the morning of surgery with a sip of water. I understand a pre-operative phone call will be made to verify my surgery time.   In the event that I am not available, I give permission for a message to be left on my answering service and/or with another person?       yes    Preop instructions reviewed  Pt verbalized understanding.       ___________________      ___________________      ________________  (Signature of Patient)          (Witness)                   (Date and Time)

## 2022-11-30 ENCOUNTER — ANESTHESIA EVENT (OUTPATIENT)
Dept: SURGERY | Age: 87
End: 2022-11-30
Payer: MEDICARE

## 2022-12-01 ENCOUNTER — ANESTHESIA (OUTPATIENT)
Dept: SURGERY | Age: 87
End: 2022-12-01
Payer: MEDICARE

## 2022-12-01 ENCOUNTER — HOSPITAL ENCOUNTER (OUTPATIENT)
Age: 87
Setting detail: OUTPATIENT SURGERY
Discharge: HOME | End: 2022-12-01
Attending: OPHTHALMOLOGY | Admitting: OPHTHALMOLOGY
Payer: MEDICARE

## 2022-12-01 VITALS
BODY MASS INDEX: 25.23 KG/M2 | HEART RATE: 70 BPM | TEMPERATURE: 97.1 F | DIASTOLIC BLOOD PRESSURE: 64 MMHG | HEIGHT: 66 IN | WEIGHT: 157 LBS | SYSTOLIC BLOOD PRESSURE: 132 MMHG | RESPIRATION RATE: 14 BRPM | OXYGEN SATURATION: 96 %

## 2022-12-01 PROCEDURE — 76060000062 HC AMB SURG ANES 1 TO 1.5 HR: Performed by: OPHTHALMOLOGY

## 2022-12-01 PROCEDURE — 74011000258 HC RX REV CODE- 258: Performed by: NURSE ANESTHETIST, CERTIFIED REGISTERED

## 2022-12-01 PROCEDURE — 74011250636 HC RX REV CODE- 250/636: Performed by: NURSE ANESTHETIST, CERTIFIED REGISTERED

## 2022-12-01 PROCEDURE — 77030003029 HC SUT VCRL J&J -B: Performed by: OPHTHALMOLOGY

## 2022-12-01 PROCEDURE — 76210000050 HC AMBSU PH II REC 0.5 TO 1 HR: Performed by: OPHTHALMOLOGY

## 2022-12-01 PROCEDURE — 77030028792 HC AGNT OPHTH DUOVISC ALCN -B: Performed by: OPHTHALMOLOGY

## 2022-12-01 PROCEDURE — 2709999900 HC NON-CHARGEABLE SUPPLY: Performed by: OPHTHALMOLOGY

## 2022-12-01 PROCEDURE — 77030018873: Performed by: OPHTHALMOLOGY

## 2022-12-01 PROCEDURE — 77030034627 HC PRB VITRCMY ANT CENTUR ALCN -G1: Performed by: OPHTHALMOLOGY

## 2022-12-01 PROCEDURE — 76030000001 HC AMB SURG OR TIME 1 TO 1.5: Performed by: OPHTHALMOLOGY

## 2022-12-01 PROCEDURE — 74011250636 HC RX REV CODE- 250/636: Performed by: OPHTHALMOLOGY

## 2022-12-01 PROCEDURE — 74011000250 HC RX REV CODE- 250

## 2022-12-01 PROCEDURE — 74011000250 HC RX REV CODE- 250: Performed by: OPHTHALMOLOGY

## 2022-12-01 PROCEDURE — V2630 ANTER CHAMBER INTRAOCUL LENS: HCPCS | Performed by: OPHTHALMOLOGY

## 2022-12-01 DEVICE — IMPLANTABLE DEVICE: Type: IMPLANTABLE DEVICE | Site: EYE | Status: FUNCTIONAL

## 2022-12-01 RX ORDER — SODIUM CHLORIDE 0.9 % (FLUSH) 0.9 %
5-40 SYRINGE (ML) INJECTION EVERY 8 HOURS
Status: DISCONTINUED | OUTPATIENT
Start: 2022-12-01 | End: 2022-12-01 | Stop reason: HOSPADM

## 2022-12-01 RX ORDER — TROPICAMIDE 10 MG/ML
1 SOLUTION/ DROPS OPHTHALMIC
Status: COMPLETED | OUTPATIENT
Start: 2022-12-01 | End: 2022-12-01

## 2022-12-01 RX ORDER — ONDANSETRON 2 MG/ML
4 INJECTION INTRAMUSCULAR; INTRAVENOUS AS NEEDED
Status: DISCONTINUED | OUTPATIENT
Start: 2022-12-01 | End: 2022-12-01 | Stop reason: HOSPADM

## 2022-12-01 RX ORDER — FENTANYL CITRATE 50 UG/ML
INJECTION, SOLUTION INTRAMUSCULAR; INTRAVENOUS AS NEEDED
Status: DISCONTINUED | OUTPATIENT
Start: 2022-12-01 | End: 2022-12-01 | Stop reason: HOSPADM

## 2022-12-01 RX ORDER — TROPICAMIDE 10 MG/ML
SOLUTION/ DROPS OPHTHALMIC
Status: COMPLETED
Start: 2022-12-01 | End: 2022-12-01

## 2022-12-01 RX ORDER — DIPHENHYDRAMINE HYDROCHLORIDE 50 MG/ML
12.5 INJECTION, SOLUTION INTRAMUSCULAR; INTRAVENOUS AS NEEDED
Status: DISCONTINUED | OUTPATIENT
Start: 2022-12-01 | End: 2022-12-01 | Stop reason: HOSPADM

## 2022-12-01 RX ORDER — SODIUM CHLORIDE, SODIUM LACTATE, POTASSIUM CHLORIDE, CALCIUM CHLORIDE 600; 310; 30; 20 MG/100ML; MG/100ML; MG/100ML; MG/100ML
25 INJECTION, SOLUTION INTRAVENOUS CONTINUOUS
Status: DISCONTINUED | OUTPATIENT
Start: 2022-12-01 | End: 2022-12-01 | Stop reason: HOSPADM

## 2022-12-01 RX ORDER — SODIUM CHLORIDE 9 MG/ML
INJECTION, SOLUTION INTRAVENOUS
Status: DISCONTINUED | OUTPATIENT
Start: 2022-12-01 | End: 2022-12-01 | Stop reason: HOSPADM

## 2022-12-01 RX ORDER — FENTANYL CITRATE 50 UG/ML
25 INJECTION, SOLUTION INTRAMUSCULAR; INTRAVENOUS
Status: DISCONTINUED | OUTPATIENT
Start: 2022-12-01 | End: 2022-12-01 | Stop reason: HOSPADM

## 2022-12-01 RX ORDER — SODIUM CHLORIDE 0.9 % (FLUSH) 0.9 %
5-40 SYRINGE (ML) INJECTION AS NEEDED
Status: DISCONTINUED | OUTPATIENT
Start: 2022-12-01 | End: 2022-12-01 | Stop reason: HOSPADM

## 2022-12-01 RX ORDER — TIMOLOL MALEATE 5 MG/ML
1 SOLUTION/ DROPS OPHTHALMIC 2 TIMES DAILY
Status: DISCONTINUED | OUTPATIENT
Start: 2022-12-01 | End: 2022-12-01 | Stop reason: HOSPADM

## 2022-12-01 RX ORDER — TETRACAINE HYDROCHLORIDE 5 MG/ML
1 SOLUTION OPHTHALMIC
Status: DISCONTINUED | OUTPATIENT
Start: 2022-12-01 | End: 2022-12-01 | Stop reason: HOSPADM

## 2022-12-01 RX ORDER — SODIUM CHLORIDE 9 MG/ML
25 INJECTION, SOLUTION INTRAVENOUS CONTINUOUS
Status: DISCONTINUED | OUTPATIENT
Start: 2022-12-01 | End: 2022-12-01 | Stop reason: HOSPADM

## 2022-12-01 RX ORDER — NEOMYCIN SULFATE, POLYMYXIN B SULFATE, AND DEXAMETHASONE 3.5; 10000; 1 MG/G; [USP'U]/G; MG/G
OINTMENT OPHTHALMIC ONCE
Status: DISCONTINUED | OUTPATIENT
Start: 2022-12-01 | End: 2022-12-01 | Stop reason: HOSPADM

## 2022-12-01 RX ORDER — NEOMYCIN SULFATE, POLYMYXIN B SULFATE, AND DEXAMETHASONE 3.5; 10000; 1 MG/G; [USP'U]/G; MG/G
OINTMENT OPHTHALMIC ONCE
Status: COMPLETED | OUTPATIENT
Start: 2022-12-01 | End: 2022-12-01

## 2022-12-01 RX ORDER — CIPROFLOXACIN HYDROCHLORIDE 3.5 MG/ML
1 SOLUTION/ DROPS TOPICAL
Status: COMPLETED | OUTPATIENT
Start: 2022-12-01 | End: 2022-12-01

## 2022-12-01 RX ORDER — LIDOCAINE HYDROCHLORIDE 10 MG/ML
0.1 INJECTION, SOLUTION EPIDURAL; INFILTRATION; INTRACAUDAL; PERINEURAL AS NEEDED
Status: DISCONTINUED | OUTPATIENT
Start: 2022-12-01 | End: 2022-12-01 | Stop reason: HOSPADM

## 2022-12-01 RX ORDER — TETRACAINE HYDROCHLORIDE 5 MG/ML
1 SOLUTION OPHTHALMIC
Status: ACTIVE | OUTPATIENT
Start: 2022-12-01 | End: 2022-12-01

## 2022-12-01 RX ADMIN — FENTANYL CITRATE 25 MCG: 50 INJECTION, SOLUTION INTRAMUSCULAR; INTRAVENOUS at 12:44

## 2022-12-01 RX ADMIN — FENTANYL CITRATE 25 MCG: 50 INJECTION, SOLUTION INTRAMUSCULAR; INTRAVENOUS at 13:03

## 2022-12-01 RX ADMIN — TROPICAMIDE 1 DROP: 10 SOLUTION/ DROPS OPHTHALMIC at 11:56

## 2022-12-01 RX ADMIN — CIPROFLOXACIN 1 DROP: 3 SOLUTION OPHTHALMIC at 12:07

## 2022-12-01 RX ADMIN — TROPICAMIDE 1 DROP: 10 SOLUTION/ DROPS OPHTHALMIC at 12:07

## 2022-12-01 RX ADMIN — FENTANYL CITRATE 50 MCG: 50 INJECTION, SOLUTION INTRAMUSCULAR; INTRAVENOUS at 13:14

## 2022-12-01 RX ADMIN — SODIUM CHLORIDE 25 ML/HR: 9 INJECTION, SOLUTION INTRAVENOUS at 11:55

## 2022-12-01 RX ADMIN — SODIUM CHLORIDE: 9 INJECTION, SOLUTION INTRAVENOUS at 12:40

## 2022-12-01 RX ADMIN — CIPROFLOXACIN 1 DROP: 3 SOLUTION OPHTHALMIC at 12:10

## 2022-12-01 RX ADMIN — TROPICAMIDE 1 DROP: 10 SOLUTION/ DROPS OPHTHALMIC at 12:10

## 2022-12-01 RX ADMIN — CIPROFLOXACIN 1 DROP: 3 SOLUTION OPHTHALMIC at 11:56

## 2022-12-01 NOTE — PERIOP NOTES
Permission received to review discharge instructions and discuss private health information with friend, Luna Cannon. Patient states that family/friend will be with them for at least 24 hours following today's procedure.

## 2022-12-01 NOTE — PERIOP NOTES
Macy Lindquist  1935  [id number]    Situation:  Verbal report given from: Bogdan Jarrett CRNA, RN  Procedure: Procedure(s):  LEFT FIRST EYE CATARACT EXTRACTION WITH INTRA OCULAR LENS IMPLANT    Background:    Preoperative diagnosis: Age-related nuclear cataract, left eye [H25.12]    Postoperative diagnosis: Age-related nuclear cataract, left eye [H25.12]    :  Dr. Juan Diego Wilkerson    Assistant(s): Circ-1: Pete Finch RN  Scrub Tech-1: Cl CHRISTOPHER RT    Specimens: * No specimens in log *    Assessment:  Intra-procedure medications         Anesthesia gave intra-procedure sedation and medications, see anesthesia flow sheet     Intravenous fluids: LR@ KVO     Vital signs stable       Recommendation:

## 2022-12-01 NOTE — ANESTHESIA POSTPROCEDURE EVALUATION
Procedure(s):  LEFT FIRST EYE CATARACT EXTRACTION WITH INTRA OCULAR LENS IMPLANT. MAC    Anesthesia Post Evaluation      Multimodal analgesia: multimodal analgesia used between 6 hours prior to anesthesia start to PACU discharge  Patient location during evaluation: PACU  Patient participation: complete - patient participated  Level of consciousness: awake and alert  Pain score: 0  Airway patency: patent  Anesthetic complications: no  Cardiovascular status: acceptable  Respiratory status: acceptable  Hydration status: acceptable  Post anesthesia nausea and vomiting:  none  Final Post Anesthesia Temperature Assessment:  Normothermia (36.0-37.5 degrees C)      INITIAL Post-op Vital signs:   Vitals Value Taken Time   /64 12/01/22 1422   Temp 36.2 °C (97.2 °F) 12/01/22 1410   Pulse 72 12/01/22 1423   Resp 19 12/01/22 1423   SpO2 97 % 12/01/22 1423   Vitals shown include unvalidated device data.

## 2022-12-01 NOTE — ANESTHESIA PREPROCEDURE EVALUATION
Relevant Problems   RESPIRATORY SYSTEM   (+) CAP (community acquired pneumonia)      RENAL FAILURE   (+) Acute kidney injury ([address]   (+) Bilateral hydronephrosis       Anesthetic History   No history of anesthetic complications            Review of Systems / Medical History  Patient summary reviewed, nursing notes reviewed and pertinent labs reviewed    Pulmonary  Within defined limits                 Neuro/Psych   Within defined limits           Cardiovascular  Within defined limits                Exercise tolerance: >4 METS  Comments: 11/22 EKG= ST, RAD    H/o multifocal atrial tachycardia on previous EKGs. Asymptomatic.    (Will see cardiology in January)   GI/Hepatic/Renal  Within defined limits              Endo/Other  Within defined limits           Other Findings   Comments: Cataracts         Physical Exam    Airway  Mallampati: III  TM Distance: 4 - 6 cm  Neck ROM: decreased range of motion   Mouth opening: Normal     Cardiovascular    Rhythm: regular  Rate: normal         Dental  No notable dental hx       Pulmonary  Breath sounds clear to auscultation               Abdominal  GI exam deferred       Other Findings            Anesthetic Plan    ASA: 2  Anesthesia type: MAC          Induction: Intravenous  Anesthetic plan and risks discussed with: Patient

## 2022-12-01 NOTE — PERIOP NOTES
Pt tolerating liquids, pt has already voided. VSS    1440-D/c instructions reviewed, all questions answered. Reviewed when to call the doctor,diet,activity and hygiene. Reviewed to go to Dr. Kaylynn Elizalde office at 3 pm and he will direct eye drops. Pt given implant card. 6709-Transported via w/c to awaiting transportation. No complaints noted at this time.

## 2022-12-01 NOTE — OP NOTES
Operative Note    Patient: Pita Bonilla  YOB: 1935  MRN: [id number]    Date of Procedure: 12/1/2022     Preoperative Diagnosis: Nuclear Sclerotic cataract left eye H25.12  Postoperative Diagnosis: Nuclear Sclerotic cataract left eye H25.12  Procedure: Extracapsular cataract extraction with lens implant left eye  Surgeon:  CHIDI Pritchard MD  Assistants: None  Anesthesia: MAC with local  Estimated Blood Loss: None  Findings: Cataract left eye  Complications: Anterior vitrectomy  Specimens: None  Prosthetic Devices: Intraocular lens implant     The patient's left eye was dilated with mydriacyl 1% and ciprofloxacin 0.3% for 3 doses preoperatively. The patient was taken to the operating room and was given sedation. Tetracaine was given topically to the left eye, and the eye was prepped and draped in the usual manner for sterile eye surgery, including Betadine solution being dropped onto the conjunctiva at the beginning of the prep. The eyelashes were isolated with a plastic drape. A lid speculum was placed. A #15 blade was used to make a paracentesis at the 5:00 location. The eye was flushed with a lidocaine / epinephrine mixture (\"Shugarcaine\"). The eye was filled with Viscoat (Duovisc), and a crescent blade was used to make a 2.5 mm incision at the limbus temporally. This was dissected 2 mm into clear cornea, and the eye was entered with a 2.4 mm keratome. A 0.12 forceps was used for fixation during the procedure. A capsulorhexis flap was started with a cystotome, and this was completed 360 degrees with Utrata forceps. The capsular piece was removed. Canby dissection was performed with the \"Shugarcaine\" mixture on a cannula. The lens nucleus was removed using phacoemulsification with a total phaco time of 3:00 minutes at 20%. The lens was cracked and manipulated with a Sinsky hook. Residual cortex was removed using irrigation / aspiration.   A portion of the cortex was very adherant to the posterior capsule and a posterior capsular tear occurred during the end of the I/A procedure. The anterior capsule remained intact. The eye was reformed with viscoat and the I/A was removed. A limited anterior vitrectomy was performed and there did not appear to be any vitreous loss when tested. Residual cortex was removed with the vitrectomy. The ciliary sulcus was refilled with Viscoat, and an Hayden Intraocular lens model [id number] power 20.0 was placed in a lens folding cartridge with Provisc. The lens was unfolded into the ciliary sulcus. The trailing haptic was rotated into position. The lens centered well. Residual Provisc and Viscoat were removed using vitrectomy. Miostat was used to constrict the pupil. The incision sites were swept and were found to be free of vitreous and in the incisions themselves were tested. The pupil was round without peaking. One 10-0 vicryl suture was used to close the incision. The knot was tied, cut and rotated under the cornea. The eye was flushed with BSS through the paracentesis. Betadine solution was placed on the conjunctival surface at the end of the case. The eye was left soft and formed at the end of the case. The incision site was water tight. The speculum was removed, and a drop of timolol 0.5% and mydriacyl 1% and neomycin/polymixin/dexamethasone ointment was placed on the eye followed by a patch and a shield. The patient tolerated the procedure well and is to follow-up in one day.

## 2022-12-01 NOTE — DISCHARGE INSTRUCTIONS
Jocelynn Santizo MD  [address] [place] [address] Drive   SG Wright  [place], [address]  Phone: [phone]  If you are unable to keep appointment, kindly give 24 hours notice please. REMOVE PATCH  START DROPS WHEN YOU GET HOME  PUT PATCH BACK ON AT BEDTIME    DO NOT RUB the eye that was operated on. Do not strain excessively. It is all right to bend as long as you do not strain. It is safe to take a shower, wash your face, and wash your hair. Just keep the eye closed. Do not swim for 1 week after surgery. If you have any problems or questions, do not hesitate to call [phone]. Follow instructions on eye drops from office. You may take Tylenol or Advil for discomfort. If it pressure not relieved by Tylenol or Advil, please call Dr. Say Tesfaye office. TO PREVENT AN INFECTION      [address] YOUR HANDS    To prevent infection, good handwashing is the most important thing you or your caregiver can do. Wash your hands with soap and water or use the hand  we gave you before you touch any wounds. USE CLEAN SHEETS    Use freshly cleaned sheets on your bed after surgery. To keep the surgery site clean, do not allow pets to sleep with you while your wound is still healing. STOP SMOKING    Stop smoking, or at least cut back on smoking    Smoking slows your healing. CONTROL YOUR BLOOD SUGAR    High blood sugars slow wound healing. If you are diabetic, control your blood sugar levels before and after your surgery. If you were given prescriptions, please review the written information on the prescribed medications. DO NOT DRIVE WHILE TAKING NARCOTIC PAIN MEDICATIONS. DISCHARGE SUMMARY from Nurse    The following personal items collected during your admission are returned to you:   Dental Appliance: Dental Appliances: None  Vision: Visual Aid: None  Hearing Aid:    Jewelry: Jewelry: None  Clothing: Clothing: With patient  Other Valuables: Other Valuables:  Other (comment), Purse (Purse and medication bag given to friend)  Valuables sent to safe:      PATIENT INSTRUCTIONS:    After general anesthesia or intravenous sedation, for 24 hours or while taking prescription Narcotics:  Someone should be with you for the next 24 hours. For your own safety, a responsible adult must drive you home. Limit your activities  Recommended activity: Rest today, Do not climb stairs or shower unattended for the next 24 hours. Do not drive and operate hazardous machinery  Do not make important personal or business decisions  Do  not drink alcoholic beverages  If you have not urinated within 8 hours after discharge, please contact your surgeon on call. Report the following to your surgeon:  Excessive pain, swelling, redness or odor of or around the surgical area  Temperature over 100.5  Nausea and vomiting lasting longer than 4 hours or if unable to take medications    You will receive a Post Operative Call from one of the Recovery Room Nurses on the day after your surgery to check on you. It is very important for us to know how you are recovering after your surgery. You may receive an e-mail or letter in the mail from [employer] regarding your experience with us in the Ambulatory Surgery Unit. Your feedback is valuable to us and we appreciate your participation in the survey. We wish youre a speedy recovery ? What to do at Home:      *  Please give a list of your current medications to your Primary Care Provider. *  Please update this list whenever your medications are discontinued, doses are      changed, or new medications (including over-the-counter products) are added. *  Please carry medication information at all times in case of emergency situations.           These are general instructions for a healthy lifestyle:    No smoking/ No tobacco products/ Avoid exposure to second hand smoke    Surgeon General's Warning:  Quitting smoking now greatly reduces serious risk to your health. Obesity, smoking, and sedentary lifestyle greatly increases your risk for illness    A healthy diet, regular physical exercise & weight monitoring are important for maintaining a healthy lifestyle    You may be retaining fluid if you have a history of heart failure or if you experience any of the following symptoms:  Weight gain of 3 pounds or more overnight or 5 pounds in a week, increased swelling in our hands or feet or shortness of breath while lying flat in bed. Please call your doctor as soon as you notice any of these symptoms; do not wait until your next office visit. Recognize signs and symptoms of STROKE:    B - Balance  E - Eyes    F-face looks uneven    A-arms unable to move or move even    S-speech slurred or non-existent    T-time-call 911 as soon as signs and symptoms begin-DO NOT go       Back to bed or wait to see if you get better-TIME IS BRAIN. If you have not received your influenza and/or pneumococcal vaccine, please follow up with your primary care physician. The discharge information has been reviewed with the patient and caregiver. The patient and caregiver verbalized understanding.

## 2022-12-12 NOTE — PERIOP NOTES
[facility] [place]  Pre-operative Instructions    Surgery/Procedure Date  12/15/2022            Tentative Arrival Time TBD      1. On the day of your surgery/procedure, please report to the Ambulatory Surgery Unit Registration Desk and sign in at your designated time. The Ambulatory Surgery Unit is located in [facility] on the [facility] side of the \[facility] [place] [facility]\"" across from the [address] [place] [address]. Please have all of your health insurance cards, co-payment, and a photo ID.    **TWO adults may accompany you the day of the procedure. We have limited seating available. If our waiting room is at capacity, your ride may be asked to remain in their vehicle. No one under 15 is allowed in the waiting room. 2. You must have someone with you to drive you home, as you should not drive a car for 24 hours following anesthesia. Please make arrangements for a responsible adult friend or family member to stay with you for at least the first 24 hours after your surgery. 3. Do not have anything to eat or drink (including water, gum, mints, coffee, juice) after 11:59 PM  12/14/2022. This may not apply to medications prescribed by your physician. (Please note below the special instructions with medications to take the morning of surgery, if applicable.)    4. We recommend you do not drink any alcoholic beverages for 24 hours before and after your surgery. 5. Contact your surgeons office for instructions on the following medications: non-steroidal anti-inflammatory drugs (i.e. Advil, Aleve), vitamins, and supplements. (Some surgeons will want you to stop these medications prior to surgery and others may allow you to take them)   **If you are currently taking Plavix, Coumadin, Aspirin and/or other blood-thinning agents, contact your surgeon for instructions. ** Your surgeon will partner with the physician prescribing these medications to determine if it is safe to stop or if you need to continue taking. Please do not stop taking these medications without instructions from your surgeon. 6. In an effort to help prevent surgical site infection, we ask that you shower with an anti-bacterial soap (i.e. Dial/Safeguard, or the soap provided to you at your preadmission testing appointment) for 3 days prior to and on the morning of surgery, using a fresh towel after each shower. (Please begin this process with fresh bed linens.) Do not apply any lotions, powders, or deodorants after the shower on the day of your procedure. If applicable, please do not shave the operative site for 48 hours prior to surgery. 7. Wear comfortable clothes. Wear glasses instead of contacts. Do not bring any jewelry or money (other than copays or fees as instructed). Do not wear make-up, particularly mascara, the morning of your surgery. Do not wear nail polish, particularly if you are having foot /hand surgery. Wear your hair loose or down, no ponytails, buns, barry pins or clips. All body piercings must be removed. 8. You should understand that if you do not follow these instructions your surgery may be cancelled. If your physical condition changes (i.e. fever, cold or flu) please contact your surgeon as soon as possible. 9. It is important that you be on time. If a situation occurs where you may be late, or if you have any questions or problems, please call [phone].    10. Your surgery time may be subject to change. You will receive a phone call the day prior to surgery to confirm your arrival time. 11. Pediatric patients: please bring a change of clothes, diapers, bottle/sippy cup, pacifier, etc.      Special Instructions: Take all medications and inhalers, as prescribed, on the morning of surgery with a sip of water . Insulin Dependent Diabetic patients: Take your diabetic medications as prescribed the day before surgery. Hold all diabetic medications the day of surgery.     If you are scheduled to arrive for surgery after 8:00 AM, and your AM blood sugar is >200, please call Ambulatory Surgery. I understand a pre-operative phone call will be made to verify my surgery time. In the event that I am not available, I give permission for a message to be left on my answering service and/or with another person?       Yes      Reviewed  instructions with patient, able to verbalized understanding.         ___________________      ___________________      ________________  (Signature of Patient)          (Witness)                   (Date and Time)

## 2022-12-14 ENCOUNTER — ANESTHESIA EVENT (OUTPATIENT)
Dept: SURGERY | Age: 87
End: 2022-12-14
Payer: MEDICARE

## 2022-12-15 ENCOUNTER — HOSPITAL ENCOUNTER (OUTPATIENT)
Age: 87
Setting detail: OUTPATIENT SURGERY
Discharge: HOME | End: 2022-12-15
Attending: OPHTHALMOLOGY | Admitting: OPHTHALMOLOGY
Payer: MEDICARE

## 2022-12-15 ENCOUNTER — ANESTHESIA (OUTPATIENT)
Dept: SURGERY | Age: 87
End: 2022-12-15
Payer: MEDICARE

## 2022-12-15 VITALS
OXYGEN SATURATION: 95 % | RESPIRATION RATE: 18 BRPM | DIASTOLIC BLOOD PRESSURE: 65 MMHG | BODY MASS INDEX: 26.29 KG/M2 | TEMPERATURE: 97.4 F | HEIGHT: 64 IN | SYSTOLIC BLOOD PRESSURE: 131 MMHG | HEART RATE: 71 BPM | WEIGHT: 154 LBS

## 2022-12-15 PROCEDURE — 74011250636 HC RX REV CODE- 250/636: Performed by: NURSE ANESTHETIST, CERTIFIED REGISTERED

## 2022-12-15 PROCEDURE — 2709999900 HC NON-CHARGEABLE SUPPLY: Performed by: OPHTHALMOLOGY

## 2022-12-15 PROCEDURE — 76060000061 HC AMB SURG ANES 0.5 TO 1 HR: Performed by: OPHTHALMOLOGY

## 2022-12-15 PROCEDURE — 74011250636 HC RX REV CODE- 250/636: Performed by: OPHTHALMOLOGY

## 2022-12-15 PROCEDURE — V2632 POST CHMBR INTRAOCULAR LENS: HCPCS | Performed by: OPHTHALMOLOGY

## 2022-12-15 PROCEDURE — 74011000250 HC RX REV CODE- 250

## 2022-12-15 PROCEDURE — 77030028792 HC AGNT OPHTH DUOVISC ALCN -B: Performed by: OPHTHALMOLOGY

## 2022-12-15 PROCEDURE — 74011000250 HC RX REV CODE- 250: Performed by: OPHTHALMOLOGY

## 2022-12-15 PROCEDURE — 76210000050 HC AMBSU PH II REC 0.5 TO 1 HR: Performed by: OPHTHALMOLOGY

## 2022-12-15 PROCEDURE — 76030000000 HC AMB SURG OR TIME 0.5 TO 1: Performed by: OPHTHALMOLOGY

## 2022-12-15 DEVICE — ACRYSOF(R) IQ ASPHERIC NATURAL IOL, SINGLE-PIECE ACRYLIC FOLDABLE PCL, UV WITH BLUE LIGHTFILTER, 13.0MM LENGTH, 6.0MM ANTERIORASYMMETRIC BICONVEX OPTIC, PLANAR HAPTICS.
Type: IMPLANTABLE DEVICE | Site: EYE | Status: FUNCTIONAL
Brand: ACRYSOF®

## 2022-12-15 RX ORDER — PROPOFOL 10 MG/ML
INJECTION, EMULSION INTRAVENOUS AS NEEDED
Status: DISCONTINUED | OUTPATIENT
Start: 2022-12-15 | End: 2022-12-15 | Stop reason: HOSPADM

## 2022-12-15 RX ORDER — TROPICAMIDE 10 MG/ML
SOLUTION/ DROPS OPHTHALMIC
Status: COMPLETED
Start: 2022-12-15 | End: 2022-12-15

## 2022-12-15 RX ORDER — SODIUM CHLORIDE 0.9 % (FLUSH) 0.9 %
5-40 SYRINGE (ML) INJECTION AS NEEDED
Status: DISCONTINUED | OUTPATIENT
Start: 2022-12-15 | End: 2022-12-15 | Stop reason: HOSPADM

## 2022-12-15 RX ORDER — SODIUM CHLORIDE, SODIUM LACTATE, POTASSIUM CHLORIDE, CALCIUM CHLORIDE 600; 310; 30; 20 MG/100ML; MG/100ML; MG/100ML; MG/100ML
25 INJECTION, SOLUTION INTRAVENOUS CONTINUOUS
Status: DISCONTINUED | OUTPATIENT
Start: 2022-12-15 | End: 2022-12-15 | Stop reason: HOSPADM

## 2022-12-15 RX ORDER — SODIUM CHLORIDE 0.9 % (FLUSH) 0.9 %
5-40 SYRINGE (ML) INJECTION EVERY 8 HOURS
Status: DISCONTINUED | OUTPATIENT
Start: 2022-12-15 | End: 2022-12-15 | Stop reason: HOSPADM

## 2022-12-15 RX ORDER — TIMOLOL MALEATE 5 MG/ML
SOLUTION/ DROPS OPHTHALMIC AS NEEDED
Status: DISCONTINUED | OUTPATIENT
Start: 2022-12-15 | End: 2022-12-15 | Stop reason: HOSPADM

## 2022-12-15 RX ORDER — SODIUM CHLORIDE 9 MG/ML
25 INJECTION, SOLUTION INTRAVENOUS CONTINUOUS
Status: DISCONTINUED | OUTPATIENT
Start: 2022-12-15 | End: 2022-12-15 | Stop reason: HOSPADM

## 2022-12-15 RX ORDER — FENTANYL CITRATE 50 UG/ML
INJECTION, SOLUTION INTRAMUSCULAR; INTRAVENOUS
Status: COMPLETED
Start: 2022-12-15 | End: 2022-12-15

## 2022-12-15 RX ORDER — DIPHENHYDRAMINE HYDROCHLORIDE 50 MG/ML
12.5 INJECTION, SOLUTION INTRAMUSCULAR; INTRAVENOUS AS NEEDED
Status: DISCONTINUED | OUTPATIENT
Start: 2022-12-15 | End: 2022-12-15 | Stop reason: HOSPADM

## 2022-12-15 RX ORDER — TETRACAINE HYDROCHLORIDE 5 MG/ML
SOLUTION OPHTHALMIC AS NEEDED
Status: DISCONTINUED | OUTPATIENT
Start: 2022-12-15 | End: 2022-12-15 | Stop reason: HOSPADM

## 2022-12-15 RX ORDER — ONDANSETRON 2 MG/ML
4 INJECTION INTRAMUSCULAR; INTRAVENOUS AS NEEDED
Status: DISCONTINUED | OUTPATIENT
Start: 2022-12-15 | End: 2022-12-15 | Stop reason: HOSPADM

## 2022-12-15 RX ORDER — FENTANYL CITRATE 50 UG/ML
25 INJECTION, SOLUTION INTRAMUSCULAR; INTRAVENOUS
Status: DISCONTINUED | OUTPATIENT
Start: 2022-12-15 | End: 2022-12-15 | Stop reason: HOSPADM

## 2022-12-15 RX ORDER — NEOMYCIN SULFATE, POLYMYXIN B SULFATE, AND DEXAMETHASONE 3.5; 10000; 1 MG/G; [USP'U]/G; MG/G
OINTMENT OPHTHALMIC AS NEEDED
Status: DISCONTINUED | OUTPATIENT
Start: 2022-12-15 | End: 2022-12-15 | Stop reason: HOSPADM

## 2022-12-15 RX ORDER — CIPROFLOXACIN HYDROCHLORIDE 3.5 MG/ML
1 SOLUTION/ DROPS TOPICAL
Status: COMPLETED | OUTPATIENT
Start: 2022-12-15 | End: 2022-12-15

## 2022-12-15 RX ORDER — LIDOCAINE HYDROCHLORIDE 10 MG/ML
0.1 INJECTION, SOLUTION EPIDURAL; INFILTRATION; INTRACAUDAL; PERINEURAL AS NEEDED
Status: DISCONTINUED | OUTPATIENT
Start: 2022-12-15 | End: 2022-12-15 | Stop reason: HOSPADM

## 2022-12-15 RX ORDER — FENTANYL CITRATE 50 UG/ML
INJECTION, SOLUTION INTRAMUSCULAR; INTRAVENOUS AS NEEDED
Status: DISCONTINUED | OUTPATIENT
Start: 2022-12-15 | End: 2022-12-15 | Stop reason: HOSPADM

## 2022-12-15 RX ORDER — TROPICAMIDE 10 MG/ML
1 SOLUTION/ DROPS OPHTHALMIC
Status: COMPLETED | OUTPATIENT
Start: 2022-12-15 | End: 2022-12-15

## 2022-12-15 RX ORDER — CIPROFLOXACIN HYDROCHLORIDE 3.5 MG/ML
1 SOLUTION/ DROPS TOPICAL
Status: DISCONTINUED | OUTPATIENT
Start: 2022-12-15 | End: 2022-12-15

## 2022-12-15 RX ADMIN — CIPROFLOXACIN 1 DROP: 3 SOLUTION OPHTHALMIC at 12:52

## 2022-12-15 RX ADMIN — CIPROFLOXACIN 1 DROP: 3 SOLUTION OPHTHALMIC at 12:49

## 2022-12-15 RX ADMIN — PROPOFOL 10 MG: 10 INJECTION, EMULSION INTRAVENOUS at 14:40

## 2022-12-15 RX ADMIN — FENTANYL CITRATE 25 MCG: 50 INJECTION, SOLUTION INTRAMUSCULAR; INTRAVENOUS at 14:25

## 2022-12-15 RX ADMIN — TROPICAMIDE 1 DROP: 10 SOLUTION/ DROPS OPHTHALMIC at 12:48

## 2022-12-15 RX ADMIN — TROPICAMIDE 1 DROP: 10 SOLUTION/ DROPS OPHTHALMIC at 13:05

## 2022-12-15 RX ADMIN — FENTANYL CITRATE 25 MCG: 50 INJECTION, SOLUTION INTRAMUSCULAR; INTRAVENOUS at 14:12

## 2022-12-15 RX ADMIN — PROPOFOL 10 MG: 10 INJECTION, EMULSION INTRAVENOUS at 14:48

## 2022-12-15 RX ADMIN — TROPICAMIDE 1 DROP: 10 SOLUTION/ DROPS OPHTHALMIC at 12:52

## 2022-12-15 RX ADMIN — FENTANYL CITRATE 25 MCG: 50 INJECTION, SOLUTION INTRAMUSCULAR; INTRAVENOUS at 14:34

## 2022-12-15 RX ADMIN — SODIUM CHLORIDE 25 ML/HR: 9 INJECTION, SOLUTION INTRAVENOUS at 13:10

## 2022-12-15 RX ADMIN — FENTANYL CITRATE 25 MCG: 50 INJECTION, SOLUTION INTRAMUSCULAR; INTRAVENOUS at 14:18

## 2022-12-15 RX ADMIN — CIPROFLOXACIN 1 DROP: 3 SOLUTION OPHTHALMIC at 13:05

## 2022-12-15 RX ADMIN — PROPOFOL 10 MG: 10 INJECTION, EMULSION INTRAVENOUS at 14:45

## 2022-12-15 NOTE — PERIOP NOTES
CHRIS'S WIFE RETURNED CALL AND RESULTS WERE GIVEN AS WRITTEN BY RISHI ROOT   Pt tolerating liquids, vss.  D/c instructions reviewed, all questions answered. Reviewed when to call the doctor,diet, activity and hygiene. Reviewed what to take for pain and when to take it. Pt to go to office for follow up when leaving recovery room. 6623-Transported via w/c to awaiting transportation. No complaints noted at time of d/c home.

## 2022-12-15 NOTE — DISCHARGE INSTRUCTIONS
Isabell Hurst MD  [address] [place] [address] Drive   SG Alexisu, [address]  Phone: [phone]  If you are unable to keep appointment, kindly give 24 hours notice please. REMOVE PATCH  START DROPS WHEN YOU GET HOME  PUT PATCH BACK ON AT BEDTIME    DO NOT RUB the eye that was operated on. Do not strain excessively. It is all right to bend as long as you do not strain. It is safe to take a shower, wash your face, and wash your hair. Just keep the eye closed. Do not swim for 1 week after surgery. If you have any problems or questions, do not hesitate to call [phone]. Follow instructions on eye drops from office. You may take Tylenol or Advil for discomfort. If it pressure not relieved by Tylenol or Advil, please call Dr. Alex Erwin office. TO PREVENT AN INFECTION      [address] YOUR HANDS    To prevent infection, good handwashing is the most important thing you or your caregiver can do. Wash your hands with soap and water or use the hand  we gave you before you touch any wounds. USE CLEAN SHEETS    Use freshly cleaned sheets on your bed after surgery. To keep the surgery site clean, do not allow pets to sleep with you while your wound is still healing. STOP SMOKING    Stop smoking, or at least cut back on smoking    Smoking slows your healing. CONTROL YOUR BLOOD SUGAR    High blood sugars slow wound healing. If you are diabetic, control your blood sugar levels before and after your surgery. If you were given prescriptions, please review the written information on the prescribed medications. DO NOT DRIVE WHILE TAKING NARCOTIC PAIN MEDICATIONS.     DISCHARGE SUMMARY from Nurse    The following personal items collected during your admission are returned to you:   Dental Appliance: Dental Appliances: None  Vision: Visual Aid: None  Hearing Aid:    Jewelry:    Clothing:    Other Valuables:    Valuables sent to safe:      PATIENT INSTRUCTIONS:    After general anesthesia or intravenous sedation, for 24 hours or while taking prescription Narcotics:  Someone should be with you for the next 24 hours. For your own safety, a responsible adult must drive you home. Limit your activities  Recommended activity: Rest today, Do not climb stairs or shower unattended for the next 24 hours. Do not drive and operate hazardous machinery  Do not make important personal or business decisions  Do  not drink alcoholic beverages  If you have not urinated within 8 hours after discharge, please contact your surgeon on call. Report the following to your surgeon:  Excessive pain, swelling, redness or odor of or around the surgical area  Temperature over 100.5  Nausea and vomiting lasting longer than 4 hours or if unable to take medications    You will receive a Post Operative Call from one of the Recovery Room Nurses on the day after your surgery to check on you. It is very important for us to know how you are recovering after your surgery. You may receive an e-mail or letter in the mail from [employer] regarding your experience with us in the Ambulatory Surgery Unit. Your feedback is valuable to us and we appreciate your participation in the survey. We wish youre a speedy recovery ? What to do at Home:      *  Please give a list of your current medications to your Primary Care Provider. *  Please update this list whenever your medications are discontinued, doses are      changed, or new medications (including over-the-counter products) are added. *  Please carry medication information at all times in case of emergency situations. These are general instructions for a healthy lifestyle:    No smoking/ No tobacco products/ Avoid exposure to second hand smoke    Surgeon General's Warning:  Quitting smoking now greatly reduces serious risk to your health.     Obesity, smoking, and sedentary lifestyle greatly increases your risk for illness    A healthy diet, regular physical exercise & weight monitoring are important for maintaining a healthy lifestyle    You may be retaining fluid if you have a history of heart failure or if you experience any of the following symptoms:  Weight gain of 3 pounds or more overnight or 5 pounds in a week, increased swelling in our hands or feet or shortness of breath while lying flat in bed. Please call your doctor as soon as you notice any of these symptoms; do not wait until your next office visit. Recognize signs and symptoms of STROKE:    B - Balance  E - Eyes    F-face looks uneven    A-arms unable to move or move even    S-speech slurred or non-existent    T-time-call 911 as soon as signs and symptoms begin-DO NOT go       Back to bed or wait to see if you get better-TIME IS BRAIN. If you have not received your influenza and/or pneumococcal vaccine, please follow up with your primary care physician. The discharge information has been reviewed with the patient and caregiver. The patient and caregiver verbalized understanding. You can access the FollowMyHealth Patient Portal offered by [facility] by registering at the following website: [email or web address]. By joining [facility]’s FollowMyHealth portal, you will also be able to view your health information using other applications (apps) compatible with our system.

## 2022-12-15 NOTE — ANESTHESIA POSTPROCEDURE EVALUATION
Procedure(s):  RIGHT SECOND EYE CATARACT EXTRACTION WITH INTRA OCULAR LENS IMPLANT. MAC    Anesthesia Post Evaluation      Multimodal analgesia: multimodal analgesia used between 6 hours prior to anesthesia start to PACU discharge  Patient location during evaluation: PACU  Patient participation: complete - patient participated  Level of consciousness: awake and alert  Pain score: 0  Airway patency: patent  Anesthetic complications: no  Cardiovascular status: acceptable  Respiratory status: acceptable  Hydration status: acceptable  Post anesthesia nausea and vomiting:  none  Final Post Anesthesia Temperature Assessment:  Normothermia (36.0-37.5 degrees C)      INITIAL Post-op Vital signs:   Vitals Value Taken Time   /65 12/15/22 1521   Temp 36.8 °C (98.2 °F) 12/15/22 1501   Pulse 65 12/15/22 1523   Resp 16 12/15/22 1523   SpO2 97 % 12/15/22 1523   Vitals shown include unvalidated device data.

## 2022-12-15 NOTE — OP NOTES
Operative Note    Patient: Ortiz Peters  YOB: 1935  MRN: [id number]    Date of Procedure: 12/15/2022     Preoperative Diagnosis: Nuclear Sclerotic cataract right eye H25.11  Postoperative Diagnosis: Nuclear Sclerotic cataract right eye H25.11  Procedure: Extracapsular cataract extraction with lens implant right eye, complex code 05316  Surgeon:  CHIDI Cortez MD  Assistants: None  Anesthesia: MAC with local  Estimated Blood Loss: None  Findings: Cataract right eye  Complications: None  Specimens: None  Prosthetic Devices: Intraocular lens implant     The patient's right eye was dilated with mydriacyl 1% and ciprofloxacin 0.3% for 3 doses preoperatively. The patient was taken to the operating room and was given sedation. Tetracaine was given topically to the right eye, and the eye was prepped and draped in the usual manner for sterile eye surgery, including Betadine solution being dropped onto the conjunctiva at the beginning of the prep. The eyelashes were isolated with a plastic drape. A lid speculum was placed. The pupil was very miotic, failing to dilate more than 3 mm with the preoperative eye drops. A #15 blade was used to make a paracentesis at the 10:00 location. The eye was flushed with a lidocaine / epinephrine mixture (\"Shugarcaine\"). The eye was then filled with Viscoat (Duovisc), but the pupil still failed to dilate. It was determined that iris retactors were necessary to complete the case. This was considered a possibility preoperatively due to poor dilation in the office. The #15 blade was used to make additional paracenteses at 4:00, 8:00, 11:00 and 2:00. A flexible iris retractor was then inserted through each paracentesis and used to engage the iris. After all retractors had been placed, the retractors were adjusted to create a 6mm pupil without damaging the iris margin. A crescent blade was used to make a 2.5 mm incision at the limbus temporally.   This was dissected 2 mm into clear cornea, and the eye was entered with a 2.4 mm keratome. A 0.12 forceps was used for fixation during the procedure. A capsulorhexis flap was started with a cystotome, and this was completed 360 degrees with Utrata forceps. The capsular piece was removed. [place] dissection was performed with the \"Shugarcaine\" mixture on a cannula. The lens nucleus was removed using phacoemulsification with a total phaco time of 4:10 minutes at 11.7%. The lens was cracked and manipulated with a Sinsky hook. Residual cortex was removed using irrigation / aspiration. The capsule remained intact. The capsule was refilled with Provisc (Duovisc), and an Hayden Intraocular lens model SN60WF power 21.0 was placed in a lens folding cartridge with Provisc. The lens was unfolded into the capsular bag. The lens centered well. Each retractor was then loosened and removed from the operative field. The pupil remained round without hemorrhage or tearing. Residual Provisc and Viscoat were removed using I / A. No suture was required to close the incision. The eye was flushed with BSS through the paracentesis. Betadine solution was placed on the conjunctival surface at the end of the case. The eye was left soft and formed at the end of the case. The incision sites were water tight. The speculum was removed, and a drop of timolol 0.5% and neomycin/polymixin/dexamethasone ointment was placed on the eye followed by a shield. The patient tolerated the procedure well and is to follow-up in one day.

## 2022-12-15 NOTE — H&P
Surgery History and Physcial    Subjective:      Colt Vazquez is a 81 y.o. female with visually significant cataract right eye for cataract extraction, lens implant right eye    Patient Active Problem List    Diagnosis Date Noted    Femoral hernia of right side 02/25/2021    CAP (community acquired pneumonia) 02/21/2021    Abscess of abdominal cavity ([facility] [address] 03/28/2019    Abdominal pain 03/20/2019    Ureteral obstruction 02/15/2019    Acute kidney injury ([facility] [address] 02/15/2019    Septic shock ([facility] 02/14/2019    Pelvic abscess in female 02/14/2019    IUD (intrauterine device) in place 02/14/2019    Bilateral hydronephrosis 02/14/2019     Past Medical History:   Diagnosis Date    Diverticulosis       Past Surgical History:   Procedure Laterality Date    COLONOSCOPY Left 03/26/2019    COLONOSCOPY performed by Aurora Fernandez MD at [facility] ENDOSCOPY    HX CATARACT REMOVAL Left 12/01/2022    HX HERNIA REPAIR  02/22/2021    Open Right Femoral Hernia Repair with mesh-[facility]-Dr. Emiliano Love    HX LYSIS OF ADHESIONS  03/27/2019    Laparoscopic drainage of pelvic abscess x 2 with lysis of adhesions    HX TONSILLECTOMY        Social History     Tobacco Use    Smoking status: Never     Passive exposure: Never    Smokeless tobacco: Never   Substance Use Topics    Alcohol use: Not Currently      History reviewed. No pertinent family history. Prior to Admission medications    Medication Sig Start Date End Date Taking? Authorizing Provider   multivitamin (ONE A DAY) tablet Take 1 Tab by mouth daily. Yes Provider, Historical   aspirin 81 mg chewable tablet Take 81 mg by mouth daily. Yes Provider, Historical     No Known Allergies      Review of Systems   All other systems reviewed and are negative.      Objective:     Visit Vitals  /67 (BP 1 Location: Right upper arm, BP Patient Position: At rest;Sitting)   Pulse 84   Temp 97.5 °F (36.4 °C)   Resp 16   Ht 5' 4\" (1.626 m)   Wt 69.9 kg (154 lb)   SpO2 95%   BMI 26.43 kg/m² Physical Exam  HENT:      Head: Normocephalic and atraumatic. Cardiovascular:      Heart sounds: Normal heart sounds. Pulmonary:      Breath sounds: Normal breath sounds. Abdominal:      General: Bowel sounds are normal.   Musculoskeletal:         General: Normal range of motion. Neurological:      General: No focal deficit present. Mental Status: She is alert and oriented to person, place, and time. Psychiatric:         Behavior: Behavior normal.       Imaging:      Lab Review:  No results found for this or any previous visit (from the past 24 hour(s)).       Assessment:     Visually significant cataract right eye for cataract extraction, lens implant right eye    Plan:     Visually significant cataract right eye for cataract extraction, lens implant right eye

## 2022-12-15 NOTE — PERIOP NOTES
Permission received to review discharge instructions and discuss private health information with sister, Charis Berumen. Patient states that family/friend will be with them for at least 24 hours following today's procedure.

## 2022-12-15 NOTE — PERIOP NOTES
Gloria Colindres  1935  [id number]    Situation:  Verbal report given from: Nuris Recinos CRNA, Saman Lang RN  Procedure: Procedure(s):  RIGHT SECOND EYE CATARACT EXTRACTION WITH INTRA OCULAR LENS IMPLANT    Background:    Preoperative diagnosis: Age-related nuclear cataract, right eye [H25.11]    Postoperative diagnosis: Age-related nuclear cataract, right eye [H25.11]    :  Dr. Hina Antony    Assistant(s): Circ-1: Audelia Bolaños  Scrub Tech-1: Kiet Art    Specimens: * No specimens in log *    Assessment:  Intra-procedure medications         Anesthesia gave intra-procedure sedation and medications, see anesthesia flow sheet     Intravenous fluids: LR@ KVO     Vital signs stable       Recommendation:    Permission to share finding with sister : yes

## 2023-05-19 RX ORDER — ASPIRIN 81 MG/1
81 TABLET, CHEWABLE ORAL DAILY
COMMUNITY

## 2024-10-21 NOTE — PROGRESS NOTES
Physical Therapy  2/25/2021    Chart reviewed. Attempted to see pt this morning, however pt politely declining. She reports she was just washed up w/ PCT and NSG student assist and just started sitting up in chair. PCT confirming. Pt asking to rest at this time. Will check back at later time as able and appropriate as time allows.      Melissa Means, PTA 4 = No assist / stand by assistance

## 2024-12-30 ENCOUNTER — HOSPITAL ENCOUNTER (EMERGENCY)
Facility: HOSPITAL | Age: 89
Discharge: HOME | End: 2024-12-30
Attending: EMERGENCY MEDICINE
Payer: MEDICARE

## 2024-12-30 ENCOUNTER — APPOINTMENT (OUTPATIENT)
Facility: HOSPITAL | Age: 89
End: 2024-12-30
Payer: MEDICARE

## 2024-12-30 VITALS
OXYGEN SATURATION: 94 % | SYSTOLIC BLOOD PRESSURE: 128 MMHG | WEIGHT: 154.76 LBS | BODY MASS INDEX: 24.29 KG/M2 | TEMPERATURE: 98 F | DIASTOLIC BLOOD PRESSURE: 62 MMHG | RESPIRATION RATE: 17 BRPM | HEART RATE: 85 BPM | HEIGHT: 67 IN

## 2024-12-30 DIAGNOSIS — R10.30 LOWER ABDOMINAL PAIN: Primary | ICD-10-CM

## 2024-12-30 LAB
ALBUMIN SERPL-MCNC: 3.6 G/DL (ref 3.5–5)
ALBUMIN/GLOB SERPL: 0.9 (ref 1.1–2.2)
ALP SERPL-CCNC: 125 U/L (ref 45–117)
ALT SERPL-CCNC: 22 U/L (ref 12–78)
ANION GAP SERPL CALC-SCNC: 5 MMOL/L (ref 2–12)
APPEARANCE UR: CLEAR
AST SERPL-CCNC: 11 U/L (ref 15–37)
BACTERIA URNS QL MICRO: ABNORMAL /HPF
BASOPHILS # BLD: 0 K/UL (ref 0–0.1)
BASOPHILS NFR BLD: 0 % (ref 0–1)
BILIRUB SERPL-MCNC: 0.3 MG/DL (ref 0.2–1)
BILIRUB UR QL: NEGATIVE
BUN SERPL-MCNC: 16 MG/DL (ref 6–20)
BUN/CREAT SERPL: 22 (ref 12–20)
CALCIUM SERPL-MCNC: 9.4 MG/DL (ref 8.5–10.1)
CHLORIDE SERPL-SCNC: 105 MMOL/L (ref 97–108)
CO2 SERPL-SCNC: 28 MMOL/L (ref 21–32)
COLOR UR: ABNORMAL
COMMENT:: NORMAL
CREAT SERPL-MCNC: 0.73 MG/DL (ref 0.55–1.02)
DIFFERENTIAL METHOD BLD: ABNORMAL
EOSINOPHIL # BLD: 0 K/UL (ref 0–0.4)
EOSINOPHIL NFR BLD: 0 % (ref 0–7)
EPITH CASTS URNS QL MICRO: ABNORMAL /LPF
ERYTHROCYTE [DISTWIDTH] IN BLOOD BY AUTOMATED COUNT: 12.5 % (ref 11.5–14.5)
FERRITIN SERPL-MCNC: 107 NG/ML (ref 26–388)
GLOBULIN SER CALC-MCNC: 4.2 G/DL (ref 2–4)
GLUCOSE SERPL-MCNC: 139 MG/DL (ref 65–100)
GLUCOSE UR STRIP.AUTO-MCNC: NEGATIVE MG/DL
HCT VFR BLD AUTO: 42.4 % (ref 35–47)
HGB BLD-MCNC: 13.1 G/DL (ref 11.5–16)
HGB UR QL STRIP: ABNORMAL
HYALINE CASTS URNS QL MICRO: ABNORMAL /LPF (ref 0–5)
IMM GRANULOCYTES # BLD AUTO: 0.1 K/UL (ref 0–0.04)
IMM GRANULOCYTES NFR BLD AUTO: 1 % (ref 0–0.5)
KETONES UR QL STRIP.AUTO: ABNORMAL MG/DL
LEUKOCYTE ESTERASE UR QL STRIP.AUTO: ABNORMAL
LIPASE SERPL-CCNC: 40 U/L (ref 13–75)
LYMPHOCYTES # BLD: 1.9 K/UL (ref 0.8–3.5)
LYMPHOCYTES NFR BLD: 17 % (ref 12–49)
MCH RBC QN AUTO: 31.4 PG (ref 26–34)
MCHC RBC AUTO-ENTMCNC: 30.9 G/DL (ref 30–36.5)
MCV RBC AUTO: 101.7 FL (ref 80–99)
MONOCYTES # BLD: 1.6 K/UL (ref 0–1)
MONOCYTES NFR BLD: 15 % (ref 5–13)
NEUTS SEG # BLD: 7.6 K/UL (ref 1.8–8)
NEUTS SEG NFR BLD: 67 % (ref 32–75)
NITRITE UR QL STRIP.AUTO: NEGATIVE
NRBC # BLD: 0 K/UL (ref 0–0.01)
NRBC BLD-RTO: 0 PER 100 WBC
PH UR STRIP: 7.5 (ref 5–8)
PLATELET # BLD AUTO: 342 K/UL (ref 150–400)
PMV BLD AUTO: 9 FL (ref 8.9–12.9)
POTASSIUM SERPL-SCNC: 3.8 MMOL/L (ref 3.5–5.1)
PROCALCITONIN SERPL-MCNC: <0.05 NG/ML
PROT SERPL-MCNC: 7.8 G/DL (ref 6.4–8.2)
PROT UR STRIP-MCNC: NEGATIVE MG/DL
RBC # BLD AUTO: 4.17 M/UL (ref 3.8–5.2)
RBC #/AREA URNS HPF: ABNORMAL /HPF (ref 0–5)
SODIUM SERPL-SCNC: 138 MMOL/L (ref 136–145)
SP GR UR REFRACTOMETRY: 1.02 (ref 1–1.03)
SPECIMEN HOLD: NORMAL
SPECIMEN HOLD: NORMAL
UROBILINOGEN UR QL STRIP.AUTO: 0.2 EU/DL (ref 0.2–1)
WBC # BLD AUTO: 11.3 K/UL (ref 3.6–11)
WBC URNS QL MICRO: ABNORMAL /HPF (ref 0–4)

## 2024-12-30 PROCEDURE — 6360000002 HC RX W HCPCS: Performed by: EMERGENCY MEDICINE

## 2024-12-30 PROCEDURE — 83690 ASSAY OF LIPASE: CPT

## 2024-12-30 PROCEDURE — 81001 URINALYSIS AUTO W/SCOPE: CPT

## 2024-12-30 PROCEDURE — 96361 HYDRATE IV INFUSION ADD-ON: CPT

## 2024-12-30 PROCEDURE — 82728 ASSAY OF FERRITIN: CPT

## 2024-12-30 PROCEDURE — 2580000003 HC RX 258: Performed by: EMERGENCY MEDICINE

## 2024-12-30 PROCEDURE — 84145 PROCALCITONIN (PCT): CPT

## 2024-12-30 PROCEDURE — 6360000004 HC RX CONTRAST MEDICATION: Performed by: RADIOLOGY

## 2024-12-30 PROCEDURE — 80053 COMPREHEN METABOLIC PANEL: CPT

## 2024-12-30 PROCEDURE — 74174 CTA ABD&PLVS W/CONTRAST: CPT

## 2024-12-30 PROCEDURE — 85025 COMPLETE CBC W/AUTO DIFF WBC: CPT

## 2024-12-30 PROCEDURE — 96374 THER/PROPH/DIAG INJ IV PUSH: CPT

## 2024-12-30 PROCEDURE — 6360000002 HC RX W HCPCS: Performed by: STUDENT IN AN ORGANIZED HEALTH CARE EDUCATION/TRAINING PROGRAM

## 2024-12-30 PROCEDURE — 36415 COLL VENOUS BLD VENIPUNCTURE: CPT

## 2024-12-30 PROCEDURE — 96375 TX/PRO/DX INJ NEW DRUG ADDON: CPT

## 2024-12-30 PROCEDURE — 99285 EMERGENCY DEPT VISIT HI MDM: CPT

## 2024-12-30 RX ORDER — CEFPODOXIME PROXETIL 200 MG/1
200 TABLET, FILM COATED ORAL 2 TIMES DAILY
Qty: 20 TABLET | Refills: 0 | Status: SHIPPED | OUTPATIENT
Start: 2024-12-30 | End: 2025-01-09

## 2024-12-30 RX ORDER — KETOROLAC TROMETHAMINE 30 MG/ML
15 INJECTION, SOLUTION INTRAMUSCULAR; INTRAVENOUS
Status: COMPLETED | OUTPATIENT
Start: 2024-12-30 | End: 2024-12-30

## 2024-12-30 RX ORDER — CEFPODOXIME PROXETIL 200 MG/1
200 TABLET, FILM COATED ORAL 2 TIMES DAILY
Qty: 20 TABLET | Refills: 0 | Status: SHIPPED | OUTPATIENT
Start: 2024-12-30 | End: 2024-12-30

## 2024-12-30 RX ORDER — ONDANSETRON 2 MG/ML
4 INJECTION INTRAMUSCULAR; INTRAVENOUS
Status: COMPLETED | OUTPATIENT
Start: 2024-12-30 | End: 2024-12-30

## 2024-12-30 RX ORDER — SODIUM CHLORIDE 9 MG/ML
INJECTION, SOLUTION INTRAVENOUS CONTINUOUS
Status: DISCONTINUED | OUTPATIENT
Start: 2024-12-30 | End: 2024-12-30 | Stop reason: HOSPADM

## 2024-12-30 RX ORDER — IOPAMIDOL 755 MG/ML
100 INJECTION, SOLUTION INTRAVASCULAR
Status: COMPLETED | OUTPATIENT
Start: 2024-12-30 | End: 2024-12-30

## 2024-12-30 RX ADMIN — KETOROLAC TROMETHAMINE 15 MG: 30 INJECTION, SOLUTION INTRAMUSCULAR at 17:05

## 2024-12-30 RX ADMIN — PANTOPRAZOLE SODIUM 40 MG: 40 INJECTION, POWDER, FOR SOLUTION INTRAVENOUS at 13:45

## 2024-12-30 RX ADMIN — SODIUM CHLORIDE: 9 INJECTION, SOLUTION INTRAVENOUS at 13:44

## 2024-12-30 RX ADMIN — IOPAMIDOL 100 ML: 755 INJECTION, SOLUTION INTRAVENOUS at 14:07

## 2024-12-30 RX ADMIN — ONDANSETRON 4 MG: 2 INJECTION INTRAMUSCULAR; INTRAVENOUS at 13:44

## 2024-12-30 ASSESSMENT — PAIN DESCRIPTION - ORIENTATION: ORIENTATION: LOWER

## 2024-12-30 ASSESSMENT — PAIN - FUNCTIONAL ASSESSMENT
PAIN_FUNCTIONAL_ASSESSMENT: ACTIVITIES ARE NOT PREVENTED
PAIN_FUNCTIONAL_ASSESSMENT: 0-10

## 2024-12-30 ASSESSMENT — PAIN SCALES - GENERAL: PAINLEVEL_OUTOF10: 6

## 2024-12-30 ASSESSMENT — PAIN DESCRIPTION - DESCRIPTORS: DESCRIPTORS: BURNING

## 2024-12-30 ASSESSMENT — PAIN DESCRIPTION - ONSET: ONSET: ON-GOING

## 2024-12-30 ASSESSMENT — PAIN DESCRIPTION - FREQUENCY: FREQUENCY: INTERMITTENT

## 2024-12-30 ASSESSMENT — PAIN DESCRIPTION - LOCATION: LOCATION: ABDOMEN

## 2024-12-30 ASSESSMENT — PAIN DESCRIPTION - PAIN TYPE: TYPE: ACUTE PAIN

## 2024-12-30 NOTE — ED PROVIDER NOTES
[facility] EMERGENCY DEP  EMERGENCY DEPARTMENT ENCOUNTER      Pt Name: Esmer Serrano  MRN: [id number]  Birthdate 1935  Date of evaluation: 12/30/2024  Provider: Ryan Cespedes MD      HISTORY OF PRESENT ILLNESS      89-year-old female with history of diverticulitis presents to the emergency department with a chief complaint of lower abdominal pain for about 1 week.  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  Also notes nausea but no vomiting.  No fevers.    The history is provided by the patient and medical records.           Nursing Notes were reviewed.    REVIEW OF SYSTEMS         Review of Systems        PAST MEDICAL HISTORY     Past Medical History:   Diagnosis Date    Diverticulosis          SURGICAL HISTORY       Past Surgical History:   Procedure Laterality Date    CATARACT REMOVAL Left 12/01/2022    COLONOSCOPY Left 03/26/2019    COLONOSCOPY performed by Dimas Diaz MD at [facility] ENDOSCOPY    CT RETROPERITONEAL PERC DRAIN  3/22/2019    CT RETROPERITONEAL PERC DRAIN 3/22/2019 [facility] RAD CT    HERNIA REPAIR  02/22/2021    Open Right Femoral Hernia Repair with mesh-[facility]-Dr. MANDI Gavin    LYSIS OF ADHESIONS  03/27/2019    Laparoscopic drainage of pelvic abscess x 2 with lysis of adhesions    TONSILLECTOMY           CURRENT MEDICATIONS       Previous Medications    ASPIRIN 81 MG CHEWABLE TABLET    Take 1 tablet by mouth daily       ALLERGIES     Patient has no allergy information on record.    FAMILY HISTORY     No family history on file.       SOCIAL HISTORY       Social History     Socioeconomic History    Marital status:    Tobacco Use    Smoking status: Never    Smokeless tobacco: Never   Substance and Sexual Activity    Alcohol use: Not Currently    Drug use: Never     Social Determinants of Health     Financial Resource Strain: Low Risk  (11/15/2022)    Overall Financial Resource Strain (CARDIA)     Difficulty of Paying Living Expenses: Not

## 2024-12-30 NOTE — ED TRIAGE NOTES
Patient reports a burning sensation in abd for 2 days. Thinks she is having a diverticulitis flare up. Had blood in stool.

## 2024-12-30 NOTE — ED NOTES
ED SIGN OUT NOTE  Care assumed at [facility] 3:09 PM EST    Patient was signed out to me by Dr. Cespedes.   Pt with lower abdominal pain. CT shows vaginal prolapse and diverticulosis.   UA pending     Patient is awaiting UA.    /62   Pulse 85   Temp 98 °F (36.7 °C) (Oral)   Resp 17   Ht 1.702 m (5' 7\")   Wt 70.2 kg (154 lb 12.2 oz)   SpO2 94%   BMI 24.24 kg/m²     Labs Reviewed   CBC WITH AUTO DIFFERENTIAL - Abnormal; Notable for the following components:       Result Value    WBC 11.3 (*)     .7 (*)     Monocytes % 15 (*)     Immature Granulocytes % 1 (*)     Monocytes Absolute 1.6 (*)     Immature Granulocytes Absolute 0.1 (*)     All other components within normal limits   COMPREHENSIVE METABOLIC PANEL - Abnormal; Notable for the following components:    Glucose 139 (*)     BUN/Creatinine Ratio 22 (*)     AST 11 (*)     Alk Phosphatase 125 (*)     Globulin 4.2 (*)     Albumin/Globulin Ratio 0.9 (*)     All other components within normal limits   URINALYSIS WITH MICROSCOPIC - Abnormal; Notable for the following components:    Ketones, Urine TRACE (*)     Blood, Urine SMALL (*)     Leukocyte Esterase, Urine TRACE (*)     BACTERIA, URINE 4+ (*)     All other components within normal limits   URINE CULTURE HOLD SAMPLE   LIPASE   PROCALCITONIN   FERRITIN   EXTRA TUBES HOLD     CTA ABDOMEN PELVIS W WO CONTRAST   Final Result   No evidence of active GI bleed. New age-indeterminate L1 compression fracture;   correlate clinically. Partial vaginal prolapse. Stable incidental findings as   detailed above.         Electronically signed by Samuel Hernandez          ED Course as of 01/02/25 2217   Mon Dec 30, 2024   1428 I have independently viewed the obtained radiographic images and note CT abdomen pelvis with diverticular disease. Will await radiology read. [JM]   1752 Nitrite, Urine: Negative [SL]   1752 Leukocyte Esterase, Urine(!): TRACE [SL]   1752 WBC, UA: 0-4 [SL]   1752 Bacteria, UA(!): 4+ [SL]

## 2025-02-25 ENCOUNTER — APPOINTMENT (OUTPATIENT)
Facility: HOSPITAL | Age: 89
DRG: 175 | End: 2025-02-25
Payer: MEDICARE

## 2025-02-25 ENCOUNTER — HOSPITAL ENCOUNTER (INPATIENT)
Facility: HOSPITAL | Age: 89
DRG: 175 | End: 2025-02-25
Attending: STUDENT IN AN ORGANIZED HEALTH CARE EDUCATION/TRAINING PROGRAM | Admitting: HOSPITALIST
Payer: MEDICARE

## 2025-02-25 DIAGNOSIS — J96.01 ACUTE HYPOXIC RESPIRATORY FAILURE (HCC): ICD-10-CM

## 2025-02-25 DIAGNOSIS — I26.99 ACUTE MASSIVE PULMONARY EMBOLISM (HCC): Primary | ICD-10-CM

## 2025-02-25 LAB
ALBUMIN SERPL-MCNC: 3 G/DL (ref 3.5–5)
ALBUMIN/GLOB SERPL: 0.7 (ref 1.1–2.2)
ALP SERPL-CCNC: 99 U/L (ref 45–117)
ALT SERPL-CCNC: 58 U/L (ref 12–78)
ANION GAP SERPL CALC-SCNC: 9 MMOL/L (ref 2–12)
APTT PPP: 27.1 SEC (ref 22.1–31)
AST SERPL-CCNC: 26 U/L (ref 15–37)
BASOPHILS # BLD: 0 K/UL (ref 0–0.1)
BASOPHILS NFR BLD: 0 % (ref 0–1)
BILIRUB SERPL-MCNC: 0.8 MG/DL (ref 0.2–1)
BUN SERPL-MCNC: 11 MG/DL (ref 6–20)
BUN/CREAT SERPL: 12 (ref 12–20)
CALCIUM SERPL-MCNC: 9.4 MG/DL (ref 8.5–10.1)
CHLORIDE SERPL-SCNC: 100 MMOL/L (ref 97–108)
CO2 SERPL-SCNC: 28 MMOL/L (ref 21–32)
CREAT SERPL-MCNC: 0.91 MG/DL (ref 0.55–1.02)
DIFFERENTIAL METHOD BLD: ABNORMAL
ECHO BSA: 1.71 M2
EOSINOPHIL # BLD: 0 K/UL (ref 0–0.4)
EOSINOPHIL NFR BLD: 0 % (ref 0–7)
ERYTHROCYTE [DISTWIDTH] IN BLOOD BY AUTOMATED COUNT: 12.9 % (ref 11.5–14.5)
FLUAV RNA SPEC QL NAA+PROBE: NOT DETECTED
FLUBV RNA SPEC QL NAA+PROBE: NOT DETECTED
GLOBULIN SER CALC-MCNC: 4.2 G/DL (ref 2–4)
GLUCOSE SERPL-MCNC: 150 MG/DL (ref 65–100)
HCT VFR BLD AUTO: 41.2 % (ref 35–47)
HGB BLD-MCNC: 13.4 G/DL (ref 11.5–16)
IMM GRANULOCYTES # BLD AUTO: 0 K/UL (ref 0–0.04)
IMM GRANULOCYTES NFR BLD AUTO: 0 % (ref 0–0.5)
INR PPP: 1.1 (ref 0.9–1.1)
LYMPHOCYTES # BLD: 1.4 K/UL (ref 0.8–3.5)
LYMPHOCYTES NFR BLD: 7 % (ref 12–49)
MAGNESIUM SERPL-MCNC: 2 MG/DL (ref 1.6–2.4)
MCH RBC QN AUTO: 31.2 PG (ref 26–34)
MCHC RBC AUTO-ENTMCNC: 32.5 G/DL (ref 30–36.5)
MCV RBC AUTO: 96 FL (ref 80–99)
MONOCYTES # BLD: 2.4 K/UL (ref 0–1)
MONOCYTES NFR BLD: 12 % (ref 5–13)
NEUTS BAND NFR BLD MANUAL: 2 %
NEUTS SEG # BLD: 16.2 K/UL (ref 1.8–8)
NEUTS SEG NFR BLD: 79 % (ref 32–75)
NRBC # BLD: 0 K/UL (ref 0–0.01)
NRBC BLD-RTO: 0 PER 100 WBC
PLATELET # BLD AUTO: 287 K/UL (ref 150–400)
PMV BLD AUTO: 9.4 FL (ref 8.9–12.9)
POTASSIUM SERPL-SCNC: 3.4 MMOL/L (ref 3.5–5.1)
PROT SERPL-MCNC: 7.2 G/DL (ref 6.4–8.2)
PROTHROMBIN TIME: 11.6 SEC (ref 9.2–11.2)
RBC # BLD AUTO: 4.29 M/UL (ref 3.8–5.2)
RBC MORPH BLD: ABNORMAL
SARS-COV-2 RNA RESP QL NAA+PROBE: NOT DETECTED
SODIUM SERPL-SCNC: 137 MMOL/L (ref 136–145)
SOURCE: NORMAL
THERAPEUTIC RANGE: NORMAL SECS (ref 58–77)
TROPONIN I SERPL HS-MCNC: 90 NG/L (ref 0–51)
WBC # BLD AUTO: 20 K/UL (ref 3.6–11)

## 2025-02-25 PROCEDURE — 85730 THROMBOPLASTIN TIME PARTIAL: CPT

## 2025-02-25 PROCEDURE — 71045 X-RAY EXAM CHEST 1 VIEW: CPT

## 2025-02-25 PROCEDURE — 6360000004 HC RX CONTRAST MEDICATION: Performed by: STUDENT IN AN ORGANIZED HEALTH CARE EDUCATION/TRAINING PROGRAM

## 2025-02-25 PROCEDURE — 36415 COLL VENOUS BLD VENIPUNCTURE: CPT

## 2025-02-25 PROCEDURE — 3E03017 INTRODUCTION OF OTHER THROMBOLYTIC INTO PERIPHERAL VEIN, OPEN APPROACH: ICD-10-PCS | Performed by: STUDENT IN AN ORGANIZED HEALTH CARE EDUCATION/TRAINING PROGRAM

## 2025-02-25 PROCEDURE — 6360000002 HC RX W HCPCS: Performed by: STUDENT IN AN ORGANIZED HEALTH CARE EDUCATION/TRAINING PROGRAM

## 2025-02-25 PROCEDURE — 83735 ASSAY OF MAGNESIUM: CPT

## 2025-02-25 PROCEDURE — 80053 COMPREHEN METABOLIC PANEL: CPT

## 2025-02-25 PROCEDURE — 93005 ELECTROCARDIOGRAM TRACING: CPT | Performed by: STUDENT IN AN ORGANIZED HEALTH CARE EDUCATION/TRAINING PROGRAM

## 2025-02-25 PROCEDURE — 84484 ASSAY OF TROPONIN QUANT: CPT

## 2025-02-25 PROCEDURE — 87636 SARSCOV2 & INF A&B AMP PRB: CPT

## 2025-02-25 PROCEDURE — 85025 COMPLETE CBC W/AUTO DIFF WBC: CPT

## 2025-02-25 PROCEDURE — 99285 EMERGENCY DEPT VISIT HI MDM: CPT

## 2025-02-25 PROCEDURE — 2000000000 HC ICU R&B

## 2025-02-25 PROCEDURE — 96374 THER/PROPH/DIAG INJ IV PUSH: CPT

## 2025-02-25 PROCEDURE — 74177 CT ABD & PELVIS W/CONTRAST: CPT

## 2025-02-25 PROCEDURE — 85610 PROTHROMBIN TIME: CPT

## 2025-02-25 PROCEDURE — 93970 EXTREMITY STUDY: CPT

## 2025-02-25 PROCEDURE — 2580000003 HC RX 258: Performed by: STUDENT IN AN ORGANIZED HEALTH CARE EDUCATION/TRAINING PROGRAM

## 2025-02-25 PROCEDURE — 71275 CT ANGIOGRAPHY CHEST: CPT

## 2025-02-25 RX ORDER — 0.9 % SODIUM CHLORIDE 0.9 %
1000 INTRAVENOUS SOLUTION INTRAVENOUS ONCE
Status: COMPLETED | OUTPATIENT
Start: 2025-02-25 | End: 2025-02-26

## 2025-02-25 RX ORDER — SODIUM CHLORIDE 0.9 % (FLUSH) 0.9 %
5-40 SYRINGE (ML) INJECTION PRN
Status: ACTIVE | OUTPATIENT
Start: 2025-02-25

## 2025-02-25 RX ORDER — POTASSIUM CHLORIDE 7.45 MG/ML
10 INJECTION INTRAVENOUS PRN
Status: DISCONTINUED | OUTPATIENT
Start: 2025-02-25 | End: 2025-02-26

## 2025-02-25 RX ORDER — IOPAMIDOL 755 MG/ML
100 INJECTION, SOLUTION INTRAVASCULAR
Status: COMPLETED | OUTPATIENT
Start: 2025-02-25 | End: 2025-02-25

## 2025-02-25 RX ORDER — 0.9 % SODIUM CHLORIDE 0.9 %
50 INTRAVENOUS SOLUTION INTRAVENOUS ONCE
Status: COMPLETED | OUTPATIENT
Start: 2025-02-25 | End: 2025-02-26

## 2025-02-25 RX ORDER — ACETAMINOPHEN 325 MG/1
650 TABLET ORAL EVERY 6 HOURS PRN
Status: DISPENSED | OUTPATIENT
Start: 2025-02-25

## 2025-02-25 RX ORDER — POTASSIUM CHLORIDE 29.8 MG/ML
20 INJECTION INTRAVENOUS PRN
Status: DISCONTINUED | OUTPATIENT
Start: 2025-02-25 | End: 2025-02-26

## 2025-02-25 RX ORDER — MAGNESIUM SULFATE IN WATER 40 MG/ML
2000 INJECTION, SOLUTION INTRAVENOUS PRN
Status: DISCONTINUED | OUTPATIENT
Start: 2025-02-25 | End: 2025-02-26

## 2025-02-25 RX ORDER — ENOXAPARIN SODIUM 100 MG/ML
1 INJECTION SUBCUTANEOUS
Status: DISCONTINUED | OUTPATIENT
Start: 2025-02-25 | End: 2025-02-25

## 2025-02-25 RX ORDER — ACETAMINOPHEN 650 MG/1
650 SUPPOSITORY RECTAL EVERY 6 HOURS PRN
Status: ACTIVE | OUTPATIENT
Start: 2025-02-25

## 2025-02-25 RX ORDER — SODIUM CHLORIDE 0.9 % (FLUSH) 0.9 %
5-40 SYRINGE (ML) INJECTION EVERY 12 HOURS SCHEDULED
Status: ACTIVE | OUTPATIENT
Start: 2025-02-25

## 2025-02-25 RX ORDER — ONDANSETRON 4 MG/1
4 TABLET, ORALLY DISINTEGRATING ORAL EVERY 8 HOURS PRN
Status: ACTIVE | OUTPATIENT
Start: 2025-02-25

## 2025-02-25 RX ORDER — SODIUM CHLORIDE 9 MG/ML
INJECTION, SOLUTION INTRAVENOUS PRN
Status: ACTIVE | OUTPATIENT
Start: 2025-02-25

## 2025-02-25 RX ORDER — ONDANSETRON 2 MG/ML
4 INJECTION INTRAMUSCULAR; INTRAVENOUS EVERY 6 HOURS PRN
Status: ACTIVE | OUTPATIENT
Start: 2025-02-25

## 2025-02-25 RX ORDER — POLYETHYLENE GLYCOL 3350 17 G/17G
17 POWDER, FOR SOLUTION ORAL DAILY PRN
Status: ACTIVE | OUTPATIENT
Start: 2025-02-25

## 2025-02-25 RX ADMIN — ALTEPLASE 50 MG: KIT at 21:42

## 2025-02-25 RX ADMIN — SODIUM CHLORIDE 1000 ML: 0.9 INJECTION, SOLUTION INTRAVENOUS at 19:01

## 2025-02-25 RX ADMIN — IOPAMIDOL 100 ML: 755 INJECTION, SOLUTION INTRAVENOUS at 20:11

## 2025-02-25 ASSESSMENT — LIFESTYLE VARIABLES
HOW OFTEN DO YOU HAVE A DRINK CONTAINING ALCOHOL: NEVER
HOW MANY STANDARD DRINKS CONTAINING ALCOHOL DO YOU HAVE ON A TYPICAL DAY: PATIENT DOES NOT DRINK

## 2025-02-25 ASSESSMENT — PAIN SCALES - GENERAL: PAINLEVEL_OUTOF10: 0

## 2025-02-26 ENCOUNTER — APPOINTMENT (OUTPATIENT)
Facility: HOSPITAL | Age: 89
DRG: 175 | End: 2025-02-26
Payer: MEDICARE

## 2025-02-26 LAB
ANION GAP SERPL CALC-SCNC: 4 MMOL/L (ref 2–12)
ANION GAP SERPL CALC-SCNC: 6 MMOL/L (ref 2–12)
APTT PPP: 26 SEC (ref 22.1–31)
BUN SERPL-MCNC: 10 MG/DL (ref 6–20)
BUN SERPL-MCNC: 10 MG/DL (ref 6–20)
BUN/CREAT SERPL: 14 (ref 12–20)
BUN/CREAT SERPL: 14 (ref 12–20)
CALCIUM SERPL-MCNC: 8.6 MG/DL (ref 8.5–10.1)
CALCIUM SERPL-MCNC: 8.6 MG/DL (ref 8.5–10.1)
CHLORIDE SERPL-SCNC: 106 MMOL/L (ref 97–108)
CHLORIDE SERPL-SCNC: 106 MMOL/L (ref 97–108)
CO2 SERPL-SCNC: 26 MMOL/L (ref 21–32)
CO2 SERPL-SCNC: 28 MMOL/L (ref 21–32)
CREAT SERPL-MCNC: 0.73 MG/DL (ref 0.55–1.02)
CREAT SERPL-MCNC: 0.73 MG/DL (ref 0.55–1.02)
ECHO AV AREA PEAK VELOCITY: 2.1 CM2
ECHO AV AREA VTI: 2.6 CM2
ECHO AV AREA/BSA PEAK VELOCITY: 1.2 CM2/M2
ECHO AV AREA/BSA VTI: 1.5 CM2/M2
ECHO AV MEAN GRADIENT: 3 MMHG
ECHO AV MEAN VELOCITY: 0.8 M/S
ECHO AV PEAK GRADIENT: 4 MMHG
ECHO AV PEAK VELOCITY: 1.1 M/S
ECHO AV VELOCITY RATIO: 0.64
ECHO AV VTI: 18.5 CM
ECHO BSA: 1.71 M2
ECHO EST RA PRESSURE: 8 MMHG
ECHO LA DIAMETER INDEX: 1.57 CM/M2
ECHO LA DIAMETER: 2.7 CM
ECHO LV E' LATERAL VELOCITY: 6.21 CM/S
ECHO LV E' SEPTAL VELOCITY: 4.71 CM/S
ECHO LV EDV A4C: 49 ML
ECHO LV EDV INDEX A4C: 28 ML/M2
ECHO LV EF PHYSICIAN: 55 %
ECHO LV EJECTION FRACTION A4C: 57 %
ECHO LV ESV A4C: 21 ML
ECHO LV ESV INDEX A4C: 12 ML/M2
ECHO LV FRACTIONAL SHORTENING: 26 % (ref 28–44)
ECHO LV INTERNAL DIMENSION DIASTOLE INDEX: 2.03 CM/M2
ECHO LV INTERNAL DIMENSION DIASTOLIC: 3.5 CM (ref 3.9–5.3)
ECHO LV INTERNAL DIMENSION SYSTOLIC INDEX: 1.51 CM/M2
ECHO LV INTERNAL DIMENSION SYSTOLIC: 2.6 CM
ECHO LV IVSD: 1.1 CM (ref 0.6–0.9)
ECHO LV IVSD: 1.2 CM (ref 0.6–0.9)
ECHO LV POSTERIOR WALL DIASTOLIC: 1.2 CM (ref 0.6–0.9)
ECHO LV RELATIVE WALL THICKNESS RATIO: 0.69
ECHO LVOT AREA: 3.1 CM2
ECHO LVOT AV VTI INDEX: 0.79
ECHO LVOT DIAM: 2 CM
ECHO LVOT MEAN GRADIENT: 1 MMHG
ECHO LVOT PEAK GRADIENT: 2 MMHG
ECHO LVOT PEAK VELOCITY: 0.7 M/S
ECHO LVOT STROKE VOLUME INDEX: 26.8 ML/M2
ECHO LVOT SV: 46.2 ML
ECHO LVOT VTI: 14.7 CM
ECHO MV A VELOCITY: 0.73 M/S
ECHO MV AREA VTI: 1.9 CM2
ECHO MV E DECELERATION TIME (DT): 241 MS
ECHO MV E VELOCITY: 0.56 M/S
ECHO MV E/A RATIO: 0.77
ECHO MV E/E' LATERAL: 9.02
ECHO MV E/E' RATIO (AVERAGED): 10.45
ECHO MV E/E' SEPTAL: 11.89
ECHO MV LVOT VTI INDEX: 1.61
ECHO MV MAX VELOCITY: 1 M/S
ECHO MV MEAN GRADIENT: 1 MMHG
ECHO MV MEAN VELOCITY: 0.6 M/S
ECHO MV PEAK GRADIENT: 4 MMHG
ECHO MV VTI: 23.7 CM
ECHO RIGHT VENTRICULAR SYSTOLIC PRESSURE (RVSP): 56 MMHG
ECHO RV FREE WALL PEAK S': 12.6 CM/S
ECHO RV INTERNAL DIMENSION: 2.9 CM
ECHO RV INTERNAL DIMENSION: 2.9 CM
ECHO RV TAPSE: 1.6 CM (ref 1.7–?)
ECHO TV ALIASING VELOCITY (NYQUIST): 35 CM/S
ECHO TV EROA: 0.4 CM2
ECHO TV REGURGITANT MAX VELOCITY: 3.46 M/S
ECHO TV REGURGITANT PEAK GRADIENT: 48 MMHG
ECHO TV REGURGITANT VELOCITY PISA: 3.3 M/S
ECHO TV REGURGITANT VTI: 107.5 CM
EKG ATRIAL RATE: 124 BPM
EKG DIAGNOSIS: NORMAL
EKG P AXIS: 88 DEGREES
EKG P-R INTERVAL: 174 MS
EKG Q-T INTERVAL: 276 MS
EKG QRS DURATION: 80 MS
EKG QTC CALCULATION (BAZETT): 396 MS
EKG R AXIS: 64 DEGREES
EKG T AXIS: 34 DEGREES
EKG VENTRICULAR RATE: 124 BPM
ERYTHROCYTE [DISTWIDTH] IN BLOOD BY AUTOMATED COUNT: 12.8 % (ref 11.5–14.5)
ERYTHROCYTE [DISTWIDTH] IN BLOOD BY AUTOMATED COUNT: 13 % (ref 11.5–14.5)
GLUCOSE SERPL-MCNC: 115 MG/DL (ref 65–100)
GLUCOSE SERPL-MCNC: 121 MG/DL (ref 65–100)
HCT VFR BLD AUTO: 35 % (ref 35–47)
HCT VFR BLD AUTO: 35.4 % (ref 35–47)
HGB BLD-MCNC: 11.2 G/DL (ref 11.5–16)
HGB BLD-MCNC: 11.4 G/DL (ref 11.5–16)
INR PPP: 1.1 (ref 0.9–1.1)
MAGNESIUM SERPL-MCNC: NORMAL MG/DL (ref 1.6–2.4)
MCH RBC QN AUTO: 31 PG (ref 26–34)
MCH RBC QN AUTO: 31.2 PG (ref 26–34)
MCHC RBC AUTO-ENTMCNC: 32 G/DL (ref 30–36.5)
MCHC RBC AUTO-ENTMCNC: 32.2 G/DL (ref 30–36.5)
MCV RBC AUTO: 96.2 FL (ref 80–99)
MCV RBC AUTO: 97.5 FL (ref 80–99)
NRBC # BLD: 0 K/UL (ref 0–0.01)
NRBC # BLD: 0 K/UL (ref 0–0.01)
NRBC BLD-RTO: 0 PER 100 WBC
NRBC BLD-RTO: 0 PER 100 WBC
NT PRO BNP: 1918 PG/ML
PHOSPHATE SERPL-MCNC: 3.6 MG/DL (ref 2.6–4.7)
PLATELET # BLD AUTO: 233 K/UL (ref 150–400)
PLATELET # BLD AUTO: 242 K/UL (ref 150–400)
PMV BLD AUTO: 9.1 FL (ref 8.9–12.9)
PMV BLD AUTO: 9.3 FL (ref 8.9–12.9)
POTASSIUM SERPL-SCNC: 2.8 MMOL/L (ref 3.5–5.1)
POTASSIUM SERPL-SCNC: ABNORMAL MMOL/L (ref 3.5–5.1)
PROCALCITONIN SERPL-MCNC: 0.09 NG/ML
PROTHROMBIN TIME: 11.7 SEC (ref 9.2–11.2)
RBC # BLD AUTO: 3.59 M/UL (ref 3.8–5.2)
RBC # BLD AUTO: 3.68 M/UL (ref 3.8–5.2)
SODIUM SERPL-SCNC: 138 MMOL/L (ref 136–145)
SODIUM SERPL-SCNC: 138 MMOL/L (ref 136–145)
THERAPEUTIC RANGE: NORMAL SECS (ref 58–77)
UFH PPP CHRO-ACNC: 1.13 IU/ML
UFH PPP CHRO-ACNC: <0.1 IU/ML
UFH PPP CHRO-ACNC: <0.1 IU/ML
WBC # BLD AUTO: 13.8 K/UL (ref 3.6–11)
WBC # BLD AUTO: 16.4 K/UL (ref 3.6–11)

## 2025-02-26 PROCEDURE — 36415 COLL VENOUS BLD VENIPUNCTURE: CPT

## 2025-02-26 PROCEDURE — 83735 ASSAY OF MAGNESIUM: CPT

## 2025-02-26 PROCEDURE — 83880 ASSAY OF NATRIURETIC PEPTIDE: CPT

## 2025-02-26 PROCEDURE — 85730 THROMBOPLASTIN TIME PARTIAL: CPT

## 2025-02-26 PROCEDURE — 84145 PROCALCITONIN (PCT): CPT

## 2025-02-26 PROCEDURE — 2580000003 HC RX 258: Performed by: STUDENT IN AN ORGANIZED HEALTH CARE EDUCATION/TRAINING PROGRAM

## 2025-02-26 PROCEDURE — 80048 BASIC METABOLIC PNL TOTAL CA: CPT

## 2025-02-26 PROCEDURE — 6370000000 HC RX 637 (ALT 250 FOR IP): Performed by: NURSE PRACTITIONER

## 2025-02-26 PROCEDURE — 2580000003 HC RX 258: Performed by: NURSE PRACTITIONER

## 2025-02-26 PROCEDURE — 85520 HEPARIN ASSAY: CPT

## 2025-02-26 PROCEDURE — 2060000000 HC ICU INTERMEDIATE R&B

## 2025-02-26 PROCEDURE — 85610 PROTHROMBIN TIME: CPT

## 2025-02-26 PROCEDURE — 93306 TTE W/DOPPLER COMPLETE: CPT

## 2025-02-26 PROCEDURE — 84100 ASSAY OF PHOSPHORUS: CPT

## 2025-02-26 PROCEDURE — 85027 COMPLETE CBC AUTOMATED: CPT

## 2025-02-26 PROCEDURE — 6360000002 HC RX W HCPCS: Performed by: NURSE PRACTITIONER

## 2025-02-26 PROCEDURE — 2500000003 HC RX 250 WO HCPCS: Performed by: NURSE PRACTITIONER

## 2025-02-26 PROCEDURE — 6370000000 HC RX 637 (ALT 250 FOR IP): Performed by: INTERNAL MEDICINE

## 2025-02-26 RX ORDER — HEPARIN SODIUM 10000 [USP'U]/100ML
18-30 INJECTION, SOLUTION INTRAVENOUS CONTINUOUS
Status: DISCONTINUED | OUTPATIENT
Start: 2025-02-26 | End: 2025-02-28

## 2025-02-26 RX ORDER — POTASSIUM CHLORIDE 1500 MG/1
40 TABLET, EXTENDED RELEASE ORAL
Status: COMPLETED | OUTPATIENT
Start: 2025-02-26 | End: 2025-02-26

## 2025-02-26 RX ORDER — HEPARIN SODIUM 1000 [USP'U]/ML
80 INJECTION, SOLUTION INTRAVENOUS; SUBCUTANEOUS PRN
Status: DISCONTINUED | OUTPATIENT
Start: 2025-02-26 | End: 2025-02-28

## 2025-02-26 RX ORDER — HEPARIN SODIUM 1000 [USP'U]/ML
40 INJECTION, SOLUTION INTRAVENOUS; SUBCUTANEOUS PRN
Status: DISCONTINUED | OUTPATIENT
Start: 2025-02-26 | End: 2025-02-28

## 2025-02-26 RX ADMIN — HEPARIN SODIUM 18 UNITS/KG/HR: 10000 INJECTION, SOLUTION INTRAVENOUS at 01:26

## 2025-02-26 RX ADMIN — SODIUM CHLORIDE, PRESERVATIVE FREE 10 ML: 5 INJECTION INTRAVENOUS at 08:42

## 2025-02-26 RX ADMIN — PIPERACILLIN AND TAZOBACTAM 4500 MG: 4; .5 INJECTION, POWDER, LYOPHILIZED, FOR SOLUTION INTRAVENOUS at 02:08

## 2025-02-26 RX ADMIN — ACETAMINOPHEN 650 MG: 325 TABLET ORAL at 08:27

## 2025-02-26 RX ADMIN — PIPERACILLIN AND TAZOBACTAM 3375 MG: 3; .375 INJECTION, POWDER, LYOPHILIZED, FOR SOLUTION INTRAVENOUS at 06:07

## 2025-02-26 RX ADMIN — HEPARIN SODIUM 5000 UNITS: 1000 INJECTION INTRAVENOUS; SUBCUTANEOUS at 08:21

## 2025-02-26 RX ADMIN — POTASSIUM CHLORIDE 10 MEQ: 7.46 INJECTION, SOLUTION INTRAVENOUS at 14:22

## 2025-02-26 RX ADMIN — PIPERACILLIN AND TAZOBACTAM 3375 MG: 3; .375 INJECTION, POWDER, LYOPHILIZED, FOR SOLUTION INTRAVENOUS at 16:42

## 2025-02-26 RX ADMIN — HEPARIN SODIUM 19 UNITS/KG/HR: 10000 INJECTION, SOLUTION INTRAVENOUS at 19:28

## 2025-02-26 RX ADMIN — SODIUM CHLORIDE: 0.9 INJECTION, SOLUTION INTRAVENOUS at 01:57

## 2025-02-26 RX ADMIN — PIPERACILLIN AND TAZOBACTAM 3375 MG: 3; .375 INJECTION, POWDER, LYOPHILIZED, FOR SOLUTION INTRAVENOUS at 21:24

## 2025-02-26 RX ADMIN — POTASSIUM CHLORIDE 10 MEQ: 7.46 INJECTION, SOLUTION INTRAVENOUS at 11:57

## 2025-02-26 RX ADMIN — SODIUM CHLORIDE 50 ML: 0.9 INJECTION, SOLUTION INTRAVENOUS at 00:52

## 2025-02-26 RX ADMIN — POTASSIUM CHLORIDE 10 MEQ: 7.46 INJECTION, SOLUTION INTRAVENOUS at 13:00

## 2025-02-26 RX ADMIN — POTASSIUM CHLORIDE 40 MEQ: 20 TABLET, EXTENDED RELEASE ORAL at 17:34

## 2025-02-26 RX ADMIN — POTASSIUM CHLORIDE 40 MEQ: 20 TABLET, EXTENDED RELEASE ORAL at 18:00

## 2025-02-26 RX ADMIN — POTASSIUM CHLORIDE 10 MEQ: 7.46 INJECTION, SOLUTION INTRAVENOUS at 15:44

## 2025-02-26 RX ADMIN — HEPARIN SODIUM 19 UNITS/KG/HR: 10000 INJECTION, SOLUTION INTRAVENOUS at 21:14

## 2025-02-26 RX ADMIN — SODIUM CHLORIDE, PRESERVATIVE FREE 10 ML: 5 INJECTION INTRAVENOUS at 21:22

## 2025-02-26 RX ADMIN — POTASSIUM CHLORIDE 40 MEQ: 20 TABLET, EXTENDED RELEASE ORAL at 19:28

## 2025-02-26 ASSESSMENT — PAIN SCALES - GENERAL
PAINLEVEL_OUTOF10: 0
PAINLEVEL_OUTOF10: 3
PAINLEVEL_OUTOF10: 1
PAINLEVEL_OUTOF10: 1

## 2025-02-26 ASSESSMENT — PAIN - FUNCTIONAL ASSESSMENT: PAIN_FUNCTIONAL_ASSESSMENT: ACTIVITIES ARE NOT PREVENTED

## 2025-02-26 ASSESSMENT — PAIN DESCRIPTION - ORIENTATION: ORIENTATION: ANTERIOR

## 2025-02-26 ASSESSMENT — PAIN DESCRIPTION - LOCATION
LOCATION: HEAD
LOCATION: HEAD

## 2025-02-26 ASSESSMENT — PAIN DESCRIPTION - DESCRIPTORS: DESCRIPTORS: ACHING

## 2025-02-26 NOTE — ED PROVIDER NOTES
[facility] CRITICAL CARE  EMERGENCY DEPARTMENT ENCOUNTER       Pt Name: Esmer Serrano  MRN: [id number]  Birthdate 1935  Date of evaluation: 2/25/2025  Provider: Marvin Elizabeth MD   PCP: Michael Liz MD  Note Started: 11:00 AM EST 2/26/25     CHIEF COMPLAINT       Chief Complaint   Patient presents with    Abdominal Pain     Pt in WC in TR. Pt was recently at [facility] for possible diverticulitis. Pt states she thinks she has a uterine or rectal prolapse. Pt c/o decreased appetite, N/V/D since being home from [facility] (1.5 weeks).     Shortness of Breath     Pt 88 %on RA denies wearing oxygen at home     HISTORY OF PRESENT ILLNESS: 1 or more elements      History From: patient and son, History limited by: none     Esmer Serrano is a 89 y.o. female w pmhx of pelvic organ prolapse presents to the ED with fatigue, shortness of breath, nausea/vomiting/diarrhea all since being home from [facility] for the past 1-2 weeks.  Son does not feel that he is able to take care of her at home anymore currently.  She notes that she has shortness of breath and chest and abdomen discomfort.  She was found to be hypoxic (new in the ED).    Please See MDM for Additional Details of the HPI/PMH  Nursing Notes were all reviewed and agreed with or any disagreements were addressed in the HPI.     REVIEW OF SYSTEMS        Positives and Pertinent negatives as per HPI.    PAST HISTORY     Past Medical History:  Past Medical History:   Diagnosis Date    Diverticulosis        Past Surgical History:  Past Surgical History:   Procedure Laterality Date    CATARACT REMOVAL Left 12/01/2022    COLONOSCOPY Left 03/26/2019    COLONOSCOPY performed by Dimas Diaz MD at [facility] ENDOSCOPY    CT PERITONEAL/RETROPERITONEAL PERC DRAIN  3/22/2019    CT RETROPERITONEAL PERC DRAIN 3/22/2019 [facility] RAD CT    HERNIA REPAIR  02/22/2021    Open Right Femoral Hernia Repair with mesh-[facility]-Dr. MANDI Gavin    LYSIS OF ADHESIONS  03/27/2019    Laparoscopic  degrees    Diagnosis       Sinus tachycardia  Anteroseptal infarct (cited on or before 20-MAR-2019)  Abnormal ECG     Confirmed by Donis Begum M.D. ([id number]) on 2/26/2025 9:09:26 AM     CBC with Auto Differential    Collection Time: 02/25/25  6:45 PM   Result Value Ref Range    WBC 20.0 (H) 3.6 - 11.0 K/uL    RBC 4.29 3.80 - 5.20 M/uL    Hemoglobin 13.4 11.5 - 16.0 g/dL    Hematocrit 41.2 35.0 - 47.0 %    MCV 96.0 80.0 - 99.0 FL    MCH 31.2 26.0 - 34.0 PG    MCHC 32.5 30.0 - 36.5 g/dL    RDW 12.9 11.5 - 14.5 %    Platelets 287 150 - 400 K/uL    MPV 9.4 8.9 - 12.9 FL    Nucleated RBCs 0.0 0  WBC    nRBC 0.00 0.00 - 0.01 K/uL    Neutrophils % 79 (H) 32.0 - 75.0 %    Band Neutrophils 2 %    Lymphocytes % 7 (L) 12.0 - 49.0 %    Monocytes % 12 5.0 - 13.0 %    Eosinophils % 0 0.0 - 7.0 %    Basophils % 0 0.0 - 1.0 %    Immature Granulocytes % 0 0.0 - 0.5 %    Neutrophils Absolute 16.20 (H) 1.80 - 8.00 K/UL    Lymphocytes Absolute 1.40 0.80 - 3.50 K/UL    Monocytes Absolute 2.40 (H) 0.00 - 1.00 K/UL    Eosinophils Absolute 0.00 0.00 - 0.40 K/UL    Basophils Absolute 0.00 0.00 - 0.10 K/UL    Immature Granulocytes Absolute 0.00 0.00 - 0.04 K/UL    Differential Type MANUAL      RBC Comment MACROCYTOSIS  1+       Comprehensive Metabolic Panel    Collection Time: 02/25/25  6:45 PM   Result Value Ref Range    Sodium 137 136 - 145 mmol/L    Potassium 3.4 (L) 3.5 - 5.1 mmol/L    Chloride 100 97 - 108 mmol/L    CO2 28 21 - 32 mmol/L    Anion Gap 9 2 - 12 mmol/L    Glucose 150 (H) 65 - 100 mg/dL    BUN 11 6 - 20 MG/DL    Creatinine 0.91 0.55 - 1.02 MG/DL    BUN/Creatinine Ratio 12 12 - 20      Est, Glom Filt Rate 61 >60 ml/min/1.73m2    Calcium 9.4 8.5 - 10.1 MG/DL    Total Bilirubin 0.8 0.2 - 1.0 MG/DL    ALT 58 12 - 78 U/L    AST 26 15 - 37 U/L    Alk Phosphatase 99 45 - 117 U/L    Total Protein 7.2 6.4 - 8.2 g/dL    Albumin 3.0 (L) 3.5 - 5.0 g/dL    Globulin 4.2 (H) 2.0 - 4.0 g/dL    Albumin/Globulin Ratio 0.7 (L) 1.1

## 2025-02-26 NOTE — H&P
CRITICAL CARE NOTE    Name: Esmer Serrano   : 1935   MRN: [id number]   Date: 2025      REASON FOR ICU ADMISSION:  Submassive PE     PRINCIPAL ICU DIAGNOSIS     Submassive PE    BRIEF PATIENT SUMMARY     Esmer Serrano is an 88 yo female with a PMH of diverticulosis who presented to the ED with SOB.  CT revealed moderate to large PE and concern for R heart strain. Trop 90. Initial BNP not performed. She has no history of stroke, cancer, blood clot, heart/cardiovascular disease, does not smoke and takes no medications at baseline. VS on presentation , RR 27, T 97.9 /70 O2 sat 94% on 6L/min NC. PESI 128.  She received 1/2 dose TPA in the ED.  Lower extremity DVT studies negative.  She has been transferred to the ICU for continuation of care.     COMPREHENSIVE ASSESSMENT & PLAN:SYSTEM BASED     24 HOUR EVENTS: As above    NEUROLOGICAL:     Oriented x3    PULMONOLOGY:     Submassive PE c/b hypoxia w concern for R heart strain  - Received half dose TPA  - Heparin drip per protocol following TPA administration  - TTE  - IR consulted - clot too distal to retrieve  - CTS consulted - no indication for ECMO at this time  - Maintain MAP >60; no pressors at this time  - Will need workup for etiology of clot    CARDIOVASCULAR:     See above under \"submassive PE\"    GASTROINTESTINAL      Hx diverticulosis  Proctitis  - Zosyn    RENAL/ELECTROLYTE/FLUIDS:     Daily BMP    ENDOCRINE:     Maintain euglycemia    HEMATOLOGY/ONCOLOGY:     Daily CBC    INFECTIOUS DISEASE:     ANTIBIOTICS TO DATE:  Zosyn    CULTURES TO DATE:      ICU DAILY CHECKLIST     Code Status:Full  DVT Prophylaxis:Heparin  T/L/D: N  Lines:N  Drains: N  SUP: N  Diet: As fady  Activity Level:As fady  ABCDEF Bundle/Checklist Completed:Yes  Disposition: ICU  Multidisciplinary Rounds Completed: Pending  Goals of Care Discussion/Palliative: F  Patient/Family Updated: Y     Peripheral Intravenous Line:    Peripheral IV 25 Right;Anterior

## 2025-02-26 NOTE — ED NOTES
Verbal shift change report given to Janette (oncoming nurse) by Sabino (offgoing nurse). Report included the following information Nurse Handoff Report, ED Encounter Summary, ED SBAR, MAR, Recent Results, and Neuro Assessment.

## 2025-02-26 NOTE — PROGRESS NOTES
0750  Bedside shift change report received from ABRAM Ovalle (offgoing nurse). Assumed care of pt at this time. Report included the following information SBAR, Kardex, Intake/Output, MAR, Recent Results, Heart rhythm and Medications (heparin gtt increased from 18 un/kg/hr to 20 un/kg/hr). Heparin XA < 0.10 (call pharmacy to confirm adjustments and bolus.)    0815  Pharmacy notified of Xa results, < 0.10). Bolus Heparin 5000 units IV  x1, Increase Heparin gtt rate from 18-22 units/hg/hr.  Redraw XA level in 6 hours @ 1415.    1139  K+ 2.8 (PRN protocol: Potassium 10 meq IV x6 doses)    1615  Hospitalist in to see. Re-draw for Anti-XA 2/2 hemolyzed sample.    1813  Heparin XA is 1.13  Heparin gtt paused (x1 hour)  Will restart at 1913 at the rate of 19 un/kg/hr    1915  Report to ABRAM Ovalle

## 2025-02-26 NOTE — ED NOTES
TRANSFER - OUT REPORT:    Verbal report given to Arline VALVERDE on Esmer Serrano  being transferred to ICU 2502 for routine progression of patient care       Report consisted of patient's Situation, Background, Assessment and   Recommendations(SBAR).     Information from the following report(s) Nurse Handoff Report, ED SBAR, MAR, Recent Results, and Cardiac Rhythm NSR  was reviewed with the receiving nurse.    [place] Fall Assessment:    Presents to emergency department  because of falls (Syncope, seizure, or loss of consciousness): No  Age > 70: Yes  Altered Mental Status, Intoxication with alcohol or substance confusion (Disorientation, impaired judgment, poor safety awaremess, or inability to follow instructions): No  Impaired Mobility: Ambulates or transfers with assistive devices or assistance; Unable to ambulate or transer.: No             Lines:   Peripheral IV 02/25/25 Right;Anterior Forearm (Active)       Peripheral IV 02/25/25 Left;Anterior Antecubital (Active)        Opportunity for questions and clarification was provided.      Patient transported with:  Monitor, O2 @ 6lpm, and Registered Nurse

## 2025-02-26 NOTE — CONSULTS
Hospitalist Consult Note    NAME:   Esmer Serrano   : 1935   MRN: [id number]     Date/Time: 2025 3:48 PM    Patient PCP: Michael Liz MD    ______________________________________________________________________  Given the patient's current clinical presentation, I have a high level of concern for decompensation if discharged from the emergency department.  Complex decision making was performed, which includes reviewing the patient's available past medical records, laboratory results, and x-ray films.       My assessment of this patient's clinical condition and my plan of care is as follows.    Assessment / Plan:  Submassive pulmonary embolism  Acute hypoxic respiratory failure  Concern for R heart strain  - Received half dose TPA  - Heparin drip per protocol following TPA administration  - IR consulted - clot too distal to retrieve  - CTS consulted - no indication for ECMO at this time  - Maintain MAP >60; no pressors at this time  - Will need workup for etiology of clot  Doppler lower extremity negative for DVT.  Echocardiogram from 2025 shows EF of 60%, increased LV wall thickness, grade 1 diastolic dysfunction, mildly dilated RV, mildly reduced RV systolic function, mild TR, moderately elevated RVSP 60 mmHg.  Will consult pulmonology and heme-onc for further input.  Wean off oxygen as tolerated.    Elevated troponin  Troponin was elevated 90 at the time of presentation.  Will repeat troponin tomorrow morning.    Hypokalemia  Potassium of 2.8 and will give a total of 120 KCl orally.  Will check for magnesium level.    Leukocytosis  White count of 13.8 which is actually downtrending.  Continue to monitor CBC.    Hx diverticulosis  Proctitis  Patient having watery diarrhea and reports RLQ pain secondary to proctitis.  Will continue with IV Zosyn.    History of uterovaginal prolapse  IUD  OB/GYN is consulted        Medical Decision Making:   I personally reviewed labs: CBC, BMP,

## 2025-02-26 NOTE — PROGRESS NOTES
I saw and evaluated the patient, performing the key elements of the service the morning of 2/26/2025.  I discussed the overnight findings, assessment and plan with the NP in the morning of 2/26/2025 and agree with the NP's findings and plan as documented in the NP's note.   -S/p half TPA for pulmonary embolism.   -F/u TTE.  -continue heparin gtt.  -Monitor for bleeding.  -May transfer to tele floor later today.      Mainor Holguin MD  [facility] Critical Care

## 2025-02-26 NOTE — CONSULTS
APRN - NP   650 mg at 02/26/25 0827    Or    acetaminophen (TYLENOL) suppository 650 mg  650 mg Rectal Q6H PRN Mallory Garcia APRN - NP           Home Medications:     Prior to Admission medications    Medication Sig Start Date End Date Taking? Authorizing Provider   aspirin 81 MG chewable tablet Take 1 tablet by mouth daily    Automatic Reconciliation, Ar       Allergies/Social/Family History:     No Known Allergies   Social History     Tobacco Use    Smoking status: Never    Smokeless tobacco: Never   Substance Use Topics    Alcohol use: Not Currently      History reviewed. No pertinent family history.    LABS AND  DATA :  Reviewed    Recent Results (from the past 24 hour(s))   EKG 12 Lead    Collection Time: 02/25/25  6:26 PM   Result Value Ref Range    Ventricular Rate 124 BPM    Atrial Rate 124 BPM    P-R Interval 174 ms    QRS Duration 80 ms    Q-T Interval 276 ms    QTc Calculation (Bazett) 396 ms    P Axis 88 degrees    R Axis 64 degrees    T Axis 34 degrees    Diagnosis       Sinus tachycardia  Anteroseptal infarct (cited on or before 20-MAR-2019)  Abnormal ECG     Confirmed by Donis Begum M.D. ([id number]) on 2/26/2025 9:09:26 AM     CBC with Auto Differential    Collection Time: 02/25/25  6:45 PM   Result Value Ref Range    WBC 20.0 (H) 3.6 - 11.0 K/uL    RBC 4.29 3.80 - 5.20 M/uL    Hemoglobin 13.4 11.5 - 16.0 g/dL    Hematocrit 41.2 35.0 - 47.0 %    MCV 96.0 80.0 - 99.0 FL    MCH 31.2 26.0 - 34.0 PG    MCHC 32.5 30.0 - 36.5 g/dL    RDW 12.9 11.5 - 14.5 %    Platelets 287 150 - 400 K/uL    MPV 9.4 8.9 - 12.9 FL    Nucleated RBCs 0.0 0  WBC    nRBC 0.00 0.00 - 0.01 K/uL    Neutrophils % 79 (H) 32.0 - 75.0 %    Band Neutrophils 2 %    Lymphocytes % 7 (L) 12.0 - 49.0 %    Monocytes % 12 5.0 - 13.0 %    Eosinophils % 0 0.0 - 7.0 %    Basophils % 0 0.0 - 1.0 %    Immature Granulocytes % 0 0.0 - 0.5 %    Neutrophils Absolute 16.20 (H) 1.80 - 8.00 K/UL    Lymphocytes Absolute 1.40 0.80 - 3.50 K/UL    Result Value Ref Range    Body Surface Area 1.71 m2   Procalcitonin    Collection Time: 02/26/25  1:16 AM   Result Value Ref Range    Procalcitonin 0.09 ng/mL   CBC    Collection Time: 02/26/25  1:16 AM   Result Value Ref Range    WBC 16.4 (H) 3.6 - 11.0 K/uL    RBC 3.68 (L) 3.80 - 5.20 M/uL    Hemoglobin 11.4 (L) 11.5 - 16.0 g/dL    Hematocrit 35.4 35.0 - 47.0 %    MCV 96.2 80.0 - 99.0 FL    MCH 31.0 26.0 - 34.0 PG    MCHC 32.2 30.0 - 36.5 g/dL    RDW 12.8 11.5 - 14.5 %    Platelets 242 150 - 400 K/uL    MPV 9.1 8.9 - 12.9 FL    Nucleated RBCs 0.0 0  WBC    nRBC 0.00 0.00 - 0.01 K/uL   APTT    Collection Time: 02/26/25  1:16 AM   Result Value Ref Range    APTT 26.0 22.1 - 31.0 sec    Therapeutic Range   58.0 - 77.0 SECS   Protime-INR    Collection Time: 02/26/25  1:16 AM   Result Value Ref Range    INR 1.1 0.9 - 1.1      Protime 11.7 (H) 9.2 - 11.2 sec   Anti-Xa, Unfractionated Heparin    Collection Time: 02/26/25  1:16 AM   Result Value Ref Range    Heparin Xa,LMWH and Unfrac <0.10 IU/mL   Anti-Xa, Unfractionated Heparin    Collection Time: 02/26/25  5:40 AM   Result Value Ref Range    Heparin Xa,LMWH and Unfrac <0.10 IU/mL   Basic Metabolic Panel    Collection Time: 02/26/25  5:40 AM   Result Value Ref Range    Sodium 138 136 - 145 mmol/L    Potassium Hemolyzed, Recollection Recommended 3.5 - 5.1 mmol/L    Chloride 106 97 - 108 mmol/L    CO2 26 21 - 32 mmol/L    Anion Gap 6 2 - 12 mmol/L    Glucose 115 (H) 65 - 100 mg/dL    BUN 10 6 - 20 MG/DL    Creatinine 0.73 0.55 - 1.02 MG/DL    BUN/Creatinine Ratio 14 12 - 20      Est, Glom Filt Rate 79 >60 ml/min/1.73m2    Calcium 8.6 8.5 - 10.1 MG/DL   Magnesium    Collection Time: 02/26/25  5:40 AM   Result Value Ref Range    Magnesium Hemolyzed, Recollection Recommended 1.6 - 2.4 mg/dL   Phosphorus    Collection Time: 02/26/25  5:40 AM   Result Value Ref Range    Phosphorus 3.6 2.6 - 4.7 MG/DL   CBC    Collection Time: 02/26/25 10:22 AM   Result Value Ref Range  the lateral right upper lobe      Electronically signed by JULISSA VEGA      CT ABDOMEN PELVIS W IV CONTRAST 02/25/2025    Narrative  EXAM: CTA CHEST W WO CONTRAST, CT ABDOMEN PELVIS W IV CONTRAST    INDICATION: eval consolidation v PE with infarct, shortness of breath,  right-sided abdominal pain.    COMPARISON: Chest radiograph performed the same day. No prior chest CT available  for comparison. CT abdomen/pelvis 12/30/2024    IV CONTRAST: 100 mL of Isovue-370.    ORAL CONTRAST: None    TECHNIQUE: Helical thin section chest CT following uneventful intravenous  administration of nonionic contrast material according to departmental PE  protocol. Thin axial images were obtained through the abdomen and pelvis  following administration of nonionic contrast material. Coronal and sagittal  reformats were performed. 3D post processing was not performed.  CT dose  reduction was achieved through the use of a standardized protocol tailored for  this examination and automatic exposure control for dose modulation.    FINDINGS: This is a good quality study for the evaluation of pulmonary embolism  to the first subsegmental arterial level. Positive for PE. Moderate to large  clot burden. Proximal clot involving lobar arteries bilaterally, right greater  than left. There is right heart enlargement.    THYROID: No nodule.  MEDIASTINUM: No mass or lymphadenopathy.  CHAD: No mass or lymphadenopathy.  THORACIC AORTA: No dissection or aneurysm.  MAIN PULMONARY ARTERY: Normal in caliber.  TRACHEA/BRONCHI: Patent.  ESOPHAGUS: No wall thickening or dilatation.  HEART: Normal in size.  PLEURA: No effusion or pneumothorax.  LUNGS: Small focus of consolidation in the right upper lobe, and superior  segment right lower lobe. Left lung is clear.    LIVER: Small hepatic cysts.  BILIARY TREE: Gallbladder is within normal limits. CBD is not dilated.  SPLEEN: within normal limits.  PANCREAS: No mass or ductal dilatation.  ADRENALS:

## 2025-02-26 NOTE — PROGRESS NOTES
Consult received. Full consult note to follow.      Shira Ayon MD  Pulmonology  [place], [place]  [phone]  2/26/2025

## 2025-02-26 NOTE — PLAN OF CARE
Problem: Discharge Planning  Goal: Discharge to home or other facility with appropriate resources  Outcome: Progressing  Flowsheets  Taken 2/26/2025 0800 by Geraldine Li RN  Discharge to home or other facility with appropriate resources:   Identify barriers to discharge with patient and caregiver   Arrange for needed discharge resources and transportation as appropriate   Identify discharge learning needs (meds, wound care, etc)   Refer to discharge planning if patient needs post-hospital services based on physician order or complex needs related to functional status, cognitive ability or social support system  Taken 2/25/2025 2240 by Yamile Boone, RN  Discharge to home or other facility with appropriate resources:   Identify barriers to discharge with patient and caregiver   Arrange for needed discharge resources and transportation as appropriate   Identify discharge learning needs (meds, wound care, etc)     Problem: Pain  Goal: Verbalizes/displays adequate comfort level or baseline comfort level  Outcome: Progressing  Flowsheets (Taken 2/26/2025 0000 by Yamile Boone, RN)  Verbalizes/displays adequate comfort level or baseline comfort level:   Encourage patient to monitor pain and request assistance   Assess pain using appropriate pain scale   Administer analgesics based on type and severity of pain and evaluate response   Implement non-pharmacological measures as appropriate and evaluate response     Problem: Skin/Tissue Integrity  Goal: Skin integrity remains intact  Description: 1.  Monitor for areas of redness and/or skin breakdown  2.  Assess vascular access sites hourly  3.  Every 4-6 hours minimum:  Change oxygen saturation probe site  4.  Every 4-6 hours:  If on nasal continuous positive airway pressure, respiratory therapy assess nares and determine need for appliance change or resting period  Outcome: Progressing  Flowsheets (Taken 2/26/2025 0800)  Skin Integrity Remains Intact:   Monitor for  areas of redness and/or skin breakdown   Assess vascular access sites hourly   Every 4-6 hours minimum: Change oxygen saturation probe site   Every 4-6 hours: If on nasal continuous positive airway pressure, respiratory therapy assesses nares and determine need for appliance change or resting period     Problem: ABCDS Injury Assessment  Goal: Absence of physical injury  Outcome: Progressing  Flowsheets (Taken 2/26/2025 0800)  Absence of Physical Injury: Implement safety measures based on patient assessment     Problem: Safety - Adult  Goal: Free from fall injury  Outcome: Progressing     Problem: Risk for Elopement  Goal: Patient will not exit the unit/facility without proper excort  Outcome: Progressing

## 2025-02-26 NOTE — INTERDISCIPLINARY ROUNDS
Critical care interdisciplinary rounds today.  Following members present: Case Management, , Clinical Lead, Diabetes Team, Nursing, Nutrition, Pharmacy, and Physician. Family invited to participate.  Plan of care discussed.  See clinical pathway for plan of care and interventions and desired outcomes.    Pt. Can TX to step down today.

## 2025-02-27 ENCOUNTER — APPOINTMENT (OUTPATIENT)
Facility: HOSPITAL | Age: 89
DRG: 175 | End: 2025-02-27
Payer: MEDICARE

## 2025-02-27 LAB
ANION GAP SERPL CALC-SCNC: 5 MMOL/L (ref 2–12)
BUN SERPL-MCNC: 7 MG/DL (ref 6–20)
BUN/CREAT SERPL: 10 (ref 12–20)
CALCIUM SERPL-MCNC: 8.3 MG/DL (ref 8.5–10.1)
CHLORIDE SERPL-SCNC: 108 MMOL/L (ref 97–108)
CO2 SERPL-SCNC: 26 MMOL/L (ref 21–32)
CREAT SERPL-MCNC: 0.67 MG/DL (ref 0.55–1.02)
ERYTHROCYTE [DISTWIDTH] IN BLOOD BY AUTOMATED COUNT: 13.2 % (ref 11.5–14.5)
GLUCOSE SERPL-MCNC: 103 MG/DL (ref 65–100)
HCT VFR BLD AUTO: 31.4 % (ref 35–47)
HGB BLD-MCNC: 9.9 G/DL (ref 11.5–16)
MAGNESIUM SERPL-MCNC: 2.1 MG/DL (ref 1.6–2.4)
MCH RBC QN AUTO: 31.2 PG (ref 26–34)
MCHC RBC AUTO-ENTMCNC: 31.5 G/DL (ref 30–36.5)
MCV RBC AUTO: 99.1 FL (ref 80–99)
NRBC # BLD: 0 K/UL (ref 0–0.01)
NRBC BLD-RTO: 0 PER 100 WBC
PHOSPHATE SERPL-MCNC: 2.2 MG/DL (ref 2.6–4.7)
PLATELET # BLD AUTO: 241 K/UL (ref 150–400)
PMV BLD AUTO: 9.4 FL (ref 8.9–12.9)
POTASSIUM SERPL-SCNC: 4.5 MMOL/L (ref 3.5–5.1)
RBC # BLD AUTO: 3.17 M/UL (ref 3.8–5.2)
SODIUM SERPL-SCNC: 139 MMOL/L (ref 136–145)
TROPONIN I SERPL HS-MCNC: 28 NG/L (ref 0–51)
UFH PPP CHRO-ACNC: 0.58 IU/ML
UFH PPP CHRO-ACNC: 0.7 IU/ML
UFH PPP CHRO-ACNC: 0.86 IU/ML
WBC # BLD AUTO: 11.7 K/UL (ref 3.6–11)

## 2025-02-27 PROCEDURE — 36415 COLL VENOUS BLD VENIPUNCTURE: CPT

## 2025-02-27 PROCEDURE — 6370000000 HC RX 637 (ALT 250 FOR IP): Performed by: NURSE PRACTITIONER

## 2025-02-27 PROCEDURE — 85520 HEPARIN ASSAY: CPT

## 2025-02-27 PROCEDURE — 2500000003 HC RX 250 WO HCPCS: Performed by: NURSE PRACTITIONER

## 2025-02-27 PROCEDURE — 6370000000 HC RX 637 (ALT 250 FOR IP): Performed by: INTERNAL MEDICINE

## 2025-02-27 PROCEDURE — 80048 BASIC METABOLIC PNL TOTAL CA: CPT

## 2025-02-27 PROCEDURE — 2700000000 HC OXYGEN THERAPY PER DAY

## 2025-02-27 PROCEDURE — 2060000000 HC ICU INTERMEDIATE R&B

## 2025-02-27 PROCEDURE — 84100 ASSAY OF PHOSPHORUS: CPT

## 2025-02-27 PROCEDURE — 76856 US EXAM PELVIC COMPLETE: CPT

## 2025-02-27 PROCEDURE — 99222 1ST HOSP IP/OBS MODERATE 55: CPT | Performed by: STUDENT IN AN ORGANIZED HEALTH CARE EDUCATION/TRAINING PROGRAM

## 2025-02-27 PROCEDURE — 83735 ASSAY OF MAGNESIUM: CPT

## 2025-02-27 PROCEDURE — 85027 COMPLETE CBC AUTOMATED: CPT

## 2025-02-27 PROCEDURE — 6360000002 HC RX W HCPCS: Performed by: NURSE PRACTITIONER

## 2025-02-27 PROCEDURE — 84484 ASSAY OF TROPONIN QUANT: CPT

## 2025-02-27 PROCEDURE — 2580000003 HC RX 258: Performed by: NURSE PRACTITIONER

## 2025-02-27 RX ADMIN — Medication 1 AMPULE: at 08:36

## 2025-02-27 RX ADMIN — POTASSIUM & SODIUM PHOSPHATES POWDER PACK 280-160-250 MG 1000 MG: 280-160-250 PACK at 10:10

## 2025-02-27 RX ADMIN — SODIUM CHLORIDE, PRESERVATIVE FREE 10 ML: 5 INJECTION INTRAVENOUS at 21:24

## 2025-02-27 RX ADMIN — HEPARIN SODIUM 17 UNITS/KG/HR: 10000 INJECTION, SOLUTION INTRAVENOUS at 21:37

## 2025-02-27 RX ADMIN — ACETAMINOPHEN 650 MG: 325 TABLET ORAL at 00:11

## 2025-02-27 RX ADMIN — PIPERACILLIN AND TAZOBACTAM 3375 MG: 3; .375 INJECTION, POWDER, LYOPHILIZED, FOR SOLUTION INTRAVENOUS at 13:48

## 2025-02-27 RX ADMIN — Medication 1 AMPULE: at 21:24

## 2025-02-27 RX ADMIN — PIPERACILLIN AND TAZOBACTAM 3375 MG: 3; .375 INJECTION, POWDER, LYOPHILIZED, FOR SOLUTION INTRAVENOUS at 21:27

## 2025-02-27 RX ADMIN — PIPERACILLIN AND TAZOBACTAM 3375 MG: 3; .375 INJECTION, POWDER, LYOPHILIZED, FOR SOLUTION INTRAVENOUS at 06:12

## 2025-02-27 RX ADMIN — POTASSIUM & SODIUM PHOSPHATES POWDER PACK 280-160-250 MG 1000 MG: 280-160-250 PACK at 08:36

## 2025-02-27 ASSESSMENT — PAIN SCALES - GENERAL
PAINLEVEL_OUTOF10: 0
PAINLEVEL_OUTOF10: 2
PAINLEVEL_OUTOF10: 0

## 2025-02-27 ASSESSMENT — PAIN DESCRIPTION - DESCRIPTORS: DESCRIPTORS: ACHING

## 2025-02-27 ASSESSMENT — PAIN DESCRIPTION - LOCATION: LOCATION: GENERALIZED

## 2025-02-27 ASSESSMENT — PAIN SCALES - WONG BAKER
WONGBAKER_NUMERICALRESPONSE: NO HURT
WONGBAKER_NUMERICALRESPONSE: NO HURT

## 2025-02-27 NOTE — PROGRESS NOTES
Spiritual Health History and Assessment/Progress Note  [facility]    Initial Encounter,  , Adjustment to illness,      Name: Esmer Serrano MRN: [id number]    Age: 89 y.o.     Sex: female   Language: English   Jainism: Anabaptism   Acute massive pulmonary embolism (HCC)     Date: 2/27/2025            Total Time Calculated: 37 min              Spiritual Assessment began in [facility] CRITICAL CARE        Referral/Consult From: Rounding   Encounter Overview/Reason: Initial Encounter  Service Provided For: Patient and family together    Thi, Belief, Meaning:   Patient is connected with a thi tradition or spiritual practice and has beliefs or practices that help with coping during difficult times  Family/Friends are connected with a thi tradition or spiritual practice and have beliefs or practices that help with coping during difficult times      Importance and Influence:  Patient has spiritual/personal beliefs that influence decisions regarding their health  Family/Friends have spiritual/personal beliefs that influence decisions regarding the patient's health    Community:  Patient is connected with a spiritual community and feels well-supported. Support system includes: Children, Thi Community, Friends, and Extended family  Family/Friends are connected with a spiritual community: and feel well-supported. Support system includes: Parent/s, Children, Thi Community, Friends, and Extended family    Assessment and Plan of Care:     Patient Interventions include: Facilitated expression of thoughts and feelings, Explored spiritual coping/struggle/distress, Engaged in theological reflection, Affirmed coping skills/support systems, and Facilitated life review and/ or legacy  Family/Friends Interventions include: Facilitated expression of thoughts and feelings, Explored spiritual coping/struggle/distress, Engaged in theological reflection, Affirmed coping skills/support systems, and Facilitated life

## 2025-02-27 NOTE — PROGRESS NOTES
Hospitalist Progress Note    NAME:   Esmer Serrano   : 1935   MRN: [id number]     Date/Time: 2025 2:37 PM  Patient PCP: Michael Liz MD    Estimated discharge date: 3/1  Barriers: Heparin drip for 48 hours, heme-onc clearance, pulmonology clearance, transvaginal ultrasound, OB/GYN clearance, improvement in abdominal pain, weaning of oxygen      Assessment / Plan:  Submassive pulmonary embolism  Acute hypoxic respiratory failure  Concern for R heart strain  - Received half dose TPA  - Heparin drip per protocol following TPA administration  - IR consulted - clot too distal to retrieve  - CTS consulted - no indication for ECMO at this time  - Maintain MAP >60; no pressors at this time  - Will need workup for etiology of clot  Doppler lower extremity negative for DVT.  Echocardiogram from 2025 shows EF of 60%, increased LV wall thickness, grade 1 diastolic dysfunction, mildly dilated RV, mildly reduced RV systolic function, mild TR, moderately elevated RVSP 60 mmHg.  Will consult pulmonology and heme-onc for further input.  Wean off oxygen as tolerated.  : I spoke with heme-onc FERNY Kent, no need for hypercoagulable workup at this point, cardiology was consulted for A-fib.  Patient is high risk for bleeding due to advanced age and frailty and recommended anticoagulation for 3 months for now.  They also recommended rehab for the patient short-term.     Atrial fibrillation new  Elevated troponin  Troponin was elevated 90 at the time of presentation.  Will repeat troponin tomorrow morning.  : Patient had A-fib when she was admitted.  Initial troponin was elevated and the test ended down this morning to 28.  Cardiology was consulted.     Hypokalemia  Hypophosphatemia  Potassium of 2.8 and will give a total of 120 KCl orally.  Will check for magnesium level.  : Potassium is up at 4 point magnesium is 2.1.  Phosphorus of 2.2 and will give 8 packets of K-Phos.     Leukocytosis  White  YES  PMH/SH reviewed - no change compared to H&P    Procedures: see electronic medical records for all procedures/Xrays and details which were not copied into this note but were reviewed prior to creation of Plan.      LABS:  I reviewed today's most current labs and imaging studies.  Pertinent labs include:  Recent Labs     02/26/25  0116 02/26/25  1022 02/27/25  0601   WBC 16.4* 13.8* 11.7*   HGB 11.4* 11.2* 9.9*   HCT 35.4 35.0 31.4*    233 241     Recent Labs     02/25/25  1845 02/25/25  2121 02/26/25  0116 02/26/25  0540 02/26/25  1022 02/27/25  0601     --   --  138 138 139   K 3.4*  --   --  Hemolyzed, Recollection Recommended 2.8* 4.5     --   --  106 106 108   CO2 28  --   --  26 28 26   GLUCOSE 150*  --   --  115* 121* 103*   BUN 11  --   --  10 10 7   CREATININE 0.91  --   --  0.73 0.73 0.67   CALCIUM 9.4  --   --  8.6 8.6 8.3*   MG 2.0  --   --  Hemolyzed, Recollection Recommended  --  2.1   PHOS  --   --   --  3.6  --  2.2*   BILITOT 0.8  --   --   --   --   --    AST 26  --   --   --   --   --    ALT 58  --   --   --   --   --    INR  --  1.1 1.1  --   --   --        Signed: Donna Varma MD

## 2025-02-27 NOTE — CONSULTS
GYN asked to consult on retained, correctly placed IUD in this 89 year old who was admitted to the ICU with PE.  CT and US both indicate appropriate intrauterine location.  There is no indication for emergent removal of appropriately placed intrauterine device.  May be managed as outpatient as patient's clinical status allows.

## 2025-02-27 NOTE — PROGRESS NOTES
NEW PATIENT PCP transitional care appointment has been scheduled with Dr. Kaitlin Boateng on 3/13/25 at 1430.  Adriana Young, Care Management Assistant

## 2025-02-27 NOTE — PROGRESS NOTES
Physician Progress Note      PATIENT:               VALE PANDEY  CSN #:                  [id number]  :                       1935  ADMIT DATE:       2025 6:02 PM  DISCH DATE:  RESPONDING  PROVIDER #:        Donna Varma MD          QUERY TEXT:    Pt admitted with Bilateral pulmonary emboli, Rt>Lt . Pt noted to have \"Mild RV   dysfunction with right heart strain\". If possible, please document in the   progress notes and discharge summary if you are evaluating and / or treating   any of the following:    The medical record reflects the following:  Risk Factors: Bilateral pulmonary emboli, Rt>Lt  Clinical Indicators: 26 pn-Mild RV dysfunction with right heart strain  Treatment: half dose TPA, heparin gtt, CTS cx  Options provided:  -- Pulmonary Embolism with Acute Cor Pulmonale  -- Pulmonary Embolism without Acute Cor Pulmonale  -- Other - I will add my own diagnosis  -- Disagree - Not applicable / Not valid  -- Disagree - Clinically unable to determine / Unknown  -- Refer to Clinical Documentation Reviewer    PROVIDER RESPONSE TEXT:    This patient has Pulmonary Embolism without acute cor pulmonale.    Query created by: Monica Willson on 2025 12:00 PM      Electronically signed by:  Donna Varma MD 2025 1:30 PM

## 2025-02-27 NOTE — PROGRESS NOTES
Pulmonary Progress Note    Patient: Esmer Serrano                     YOB: 1935        Date- 2/27/2025                           Admit Date: 2/25/2025       CC: Follow up for pulmonary embolism           IMPRESSION & PLAN:     Bilateral pulmonary emboli, Rt>Lt  Mild RV dysfunction with right heart strain presumably from #1  Hypoxia-multifactorial elevated RVSP suspicious for pulmonary hypertension, most likely acute from #1  Pulmonary infiltrates, likely infarcts  A-fib  Leukocytosis probably from proctitis  Hypokalemia     Management plan :     Bilateral PE with significant clot burden and right heart strain,s/p half dose tPA.  First episode of VTE with no noticeable risk factors.  Continue anticoagulation.  Risks and benefits of anticoagulation discussed.  Pleural-based pulmonary infiltrates in RUL and RLL on  chest CT.  No symptoms symptoms suggestive of pulmonary infection.  Infiltrates most likely pulmonary infarcts.  Mild hypoxia, requiring O2 2 LPM  Cardiology evaluation for A-fib.  Elevated RVSP and mild RV dysfunction on echo, most likely from the acute PE, recommend follow-up echo in a few months  Leukocytosis improving. ? Proctitis. antibiotics as per primary team    Had a lengthy discussion with her son Chance at bedside and reviewed the recommendations.    Medical Decision Making  Problems Addressed:  Acute hypoxic respiratory failure (HCC): acute illness or injury  Acute massive pulmonary embolism (HCC): acute illness or injury    Amount and/or Complexity of Data Reviewed  Independent Historian: guardian  External Data Reviewed: labs.     Details: CBC, Hb, platelets and chemistry    Risk  Prescription drug management.  Drug therapy requiring intensive monitoring for toxicity.            Subjective:    Interval History:    Breathing better    Remains on O2 2 LPM.    On heparin drip.  Denies any bleeding.    White count 11K, improving.  Some drop in H&H but no  bleeding    Medications:  Current Facility-Administered Medications   Medication Dose Route Frequency Provider Last Rate Last Admin    alcohol 62% (NOZIN) nasal  1 ampule  1 ampule Nasal Q12H Mallory Garcia APRN - NP   1 ampule at 02/27/25 0836    heparin (porcine) injection 5,000 Units  80 Units/kg IntraVENous PRN Mallory Garcia APRN - NP   5,000 Units at 02/26/25 0821    heparin (porcine) injection 2,500 Units  40 Units/kg IntraVENous PRN Mallory Garcia APRN - NP        heparin 25,000 units in dextrose 5% 250 mL (premix) infusion  18-30 Units/kg/hr IntraVENous Continuous Mallory Garcia APRN - NP 10.5 mL/hr at 02/27/25 0640 17 Units/kg/hr at 02/27/25 0640    piperacillin-tazobactam (ZOSYN) 3,375 mg in sodium chloride 0.9 % 50 mL IVPB (addEASE)  3,375 mg IntraVENous Q8H Mallory Garcia APRN - NP 12.5 mL/hr at 02/27/25 1348 3,375 mg at 02/27/25 1348    sodium chloride flush 0.9 % injection 5-40 mL  5-40 mL IntraVENous 2 times per day Mallory Garcia APRN - NP   10 mL at 02/26/25 2122    sodium chloride flush 0.9 % injection 5-40 mL  5-40 mL IntraVENous PRN Mallory Garcia APRN - NP        0.9 % sodium chloride infusion   IntraVENous PRN Mallory Garcia APRN - NP   Stopped at 02/26/25 1157    ondansetron (ZOFRAN-ODT) disintegrating tablet 4 mg  4 mg Oral Q8H PRN Mallory Garcia APRN - NP        Or    ondansetron (ZOFRAN) injection 4 mg  4 mg IntraVENous Q6H PRN Mallory Garcia APRN - NP        polyethylene glycol (GLYCOLAX) packet 17 g  17 g Oral Daily PRN Mallory Garcia APRN - NP        acetaminophen (TYLENOL) tablet 650 mg  650 mg Oral Q6H PRN Mallory Garcia APRN - NP   650 mg at 02/27/25 0011    Or    acetaminophen (TYLENOL) suppository 650 mg  650 mg Rectal Q6H PRN Mallory Garcia APRN - NP           I/O's:    Intake/Output Summary (Last 24 hours) at 2/27/2025 1542  Last data filed at 2/27/2025 0640  Gross per 24 hour   Intake 1309.88 ml   Output 900 ml   Net 409.88 ml

## 2025-02-27 NOTE — CONSULTS
[facility]  [address] [facility] [address] [address]  W: [phone] F: [phone]  Hematology/ Oncology Consult Note      Reason for consult:     Esmer Serrano is a 89 y.o. female who we have been asked to see by Dr. Varma for acute PE.    Subjective:     Esmer Serrano is an 89-year-old female patient admitted to [facility] with submassive pulmonary embolism and acute hypoxic respiratory failure.  She received a half dose of tPA and IR was unable to retrieve clot as it was too distal.  Past medical history includes IUD placed for 55 years with chronic uterine prolapse and diverticulitis.    Patient seen at the bedside with her son present.  Reports that she has been very sedentary in the past few weeks due to pain she associates with her uterine prolapse and longstanding IUD.  She lives with her son who is at the bedside.  Son describes that his mother is often noncompliant when it comes to medical care and does not see a physician regularly.  She does not have a primary care physician.  Describes worsening shortness of breath over the past several weeks to 1 to 2 months which they associated with deconditioning given recent sedentary lifestyle.  They have been waiting to see a GYN in the outpatient setting to discuss hysterectomy with current IUD placed for 55 years and uterine prolapse.  Son reports that patient has suffered chronic UTIs which he attributes to ongoing GYN issues.  Denies any history of VTE.  Discussed indications for transition to DOAC and patient high risk for bleeding given age and frailty.  Discussed will need to determine if patient suffers from chronic atrial fibrillation since she presented to the hospital in atrial fibrillation.    Discussed the risks vs benefits of initiation of anticoagulation. In general, the risk of life-threatening and fatal bleeding is lower with direct oral  anticoagulants  than with vitamin K antagonists, including warfarin. Factors that increase the risk for bleeding include(but not limited to) advanced age, history of prior bleeding (such as GI, post surgical or ICH), kidney and renal disease, diabetes, cancer, drug abuse, endocarditis, frequent falls and concomitant use of antiplatelet medications.     Review of Systems:  Pertinent items are noted in the History of Present Illness.       Past Medical History:   Diagnosis Date    Diverticulosis      Past Surgical History:   Procedure Laterality Date    CATARACT REMOVAL Left 12/01/2022    COLONOSCOPY Left 03/26/2019    COLONOSCOPY performed by Dimas Diaz MD at [facility] ENDOSCOPY    CT PERITONEAL/RETROPERITONEAL PERC DRAIN  3/22/2019    CT RETROPERITONEAL PERC DRAIN 3/22/2019 [facility] RAD CT    HERNIA REPAIR  02/22/2021    Open Right Femoral Hernia Repair with mesh-[facility]-Dr. MANDI Gavin    LYSIS OF ADHESIONS  03/27/2019    Laparoscopic drainage of pelvic abscess x 2 with lysis of adhesions    TONSILLECTOMY        History reviewed. No pertinent family history.  Social History     Tobacco Use    Smoking status: Never    Smokeless tobacco: Never   Substance Use Topics    Alcohol use: Not Currently      Current Facility-Administered Medications   Medication Dose Route Frequency Provider Last Rate Last Admin    alcohol 62% (NOZIN) nasal  1 ampule  1 ampule Nasal Q12H Mallory Garcia APRN - NP   1 ampule at 02/27/25 0836    heparin (porcine) injection 5,000 Units  80 Units/kg IntraVENous PRN Mallory Garcia APRN - NP   5,000 Units at 02/26/25 0821    heparin (porcine) injection 2,500 Units  40 Units/kg IntraVENous PRN Mallory Garcia APRN - NP        heparin 25,000 units in dextrose 5% 250 mL (premix) infusion  18-30 Units/kg/hr IntraVENous Continuous Mallory Garcia APRN - NP 10.5 mL/hr at 02/27/25 0640 17 Units/kg/hr at 02/27/25 0640    piperacillin-tazobactam (ZOSYN) 3,375 mg in sodium chloride 0.9 % 50 mL IVPB

## 2025-02-27 NOTE — CARE COORDINATION
Care Management Initial Assessment       RUR: 13%  Readmission? No  1st IM letter given? Yes - Given by patient access on 2/25/25  1st  letter given: No--N/A             02/27/25 0490   Service Assessment   Patient Orientation Alert and Oriented;Person;Place;Self   Cognition Alert   History Provided By Child/Family   Primary Caregiver Family   Support Systems Children;Friends/Neighbors;Family Members   Patient's Healthcare Decision Maker is: Legal Next of Kin   PCP Verified by CM Yes   Last Visit to PCP   (Per son states patient has not seen a pcp since 2019.)   Prior Functional Level Assistance with the following:;Bathing   Current Functional Level Assistance with the following:;Bathing;Dressing;Toileting;Mobility   Can patient return to prior living arrangement Other (see comment)  (Family wants to see how the patient progresses.)   Family able to assist with home care needs: Other (comment)  (Patient wants to see how the patient progresses.)   Would you like for me to discuss the discharge plan with any other family members/significant others, and if so, who? Yes  (Son)   Financial Resources Medicare   Community Resources None   Social/Functional History   Lives With Son  (Patient lives with her son and daughter in law.)   Type of Home House  (Two story but patient lives on the first floor.)   Home Equipment Walker - Rolling;Cane;Rollator  (Patient also has a potty chair.)   Active  Yes  (Son states the patient was driving prior to becoming ill.)   Discharge Planning   Type of Residence Other (Comment)  (Family wants to see how the patient progresses.)   Current Services Prior To Admission None   Patient expects to be discharged to: Other (comment)  (Family wants to see how the patient progresses.)         Emailed CM Specialist that patient will need a pcp and an appointment prior to discharge. The son states she has not seen one since 2019. He is fine with specialist assisting to get her a pcp.

## 2025-02-28 LAB
ANION GAP SERPL CALC-SCNC: 3 MMOL/L (ref 2–12)
BUN SERPL-MCNC: 4 MG/DL (ref 6–20)
BUN/CREAT SERPL: 6 (ref 12–20)
CALCIUM SERPL-MCNC: 8.7 MG/DL (ref 8.5–10.1)
CHLORIDE SERPL-SCNC: 108 MMOL/L (ref 97–108)
CO2 SERPL-SCNC: 27 MMOL/L (ref 21–32)
CREAT SERPL-MCNC: 0.66 MG/DL (ref 0.55–1.02)
ERYTHROCYTE [DISTWIDTH] IN BLOOD BY AUTOMATED COUNT: 13.2 % (ref 11.5–14.5)
GLUCOSE SERPL-MCNC: 96 MG/DL (ref 65–100)
HCT VFR BLD AUTO: 33.7 % (ref 35–47)
HGB BLD-MCNC: 10.7 G/DL (ref 11.5–16)
MAGNESIUM SERPL-MCNC: 1.9 MG/DL (ref 1.6–2.4)
MCH RBC QN AUTO: 31.5 PG (ref 26–34)
MCHC RBC AUTO-ENTMCNC: 31.8 G/DL (ref 30–36.5)
MCV RBC AUTO: 99.1 FL (ref 80–99)
NRBC # BLD: 0 K/UL (ref 0–0.01)
NRBC BLD-RTO: 0 PER 100 WBC
PHOSPHATE SERPL-MCNC: 3.2 MG/DL (ref 2.6–4.7)
PLATELET # BLD AUTO: 287 K/UL (ref 150–400)
PMV BLD AUTO: 9.2 FL (ref 8.9–12.9)
POTASSIUM SERPL-SCNC: 3.7 MMOL/L (ref 3.5–5.1)
RBC # BLD AUTO: 3.4 M/UL (ref 3.8–5.2)
SODIUM SERPL-SCNC: 138 MMOL/L (ref 136–145)
UFH PPP CHRO-ACNC: 0.67 IU/ML
WBC # BLD AUTO: 12.6 K/UL (ref 3.6–11)

## 2025-02-28 PROCEDURE — 2500000003 HC RX 250 WO HCPCS: Performed by: NURSE PRACTITIONER

## 2025-02-28 PROCEDURE — 2580000003 HC RX 258: Performed by: NURSE PRACTITIONER

## 2025-02-28 PROCEDURE — 6360000002 HC RX W HCPCS: Performed by: NURSE PRACTITIONER

## 2025-02-28 PROCEDURE — 2700000000 HC OXYGEN THERAPY PER DAY

## 2025-02-28 PROCEDURE — 36415 COLL VENOUS BLD VENIPUNCTURE: CPT

## 2025-02-28 PROCEDURE — 85027 COMPLETE CBC AUTOMATED: CPT

## 2025-02-28 PROCEDURE — 80048 BASIC METABOLIC PNL TOTAL CA: CPT

## 2025-02-28 PROCEDURE — 97165 OT EVAL LOW COMPLEX 30 MIN: CPT | Performed by: OCCUPATIONAL THERAPIST

## 2025-02-28 PROCEDURE — 6370000000 HC RX 637 (ALT 250 FOR IP): Performed by: INTERNAL MEDICINE

## 2025-02-28 PROCEDURE — 2060000000 HC ICU INTERMEDIATE R&B

## 2025-02-28 PROCEDURE — 84100 ASSAY OF PHOSPHORUS: CPT

## 2025-02-28 PROCEDURE — 97535 SELF CARE MNGMENT TRAINING: CPT | Performed by: OCCUPATIONAL THERAPIST

## 2025-02-28 PROCEDURE — 97161 PT EVAL LOW COMPLEX 20 MIN: CPT | Performed by: PHYSICAL THERAPIST

## 2025-02-28 PROCEDURE — 83735 ASSAY OF MAGNESIUM: CPT

## 2025-02-28 PROCEDURE — 6370000000 HC RX 637 (ALT 250 FOR IP): Performed by: NURSE PRACTITIONER

## 2025-02-28 PROCEDURE — 85520 HEPARIN ASSAY: CPT

## 2025-02-28 PROCEDURE — 97116 GAIT TRAINING THERAPY: CPT | Performed by: PHYSICAL THERAPIST

## 2025-02-28 PROCEDURE — 97530 THERAPEUTIC ACTIVITIES: CPT | Performed by: OCCUPATIONAL THERAPIST

## 2025-02-28 RX ORDER — CASTOR OIL AND BALSAM, PERU 788; 87 MG/G; MG/G
OINTMENT TOPICAL 2 TIMES DAILY
Status: DISPENSED | OUTPATIENT
Start: 2025-02-28

## 2025-02-28 RX ADMIN — APIXABAN 10 MG: 5 TABLET, FILM COATED ORAL at 09:48

## 2025-02-28 RX ADMIN — Medication 1 AMPULE: at 21:58

## 2025-02-28 RX ADMIN — SODIUM CHLORIDE, PRESERVATIVE FREE 10 ML: 5 INJECTION INTRAVENOUS at 08:45

## 2025-02-28 RX ADMIN — Medication: at 19:00

## 2025-02-28 RX ADMIN — PIPERACILLIN AND TAZOBACTAM 3375 MG: 3; .375 INJECTION, POWDER, LYOPHILIZED, FOR SOLUTION INTRAVENOUS at 05:42

## 2025-02-28 RX ADMIN — PIPERACILLIN AND TAZOBACTAM 3375 MG: 3; .375 INJECTION, POWDER, LYOPHILIZED, FOR SOLUTION INTRAVENOUS at 21:57

## 2025-02-28 RX ADMIN — SODIUM CHLORIDE: 0.9 INJECTION, SOLUTION INTRAVENOUS at 05:05

## 2025-02-28 RX ADMIN — SODIUM CHLORIDE, PRESERVATIVE FREE 10 ML: 5 INJECTION INTRAVENOUS at 21:58

## 2025-02-28 RX ADMIN — APIXABAN 10 MG: 5 TABLET, FILM COATED ORAL at 21:47

## 2025-02-28 RX ADMIN — PIPERACILLIN AND TAZOBACTAM 3375 MG: 3; .375 INJECTION, POWDER, LYOPHILIZED, FOR SOLUTION INTRAVENOUS at 14:40

## 2025-02-28 RX ADMIN — ACETAMINOPHEN 650 MG: 325 TABLET ORAL at 05:01

## 2025-02-28 RX ADMIN — Medication 1 AMPULE: at 08:44

## 2025-02-28 ASSESSMENT — PAIN SCALES - GENERAL
PAINLEVEL_OUTOF10: 0
PAINLEVEL_OUTOF10: 4
PAINLEVEL_OUTOF10: 0
PAINLEVEL_OUTOF10: 0

## 2025-02-28 ASSESSMENT — PAIN DESCRIPTION - LOCATION: LOCATION: HEAD

## 2025-02-28 ASSESSMENT — PAIN SCALES - WONG BAKER
WONGBAKER_NUMERICALRESPONSE: NO HURT

## 2025-02-28 ASSESSMENT — PAIN DESCRIPTION - DESCRIPTORS: DESCRIPTORS: ACHING

## 2025-02-28 NOTE — PROGRESS NOTES
Hospitalist Progress Note    NAME:   Esmer Serrano   : 1935   MRN: [id number]     Date/Time: 2025 9:40 AM  Patient PCP: Michael Liz MD    Estimated discharge date: 3/1  Barriers: Switch to eliquis , wean off oxygen, Pt/OT eval      Assessment / Plan:  Submassive pulmonary embolism  Acute hypoxic respiratory failure  Concern for R heart strain  - Received half dose TPA  - Heparin drip per protocol following TPA administration  - IR consulted - clot too distal to retrieve  - CTS consulted - no indication for ECMO at this time  - Maintain MAP >60; no pressors at this time  - Will need workup for etiology of clot  Doppler lower extremity negative for DVT.  Echocardiogram from 2025 shows EF of 60%, increased LV wall thickness, grade 1 diastolic dysfunction, mildly dilated RV, mildly reduced RV systolic function, mild TR, moderately elevated RVSP 60 mmHg.  Will consult pulmonology and heme-onc for further input.  Wean off oxygen as tolerated.  : I spoke with heme-onc FERNY Kent, no need for hypercoagulable workup at this point, cardiology was consulted for A-fib.  Patient is high risk for bleeding due to advanced age and frailty and recommended anticoagulation for 3 months for now.  They also recommended rehab for the patient short-term.  : Patient completed 48 hours of heparin drip.  Will switch to oral Eliquis that she will need to continue for at least 3 months and follow-up with heme-onc outpatient.  Awaiting for PT OT eval.  Patient is still on 2 L of oxygen will try to wean off oxygen as tolerated.     Atrial fibrillation new  Elevated troponin  Troponin was elevated 90 at the time of presentation.  Will repeat troponin tomorrow morning.  : Patient had A-fib when she was admitted.  Initial troponin was elevated and the test ended down this morning to 28.  Cardiology was consulted.     Hypokalemia  Hypophosphatemia  Potassium of 2.8 and will give a total of 120 KCl  most current radiology test results   YES  Review and summation of old records today    NO  Reviewed patient's current orders and MAR    YES  PMH/SH reviewed - no change compared to H&P    Procedures: see electronic medical records for all procedures/Xrays and details which were not copied into this note but were reviewed prior to creation of Plan.      LABS:  I reviewed today's most current labs and imaging studies.  Pertinent labs include:  Recent Labs     02/26/25  1022 02/27/25  0601 02/28/25  0515   WBC 13.8* 11.7* 12.6*   HGB 11.2* 9.9* 10.7*   HCT 35.0 31.4* 33.7*    241 287     Recent Labs     02/25/25  1845 02/25/25  2121 02/26/25  0116 02/26/25  0540 02/26/25  1022 02/27/25  0601 02/28/25  0515     --   --  138 138 139 138   K 3.4*  --   --  Hemolyzed, Recollection Recommended 2.8* 4.5 3.7     --   --  106 106 108 108   CO2 28  --   --  26 28 26 27   GLUCOSE 150*  --   --  115* 121* 103* 96   BUN 11  --   --  10 10 7 4*   CREATININE 0.91  --   --  0.73 0.73 0.67 0.66   CALCIUM 9.4  --   --  8.6 8.6 8.3* 8.7   MG 2.0  --   --  Hemolyzed, Recollection Recommended  --  2.1 1.9   PHOS  --   --   --  3.6  --  2.2* 3.2   BILITOT 0.8  --   --   --   --   --   --    AST 26  --   --   --   --   --   --    ALT 58  --   --   --   --   --   --    INR  --  1.1 1.1  --   --   --   --        Signed: Donna Varma MD

## 2025-02-28 NOTE — PLAN OF CARE
Problem: Occupational Therapy - Adult  Goal: By Discharge: Performs self-care activities at highest level of function for planned discharge setting.  See evaluation for individualized goals.  Description: FUNCTIONAL STATUS PRIOR TO ADMISSION: Per patient she was ambulatory without an AD and performing ADLs at an independent to mod I for all ADLs, but has been very weak since DC from [facility] a few weeks ago.     HOME SUPPORT PRIOR TO ADMISSION: The patient lived with son and DIL who assist her as needed. Owns RW, w/c, bedside commode and shower chair but she hadn't been using it.       Occupational Therapy Goals:  Initiated 2/28/2025  1.  Patient will perform grooming standing at sink with Stand by Assist within 7 day(s).  2.  Patient will perform upper body dressing with Set-up and Supervision within 7 day(s).  3.  Patient will perform lower body dressing with Stand by Assist and Set-up within 7 day(s).  4.  Patient will perform toilet transfers with Stand by Assist  within 7 day(s).  5.  Patient will perform all aspects of toileting with Stand by Assist within 7 day(s).  6.  Patient will sponge bathing with Stand by Assist and Set-up within 7 day(s).     Outcome: Progressing     OCCUPATIONAL THERAPY EVALUATION    Patient: Esmer Serrano (89 y.o. female)  Date: 2/28/2025  Primary Diagnosis: Acute massive pulmonary embolism (HCC) [I26.99]         Precautions: Fall Risk                  ASSESSMENT :  The patient is currently limited by mild GW, decreased activity tolerance, decreased safety awareness and decreased balance which is impairing her functional independence. She is functioning below her independent to mod I baseline, performing ADLs at an independent to mod A level and is CGA to min   A for functional mobility. At this time the patient will continue to benefit from acute OT and she will need HHOT and PT after discharge.          PLAN :  Recommendations and Planned Interventions:   self care training,  awareness of need for safety    Hearing:        Vision/Perceptual:    Vision - Basic Assessment  Prior Vision:  (Recent Cataract surgery, needs reading glasses.)        Perception  Overall Perceptual Status: WFL    Range of Motion:   AROM: Generally decreased, functional         Strength:  Strength: Generally decreased, functional      Coordination:  Coordination: Within functional limits     Coordination: Within functional limits      Tone & Sensation:   Tone: Normal  Sensation: Intact          Functional Mobility and Transfers for ADLs:    Bed Mobility:     Bed Mobility Training  Bed Mobility Training: Yes  Rolling: Supervision  Supine to Sit: Contact guard assistance  Scooting: Contact guard assistance    Transfers:     Transfer Training  Transfer Training: Yes  Sit to Stand: Minimal assistance  Stand to Sit: Contact guard assistance  Ambulation:  CGA ambulating with a RW  Balance:     Balance  Sitting: Intact  Standing: Impaired  Standing - Static: Constant support;Good  Standing - Dynamic: Constant support;Fair        ADL Assessment:     Feeding: Independent       Grooming: Minimal assistance  Grooming Skilled Clinical Factors: if standing    UE Bathing: Stand by assistance;Setup       LE Bathing: Minimal assistance;Setup       UE Dressing: Stand by assistance;Setup       LE Dressing: Moderate assistance;Setup       Toileting: Moderate assistance       Pain Ratin/10     Activity Tolerance:   Fair , requires rest breaks, and SpO2 stable on room air  HR up to 154 during activity    After treatment:   Patient left in no apparent distress sitting up in chair, Call bell within reach, Bed/ chair alarm activated, and Caregiver / family present    COMMUNICATION/EDUCATION:   The patient's plan of care was discussed with: physical therapist and registered nurse    Patient Education  Education Given To: Patient  Education Provided: Role of Therapy;Plan of Care  Education Method: Verbal  Barriers to Learning:  Cognition  Education Outcome: Verbalized understanding    Thank you for this referral.  Sudarshan Fournier, OTR/L  Minutes: 23

## 2025-02-28 NOTE — PROGRESS NOTES
Made Dr. Varma aware of skin breakdown noted on arrival to [facility]; placed order for iso-air bed and received orders to start venelex & obtain wound consult for evaluation. Airpumps are unavailable at this time for [place] beds.

## 2025-02-28 NOTE — PLAN OF CARE
Problem: Physical Therapy - Adult  Goal: By Discharge: Performs mobility at highest level of function for planned discharge setting.  See evaluation for individualized goals.  Description: FUNCTIONAL STATUS PRIOR TO ADMISSION: Per patient she was ambulatory without an AD but has been very weak since DC from [facility] a few weeks ago.      HOME SUPPORT PRIOR TO ADMISSION: The patient lived with son and DIL who assist her as needed.  Owns RW, w/c, bedside commode and shower chair but she hadn't been using it.    Physical Therapy Goals  Initiated 2/28/2025  1.  Patient will move from supine to sit and sit to supine in bed with modified independence within 7 day(s).    2.  Patient will perform sit to stand with modified independence within 7 day(s).  3.  Patient will transfer from bed to chair and chair to bed with contact guard assist using the least restrictive device within 7 day(s).  4.  Patient will ambulate with contact guard assist for 200 feet with the least restrictive device within 7 day(s).   5.  Patient will ascend/descend 2 stairs with 1 handrail(s) with contact guard assist within 7 day(s).  Outcome: Progressing   PHYSICAL THERAPY EVALUATION    Patient: Esmer Serrano (89 y.o. female)  Date: 2/28/2025  Primary Diagnosis: Acute massive pulmonary embolism (HCC) [I26.99]       Precautions: Restrictions/Precautions  Restrictions/Precautions: Fall Risk            ASSESSMENT :   DEFICITS/IMPAIRMENTS:   The patient is limited by impaired balance, gait instability,generalized weakness, and decreased activity tolerance.  Son present in room and states that patient is non-compliant with Dr visits and has had minimal medical care in her life.  Overall requires CGA for bed mobility and CGA-Darius to come to stand.  Able to take a few steps to the chair and to amb approx 100 feet with RW and CGA with no overt LOB.  HR as high as 154 bpm with activity and SpO2 stays above 90% with activity.  Patient is below baseline but                             Pain Ratin/10   Pain Intervention(s):       Activity Tolerance:   Fair  and requires rest breaks    After treatment:   Patient left in no apparent distress sitting up in chair, Call bell within reach, and Caregiver / family present    COMMUNICATION/EDUCATION:   The patient's plan of care was discussed with: physical therapist, occupational therapist, and registered nurse         Thank you for this referral.  Poornima Stern, PT  Minutes: 25

## 2025-02-28 NOTE — PROGRESS NOTES
Spiritual Health History and Assessment/Progress Note  [facility]    Follow-up,  , Adjustment to illness,      Name: Esmer Serrano MRN: [id number]    Age: 89 y.o.     Sex: female   Language: English   Congregational: [employer]   Acute massive pulmonary embolism (HCC)     Date: 2/28/2025            Total Time Calculated: 10 min              Spiritual Assessment continued in [facility] 2 CRITICAL CARE        Referral/Consult From: Rounding   Encounter Overview/Reason: Follow-up  Service Provided For: Patient and family together    Thi, Belief, Meaning:   Patient identifies as spiritual and is connected with a thi tradition or spiritual practice  Family/Friends identify as spiritual, are connected with a thi tradition or spiritual practice, and Other: Son is a [employer] . 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      Importance and Influence:  Patient has spiritual/personal beliefs that influence decisions regarding their health  Family/Friends have spiritual/personal beliefs that influence decisions regarding the patient's health    Community:  Patient feels well-supported. Support system includes: Children  Family/Friends are connected with a spiritual community: and feel well-supported. Support system includes: Parent/s    Assessment and Plan of Care:     Patient Interventions include: Facilitated expression of thoughts and feelings  Family/Friends Interventions include: Facilitated expression of thoughts and feelings    Patient Plan of Care: Spiritual Care available upon further referral  Family/Friends Plan of Care: Spiritual Care available upon further referral    Electronically signed by Chaplain Khai on 2/28/2025 at 10:00 AM

## 2025-02-28 NOTE — PROGRESS NOTES
0700: Bedside shift report received from ABRAM Ovalle    0800: Shift assessment completed. Pt AxOx4, follows commands, On 2 L NC, diminished lung sounds, active bowel sounds, palpable pulses in all extremities, hep @17units/kg/hr, handoff completed. See flow sheet/MAR for details.     1200: Reassessment completed. No changes to previous assessment. See flow sheet/MAR for details.     1220: Tried to call to given report for a pt going to 2116. Nurse did not  the call, left voice mail.     1340: Transfer report given to ABRAM Ramos    1410: Pt moved to [facility], [id number]

## 2025-02-28 NOTE — PROGRESS NOTES
Pulmonary Progress Note    Patient: Esmer Serrano                     YOB: 1935        Date- 2/28/2025                           Admit Date: 2/25/2025       CC: Follow up for pulmonary embolism           IMPRESSION & PLAN:     Bilateral pulmonary emboli, Rt>Lt  Mild RV dysfunction with right heart strain presumably from #1  Hypoxia-multifactorial elevated RVSP suspicious for pulmonary hypertension, most likely acute from #1  Pulmonary infiltrates, likely infarcts  A-fib  Leukocytosis probably from proctitis  Hypokalemia     Management plan :     Bilateral PE with significant clot burden and right heart strain,s/p half dose tPA.  First episode of VTE with no noticeable risk factors.  Tolerating anticoagulation okay, anticoagulation switched to NOAC.  Since this is an unprovoked PE, we would recommend definitive anticoagulation.  Risks and benefits discussed.  Pleural-based pulmonary infiltrates in RUL and RLL on chest CT.  No symptoms symptoms suggestive of pulmonary infection.  Infiltrates most likely pulmonary infarcts.  Mild hypoxia, on O2 2 LPM,  trial off off oxygen  Cardiology evaluation for A-fib.  Elevated RVSP and mild RV dysfunction on echo, most likely from the acute PE, recommend follow-up echo in a few months  Leukocytosis. ? Proctitis. antibiotics as per primary team      Thank you for involving us in her care.  We will sign off and offer offer to follow-up with my partners at [facility].        Medical Decision Making  Problems Addressed:  Acute massive pulmonary embolism (HCC): acute illness or injury    Amount and/or Complexity of Data Reviewed  External Data Reviewed: labs.     Details: Hb, platelets and chemstry    Risk  Drug therapy requiring intensive monitoring for toxicity.             Subjective:    Interval History:    Breathing better.  Denies bleeding.    Remains on O2 2 LPM.    Anticoagulation switched to Eliquis today.    White count 12K, H&H overall no  significant change in the last couple of days.    medications:  Current Facility-Administered Medications   Medication Dose Route Frequency Provider Last Rate Last Admin    apixaban (ELIQUIS) tablet 10 mg  10 mg Oral BID Donna Varma MD   10 mg at 02/28/25 0948    Followed by    [START ON 3/7/2025] apixaban (ELIQUIS) tablet 5 mg  5 mg Oral BID Donna Varma MD        balsum peru-castor oil (VENELEX) ointment   Topical BID Donna Varma MD        alcohol 62% (NOZIN) nasal  1 ampule  1 ampule Nasal Q12H Mallory Garcia APRN - NP   1 ampule at 02/28/25 0844    piperacillin-tazobactam (ZOSYN) 3,375 mg in sodium chloride 0.9 % 50 mL IVPB (addEASE)  3,375 mg IntraVENous Q8H Mallory Garcia APRN - NP 12.5 mL/hr at 02/28/25 1440 3,375 mg at 02/28/25 1440    sodium chloride flush 0.9 % injection 5-40 mL  5-40 mL IntraVENous 2 times per day Mallory Garcia APRN - NP   10 mL at 02/28/25 0845    sodium chloride flush 0.9 % injection 5-40 mL  5-40 mL IntraVENous PRN Mallory Garcia APRN - NP        0.9 % sodium chloride infusion   IntraVENous PRN Mallory Garcia APRN - NP 5 mL/hr at 02/28/25 0505 Rate Verify at 02/28/25 0505    ondansetron (ZOFRAN-ODT) disintegrating tablet 4 mg  4 mg Oral Q8H PRN Mallory Garcia APRN - NP        Or    ondansetron (ZOFRAN) injection 4 mg  4 mg IntraVENous Q6H PRN Mallory Garcia APRN - NP        polyethylene glycol (GLYCOLAX) packet 17 g  17 g Oral Daily PRN Mallory Garcia APRN - NP        acetaminophen (TYLENOL) tablet 650 mg  650 mg Oral Q6H PRN Mallory Garcia APRN - NP   650 mg at 02/28/25 0501    Or    acetaminophen (TYLENOL) suppository 650 mg  650 mg Rectal Q6H PRN Mallory Garcia APRN - NP           I/O's:    Intake/Output Summary (Last 24 hours) at 2/28/2025 1624  Last data filed at 2/28/2025 1400  Gross per 24 hour   Intake 605.91 ml   Output 1700 ml   Net -1094.09 ml            Vital Signs:  BP (!) 126/57   Pulse 96   Temp 97.6 °F

## 2025-02-28 NOTE — PROGRESS NOTES
1415: TRANSFER - IN REPORT:    Verbal report received from ABRAM Brandon on Esmer Serrano  being received from  CCU for routine progression of patient care      Report consisted of patient's Situation, Background, Assessment and   Recommendations(SBAR).     Information from the following report(s) Nurse Handoff Report, Index, Intake/Output, MAR, Recent Results, and Cardiac Rhythm NSR  was reviewed with the receiving nurse.    Opportunity for questions and clarification was provided.      Assessment completed upon patient's arrival to unit and care assumed.     1545: Pt placed on isoair bed at this time. Heel boots applied.

## 2025-03-01 PROBLEM — E43 SEVERE PROTEIN-CALORIE MALNUTRITION: Status: ACTIVE | Noted: 2025-03-01

## 2025-03-01 LAB
ANION GAP SERPL CALC-SCNC: 4 MMOL/L (ref 2–12)
BUN SERPL-MCNC: 8 MG/DL (ref 6–20)
BUN/CREAT SERPL: 12 (ref 12–20)
CALCIUM SERPL-MCNC: 8.8 MG/DL (ref 8.5–10.1)
CHLORIDE SERPL-SCNC: 108 MMOL/L (ref 97–108)
CO2 SERPL-SCNC: 28 MMOL/L (ref 21–32)
CREAT SERPL-MCNC: 0.65 MG/DL (ref 0.55–1.02)
ERYTHROCYTE [DISTWIDTH] IN BLOOD BY AUTOMATED COUNT: 13 % (ref 11.5–14.5)
GLUCOSE SERPL-MCNC: 101 MG/DL (ref 65–100)
HCT VFR BLD AUTO: 34.5 % (ref 35–47)
HGB BLD-MCNC: 10.8 G/DL (ref 11.5–16)
MAGNESIUM SERPL-MCNC: 1.8 MG/DL (ref 1.6–2.4)
MCH RBC QN AUTO: 31.1 PG (ref 26–34)
MCHC RBC AUTO-ENTMCNC: 31.3 G/DL (ref 30–36.5)
MCV RBC AUTO: 99.4 FL (ref 80–99)
NRBC # BLD: 0 K/UL (ref 0–0.01)
NRBC BLD-RTO: 0 PER 100 WBC
PHOSPHATE SERPL-MCNC: 3.3 MG/DL (ref 2.6–4.7)
PLATELET # BLD AUTO: 309 K/UL (ref 150–400)
PMV BLD AUTO: 9.1 FL (ref 8.9–12.9)
POTASSIUM SERPL-SCNC: 3.5 MMOL/L (ref 3.5–5.1)
RBC # BLD AUTO: 3.47 M/UL (ref 3.8–5.2)
SODIUM SERPL-SCNC: 140 MMOL/L (ref 136–145)
WBC # BLD AUTO: 8.9 K/UL (ref 3.6–11)

## 2025-03-01 PROCEDURE — 2500000003 HC RX 250 WO HCPCS: Performed by: NURSE PRACTITIONER

## 2025-03-01 PROCEDURE — 6370000000 HC RX 637 (ALT 250 FOR IP): Performed by: INTERNAL MEDICINE

## 2025-03-01 PROCEDURE — 6360000002 HC RX W HCPCS: Performed by: NURSE PRACTITIONER

## 2025-03-01 PROCEDURE — 80048 BASIC METABOLIC PNL TOTAL CA: CPT

## 2025-03-01 PROCEDURE — 36415 COLL VENOUS BLD VENIPUNCTURE: CPT

## 2025-03-01 PROCEDURE — 2580000003 HC RX 258: Performed by: NURSE PRACTITIONER

## 2025-03-01 PROCEDURE — 83735 ASSAY OF MAGNESIUM: CPT

## 2025-03-01 PROCEDURE — 2060000000 HC ICU INTERMEDIATE R&B

## 2025-03-01 PROCEDURE — 94760 N-INVAS EAR/PLS OXIMETRY 1: CPT

## 2025-03-01 PROCEDURE — 6370000000 HC RX 637 (ALT 250 FOR IP): Performed by: NURSE PRACTITIONER

## 2025-03-01 PROCEDURE — 2700000000 HC OXYGEN THERAPY PER DAY

## 2025-03-01 PROCEDURE — 6360000002 HC RX W HCPCS: Performed by: INTERNAL MEDICINE

## 2025-03-01 PROCEDURE — 85027 COMPLETE CBC AUTOMATED: CPT

## 2025-03-01 PROCEDURE — 84100 ASSAY OF PHOSPHORUS: CPT

## 2025-03-01 RX ORDER — MAGNESIUM SULFATE 1 G/100ML
1000 INJECTION INTRAVENOUS ONCE
Status: COMPLETED | OUTPATIENT
Start: 2025-03-01 | End: 2025-03-01

## 2025-03-01 RX ORDER — POTASSIUM CHLORIDE 1500 MG/1
40 TABLET, EXTENDED RELEASE ORAL ONCE
Status: COMPLETED | OUTPATIENT
Start: 2025-03-01 | End: 2025-03-01

## 2025-03-01 RX ADMIN — PIPERACILLIN AND TAZOBACTAM 3375 MG: 3; .375 INJECTION, POWDER, LYOPHILIZED, FOR SOLUTION INTRAVENOUS at 06:13

## 2025-03-01 RX ADMIN — Medication: at 08:11

## 2025-03-01 RX ADMIN — ACETAMINOPHEN 650 MG: 325 TABLET ORAL at 04:05

## 2025-03-01 RX ADMIN — PIPERACILLIN AND TAZOBACTAM 3375 MG: 3; .375 INJECTION, POWDER, LYOPHILIZED, FOR SOLUTION INTRAVENOUS at 22:57

## 2025-03-01 RX ADMIN — Medication 1 AMPULE: at 20:18

## 2025-03-01 RX ADMIN — POTASSIUM CHLORIDE 40 MEQ: 1500 TABLET, EXTENDED RELEASE ORAL at 08:09

## 2025-03-01 RX ADMIN — APIXABAN 10 MG: 5 TABLET, FILM COATED ORAL at 08:11

## 2025-03-01 RX ADMIN — SODIUM CHLORIDE, PRESERVATIVE FREE 10 ML: 5 INJECTION INTRAVENOUS at 20:19

## 2025-03-01 RX ADMIN — Medication: at 19:59

## 2025-03-01 RX ADMIN — PIPERACILLIN AND TAZOBACTAM 3375 MG: 3; .375 INJECTION, POWDER, LYOPHILIZED, FOR SOLUTION INTRAVENOUS at 15:19

## 2025-03-01 RX ADMIN — MAGNESIUM SULFATE HEPTAHYDRATE 1000 MG: 1 INJECTION, SOLUTION INTRAVENOUS at 08:22

## 2025-03-01 RX ADMIN — APIXABAN 10 MG: 5 TABLET, FILM COATED ORAL at 20:18

## 2025-03-01 RX ADMIN — SODIUM CHLORIDE, PRESERVATIVE FREE 10 ML: 5 INJECTION INTRAVENOUS at 08:11

## 2025-03-01 ASSESSMENT — PAIN SCALES - GENERAL
PAINLEVEL_OUTOF10: 0
PAINLEVEL_OUTOF10: 0
PAINLEVEL_OUTOF10: 2
PAINLEVEL_OUTOF10: 0

## 2025-03-01 ASSESSMENT — PAIN DESCRIPTION - DESCRIPTORS: DESCRIPTORS: ACHING

## 2025-03-01 ASSESSMENT — PAIN SCALES - WONG BAKER
WONGBAKER_NUMERICALRESPONSE: NO HURT

## 2025-03-01 ASSESSMENT — PAIN - FUNCTIONAL ASSESSMENT: PAIN_FUNCTIONAL_ASSESSMENT: ACTIVITIES ARE NOT PREVENTED

## 2025-03-01 ASSESSMENT — PAIN DESCRIPTION - LOCATION: LOCATION: HEAD

## 2025-03-01 NOTE — PROGRESS NOTES
Bedside and Verbal shift change report given to Marie (oncoming nurse) by Rachel (offgoing nurse). Report included the following information Nurse Handoff Report, Index, Intake/Output, MAR, Recent Results, and Cardiac Rhythm NSR .          End of Shift Note    Bedside shift change report given to  (oncoming nurse) by MARIE SWANSON RN (offgoing nurse).  Report included the following information SBAR, Kardex, Intake/Output, MAR, Recent Results, and Cardiac Rhythm NSR    Shift worked:  7p-7a     Shift summary and any significant changes:     PRN tylenol for headache     Concerns for physician to address:       Zone phone for oncoming shift:          Activity:     Number times ambulated in hallways past shift: 1  Number of times OOB to chair past shift: 0    Cardiac:   Cardiac Monitoring: Yes      Cardiac Rhythm: Sinus rhythm    Access:  Current line(s): PIV     Genitourinary:   Urinary Status: External catheter, Voiding    Respiratory:   O2 Device: Nasal cannula  Chronic home O2 use?: NO  Incentive spirometer at bedside: YES    GI:  Last BM (including prior to admit): 02/27/25  Current diet:  ADULT DIET; Regular; Low Fat/Low Chol/High Fiber/2 gm Na  Passing flatus:    Pain Management:   Patient states pain is manageable on current regimen: YES    Skin:  Jhon Scale Score: 16  Interventions: Wound Offloading (Prevention Methods): Bed, pressure reduction mattress, Heel boots, Pillows, Repositioning, Turning    Patient Safety:  Fall Risk: Nursing Judgement-Fall Risk High(Add Comments): Yes  Fall Risk Interventions  Nursing Judgement-Fall Risk High(Add Comments): Yes  Toilet Every 2 Hours-In Advance of Need: Yes  Hourly Visual Checks: In bed, Awake  Fall Visual Posted: Armband, Fall sign posted  Room Door Open: Deferred to promote rest  Alarm On: Bed  Patient Moved Closer to Nursing Station: No    Active Consults:   IP CONSULT TO CRITICAL CARE  IP CONSULT TO OB GYN  IP CONSULT TO ONCOLOGY  IP CONSULT TO  PULMONOLOGY  IP CONSULT TO CARDIOLOGY    Length of Stay:  Expected LOS: 7  Actual LOS: 4    BROOKS SWANSON RN

## 2025-03-01 NOTE — PLAN OF CARE
Problem: Discharge Planning  Goal: Discharge to home or other facility with appropriate resources  Outcome: Progressing     Problem: Pain  Goal: Verbalizes/displays adequate comfort level or baseline comfort level  Outcome: Progressing     Problem: Skin/Tissue Integrity  Goal: Skin integrity remains intact  Description: 1.  Monitor for areas of redness and/or skin breakdown  2.  Assess vascular access sites hourly  3.  Every 4-6 hours minimum:  Change oxygen saturation probe site  4.  Every 4-6 hours:  If on nasal continuous positive airway pressure, respiratory therapy assess nares and determine need for appliance change or resting period  Outcome: Progressing     Problem: ABCDS Injury Assessment  Goal: Absence of physical injury  Outcome: Progressing     Problem: Safety - Adult  Goal: Free from fall injury  Outcome: Progressing     Problem: Risk for Elopement  Goal: Patient will not exit the unit/facility without proper excort  Outcome: Progressing

## 2025-03-01 NOTE — PROGRESS NOTES
Hospitalist Progress Note    NAME:   Esmer Serrano   : 1935   MRN: [id number]     Date/Time: 3/1/2025 7:26 AM  Patient PCP: Michael Liz MD    Estimated discharge date: 3/2  Barriers: wean off oxygen, cardiology clearance      Assessment / Plan:  Submassive pulmonary embolism  Acute hypoxic respiratory failure  Concern for R heart strain  - Received half dose TPA  - Heparin drip per protocol following TPA administration  - IR consulted - clot too distal to retrieve  - CTS consulted - no indication for ECMO at this time  - Maintain MAP >60; no pressors at this time  - Will need workup for etiology of clot  Doppler lower extremity negative for DVT.  Echocardiogram from 2025 shows EF of 60%, increased LV wall thickness, grade 1 diastolic dysfunction, mildly dilated RV, mildly reduced RV systolic function, mild TR, moderately elevated RVSP 60 mmHg.  Will consult pulmonology and heme-onc for further input.  Wean off oxygen as tolerated.  : I spoke with heme-onc FERNY Kent, no need for hypercoagulable workup at this point, cardiology was consulted for A-fib.  Patient is high risk for bleeding due to advanced age and frailty and recommended anticoagulation for 3 months for now.  They also recommended rehab for the patient short-term.  : Patient completed 48 hours of heparin drip.  Will switch to oral Eliquis that she will need to continue for at least 3 months and follow-up with heme-onc outpatient.  Awaiting for PT OT eval.  Patient is still on 2 L of oxygen will try to wean off oxygen as tolerated.  3/1: Patient is currently on 2 L of oxygen.  Wean off oxygen as tolerated.     Atrial fibrillation new  Elevated troponin  Troponin was elevated 90 at the time of presentation.  Will repeat troponin tomorrow morning.  : Patient had A-fib when she was admitted.  Initial troponin was elevated and the test ended down this morning to 28.  Cardiology was consulted.  : Waiting for cardiology

## 2025-03-01 NOTE — PROGRESS NOTES
Comprehensive Nutrition Assessment    Type and Reason for Visit:  Initial, Positive nutrition screen    Nutrition Recommendations/Plan:   Continue current diet.  May have Boost shakes brought in from home. Pt dislikes Ensure.  Please document % meals and supplements consumed in flowsheet I/O's under intake      Malnutrition Assessment:  Malnutrition Status:  Severe malnutrition (03/01/25 1055)    Context:  Acute Illness     Findings of the 6 clinical characteristics of malnutrition:  Energy Intake:  50% or less of estimated energy requirements for 5 or more days  Weight Loss:  Greater than 7.5% over 3 months     Body Fat Loss:  Moderate body fat loss Orbital, Triceps   Muscle Mass Loss:  Moderate muscle mass loss Temples (temporalis), Clavicles (pectoralis & deltoids), Hand (interosseous), Scapula (trapezius)  Fluid Accumulation:  No fluid accumulation     Strength:  Not Performed    Nutrition Assessment:     Chart reviewed for MST. Pt admitted with PE, proctitis and diarrhea. She has been eating very little for 2-3 weeks PTA. This morning she is feeling much better and ate >50% of breakfast. She drinks Boost at home up to BID, dislikes Ensure. Severe weight loss (22lb, 14% within 3 months) and fat/muscle wasting meeting ASPEN criteria for severe malnutrition. WOCN consult pending.     Wt Readings from Last 5 Encounters:   03/01/25 60.1 kg (132 lb 7.9 oz)   12/30/24 70.2 kg (154 lb 12.2 oz)   11/15/22 71.7 kg (158 lb)   04/14/21 73 kg (161 lb)   03/09/21 73.9 kg (163 lb)   ]    Nutrition Related Findings:    Labs: reviewed.   Meds: Zosyn. Mag Sulfate.   BM 2/27.   Wound Type: Stage I       Current Nutrition Intake & Therapies:    Average Meal Intake: 51-75%  Average Supplements Intake: None Ordered  ADULT DIET; Regular; Low Fat/Low Chol/High Fiber/2 gm Na    Anthropometric Measures:  Height: 170.2 cm (5' 7\")  Ideal Body Weight (IBW): 135 lbs (61 kg)       Current Body Weight: 60.1 kg (132 lb 7.9 oz), 98.1 % IBW.  Weight Source: Bed scale  Current BMI (kg/m2): 20.7           Weight Adjustment For: No Adjustment                 BMI Categories: Underweight (BMI less than 22) age over 65    Estimated Daily Nutrient Needs:  Energy Requirements Based On: Formula  Weight Used for Energy Requirements: Current  Energy (kcal/day): 1580 kcals (BMR x 1.3AF + 200 for wt gain)  Weight Used for Protein Requirements: Current  Protein (g/day): 60-72g (1.0-1.2g/kg)  Method Used for Fluid Requirements: 1 ml/kcal  Fluid (ml/day): 1400mL    Nutrition Diagnosis:   Severe malnutrition, in context of acute illness or injury related to inadequate protein-energy intake, decreased appetite as evidenced by criteria as identified in malnutrition assessment    Nutrition Interventions:   Food and/or Nutrient Delivery: Continue Current Diet  Nutrition Education/Counseling: No recommendation at this time          Goals:  Goals: PO intake 50% or greater, PO intake 75% or greater, by next RD assessment          Nutrition Monitoring and Evaluation:   Behavioral-Environmental Outcomes: None Identified  Food/Nutrient Intake Outcomes: Food and Nutrient Intake  Physical Signs/Symptoms Outcomes: Biochemical Data, GI Status, Nutrition Focused Physical Findings, Skin, Weight    Discharge Planning:    Continue current diet, Continue Oral Nutrition Supplement     Poornima Mcgowan RD  Contact: [phone]

## 2025-03-02 VITALS
RESPIRATION RATE: 16 BRPM | HEART RATE: 96 BPM | HEIGHT: 67 IN | OXYGEN SATURATION: 93 % | TEMPERATURE: 97.8 F | WEIGHT: 132.5 LBS | DIASTOLIC BLOOD PRESSURE: 58 MMHG | BODY MASS INDEX: 20.8 KG/M2 | SYSTOLIC BLOOD PRESSURE: 118 MMHG

## 2025-03-02 LAB
ANION GAP SERPL CALC-SCNC: 1 MMOL/L (ref 2–12)
BASOPHILS # BLD: 0.05 K/UL (ref 0–0.1)
BASOPHILS NFR BLD: 0.5 % (ref 0–1)
BUN SERPL-MCNC: 14 MG/DL (ref 6–20)
BUN/CREAT SERPL: 19 (ref 12–20)
CALCIUM SERPL-MCNC: 8.5 MG/DL (ref 8.5–10.1)
CHLORIDE SERPL-SCNC: 107 MMOL/L (ref 97–108)
CO2 SERPL-SCNC: 29 MMOL/L (ref 21–32)
CREAT SERPL-MCNC: 0.74 MG/DL (ref 0.55–1.02)
DIFFERENTIAL METHOD BLD: ABNORMAL
EOSINOPHIL # BLD: 0.14 K/UL (ref 0–0.4)
EOSINOPHIL NFR BLD: 1.3 % (ref 0–7)
ERYTHROCYTE [DISTWIDTH] IN BLOOD BY AUTOMATED COUNT: 13 % (ref 11.5–14.5)
GLUCOSE SERPL-MCNC: 99 MG/DL (ref 65–100)
HCT VFR BLD AUTO: 34.9 % (ref 35–47)
HGB BLD-MCNC: 11 G/DL (ref 11.5–16)
IMM GRANULOCYTES # BLD AUTO: 0.15 K/UL (ref 0–0.04)
IMM GRANULOCYTES NFR BLD AUTO: 1.4 % (ref 0–0.5)
LYMPHOCYTES # BLD: 1.52 K/UL (ref 0.8–3.5)
LYMPHOCYTES NFR BLD: 13.8 % (ref 12–49)
MAGNESIUM SERPL-MCNC: 2.2 MG/DL (ref 1.6–2.4)
MCH RBC QN AUTO: 31.1 PG (ref 26–34)
MCHC RBC AUTO-ENTMCNC: 31.5 G/DL (ref 30–36.5)
MCV RBC AUTO: 98.6 FL (ref 80–99)
MONOCYTES # BLD: 1.91 K/UL (ref 0–1)
MONOCYTES NFR BLD: 17.3 % (ref 5–13)
NEUTS SEG # BLD: 7.27 K/UL (ref 1.8–8)
NEUTS SEG NFR BLD: 65.7 % (ref 32–75)
NRBC # BLD: 0 K/UL (ref 0–0.01)
NRBC BLD-RTO: 0 PER 100 WBC
PLATELET # BLD AUTO: 338 K/UL (ref 150–400)
PMV BLD AUTO: 8.7 FL (ref 8.9–12.9)
POTASSIUM SERPL-SCNC: 4.4 MMOL/L (ref 3.5–5.1)
RBC # BLD AUTO: 3.54 M/UL (ref 3.8–5.2)
SODIUM SERPL-SCNC: 137 MMOL/L (ref 136–145)
WBC # BLD AUTO: 11 K/UL (ref 3.6–11)

## 2025-03-02 PROCEDURE — 2500000003 HC RX 250 WO HCPCS: Performed by: NURSE PRACTITIONER

## 2025-03-02 PROCEDURE — 2700000000 HC OXYGEN THERAPY PER DAY

## 2025-03-02 PROCEDURE — 6370000000 HC RX 637 (ALT 250 FOR IP): Performed by: NURSE PRACTITIONER

## 2025-03-02 PROCEDURE — 85025 COMPLETE CBC W/AUTO DIFF WBC: CPT

## 2025-03-02 PROCEDURE — 6370000000 HC RX 637 (ALT 250 FOR IP): Performed by: INTERNAL MEDICINE

## 2025-03-02 PROCEDURE — 6360000002 HC RX W HCPCS: Performed by: NURSE PRACTITIONER

## 2025-03-02 PROCEDURE — 83735 ASSAY OF MAGNESIUM: CPT

## 2025-03-02 PROCEDURE — 2060000000 HC ICU INTERMEDIATE R&B

## 2025-03-02 PROCEDURE — 80048 BASIC METABOLIC PNL TOTAL CA: CPT

## 2025-03-02 PROCEDURE — 36415 COLL VENOUS BLD VENIPUNCTURE: CPT

## 2025-03-02 PROCEDURE — 2580000003 HC RX 258: Performed by: NURSE PRACTITIONER

## 2025-03-02 RX ORDER — ONDANSETRON 4 MG/1
4 TABLET, ORALLY DISINTEGRATING ORAL EVERY 8 HOURS PRN
Qty: 21 TABLET | Refills: 0 | OUTPATIENT
Start: 2025-03-02

## 2025-03-02 RX ADMIN — APIXABAN 10 MG: 5 TABLET, FILM COATED ORAL at 21:43

## 2025-03-02 RX ADMIN — PIPERACILLIN AND TAZOBACTAM 3375 MG: 3; .375 INJECTION, POWDER, LYOPHILIZED, FOR SOLUTION INTRAVENOUS at 15:13

## 2025-03-02 RX ADMIN — PIPERACILLIN AND TAZOBACTAM 3375 MG: 3; .375 INJECTION, POWDER, LYOPHILIZED, FOR SOLUTION INTRAVENOUS at 21:42

## 2025-03-02 RX ADMIN — SODIUM CHLORIDE, PRESERVATIVE FREE 10 ML: 5 INJECTION INTRAVENOUS at 21:44

## 2025-03-02 RX ADMIN — Medication: at 09:15

## 2025-03-02 RX ADMIN — PIPERACILLIN AND TAZOBACTAM 3375 MG: 3; .375 INJECTION, POWDER, LYOPHILIZED, FOR SOLUTION INTRAVENOUS at 06:21

## 2025-03-02 RX ADMIN — Medication: at 21:44

## 2025-03-02 RX ADMIN — SODIUM CHLORIDE, PRESERVATIVE FREE 10 ML: 5 INJECTION INTRAVENOUS at 09:15

## 2025-03-02 RX ADMIN — Medication 1 AMPULE: at 09:15

## 2025-03-02 RX ADMIN — APIXABAN 10 MG: 5 TABLET, FILM COATED ORAL at 09:15

## 2025-03-02 ASSESSMENT — PAIN SCALES - WONG BAKER
WONGBAKER_NUMERICALRESPONSE: NO HURT
WONGBAKER_NUMERICALRESPONSE: NO HURT

## 2025-03-02 ASSESSMENT — PAIN SCALES - GENERAL
PAINLEVEL_OUTOF10: 0

## 2025-03-02 NOTE — PROGRESS NOTES
Hospitalist Progress Note    NAME:   Esmer Serrano   : 1935   MRN: [id number]     Date/Time: 3/2/2025 8:44 AM  Patient PCP: Michael Liz MD    Estimated discharge date: 3/3  Barriers: wean off oxygen      Assessment / Plan:  Submassive pulmonary embolism  Acute hypoxic respiratory failure  Concern for R heart strain  - Received half dose TPA  - Heparin drip per protocol following TPA administration  - IR consulted - clot too distal to retrieve  - CTS consulted - no indication for ECMO at this time  - Maintain MAP >60; no pressors at this time  - Will need workup for etiology of clot  Doppler lower extremity negative for DVT.  Echocardiogram from 2025 shows EF of 60%, increased LV wall thickness, grade 1 diastolic dysfunction, mildly dilated RV, mildly reduced RV systolic function, mild TR, moderately elevated RVSP 60 mmHg.  Will consult pulmonology and heme-onc for further input.  Wean off oxygen as tolerated.  : I spoke with heme-onc FERNY Kent, no need for hypercoagulable workup at this point, cardiology was consulted for A-fib.  Patient is high risk for bleeding due to advanced age and frailty and recommended anticoagulation for 3 months for now.  They also recommended rehab for the patient short-term.  : Patient completed 48 hours of heparin drip.  Will switch to oral Eliquis that she will need to continue for at least 3 months and follow-up with heme-onc outpatient.  Awaiting for PT OT eval.  Patient is still on 2 L of oxygen will try to wean off oxygen as tolerated.  3/1: Patient is currently on 2 L of oxygen.  Wean off oxygen as tolerated.  3/2: Patient was on 1 L of oxygen.  Trying to wean off oxygen.  If we can wean off oxygen tomorrow, will plan for discharge home.     Paroxysmal atrial fibrillation   Elevated troponin  Troponin was elevated 90 at the time of presentation.  Will repeat troponin tomorrow morning.  : Patient had A-fib when she was admitted.  Initial

## 2025-03-02 NOTE — PROGRESS NOTES
End of Shift Note    Bedside shift change report given to ABRAM Paris (oncoming nurse) by Daniel Murray RN (offgoing nurse).  Report included the following information SBAR, Kardex, Intake/Output, and MAR    Shift worked:  7A-7P     Shift summary and any significant changes:     Pt is alert an dorientation. Try to wean down O 2 pt de sat to 88-89% in room air.  she is now 2 ltr No any significant change during my shift.     Concerns for physician to address:       Zone phone for oncoming shift:          Activity:     Number times ambulated in hallways past shift: 0  Number of times OOB to chair past shift: 0    Cardiac:   Cardiac Monitoring: Yes      Cardiac Rhythm: Sinus rhythm    Access:  Current line(s): PIV     Genitourinary:   Urinary Status: Voiding, External catheter    Respiratory:   O2 Device: Nasal cannula  Chronic home O2 use?: NO  Incentive spirometer at bedside: YES    GI:  Last BM (including prior to admit): 02/27/25  Current diet:  ADULT DIET; Regular; Low Fat/Low Chol/High Fiber/2 gm Na  Passing flatus: YES    Pain Management:   Patient states pain is manageable on current regimen: YES    Skin:  Jhon Scale Score: 16  Interventions: Wound Offloading (Prevention Methods): Bed, pressure reduction mattress    Patient Safety:  Fall Risk: Nursing Judgement-Fall Risk High(Add Comments): Yes  Fall Risk Interventions  Nursing Judgement-Fall Risk High(Add Comments): Yes  Toilet Every 2 Hours-In Advance of Need: Yes  Hourly Visual Checks: In bed, Quiet  Fall Visual Posted: Armband, Fall sign posted  Room Door Open: Yes  Alarm On: Bed  Patient Moved Closer to Nursing Station: No    Active Consults:   IP CONSULT TO CRITICAL CARE  IP CONSULT TO OB GYN  IP CONSULT TO ONCOLOGY  IP CONSULT TO PULMONOLOGY  IP CONSULT TO CARDIOLOGY    Length of Stay:  Expected LOS: 7  Actual LOS: 4    Daniel Murray RN

## 2025-03-02 NOTE — PROGRESS NOTES
1900h- Bedside and Verbal shift change report given  by Nurse Sanchez (offgoing nurse). Report included the following information Nurse Handoff Report, Adult Overview, Cardiac Rhythm Sinus rhythm, Alarm Parameters, Quality Measures, Neuro Assessment, and Event Log.     1950h- pt AO x4, obeys command. On room air, saturation 90%.    2000h- on nasal cannula at 2lpm.    2025h- decreased O2 at 1 pm- sat's 95%    2300h- reassessment done; no changed.    0300h- reassessment done; no changed.    - pt. Slept well through the night.    0500h- blood drawn for routine labs.    0630h- due meds given.    0700h- Bedside and Verbal shift change report given to Daniel RN (oncoming nurse) by Deana VALVERDE (offgoing nurse). Report included the following information Nurse Handoff Report, Adult Overview, MAR, Recent Results, Cardiac Rhythm Sinus rhythm, Alarm Parameters, Quality Measures, and Neuro Assessment.

## 2025-03-02 NOTE — DISCHARGE INSTRUCTIONS
Please continue to take Eliquis for at least 3 months.  Follow-up with OB/GYN for UB prolapse and recurrent UTI.  Please follow-up with heme-onc to discuss about further continuation of Eliquis after 3 months.  Also follow-up with PCP, pulmonology and cardiology outpatient.

## 2025-03-02 NOTE — PLAN OF CARE
Problem: Respiratory - Adult  Goal: Achieves optimal ventilation and oxygenation  Outcome: Progressing  Flowsheets (Taken 3/1/2025 2229)  Achieves optimal ventilation and oxygenation:   Assess for changes in respiratory status   Assess for changes in mentation and behavior   Position to facilitate oxygenation and minimize respiratory effort   Oxygen supplementation based on oxygen saturation or arterial blood gases   Encourage broncho-pulmonary hygiene including cough, deep breathe, incentive spirometry   Assess the need for suctioning and aspirate as needed   Assess and instruct to report shortness of breath or any respiratory difficulty   Respiratory therapy support as indicated     Problem: Skin/Tissue Integrity  Goal: Skin integrity remains intact  Description: 1.  Monitor for areas of redness and/or skin breakdown  2.  Assess vascular access sites hourly  3.  Every 4-6 hours minimum:  Change oxygen saturation probe site  4.  Every 4-6 hours:  If on nasal continuous positive airway pressure, respiratory therapy assess nares and determine need for appliance change or resting period  3/1/2025 2230 by Calvin Gunter RN  Outcome: Progressing    Problem: Safety - Adult  Goal: Free from fall injury  3/1/2025 2230 by Calvin Gunter RN  Outcome: Progressing  Flowsheets  Taken 3/1/2025 2230  Free From Fall Injury:   Instruct family/caregiver on patient safety   Based on caregiver fall risk screen, instruct family/caregiver to ask for assistance with transferring infant if caregiver noted to have fall risk factors.

## 2025-03-02 NOTE — CONSULTS
EP/ ARRHYTHMIA/ CARDIOLOGY CONSULT requested secondary to atrial fibrillation    Patient ID:  Patient: Esmer Serrano  MRN: [id number]  Age: 89 y.o.  : 1935    Date of  Admission: 2025  6:02 PM   PCP:  Michael Liz MD    Assessment:   Submassive pulmonary embolism with infarction.  Acute hypoxic respiratory failure.  Paroxysmal atrial fibrillation reported, back in sinus as of ECG .  Mildly dilated RV with mild hypokinesis on echo.  Dilated IVC.  EF 55-60%.  Mild normocytic normochromic anemia.  Full code.    Plan:     Continue anticoagulation indicated for PULMONARY EMBOLISM..  Will hold on adding a rate control or antiarrhythmic agent at this time.  Will see if on recurrence this is needed.        [x]       High complexity decision making was performed in this patient at high risk for decompensation with multiple organ involvement.    Esmer Serrano is a 89 y.o. female admitted  with a history of worsening DYSPNEA ON EXERTION diagnosed with pulmonary embolism.  Her lower extremity dopplers were negative for DVT.  She received thrombolytic in the ER.  She was recently at [facility] with     Currently, denies chest pain, syncope, dizziness, palpitations.  No pleurisy or hemoptysis.  No TIA or stroke symptoms.    Per Dr. Barbosa's consult :  \"Esmer Serrano is an 89-year-old female patient admitted to [facility] with submassive pulmonary embolism and acute hypoxic respiratory failure.  She received a half dose of tPA and IR was unable to retrieve clot as it was too distal.  Past medical history includes IUD placed for 55 years with chronic uterine prolapse and diverticulitis.     Patient seen at the bedside with her son present.  Reports that she has been very sedentary in the past few weeks due to pain she associates with her uterine prolapse and longstanding IUD.  She lives with her son who is at the bedside.  Son describes that his mother is

## 2025-03-03 VITALS
HEIGHT: 67 IN | HEART RATE: 90 BPM | BODY MASS INDEX: 20.83 KG/M2 | DIASTOLIC BLOOD PRESSURE: 64 MMHG | OXYGEN SATURATION: 95 % | TEMPERATURE: 97.7 F | RESPIRATION RATE: 17 BRPM | WEIGHT: 132.72 LBS | SYSTOLIC BLOOD PRESSURE: 108 MMHG

## 2025-03-03 PROCEDURE — 97535 SELF CARE MNGMENT TRAINING: CPT

## 2025-03-03 PROCEDURE — 6370000000 HC RX 637 (ALT 250 FOR IP): Performed by: NURSE PRACTITIONER

## 2025-03-03 PROCEDURE — 2580000003 HC RX 258: Performed by: NURSE PRACTITIONER

## 2025-03-03 PROCEDURE — 2500000003 HC RX 250 WO HCPCS: Performed by: NURSE PRACTITIONER

## 2025-03-03 PROCEDURE — 6360000002 HC RX W HCPCS: Performed by: NURSE PRACTITIONER

## 2025-03-03 PROCEDURE — 6370000000 HC RX 637 (ALT 250 FOR IP): Performed by: INTERNAL MEDICINE

## 2025-03-03 RX ADMIN — APIXABAN 10 MG: 5 TABLET, FILM COATED ORAL at 09:24

## 2025-03-03 RX ADMIN — SODIUM CHLORIDE, PRESERVATIVE FREE 10 ML: 5 INJECTION INTRAVENOUS at 09:24

## 2025-03-03 RX ADMIN — PIPERACILLIN AND TAZOBACTAM 3375 MG: 3; .375 INJECTION, POWDER, LYOPHILIZED, FOR SOLUTION INTRAVENOUS at 06:10

## 2025-03-03 RX ADMIN — ACETAMINOPHEN 650 MG: 325 TABLET ORAL at 05:36

## 2025-03-03 RX ADMIN — Medication: at 09:24

## 2025-03-03 RX ADMIN — ONDANSETRON 4 MG: 2 INJECTION, SOLUTION INTRAMUSCULAR; INTRAVENOUS at 01:31

## 2025-03-03 ASSESSMENT — PAIN DESCRIPTION - ORIENTATION: ORIENTATION: INNER

## 2025-03-03 ASSESSMENT — PAIN SCALES - GENERAL
PAINLEVEL_OUTOF10: 0
PAINLEVEL_OUTOF10: 0
PAINLEVEL_OUTOF10: 3
PAINLEVEL_OUTOF10: 0
PAINLEVEL_OUTOF10: 5

## 2025-03-03 ASSESSMENT — PAIN DESCRIPTION - LOCATION: LOCATION: HEAD

## 2025-03-03 ASSESSMENT — PAIN DESCRIPTION - DESCRIPTORS: DESCRIPTORS: ACHING

## 2025-03-03 NOTE — PROGRESS NOTES
Pulse oximetry assessment   87% at rest on room air (if 88% or less, skip next steps)  94% on LPM at rest

## 2025-03-03 NOTE — PROGRESS NOTES
Tobacco Use    Smoking status: Never    Smokeless tobacco: Never   Substance Use Topics    Alcohol use: Not Currently        History reviewed. No pertinent family history.     No Known Allergies       Current Facility-Administered Medications   Medication Dose Route Frequency    apixaban (ELIQUIS) tablet 10 mg  10 mg Oral BID    Followed by    [START ON 3/7/2025] apixaban (ELIQUIS) tablet 5 mg  5 mg Oral BID    balsum peru-castor oil (VENELEX) ointment   Topical BID    alcohol 62% (NOZIN) nasal  1 ampule  1 ampule Nasal Q12H    piperacillin-tazobactam (ZOSYN) 3,375 mg in sodium chloride 0.9 % 50 mL IVPB (addEASE)  3,375 mg IntraVENous Q8H    sodium chloride flush 0.9 % injection 5-40 mL  5-40 mL IntraVENous 2 times per day    sodium chloride flush 0.9 % injection 5-40 mL  5-40 mL IntraVENous PRN    0.9 % sodium chloride infusion   IntraVENous PRN    ondansetron (ZOFRAN-ODT) disintegrating tablet 4 mg  4 mg Oral Q8H PRN    Or    ondansetron (ZOFRAN) injection 4 mg  4 mg IntraVENous Q6H PRN    polyethylene glycol (GLYCOLAX) packet 17 g  17 g Oral Daily PRN    acetaminophen (TYLENOL) tablet 650 mg  650 mg Oral Q6H PRN    Or    acetaminophen (TYLENOL) suppository 650 mg  650 mg Rectal Q6H PRN       Review of Symptoms:  See HPI as well.  General: negative for fever, chills, sweats, weakness, weight loss   Eyes: negative for blurred vision, eye pain, loss of vision, diplopia   Ear Nose and Throat: negative for rhinorrhea, pharyngitis, otalgia, tinnitus, speech or swallowing difficulties   Respiratory: negative for cough, sputum production, wheezing  Cardiology: negative for chest pain, palpitations, orthopnea, PND, edema, syncope   Gastrointestinal: negative for abdominal pain, N/V, dysphagia, change in bowel habits, bleeding   Genitourinary: negative for frequency, urgency, dysuria, hematuria, incontinence   Muskuloskeletal : negative for arthralgia, myalgia   Hematology: negative for easy bruising,  bleeding, lymphadenopathy   Dermatological: negative for rash, ulceration, mole change, new lesion   Endocrine: negative for hot flashes or polydipsia   Neurological: negative for headache, dizziness, confusion, focal weakness, paresthesia, memory loss, gait disturbance   Psychological: negative for anxiety, depression, agitation       Objective:      Physical Exam:  Temp (24hrs), Av.1 °F (36.7 °C), Min:97.9 °F (36.6 °C), Max:98.4 °F (36.9 °C)    No data found. No data found. No data found.     Intake/Output Summary (Last 24 hours) at 3/2/2025 2116  Last data filed at 3/2/2025 2048  Gross per 24 hour   Intake 445.68 ml   Output 950 ml   Net -504.32 ml     /70   Pulse 83   Temp 97.9 °F (36.6 °C)   Resp 22   Ht 1.702 m (5' 7\")   Wt 62 kg (136 lb 11 oz)   SpO2 96%   BMI 21.41 kg/m²      Temp (24hrs), Av.9 °F (36.6 °C), Min:97.9 °F (36.6 °C), Max:97.9 °F (36.6 °C)     Nondiaphoretic, not in acute distress.  Supple, no palpable thyromegaly.  No scleral icterus, mucous membranes moist, conjuctivae pink, no xanthelasma.  Unlabored, clear to auscultation bilaterally, symmetric air movement.  Regular rate and rhythm, no murmur, pericardial rub, knock, or gallop.  No JVD or peripheral edema.  No carotid bruit.  Palpable radial and DP/PT pulses bilaterally.  Abdomen, soft, nontender, nondistended.  No abdominal bruit or pulstatile masses.  Extremities without cyanosis or clubbing.  Muscle tone and bulk normal.  Skin warm and dry.  No rashes or ulcers.  Neuro grossly nonfocal.  No tremor.  Awake and appropriate.    CARDIOGRAPHICS and STUDIES, I reviewed:    Telemetry:  Sinus rhythm.    EC/25 study with sinus tachycardia, anteroseptal infarct pattern.    Echo:  Left Ventricle Normal left ventricular systolic function with a visually estimated EF of 55 - 60%. Left ventricle size is normal. Increased wall thickness. Normal wall motion. Grade I diastolic dysfunction with normal LAP.   Left Atrium Left

## 2025-03-03 NOTE — PROGRESS NOTES
Pulse oximetry assessment   92% at rest on room air (if 88% or less, skip next steps)  86% while ambulating on room air 'S-150'S while ambulating    92% while ambulating on 2 LPM

## 2025-03-03 NOTE — PROGRESS NOTES
troponin  Troponin was elevated 90 at the time of presentation.  Will repeat troponin tomorrow morning.  2/27: Patient had A-fib when she was admitted.  Initial troponin was elevated and the test ended down this morning to 28.  Cardiology was consulted.  3/1  3/2: Appreciate input from cardiology,: Waiting for cardiology input.  Continue with therapeutic anticoagulation for PE and no need for heart rate control agent at this point.     Hypokalemia  Hypophosphatemia  Potassium of 2.8 and will give a total of 120 KCl orally.  Will check for magnesium level.  2/27: Potassium is up at 4.5, magnesium is 2.1.  Phosphorus of 2.2 and will give 8 packets of K-Phos.  2/28: Phosphorus is up at 3.2  3/1: Potassium is 3.5 and will give 40 KCl.  Magnesium is 1.8 and will give 1 g of IV mag sulfate, phosphorus is 3.3.     Leukocytosis  White count of 13.8 which is actually downtrending.  Continue to monitor CBC.  2/28: Count is down to 12.6.  3/1: White count is down to 8.9.     Hx diverticulosis  Proctitis  Patient having watery diarrhea and reports RLQ pain secondary to proctitis.  Will continue with IV Zosyn.     History of uterovaginal prolapse  IUD  Recurrent UTI  OB/GYN is consulted  2/27: I spoke with the OB/GYN on-call and he recommended getting transvaginal ultrasound.  Son is worried about recurrent UTI as a result of UV prolapse and would like OB/GYN to evaluate the patient.  2/28: Appreciate input from OB/GYN, IUD in proper position, no need for any active intervention at this point.  Patient will need to follow-up with them outpatient to discuss about UV prolapse and recurrent UTI.  3/2: Family concerned about BP prolapse and recurrent UTI.  This is something that needs to be addressed outpatient with OB/GYN.  They understand.    Sacral ulcer  2/28: Pt has nonblanchable purple/maroon discoloration to coccyx/buttocks/sacrum area. Ordered wound care, venelex, & an iso-air bed.            Medical Decision Making:   I  personally reviewed labs:   I personally reviewed imaging:   I personally reviewed EKG:  Toxic drug monitoring: H&H while patient is on Eliquis  Discussed case with: Patient, RN, son at the bedside.        Code Status: Full code  DVT Prophylaxis: Eliquis  GI Prophylaxis:  Baseline: Patient lives with his son and daughter-in-law  Son: Chance Serrano [phone]    Subjective:     Chief Complaint / Reason for Physician Visit  \" Follow-up for submassive pulmonary embolism, acute hypoxic respiratory failure, concern for right heart strain, elevated troponin, hypokalemia, leukocytosis, history of diverticulosis, proctitis, history of uterovaginal prolapse and IUD.\".  Discussed with RN events overnight.       Objective:     VITALS:   Last 24hrs VS reviewed since prior progress note. Most recent are:  Patient Vitals for the past 24 hrs:   BP Temp Temp src Pulse Resp SpO2 Weight   03/03/25 0600 -- -- -- -- -- -- 60.2 kg (132 lb 11.5 oz)   03/03/25 0315 (!) 118/55 97.8 °F (36.6 °C) Oral 90 17 91 % --   03/02/25 2337 (!) 118/58 97.8 °F (36.6 °C) Oral 96 16 93 % --   03/02/25 2025 129/71 97.7 °F (36.5 °C) Oral 91 16 92 % --   03/02/25 1552 -- 98.3 °F (36.8 °C) Oral -- -- 95 % --   03/02/25 1220 127/69 98 °F (36.7 °C) Oral 87 -- 91 % --   03/02/25 0720 119/70 98.4 °F (36.9 °C) Oral 96 18 93 % --         Intake/Output Summary (Last 24 hours) at 3/3/2025 0657  Last data filed at 3/3/2025 0512  Gross per 24 hour   Intake 406.37 ml   Output 1350 ml   Net -943.63 ml        I had a face to face encounter and independently examined this patient on 3/3/2025, as outlined below:  PHYSICAL EXAM:  General: Alert, cooperative  EENT:  EOMI. Anicteric sclerae.  Resp:  2 L oxygen, CTA bilaterally, no wheezing or rales.  No accessory muscle use  CV:  Regular  rhythm,  No edema  GI:  Soft, Non distended, Non tender.  +Bowel sounds  Neurologic:  Alert and oriented X 3, normal speech,   Psych:   Good insight. Not anxious nor agitated  Skin:  No

## 2025-03-03 NOTE — PLAN OF CARE
Problem: Occupational Therapy - Adult  Goal: By Discharge: Performs self-care activities at highest level of function for planned discharge setting.  See evaluation for individualized goals.  Description: FUNCTIONAL STATUS PRIOR TO ADMISSION: Per patient she was ambulatory without an AD and performing ADLs at an independent to mod I for all ADLs, but has been very weak since DC from [facility] a few weeks ago.     HOME SUPPORT PRIOR TO ADMISSION: The patient lived with son and DIL who assist her as needed. Owns RW, w/c, bedside commode and shower chair but she hadn't been using it.     Occupational Therapy Goals:  Initiated 2/28/2025  1.  Patient will perform grooming standing at sink with Stand by Assist within 7 day(s).  2.  Patient will perform upper body dressing with Set-up and Supervision within 7 day(s).  3.  Patient will perform lower body dressing with Stand by Assist and Set-up within 7 day(s).  4.  Patient will perform toilet transfers with Stand by Assist  within 7 day(s).  5.  Patient will perform all aspects of toileting with Stand by Assist within 7 day(s).  6.  Patient will sponge bathing with Stand by Assist and Set-up within 7 day(s).     Outcome: Progressing   OCCUPATIONAL THERAPY TREATMENT  Patient: Esmer Serrano (89 y.o. female)  Date: 3/3/2025  Primary Diagnosis: Acute massive pulmonary embolism (HCC) [I26.99]       Precautions: Fall Risk                Chart, occupational therapy assessment, plan of care, and goals were reviewed.    ASSESSMENT  Patient continues to benefit from skilled OT services and is progressing towards goals. Patient received in recliner, agreeable to session and cleared by nurse who notes just completing oxygen challenge. Patient on room air, oxygen 95% seated. Desaturates to 86% on room air with self care/mobilization to bathroom, recovering to 93% with 2 L oxygen. HR up to 150 with activity, 135-140 majority of session. Patient overall CGA to SBA for bathing and dressing

## 2025-03-03 NOTE — PROGRESS NOTES
End of Shift Note    Bedside shift change report given to ABRAM Lomax (oncoming nurse) by Daniel Murray RN (offgoing nurse).  Report included the following information SBAR, Kardex, Intake/Output, and MAR    Shift worked:  7a-7p     Shift summary and any significant changes:     Pt is alert orientation. No pain complained during my shift.      Concerns for physician to address:       Zone phone for oncoming shift:          Activity:     Number times ambulated in hallways past shift: 0  Number of times OOB to chair past shift: 1    Cardiac:   Cardiac Monitoring: Yes      Cardiac Rhythm: Sinus rhythm    Access:  Current line(s): PIV     Genitourinary:   Urinary Status: Voiding, External catheter    Respiratory:   O2 Device: None (Room air)  Chronic home O2 use?: NO  Incentive spirometer at bedside: YES    GI:  Last BM (including prior to admit): 02/27/25  Current diet:  ADULT DIET; Regular; Low Fat/Low Chol/High Fiber/2 gm Na  Passing flatus: YES    Pain Management:   Patient states pain is manageable on current regimen: YES    Skin:  Jhon Scale Score: 17  Interventions: Wound Offloading (Prevention Methods): Bed, pressure reduction mattress    Patient Safety:  Fall Risk: Nursing Judgement-Fall Risk High(Add Comments): Yes  Fall Risk Interventions  Nursing Judgement-Fall Risk High(Add Comments): Yes  Toilet Every 2 Hours-In Advance of Need: Yes  Hourly Visual Checks: In bed, Quiet  Fall Visual Posted: Armband, Fall sign posted  Room Door Open: Yes  Alarm On: Bed  Patient Moved Closer to Nursing Station: No    Active Consults:   IP CONSULT TO CRITICAL CARE  IP CONSULT TO OB GYN  IP CONSULT TO ONCOLOGY  IP CONSULT TO PULMONOLOGY  IP CONSULT TO CARDIOLOGY    Length of Stay:  Expected LOS: 7  Actual LOS: 5    Daniel Murray RN

## 2025-03-03 NOTE — PLAN OF CARE
Problem: Discharge Planning  Goal: Discharge to home or other facility with appropriate resources  3/3/2025 1212 by Ondina Douglas RN  Outcome: Progressing  3/3/2025 0354 by Sera Toney RN  Outcome: Progressing     Problem: Pain  Goal: Verbalizes/displays adequate comfort level or baseline comfort level  3/3/2025 1212 by Ondina Douglas RN  Outcome: Progressing  3/3/2025 0354 by Sera Toney RN  Outcome: Progressing     Problem: Skin/Tissue Integrity  Goal: Skin integrity remains intact  Description: 1.  Monitor for areas of redness and/or skin breakdown  2.  Assess vascular access sites hourly  3.  Every 4-6 hours minimum:  Change oxygen saturation probe site  4.  Every 4-6 hours:  If on nasal continuous positive airway pressure, respiratory therapy assess nares and determine need for appliance change or resting period  3/3/2025 1212 by Ondina Douglas RN  Outcome: Progressing  3/3/2025 0354 by Sera Toney RN  Outcome: Progressing  Flowsheets (Taken 3/2/2025 2337)  Skin Integrity Remains Intact: Monitor for areas of redness and/or skin breakdown     Problem: ABCDS Injury Assessment  Goal: Absence of physical injury  3/3/2025 1212 by Ondina Douglas RN  Outcome: Progressing  3/3/2025 0354 by Sera Toney RN  Outcome: Progressing     Problem: Safety - Adult  Goal: Free from fall injury  3/3/2025 1212 by Ondina Douglas RN  Outcome: Progressing  3/3/2025 0354 by Sera Toney RN  Outcome: Progressing     Problem: Risk for Elopement  Goal: Patient will not exit the unit/facility without proper excort  3/3/2025 1212 by Ondina Douglas RN  Outcome: Progressing  3/3/2025 0354 by Sera Toney RN  Outcome: Progressing  Flowsheets (Taken 3/2/2025 2337)  Nursing Interventions for Elopement Risk:   Assist with personal care needs such as toileting, eating, dressing, as needed to reduce the risk of wandering   Collaborate with treatment team for drug withdrawal symptoms treatment   Collaborate with family

## 2025-03-03 NOTE — PROGRESS NOTES
End of Shift Note    Bedside shift change report given to ABRAM Nj (oncoming nurse) by Sera Toney RN (offgoing nurse).  Report included the following information SBAR, Kardex, Intake/Output, and MAR    Shift worked:  11-7a     Shift summary and any significant changes:     Pt is alert orientation. No pain complained during my shift. O2 dropped around 3 am to 88-90% while asleep. Placed on 1L for comfort 02 93%     Concerns for physician to address:       Zone phone for oncoming shift:          Activity:     Number times ambulated in hallways past shift: 0  Number of times OOB to chair past shift: 1    Cardiac:   Cardiac Monitoring: Yes      Cardiac Rhythm: Sinus rhythm    Access:  Current line(s): PIV     Genitourinary:   Urinary Status: Voiding, External catheter    Respiratory:   O2 Device: None (Room air)  Chronic home O2 use?: NO  Incentive spirometer at bedside: YES    GI:  Last BM (including prior to admit): 02/27/25  Current diet:  ADULT DIET; Regular; Low Fat/Low Chol/High Fiber/2 gm Na  Passing flatus: YES    Pain Management:   Patient states pain is manageable on current regimen: YES    Skin:  Jhon Scale Score: 17  Interventions: Wound Offloading (Prevention Methods): Repositioning    Patient Safety:  Fall Risk: Nursing Judgement-Fall Risk High(Add Comments): Yes  Fall Risk Interventions  Nursing Judgement-Fall Risk High(Add Comments): Yes  Toilet Every 2 Hours-In Advance of Need: Yes  Hourly Visual Checks: Eyes closed, In bed  Fall Visual Posted: Armband  Room Door Open: Yes  Alarm On: Bed  Patient Moved Closer to Nursing Station: Yes    Active Consults:   IP CONSULT TO CRITICAL CARE  IP CONSULT TO OB GYN  IP CONSULT TO ONCOLOGY  IP CONSULT TO PULMONOLOGY  IP CONSULT TO CARDIOLOGY    Length of Stay:  Expected LOS: 7  Actual LOS: 6    Sera Toney RN

## 2025-03-03 NOTE — CARE COORDINATION
Patient is clear from a CM standpoint .    Transition of Care Plan: Return home with OP PT/OT script if attending is agreement.    RUR: 11% (low RUR)  Prior Level of Functioning: Assistance with bathing  Disposition: Return home with OP PT/OT script if attending is agreement.  REYNA: 3/3/2025  If SNF or IPR: Date FOC offered: N/A  Date FOC received: N/A  Accepting facility: N/A  Date authorization started with reference number: N/A  Date authorization received and expires: N/A  Follow up appointments: PCP/specialists if needed.  [facility] contact information listed on AVS.  DME needed: 2L O2 NC tank delivered at bedside through [employer] (contact information listed on AVS).  Patient has a cane, RW, rollator and BSC at home.  Transportation at discharge: Family at bedside.   IM/IMM Medicare/ letter given: 2nd IM done 3/3/2025.  Is patient a [place] and connected with [facility]? No.   If yes, was  transfer form completed and [facility] notified? N/A  Caregiver Contact: Sarai Castillo - [facility] - [phone]   Discharge Caregiver contacted prior to discharge? Patient to contact.  Care Conference needed? No.  Barriers to discharge: None.    1032 - Patient discussed in morning IDRs.  Attending would like for PT/OT to see the patient again.  CM asked for O2 challenge to be updated/repeated with the amount of oxygen needed to recover patient.    1325 - Message sent to attending asking for them to review O2 challenge and if they want CM to order 2L O2.      1403 - Attending agreeable to ordering 2L O2 NC.  Referral sent to [employer].  Patient/family notified at bedside.  They are aware to call for concentrator.  They are aware to ask new PCP for HH orders next week and message sent to attending to send patient with OP PT/OT script.    1522 - [employer] notified CM that tank was delivered at bedside.     03/03/25 1407   Services At/After Discharge   Transition of Care Consult (CM Consult) DME/Supply Assistance   Services At/After

## 2025-03-03 NOTE — DISCHARGE SUMMARY
Discharge Summary    Name: Esmer Serrano  [id number]  YOB: 1935 (Age: 89 y.o.)   Date of Admission: 2/25/2025  Date of Discharge: 3/3/2025  Attending Physician: Donna Varma MD    Discharge Diagnosis:   Submassive pulmonary embolism  Acute hypoxic respiratory failure  Concern for R heart strain  Paroxysmal atrial fibrillation   Elevated troponin  Hypokalemia  Hypophosphatemia  Leukocytosis  Hx diverticulosis  Proctitis  History of uterovaginal prolapse  IUD  Recurrent UTI  Sacral ulcer        Consultations:  IP CONSULT TO CRITICAL CARE  IP CONSULT TO OB GYN  IP CONSULT TO ONCOLOGY  IP CONSULT TO PULMONOLOGY  IP CONSULT TO CARDIOLOGY  IP CONSULT TO CASE MANAGEMENT      Brief Admission History/Reason for Admission Per Mainor Holguin MD: Esmer Serrano is an 89 yo female with a PMH of diverticulosis who presented to the ED with SOB. CT revealed moderate to large PE and concern for R heart strain. Trop 90. Initial BNP not performed. She has no history of stroke, cancer, blood clot, heart/cardiovascular disease, does not smoke and takes no medications at baseline. VS on presentation , RR 27, T 97.9 /70 O2 sat 94% on 6L/min NC. PESI 128. She received 1/2 dose TPA in the ED. Lower extremity DVT studies negative. She has been transferred to the ICU for continuation of care.         Brief Hospital Course by Main Problems:   Submassive pulmonary embolism  Acute hypoxic respiratory failure  Concern for R heart strain  - Received half dose TPA  - Heparin drip per protocol following TPA administration  - IR consulted - clot too distal to retrieve  - CTS consulted - no indication for ECMO at this time  - Maintain MAP >60; no pressors at this time  - Will need workup for etiology of clot  Doppler lower extremity negative for DVT.  Echocardiogram from 2/26/2025 shows EF of 60%, increased LV wall thickness, grade 1 diastolic dysfunction, mildly dilated RV, mildly  of 2.2 and will give 8 packets of K-Phos.  2/28: Phosphorus is up at 3.2  3/1: Potassium is 3.5 and will give 40 KCl.  Magnesium is 1.8 and will give 1 g of IV mag sulfate, phosphorus is 3.3.     Leukocytosis  White count of 13.8 which is actually downtrending.  Continue to monitor CBC.  2/28: Count is down to 12.6.  3/1: White count is down to 8.9.     Hx diverticulosis  Proctitis  Patient having watery diarrhea and reports RLQ pain secondary to proctitis.  Will continue with IV Zosyn.     History of uterovaginal prolapse  IUD  Recurrent UTI  OB/GYN is consulted  2/27: I spoke with the OB/GYN on-call and he recommended getting transvaginal ultrasound.  Son is worried about recurrent UTI as a result of UV prolapse and would like OB/GYN to evaluate the patient.  2/28: Appreciate input from OB/GYN, IUD in proper position, no need for any active intervention at this point.  Patient will need to follow-up with them outpatient to discuss about UV prolapse and recurrent UTI.  3/2: Family concerned about BP prolapse and recurrent UTI.  This is something that needs to be addressed outpatient with OB/GYN.  They understand.     Sacral ulcer  2/28: Pt has nonblanchable purple/maroon discoloration to coccyx/buttocks/sacrum area. Ordered wound care, venelex, & an iso-air bed.         Discharge Exam:  Patient seen and examined by me on discharge day.  Pertinent Findings:  Patient Vitals for the past 24 hrs:   BP Temp Temp src Pulse Resp SpO2 Weight   03/03/25 1145 108/64 97.7 °F (36.5 °C) Oral -- -- -- --   03/03/25 0745 -- 98 °F (36.7 °C) Oral -- -- 95 % --   03/03/25 0600 -- -- -- -- -- -- 60.2 kg (132 lb 11.5 oz)   03/03/25 0315 (!) 118/55 97.8 °F (36.6 °C) Oral 90 17 91 % --   03/02/25 2337 (!) 118/58 97.8 °F (36.6 °C) Oral 96 16 93 % --   03/02/25 2025 129/71 97.7 °F (36.5 °C) Oral 91 16 92 % --       Gen:    Not in distress  Chest: Clear lungs  CVS:   Regular rhythm.  No edema  Abd:  Soft, not distended, not tender  Neuro:

## 2025-03-03 NOTE — PLAN OF CARE
Problem: Occupational Therapy - Adult  Goal: By Discharge: Performs self-care activities at highest level of function for planned discharge setting.  See evaluation for individualized goals.  Description: FUNCTIONAL STATUS PRIOR TO ADMISSION: Per patient she was ambulatory without an AD and performing ADLs at an independent to mod I for all ADLs, but has been very weak since DC from [facility] a few weeks ago.     HOME SUPPORT PRIOR TO ADMISSION: The patient lived with son and DIL who assist her as needed. Owns RW, w/c, bedside commode and shower chair but she hadn't been using it.       Occupational Therapy Goals:  Initiated 2/28/2025  1.  Patient will perform grooming standing at sink with Stand by Assist within 7 day(s).  2.  Patient will perform upper body dressing with Set-up and Supervision within 7 day(s).  3.  Patient will perform lower body dressing with Stand by Assist and Set-up within 7 day(s).  4.  Patient will perform toilet transfers with Stand by Assist  within 7 day(s).  5.  Patient will perform all aspects of toileting with Stand by Assist within 7 day(s).  6.  Patient will sponge bathing with Stand by Assist and Set-up within 7 day(s).     3/3/2025 1300 by Tawnya Castro OT  Outcome: Progressing     Problem: Occupational Therapy - Adult  Goal: By Discharge: Performs self-care activities at highest level of function for planned discharge setting.  See evaluation for individualized goals.  Description: FUNCTIONAL STATUS PRIOR TO ADMISSION: Per patient she was ambulatory without an AD and performing ADLs at an independent to mod I for all ADLs, but has been very weak since DC from [facility] a few weeks ago.     HOME SUPPORT PRIOR TO ADMISSION: The patient lived with son and DIL who assist her as needed. Owns RW, w/c, bedside commode and shower chair but she hadn't been using it.       Occupational Therapy Goals:  Initiated 2/28/2025  1.  Patient will perform grooming standing at sink with Stand by Assist  within 7 day(s).  2.  Patient will perform upper body dressing with Set-up and Supervision within 7 day(s).  3.  Patient will perform lower body dressing with Stand by Assist and Set-up within 7 day(s).  4.  Patient will perform toilet transfers with Stand by Assist  within 7 day(s).  5.  Patient will perform all aspects of toileting with Stand by Assist within 7 day(s).  6.  Patient will sponge bathing with Stand by Assist and Set-up within 7 day(s).     3/3/2025 1300 by Tawnya Castro OT  Outcome: Progressing

## 2025-03-03 NOTE — WOUND CARE
Wound care nurse consult for sacrum/buttocks wounds.    89 y/o female admitted for acute massive pulmonary embolism.  Past Medical History:   Diagnosis Date    Diverticulosis      Past Surgical History:   Procedure Laterality Date    CATARACT REMOVAL Left 12/01/2022    COLONOSCOPY Left 03/26/2019    COLONOSCOPY performed by Dimas Diaz MD at [facility] ENDOSCOPY    CT PERITONEAL/RETROPERITONEAL PERC DRAIN  3/22/2019    CT RETROPERITONEAL PERC DRAIN 3/22/2019 [facility] RAD CT    HERNIA REPAIR  02/22/2021    Open Right Femoral Hernia Repair with mesh-[facility]-Dr. MANDI Gavin    LYSIS OF ADHESIONS  03/27/2019    Laparoscopic drainage of pelvic abscess x 2 with lysis of adhesions    TONSILLECTOMY        POA Bilateral buttocks blanchable abraded areas from chronic friction and moisture. Purewick in use.    Calloused \"corns\" to 1st metatarsal head and 5th metatarsal head of left foot.    Wound Care Documentation:  Wound 02/28/25 Sacrum Posterior moisture and friction wounds (Active)   Wound Image   02/28/25 1458   Wound Etiology Other 03/03/25 1551   Dressing Status Clean;Dry;Intact 03/02/25 2000   Wound Cleansed Soap and water 03/02/25 0836   Dressing/Treatment Pharmaceutical agent (see MAR) 03/03/25 1551   Wound Assessment Erythema 03/03/25 1551   Drainage Amount None (dry) 03/03/25 1551   Odor None 03/03/25 1551   Patti-wound Assessment Blanchable erythema 03/03/25 1551   Wound Thickness Description not for Pressure Injury Partial thickness 03/03/25 1551   Number of days: 3     Recommend:    Sacrum/Buttocks abraded skin from friction and moisture: BID, cleanse with soap and water, pat dry and apply Venelex/BPCO ointment to wounds and may cover with a sacral foam dressing.    .RACHEAL CELESTE RN

## 2025-03-26 ENCOUNTER — OFFICE VISIT (OUTPATIENT)
Age: 89
End: 2025-03-26
Payer: MEDICARE

## 2025-03-26 VITALS
HEART RATE: 114 BPM | DIASTOLIC BLOOD PRESSURE: 81 MMHG | HEIGHT: 67 IN | RESPIRATION RATE: 16 BRPM | SYSTOLIC BLOOD PRESSURE: 135 MMHG | BODY MASS INDEX: 22.13 KG/M2 | OXYGEN SATURATION: 97 % | TEMPERATURE: 97.7 F | WEIGHT: 141 LBS

## 2025-03-26 DIAGNOSIS — Z91.89 AT HIGH RISK FOR BLEEDING: ICD-10-CM

## 2025-03-26 DIAGNOSIS — I26.09 ACUTE PULMONARY EMBOLISM WITH ACUTE COR PULMONALE, UNSPECIFIED PULMONARY EMBOLISM TYPE (HCC): Primary | ICD-10-CM

## 2025-03-26 PROCEDURE — 1159F MED LIST DOCD IN RCRD: CPT | Performed by: STUDENT IN AN ORGANIZED HEALTH CARE EDUCATION/TRAINING PROGRAM

## 2025-03-26 PROCEDURE — G8427 DOCREV CUR MEDS BY ELIG CLIN: HCPCS | Performed by: STUDENT IN AN ORGANIZED HEALTH CARE EDUCATION/TRAINING PROGRAM

## 2025-03-26 PROCEDURE — 99214 OFFICE O/P EST MOD 30 MIN: CPT | Performed by: STUDENT IN AN ORGANIZED HEALTH CARE EDUCATION/TRAINING PROGRAM

## 2025-03-26 PROCEDURE — 1123F ACP DISCUSS/DSCN MKR DOCD: CPT | Performed by: STUDENT IN AN ORGANIZED HEALTH CARE EDUCATION/TRAINING PROGRAM

## 2025-03-26 PROCEDURE — G8420 CALC BMI NORM PARAMETERS: HCPCS | Performed by: STUDENT IN AN ORGANIZED HEALTH CARE EDUCATION/TRAINING PROGRAM

## 2025-03-26 PROCEDURE — 1036F TOBACCO NON-USER: CPT | Performed by: STUDENT IN AN ORGANIZED HEALTH CARE EDUCATION/TRAINING PROGRAM

## 2025-03-26 PROCEDURE — 1111F DSCHRG MED/CURRENT MED MERGE: CPT | Performed by: STUDENT IN AN ORGANIZED HEALTH CARE EDUCATION/TRAINING PROGRAM

## 2025-03-26 PROCEDURE — 1126F AMNT PAIN NOTED NONE PRSNT: CPT | Performed by: STUDENT IN AN ORGANIZED HEALTH CARE EDUCATION/TRAINING PROGRAM

## 2025-03-26 PROCEDURE — 1090F PRES/ABSN URINE INCON ASSESS: CPT | Performed by: STUDENT IN AN ORGANIZED HEALTH CARE EDUCATION/TRAINING PROGRAM

## 2025-03-26 RX ORDER — METHENAMINE HIPPURATE 1000 MG/1
TABLET ORAL
COMMUNITY
Start: 2025-03-25

## 2025-03-26 RX ORDER — MIRTAZAPINE 15 MG/1
TABLET, FILM COATED ORAL
COMMUNITY
Start: 2025-03-23

## 2025-03-26 NOTE — PROGRESS NOTES
Esmer Serrano is a 89 y.o. female who presents for follow up of   Chief Complaint   Patient presents with    New Patient     PE       The patient is here today for an evaluation. She reports  sob. She denies abnormal bleeding or bruising. Her heart rate is elevated she has a cardiology apt. next week.      Medications reviewed with the patient, and chart updated to reflect changes.        
SpO2 Weight   02/27/25 0815 -- -- -- 74 20 92 % --   02/27/25 0800 100/76 -- -- 79 20 (!) 86 % --   02/27/25 0745 -- -- -- 82 22 91 % --   02/27/25 0730 -- -- -- 72 20 98 % --   02/27/25 0715 -- -- -- 69 22 95 % --   02/27/25 0700 (!) 99/57 98 °F (36.7 °C) Oral 76 20 97 % --   02/27/25 0600 100/60 -- -- 76 17 93 % 64.3 kg (141 lb 12.1 oz)   02/27/25 0500 (!) 93/51 -- -- 70 18 97 % --   02/27/25 0402 (!) 92/46 98 °F (36.7 °C) Oral 69 16 96 % --   02/27/25 0400 -- -- -- 70 -- -- --       Lab Results   Component Value Date    WBC 11.0 03/02/2025    HGB 11.0 (L) 03/02/2025    HCT 34.9 (L) 03/02/2025    MCV 98.6 03/02/2025     03/02/2025       Physical Exam:   /81 (BP Site: Left Upper Arm, Patient Position: Sitting)   Pulse (!) 114   Temp 97.7 °F (36.5 °C) (Temporal)   Resp 16   Ht 1.702 m (5' 7\")   Wt 64 kg (141 lb)   SpO2 97%   BMI 22.08 kg/m²   General appearance: alert, appears stated age, cooperative, and frail appearing  Abdomen: soft, non-tender; bowel sounds normal; no masses,  no organomegaly  Skin: Skin color, texture, turgor normal. No rashes or lesions  Neurologic: Grossly normal    Assessment:     Provoked submassive pulmonary embolism      Provoked by sedentary lifestyle over the last few months given the pain related to uterine prolapse  Also presented in atrial fibrillation to ED   Unclear if patient has a history of chronic A-fib as she does not see a physician regularly or had a PCP  Lower extremity Dopplers negative for DVT  Received Dose of tPA      Now stable on eliquis 5 mg BID.  No excessive bleeding/bruising. Tolerating well.     Continue for at least 3 mo.  She is very high risk for bleeding/falls.          Plan:     Discussed patient is a high risk for AC due to age, frailty.  Recommend anticoagulation for at least 3 months repeat DDIMER prior to VV follow up   Return in about 2 months (around 5/26/2025) for ok for virtual visit, ok to be seen by NP, needs labs just prior to

## 2025-04-09 NOTE — PROGRESS NOTES
Physician Progress Note      PATIENT:               VALE PANDEY  CSN #:                  [id number]  :                       1935  ADMIT DATE:       2025 6:02 PM  DISCH DATE:        3/3/2025 4:40 PM  RESPONDING  PROVIDER #:        Donna Varma MD          QUERY TEXT:    Malnutrition is documented in the medical record 3/1/25 RD Comprehensive   Nutrition Assessment.  Please specify the degree/type:    The clinical indicators include:  3/1/25 RD Comprehensive Nutrition Assessment  Malnutrition Assessment:  Malnutrition Status:  Severe malnutrition (25 1055)  Context:  Acute Illness  Findings of the 6 clinical characteristics of malnutrition:  Energy Intake:  50% or less of estimated energy requirements for 5 or more   days  Weight Loss:  Greater than 7.5% over 3 months  Body Fat Loss:  Moderate body fat loss Orbital, Triceps  Muscle Mass Loss:  Moderate muscle mass loss Temples (temporalis), Clavicles   (pectoralis & deltoids), Hand (interosseous), Scapula (trapezius)  Fluid Accumulation:  No fluid accumulation   Strength:  Not Performed    Nutrition Recommendations/Plan:  Continue current diet.  May have Boost shakes brought in from home. Pt dislikes Ensure.  Please document % meals and supplements consumed in flowsheet I/O's under   intake    Discharge Diagnosis:  Submassive pulmonary embolism  Acute hypoxic respiratory failure  Options provided:  -- Severe Malnutrition  -- Other - I will add my own diagnosis  -- Disagree - Not applicable / Not valid  -- Disagree - Clinically unable to determine / Unknown  -- Refer to Clinical Documentation Reviewer    PROVIDER RESPONSE TEXT:    This patient has severe malnutrition.    Query created by: Jessi Pop on 2025 3:17 PM      Electronically signed by:  Donna Varma MD 2025 6:36 PM

## (undated) DEVICE — REM POLYHESIVE ADULT PATIENT RETURN ELECTRODE: Brand: VALLEYLAB

## (undated) DEVICE — NEEDLE HYPO 22GA L1.5IN BLK S STL HUB POLYPR SHLD REG BVL

## (undated) DEVICE — CONTAINER SPEC 20 ML LID NEUT BUFF FORMALIN 10 % POLYPR STS

## (undated) DEVICE — INFECTION CONTROL KIT SYS

## (undated) DEVICE — DRAPE,ROBOTICS,STERILE: Brand: MEDLINE

## (undated) DEVICE — SUTURE PDS II SZ 0 L36IN ABSRB VLT L36MM CT-1 1/2 CIR Z346H

## (undated) DEVICE — CANN NASAL O2 CAPNOGRAPHY AD -- FILTERLINE

## (undated) DEVICE — TRAP SURG QUAD PARABOLA SLOT DSGN SFTY SCRN TRAPEASE

## (undated) DEVICE — DERMABOND SKIN ADH 0.7ML -- DERMABOND ADVANCED 12/BX

## (undated) DEVICE — SOL IRR SOD CL 0.9% 1000ML BTL --

## (undated) DEVICE — Device

## (undated) DEVICE — STERILE POLYISOPRENE POWDER-FREE SURGICAL GLOVES WITH EMOLLIENT COATING: Brand: PROTEXIS

## (undated) DEVICE — COLON CLOSING PACK: Brand: MEDLINE INDUSTRIES, INC.

## (undated) DEVICE — Device: Brand: MEDICAL ACTION INDUSTRIES

## (undated) DEVICE — TRAY CATH OD16FR SIL URIN M STATLOK STBL DEV SURSTP

## (undated) DEVICE — PREP SKN CHLRAPRP APL 26ML STR --

## (undated) DEVICE — SOLUTION IV 250ML 0.9% SOD CHL CLR INJ FLX BG CONT PRT CLSR

## (undated) DEVICE — SYR 50ML SLIP TIP NSAF LF STRL --

## (undated) DEVICE — UNIVERSAL FIXATION CANNULA: Brand: VERSAONE

## (undated) DEVICE — VISUALIZATION SYSTEM: Brand: CLEARIFY

## (undated) DEVICE — SOLIDIFIER FLUID 3000 CC ABSORB

## (undated) DEVICE — ROCKER SWITCH PENCIL BLADE ELECTRODE, HOLSTER: Brand: EDGE

## (undated) DEVICE — WRISTBAND ID AD W1XL11.5IN RED POLY ALRG PREPRINTED PERM

## (undated) DEVICE — SUTURE PERMAHAND SZ 3-0 L30IN NONABSORBABLE BLK SILK BRAID A304H

## (undated) DEVICE — WRAP SURG W1.31XL1.34M CARD FOR PT 165-172CM THERMOWRP

## (undated) DEVICE — BAG BELONG PT PERS CLEAR HANDL

## (undated) DEVICE — DRAIN WND RND 19FR FULL FLTD --

## (undated) DEVICE — TUBING INSUFLTN 10FT LUER -- CONVERT TO ITEM 368568

## (undated) DEVICE — CATHETER ETER IV 22GA L1IN POLYUR STR RADPQ INTROCAN SFTY

## (undated) DEVICE — MEDI-VAC NON-CONDUCTIVE SUCTION TUBING: Brand: CARDINAL HEALTH

## (undated) DEVICE — SUTURE VCRL SZ 3-0 L27IN ABSRB UD L26MM SH 1/2 CIR J416H

## (undated) DEVICE — PACK,LAPAROTOMY,2 REINFORCED GOWNS: Brand: MEDLINE

## (undated) DEVICE — GLOVE SURG SZ 75 CRM LTX FREE POLYISOPRENE POLYMER BEAD ANTI

## (undated) DEVICE — 3000CC GUARDIAN II: Brand: GUARDIAN

## (undated) DEVICE — SURGICAL PROCEDURE PACK CATRCT CUST

## (undated) DEVICE — DEVON™ KNEE AND BODY STRAP 60" X 3" (1.5 M X 7.6 CM): Brand: DEVON

## (undated) DEVICE — CLICKLINE SCISSORS INSERT: Brand: CLICKLINE

## (undated) DEVICE — SUTURE VCRL SZ 10-0 L12IN ABSRB VLT L5.5MM CS160-6 1/2 CIR V448G

## (undated) DEVICE — SURGICAL PROCEDURE KIT GEN LAPAROSCOPY LF

## (undated) DEVICE — QUILTED PREMIUM COMFORT UNDERPAD,EXTRA HEAVY: Brand: WINGS

## (undated) DEVICE — Z DISCONTINUED NO SUB IDED SET EXTN W/ 4 W STPCOCK M SPIN LOK 36IN

## (undated) DEVICE — TOWEL SURG W17XL27IN STD BLU COT NONFENESTRATED PREWASHED

## (undated) DEVICE — KENDALL DL 5 LEADS DUAL CONNECT SYSTEM BLENDED CASE - SINGLE-PATIENT-USE: Brand: SUSTAINABLE TECHNOLOGIES

## (undated) DEVICE — SYR LR LCK 1ML GRAD NSAF 30ML --

## (undated) DEVICE — VISCOAT 0.50ML 27G<USA: Brand: VISCOAT

## (undated) DEVICE — 40580 - THE PINK PAD - ADVANCED TRENDELENBURG POSITIONING KIT: Brand: 40580 - THE PINK PAD - ADVANCED TRENDELENBURG POSITIONING KIT

## (undated) DEVICE — SUTURE VCRL SZ 2-0 L27IN ABSRB UD L26MM SH 1/2 CIR J417H

## (undated) DEVICE — CONNECTOR TBNG AUX H2O JET DISP FOR OLY 160/180 SER

## (undated) DEVICE — STRAP,POSITIONING,KNEE/BODY,FOAM,4X60": Brand: MEDLINE

## (undated) DEVICE — BAG SPEC BIOHZD LF 2MIL 6X10IN -- CONVERT TO ITEM 357326

## (undated) DEVICE — SET ADMIN 16ML TBNG L100IN 2 Y INJ SITE IV PIGGY BK DISP

## (undated) DEVICE — DUOVISC (0.55PRO/0.50VIS): Brand: DUOVISC

## (undated) DEVICE — SUT SLK 2-0SH 30IN BLK --

## (undated) DEVICE — GARMENT,MEDLINE,DVT,INT,CALF,MED, GEN2: Brand: MEDLINE

## (undated) DEVICE — KENDALL RADIOLUCENT FOAM MONITORING ELECTRODE -RECTANGULAR SHAPE: Brand: KENDALL

## (undated) DEVICE — HANDLE LT SNAP ON ULT DURABLE LENS FOR TRUMPF ALC DISPOSABLE

## (undated) DEVICE — SURGICAL PROCEDURE PACK EYE CUST DR CHNDLR

## (undated) DEVICE — NEONATAL-ADULT SPO2 SENSOR: Brand: NELLCOR

## (undated) DEVICE — AIRLIFE™ U/CONNECT-IT OXYGEN TUBING 7 FEET (2.1 M) CRUSH-RESISTANT OXYGEN TUBING, VINYL TIPPED: Brand: AIRLIFE™

## (undated) DEVICE — SYR 10ML LUER LOK 1/5ML GRAD --

## (undated) DEVICE — SUTURE PROL SZ 2-0 L36IN NONABSORBABLE BLU SH L26MM 1/2 CIR 8523H

## (undated) DEVICE — SOLUTION IRRIGATION NACL 0.9% 1000 ML FLX CONTAINER

## (undated) DEVICE — THE MONARCH® "D" CARTRIDGE IS A SINGLE-USE POLYPROPYLENE CARTRIDGE FOR POSTERIOR CHAMBER IOL DELIVERY: Brand: MONARCH® III

## (undated) DEVICE — SUTURE MCRYL SZ 4-0 L27IN ABSRB UD L19MM PS-2 1/2 CIR PRIM Y426H

## (undated) DEVICE — SHEAR RMFG HARMONIC 5MMX36CM -- LAWSON OEM ITEM 322219

## (undated) DEVICE — Z DISCONTINUED USE 2751540 TUBING IRRIG L10IN DISP PMP ENDOGATOR

## (undated) DEVICE — SYRINGE MED 20ML STD CLR PLAS LUERLOCK TIP N CTRL DISP

## (undated) DEVICE — KENDALL SCD EXPRESS SLEEVES, KNEE LENGTH, MEDIUM: Brand: KENDALL SCD

## (undated) DEVICE — (D)PREP SKN CHLRAPRP APPL 26ML -- CONVERT TO ITEM 371833

## (undated) DEVICE — ENDO CARRY-ON PROCEDURE KIT INCLUDES ENZYMATIC SPONGE, GAUZE, BIOHAZARD LABEL, TRAY, LUBRICANT, DIRTY SCOPE LABEL, WATER LABEL, TRAY, DRAWSTRING PAD, AND DEFENDO 4-PIECE KIT.: Brand: ENDO CARRY-ON PROCEDURE KIT

## (undated) DEVICE — SUT ETHLN 2-0 18IN FS BLK --

## (undated) DEVICE — SOLUTION IRRIG 500ML STRL H2O NONPYROGENIC

## (undated) DEVICE — BLADELESS OPTICAL TROCAR WITH FIXATION CANNULA: Brand: VERSAPORT

## (undated) DEVICE — 3M™ CUROS™ DISINFECTING CAP STRIP FOR NEEDLELESS CONNECTORS US - CFF10- 250: Brand: CUROS™

## (undated) DEVICE — STERILE-Z MAYO STAND COVERS CLEAR POLYETHYLENE STERILE UNIVERSAL FIT 20 PER CASE: Brand: STERILE-Z

## (undated) DEVICE — 1200 GUARD II KIT W/5MM TUBE W/O VAC TUBE: Brand: GUARDIAN

## (undated) DEVICE — CATH IV AUTOGRD BC BLU 22GA 25 -- INSYTE

## (undated) DEVICE — MEDI-VAC YANK SUCT HNDL W/TPRD BULBOUS TIP: Brand: CARDINAL HEALTH

## (undated) DEVICE — BW-412T DISP COMBO CLEANING BRUSH: Brand: SINGLE USE COMBINATION CLEANING BRUSH

## (undated) DEVICE — SURGICAL PROCEDURE PACK BASIN MAJ SET CUST NO CAUT

## (undated) DEVICE — CENTURION ULTRAVIT ANTERIOR VITRECTOMY PROBE: Brand: ALCON, CENTURION, ULTRAVIT

## (undated) DEVICE — NEEDLE HYPO 18GA L1.5IN PNK S STL HUB POLYPR SHLD REG BVL

## (undated) DEVICE — BLADELESS OPTICAL TROCAR WITH FIXATION CANNULA: Brand: VERSAONE